# Patient Record
Sex: MALE | Race: WHITE | NOT HISPANIC OR LATINO | Employment: UNEMPLOYED | URBAN - METROPOLITAN AREA
[De-identification: names, ages, dates, MRNs, and addresses within clinical notes are randomized per-mention and may not be internally consistent; named-entity substitution may affect disease eponyms.]

---

## 2023-08-23 NOTE — PROVIDER TRANSFER
(Physician in Lead of Transfers)  Outside Transfer Acceptance Note / Regional Referral Center    Upon patient arrival to floor, please send SecureChat to [facility] or call extension [phone] (if no answer, do NOT leave a callback number after the beep, rather please send a SecureChat to [facility]), for Hospital Medicine admit team assignment and for additional admit orders for the patient.  Do not page the attending physician associated with the patient on arrival (this physician may not be on duty at the time of arrival).  Rather, always send a SecureChat to [facility] or call [phone] to reach the triage physician for orders and team assignment.    Referring facility:  [place], [place]  Referring provider: CHRISTINE ONOFRE  Accepting facility: [facility]  Accepting provider: MARCIN MARIE  Reason for transfer: Higher Level of Care   Transfer diagnosis: Decompensated Alcoholic Cirrhosis  Transfer specialty requested: Hepatology  Transfer specialty notified: Yes  Transfer level: NUMBER 1-5: 2  Bed type requested: stepdown  Isolation status: No active isolations   Admission class or status: IP- Inpatient      Narrative     38-year-old male with a history of alcoholic cirrhosis admitted to [facility] in [place] on August 14 with increasing abdominal girth, somnolence, and weakness.  He underwent paracentesis.  Admit diagnoses included alcoholic cirrhosis with ascites, hyponatremia, hypokalemia, thrombocytopenia, and AYAH.  During his stay he had intermittent fevers. Infectious Diseases evaluated him for fever and elevated Fungitell.  It was noted that blood and urine culture along with chest x-ray were negative.  They felt the Fungitell was nonspecific and the patient had no clear evidence of fungal infection.  HIV, RPR, and respiratory viral panels were noted to be negative.  MRI of the abdomen on August 20 had no cholelithiasis or biliary dilatation.   He remains on Rocephin currently.     Encephalopathy improved during his stay.  Current diagnoses included acute liver injury with decompensated cirrhosis, acute on chronic anemia, SIRS (no obvious source of infection) nonbleeding varices, and improving (but not resolved) encephalopathy.  He noted last alcohol intake was around July 7.     Hemoglobin remained low during his stay with levels ranging 6.6-8.0.  He had no overt evidence of bleeding. They noted fibrinogen and haptoglobin were low and LDH was elevated.  Ceruloplasmin is pending. EGD on August 21 had 1 column of large varices with no bleeding and no stigmata of recent bleeding in the lower 3rd of the esophagus.  No red Rigoberto sign present.  Banding was not performed.  Moderate portal hypertensive gastropathy in the entire examined stomach.  Examined duodenum was normal.    He remains hemodynamically stable in a [facility] bed.  He is awake and alert.  Will plan transfer to Hospital Medicine at [facility] for Hepatology evaluation.    August 22: White blood cells 8.8 (down from 14.4 on August 17), hemoglobin 6.9, hematocrit 20, platelets 40, sodium 134, potassium 3.3, chloride 104, CO2 18, BUN 9, creatinine 0.72, glucose 94, INR 3, total bilirubin 34.8, AST 45, ALT 22    August 20: RPR nonreactive    August 19: HIV nonreactive, COVID negative, influenza negative    August 18 urine culture had no growth    August 16: Acute hepatitis panel nonreactive, blood cultures had no growth at 5 days  -chest x-ray had no acute cardiopulmonary abnormality.    August 15: Abdominal ultrasound had cirrhosis without obvious focal mass.  No bile duct dilation.  Gallbladder is nondistended but contains sludge.  Gallbladder wall is thickened likely reactive to hepatic disease.  Moderate volume of ascites.  Splenomegaly.    August 14:  Peritoneal fluid culture had no growth after 3 days  -white blood cells 16.3, hemoglobin 10.4, hematocrit 28.6, platelets 66, sodium 124,  potassium 2.8, chloride 88, CO2 25, BUN 28, creatinine 1.48, glucose 96, INR 2.1    Objective     Vitals:  Temperature 99.1°, pulse 98, respirations 21, blood pressure 131/70, O2 sats 100%    Instructions    Admit to Hospital Medicine  Consult Hepatology      NADEGE Martínez MD  Hospital Medicine Staff  Cell: [phone]

## 2023-08-24 ENCOUNTER — HOSPITAL ENCOUNTER (INPATIENT)
Facility: HOSPITAL | Age: 38
LOS: 21 days | Discharge: HOME | DRG: 006 | End: 2023-09-14
Attending: INTERNAL MEDICINE | Admitting: INTERNAL MEDICINE
Payer: COMMERCIAL

## 2023-08-24 DIAGNOSIS — T38.0X5A STEROID-INDUCED HYPERGLYCEMIA: ICD-10-CM

## 2023-08-24 DIAGNOSIS — Z01.818 PRE-OP EVALUATION: ICD-10-CM

## 2023-08-24 DIAGNOSIS — B37.9 CANDIDIASIS: ICD-10-CM

## 2023-08-24 DIAGNOSIS — I49.9 ARRHYTHMIA: ICD-10-CM

## 2023-08-24 DIAGNOSIS — T38.0X5A ADRENAL CORTICAL STEROIDS CAUSING ADVERSE EFFECT IN THERAPEUTIC USE: ICD-10-CM

## 2023-08-24 DIAGNOSIS — D69.6 THROMBOCYTOPENIA, UNSPECIFIED: ICD-10-CM

## 2023-08-24 DIAGNOSIS — K72.90 DECOMPENSATED HEPATIC CIRRHOSIS: ICD-10-CM

## 2023-08-24 DIAGNOSIS — K74.60 DECOMPENSATED HEPATIC CIRRHOSIS: ICD-10-CM

## 2023-08-24 DIAGNOSIS — F10.21 ALCOHOL USE DISORDER, SEVERE, IN EARLY REMISSION: Chronic | ICD-10-CM

## 2023-08-24 DIAGNOSIS — E87.20 METABOLIC ACIDOSIS: ICD-10-CM

## 2023-08-24 DIAGNOSIS — E44.0 MODERATE MALNUTRITION: ICD-10-CM

## 2023-08-24 DIAGNOSIS — Z79.899 HIGH RISK MEDICATION USE: ICD-10-CM

## 2023-08-24 DIAGNOSIS — R73.9 STEROID-INDUCED HYPERGLYCEMIA: ICD-10-CM

## 2023-08-24 DIAGNOSIS — K70.31 ALCOHOLIC CIRRHOSIS OF LIVER WITH ASCITES: ICD-10-CM

## 2023-08-24 DIAGNOSIS — Z29.89 PROPHYLACTIC IMMUNOTHERAPY: ICD-10-CM

## 2023-08-24 DIAGNOSIS — Z76.82 LIVER TRANSPLANT CANDIDATE: ICD-10-CM

## 2023-08-24 DIAGNOSIS — D68.4 ACQUIRED COAGULATION FACTOR DEFICIENCY: ICD-10-CM

## 2023-08-24 DIAGNOSIS — R94.31 QT PROLONGATION: ICD-10-CM

## 2023-08-24 DIAGNOSIS — E87.1 HYPONATREMIA: ICD-10-CM

## 2023-08-24 DIAGNOSIS — Z79.60 LONG-TERM USE OF IMMUNOSUPPRESSANT MEDICATION: ICD-10-CM

## 2023-08-24 DIAGNOSIS — Z91.89 AT RISK FOR OPPORTUNISTIC INFECTIONS: ICD-10-CM

## 2023-08-24 DIAGNOSIS — Z01.810 PREOP CARDIOVASCULAR EXAM: ICD-10-CM

## 2023-08-24 DIAGNOSIS — Z99.11 ENCOUNTER FOR WEANING FROM VENTILATOR: ICD-10-CM

## 2023-08-24 DIAGNOSIS — Z94.4 LIVER TRANSPLANTED: Primary | ICD-10-CM

## 2023-08-24 DIAGNOSIS — Z01.818 ENCOUNTER FOR PRE-TRANSPLANT EVALUATION FOR LIVER TRANSPLANT: ICD-10-CM

## 2023-08-24 DIAGNOSIS — R00.1 SINUS BRADYCARDIA: ICD-10-CM

## 2023-08-24 DIAGNOSIS — Z76.82 PRE-LIVER TRANSPLANT, LISTED: ICD-10-CM

## 2023-08-24 LAB
ABO + RH BLD: NORMAL
ALBUMIN FLD-MCNC: 0.9 G/DL
ALBUMIN SERPL BCP-MCNC: 2.9 G/DL (ref 3.5–5.2)
ALP SERPL-CCNC: 136 U/L (ref 55–135)
ALT SERPL W/O P-5'-P-CCNC: 22 U/L (ref 10–44)
AMMONIA PLAS-SCNC: 62 UMOL/L (ref 10–50)
ANION GAP SERPL CALC-SCNC: 10 MMOL/L (ref 8–16)
APPEARANCE FLD: NORMAL
AST SERPL-CCNC: 51 U/L (ref 10–40)
BASOPHILS # BLD AUTO: 0.16 K/UL (ref 0–0.2)
BASOPHILS NFR BLD: 1.9 % (ref 0–1.9)
BILIRUB SERPL-MCNC: 39.3 MG/DL (ref 0.1–1)
BLD GP AB SCN CELLS X3 SERPL QL: NORMAL
BODY FLD TYPE: NORMAL
BODY FLUID SOURCE, LDH: NORMAL
BUN SERPL-MCNC: 14 MG/DL (ref 6–20)
CALCIUM SERPL-MCNC: 8.1 MG/DL (ref 8.7–10.5)
CHLORIDE SERPL-SCNC: 105 MMOL/L (ref 95–110)
CO2 SERPL-SCNC: 18 MMOL/L (ref 23–29)
COLOR FLD: YELLOW
CREAT SERPL-MCNC: 1 MG/DL (ref 0.5–1.4)
DIFFERENTIAL METHOD: ABNORMAL
EOSINOPHIL # BLD AUTO: 0.3 K/UL (ref 0–0.5)
EOSINOPHIL NFR BLD: 3.2 % (ref 0–8)
ERYTHROCYTE [DISTWIDTH] IN BLOOD BY AUTOMATED COUNT: 20 % (ref 11.5–14.5)
EST. GFR  (NO RACE VARIABLE): >60 ML/MIN/1.73 M^2
FIBRINOGEN PPP-MCNC: <70 MG/DL (ref 182–400)
GLUCOSE SERPL-MCNC: 116 MG/DL (ref 70–110)
GRAM STN SPEC: NORMAL
GRAM STN SPEC: NORMAL
HAPTOGLOB SERPL-MCNC: <10 MG/DL (ref 30–250)
HCT VFR BLD AUTO: 23.6 % (ref 40–54)
HGB BLD-MCNC: 8.1 G/DL (ref 14–18)
IMM GRANULOCYTES # BLD AUTO: 0.09 K/UL (ref 0–0.04)
IMM GRANULOCYTES NFR BLD AUTO: 1 % (ref 0–0.5)
INR PPP: 2.3 (ref 0.8–1.2)
LDH FLD L TO P-CCNC: 44 U/L
LDH SERPL L TO P-CCNC: 308 U/L (ref 110–260)
LYMPHOCYTES # BLD AUTO: 1 K/UL (ref 1–4.8)
LYMPHOCYTES NFR BLD: 12.1 % (ref 18–48)
LYMPHOCYTES NFR FLD MANUAL: 37 %
MCH RBC QN AUTO: 34.6 PG (ref 27–31)
MCHC RBC AUTO-ENTMCNC: 34.3 G/DL (ref 32–36)
MCV RBC AUTO: 101 FL (ref 82–98)
MESOTHL CELL NFR FLD MANUAL: 13 %
MONOCYTES # BLD AUTO: 1.2 K/UL (ref 0.3–1)
MONOCYTES NFR BLD: 14.4 % (ref 4–15)
MONOS+MACROS NFR FLD MANUAL: 38 %
NEUTROPHILS # BLD AUTO: 5.8 K/UL (ref 1.8–7.7)
NEUTROPHILS NFR BLD: 67.4 % (ref 38–73)
NEUTROPHILS NFR FLD MANUAL: 12 %
NRBC BLD-RTO: 0 /100 WBC
PLATELET # BLD AUTO: 51 K/UL (ref 150–450)
PMV BLD AUTO: 10.5 FL (ref 9.2–12.9)
POTASSIUM SERPL-SCNC: 3.3 MMOL/L (ref 3.5–5.1)
PROT FLD-MCNC: 1.1 G/DL
PROT SERPL-MCNC: 5 G/DL (ref 6–8.4)
PROTHROMBIN TIME: 23.8 SEC (ref 9–12.5)
RBC # BLD AUTO: 2.34 M/UL (ref 4.6–6.2)
SODIUM SERPL-SCNC: 133 MMOL/L (ref 136–145)
SPECIMEN OUTDATE: NORMAL
SPECIMEN SOURCE: NORMAL
SPECIMEN SOURCE: NORMAL
WBC # BLD AUTO: 8.63 K/UL (ref 3.9–12.7)
WBC # FLD: 37 /CU MM

## 2023-08-24 PROCEDURE — 80053 COMPREHEN METABOLIC PANEL: CPT | Performed by: HOSPITALIST

## 2023-08-24 PROCEDURE — 82042 OTHER SOURCE ALBUMIN QUAN EA: CPT | Performed by: HOSPITALIST

## 2023-08-24 PROCEDURE — 83010 ASSAY OF HAPTOGLOBIN QUANT: CPT | Performed by: HOSPITALIST

## 2023-08-24 PROCEDURE — 86850 RBC ANTIBODY SCREEN: CPT | Performed by: HOSPITALIST

## 2023-08-24 PROCEDURE — 84157 ASSAY OF PROTEIN OTHER: CPT | Performed by: HOSPITALIST

## 2023-08-24 PROCEDURE — 83615 LACTATE (LD) (LDH) ENZYME: CPT | Mod: 91 | Performed by: HOSPITALIST

## 2023-08-24 PROCEDURE — 87205 SMEAR GRAM STAIN: CPT | Performed by: HOSPITALIST

## 2023-08-24 PROCEDURE — 25000003 PHARM REV CODE 250: Performed by: HOSPITALIST

## 2023-08-24 PROCEDURE — 99223 PR INITIAL HOSPITAL CARE,LEVL III: ICD-10-PCS | Mod: ,,, | Performed by: HOSPITALIST

## 2023-08-24 PROCEDURE — 20600001 HC STEP DOWN PRIVATE ROOM

## 2023-08-24 PROCEDURE — 90792 PSYCH DIAG EVAL W/MED SRVCS: CPT | Mod: ,,, | Performed by: PSYCHIATRY & NEUROLOGY

## 2023-08-24 PROCEDURE — 89051 BODY FLUID CELL COUNT: CPT | Performed by: HOSPITALIST

## 2023-08-24 PROCEDURE — 63600175 PHARM REV CODE 636 W HCPCS: Performed by: HOSPITALIST

## 2023-08-24 PROCEDURE — 82140 ASSAY OF AMMONIA: CPT | Performed by: HOSPITALIST

## 2023-08-24 PROCEDURE — 85025 COMPLETE CBC W/AUTO DIFF WBC: CPT | Performed by: HOSPITALIST

## 2023-08-24 PROCEDURE — 85384 FIBRINOGEN ACTIVITY: CPT | Performed by: HOSPITALIST

## 2023-08-24 PROCEDURE — 63600175 PHARM REV CODE 636 W HCPCS: Mod: JZ,JG

## 2023-08-24 PROCEDURE — 90792 PR PSYCHIATRIC DIAGNOSTIC EVALUATION W/MEDICAL SERVICES: ICD-10-PCS | Mod: ,,, | Performed by: PSYCHIATRY & NEUROLOGY

## 2023-08-24 PROCEDURE — 99223 1ST HOSP IP/OBS HIGH 75: CPT | Mod: ,,, | Performed by: HOSPITALIST

## 2023-08-24 PROCEDURE — P9047 ALBUMIN (HUMAN), 25%, 50ML: HCPCS | Mod: JZ,JG

## 2023-08-24 PROCEDURE — 85610 PROTHROMBIN TIME: CPT | Performed by: HOSPITALIST

## 2023-08-24 PROCEDURE — 36415 COLL VENOUS BLD VENIPUNCTURE: CPT | Performed by: HOSPITALIST

## 2023-08-24 PROCEDURE — 87075 CULTR BACTERIA EXCEPT BLOOD: CPT | Performed by: HOSPITALIST

## 2023-08-24 PROCEDURE — 87070 CULTURE OTHR SPECIMN AEROBIC: CPT | Performed by: HOSPITALIST

## 2023-08-24 PROCEDURE — 83615 LACTATE (LD) (LDH) ENZYME: CPT | Performed by: HOSPITALIST

## 2023-08-24 PROCEDURE — 87040 BLOOD CULTURE FOR BACTERIA: CPT | Performed by: HOSPITALIST

## 2023-08-24 PROCEDURE — 80321 ALCOHOLS BIOMARKERS 1OR 2: CPT | Performed by: HOSPITALIST

## 2023-08-24 RX ORDER — SODIUM CHLORIDE 0.9 % (FLUSH) 0.9 %
10 SYRINGE (ML) INJECTION
Status: DISCONTINUED | OUTPATIENT
Start: 2023-08-24 | End: 2023-09-07

## 2023-08-24 RX ORDER — ACETAMINOPHEN 325 MG/1
650 TABLET ORAL EVERY 4 HOURS PRN
Status: DISCONTINUED | OUTPATIENT
Start: 2023-08-24 | End: 2023-09-07

## 2023-08-24 RX ORDER — ALUMINUM HYDROXIDE, MAGNESIUM HYDROXIDE, AND SIMETHICONE 2400; 240; 2400 MG/30ML; MG/30ML; MG/30ML
30 SUSPENSION ORAL EVERY 6 HOURS PRN
Status: DISCONTINUED | OUTPATIENT
Start: 2023-08-24 | End: 2023-09-07

## 2023-08-24 RX ORDER — POTASSIUM CHLORIDE 750 MG/1
10 TABLET, EXTENDED RELEASE ORAL 2 TIMES DAILY
Status: ON HOLD | COMMUNITY
Start: 2023-08-08 | End: 2023-09-13 | Stop reason: HOSPADM

## 2023-08-24 RX ORDER — ALBUMIN HUMAN 250 G/1000ML
SOLUTION INTRAVENOUS
Status: COMPLETED
Start: 2023-08-24 | End: 2023-08-24

## 2023-08-24 RX ORDER — SIMETHICONE 80 MG
1 TABLET,CHEWABLE ORAL 3 TIMES DAILY PRN
Status: DISCONTINUED | OUTPATIENT
Start: 2023-08-24 | End: 2023-09-07

## 2023-08-24 RX ORDER — LACTULOSE 10 G/15ML
30 SOLUTION ORAL 3 TIMES DAILY
Status: DISCONTINUED | OUTPATIENT
Start: 2023-08-24 | End: 2023-09-07

## 2023-08-24 RX ORDER — TALC
6 POWDER (GRAM) TOPICAL NIGHTLY PRN
Status: DISCONTINUED | OUTPATIENT
Start: 2023-08-24 | End: 2023-08-29

## 2023-08-24 RX ORDER — ONDANSETRON 8 MG/1
8 TABLET, ORALLY DISINTEGRATING ORAL EVERY 8 HOURS PRN
Status: DISCONTINUED | OUTPATIENT
Start: 2023-08-24 | End: 2023-09-07

## 2023-08-24 RX ORDER — OXYCODONE HYDROCHLORIDE 5 MG/1
5 TABLET ORAL EVERY 4 HOURS PRN
Status: DISCONTINUED | OUTPATIENT
Start: 2023-08-24 | End: 2023-09-07

## 2023-08-24 RX ORDER — ALBUTEROL SULFATE 90 UG/1
2 AEROSOL, METERED RESPIRATORY (INHALATION) EVERY 6 HOURS PRN
COMMUNITY

## 2023-08-24 RX ORDER — PANTOPRAZOLE SODIUM 40 MG/1
40 TABLET, DELAYED RELEASE ORAL DAILY
Status: DISCONTINUED | OUTPATIENT
Start: 2023-08-24 | End: 2023-09-07

## 2023-08-24 RX ORDER — ALBUMIN HUMAN 250 G/1000ML
SOLUTION INTRAVENOUS
Status: DISPENSED
Start: 2023-08-24 | End: 2023-08-25

## 2023-08-24 RX ADMIN — PANTOPRAZOLE SODIUM 40 MG: 40 TABLET, DELAYED RELEASE ORAL at 11:08

## 2023-08-24 RX ADMIN — LACTULOSE 30 G: 20 SOLUTION ORAL at 08:08

## 2023-08-24 RX ADMIN — PHYTONADIONE 10 MG: 10 INJECTION, EMULSION INTRAMUSCULAR; INTRAVENOUS; SUBCUTANEOUS at 11:08

## 2023-08-24 RX ADMIN — RIFAXIMIN 550 MG: 550 TABLET ORAL at 11:08

## 2023-08-24 RX ADMIN — RIFAXIMIN 550 MG: 550 TABLET ORAL at 08:08

## 2023-08-24 RX ADMIN — CEFTRIAXONE 2 G: 2 INJECTION, POWDER, FOR SOLUTION INTRAMUSCULAR; INTRAVENOUS at 11:08

## 2023-08-24 RX ADMIN — ALBUMIN (HUMAN): 12.5 SOLUTION INTRAVENOUS at 03:08

## 2023-08-24 RX ADMIN — POTASSIUM BICARBONATE 50 MEQ: 978 TABLET, EFFERVESCENT ORAL at 05:08

## 2023-08-24 NOTE — CONSULTS
[facility]  Hepatology  Consult Note    Patient Name: Sukhdeep Grimes  MRN: [id number]  Admission Date: 8/24/2023  Hospital Length of Stay: 0 days  Code Status: Full Code   Attending Provider:  Dr. Ellington  Consulting Provider: Tucker Cristobal MD  Primary Care Physician: No, Primary Doctor  Principal Problem:<principal problem not specified>    Inpatient consult to Hepatology  Consult performed by: Tucker Cristobal MD  Consult ordered by: Johanna Shearer MD        Subjective:     HPI: Sukhdeep Grimes is a 38 y.o. male with history of decompensated alcoholic cirrhosis, alcohol use disorder who was admitted to [facility] in [place] on August 14 with increasing abdominal girth, somnolence, and weakness. He had intermittent fevers but workup did not reveal a cause of infection. Paracentesis did not show SBP. MRI of the abdomen on August 20 had no cholelithiasis or biliary dilatation. Encephalopathy improved with lactulose & Xifaxin. S/p course of albumin, octreotide and midodrine (8/17-8/21) with improvement. EGD on 8/21 showed large varices (no stigmata) & PHG. Carvedilol was started at 3.125 Qday. He has now been transferred to [facility] for liver transplant eval.     After arrival, he was evaluated by addiction psychiatry. Paracentesis was performed & 5 L fluid removed.  Pertinent labs today hemoglobin 7.5, platelets 40, INR 2.4, sodium 134, creatinine 0.8 and total bilirubin 34.8.    The patient began drinking at age 22 years. Would have 5-6 shots of vodka with several cans of beer on his day off; alcohol use with persistent but lighter on his days on. He works in the service industry. Last drink 1 month ago (around 7/7/23). He continued drinking despite being told not to by physicians 2.5 years ago for elevated LFTs. This was when he underwent rehab, but relapsed. This was also around the time he underwent rehab. The patient has not had hospitalizations or medical  issues related to alcohol in the past.  No DUI or other on ends with the law.  Review of his notes from [place] indicate that the patient was motivated to do quit drinking.      Review of Systems   Constitutional:  Negative for chills and fever.   HENT:  Negative for dental problem and nosebleeds.    Eyes:  Negative for pain and visual disturbance.   Respiratory:  Positive for cough and shortness of breath. Negative for chest tightness.    Cardiovascular:  Positive for leg swelling. Negative for chest pain.   Gastrointestinal:  Negative for abdominal distention, abdominal pain, diarrhea and nausea.   Endocrine: Negative for cold intolerance and polydipsia.   Genitourinary:  Negative for dysuria and frequency.   Musculoskeletal:  Negative for arthralgias, gait problem and joint swelling.   Skin:  Negative for color change and wound.   Allergic/Immunologic: Negative for immunocompromised state.   Neurological:  Positive for dizziness. Negative for light-headedness and headaches.   Psychiatric/Behavioral:  Positive for confusion. The patient is nervous/anxious.         Objective:     Vitals:    08/24/23 1334   BP: (!) 150/86   Pulse: 92   Resp: 18   Temp:        Physical Exam  Constitutional:       Appearance: He is not ill-appearing.   HENT:      Head: Normocephalic and atraumatic.      Right Ear: External ear normal.      Left Ear: External ear normal.      Nose: Nose normal.      Mouth/Throat:      Mouth: Mucous membranes are moist.      Pharynx: No oropharyngeal exudate.   Eyes:      General: No scleral icterus.     Pupils: Pupils are equal, round, and reactive to light.   Cardiovascular:      Pulses: Normal pulses.      Heart sounds: No murmur heard.  Pulmonary:      Effort: Pulmonary effort is normal. No respiratory distress.      Breath sounds: No wheezing.   Abdominal:      General: There is distension.      Tenderness: There is no abdominal tenderness.   Musculoskeletal:      Right lower leg: No edema.       Left lower leg: No edema.   Skin:     Coloration: Skin is jaundiced.      Findings: No erythema.   Neurological:      General: No focal deficit present.      Mental Status: He is oriented to person, place, and time.   Psychiatric:         Mood and Affect: Mood normal.         Behavior: Behavior normal.     Significant Labs:  Recent Labs   Lab 08/24/23  1130   HGB 8.1*       Lab Results   Component Value Date    WBC 8.63 08/24/2023    HGB 8.1 (L) 08/24/2023    HCT 23.6 (L) 08/24/2023     (H) 08/24/2023    PLT 51 (L) 08/24/2023       Lab Results   Component Value Date     (L) 08/24/2023    K 3.3 (L) 08/24/2023     08/24/2023    CO2 18 (L) 08/24/2023    BUN 14 08/24/2023    CREATININE 1.0 08/24/2023    CALCIUM 8.1 (L) 08/24/2023    ANIONGAP 10 08/24/2023       Lab Results   Component Value Date    ALT 22 08/24/2023    AST 51 (H) 08/24/2023    ALKPHOS 136 (H) 08/24/2023    BILITOT 39.3 (H) 08/24/2023       Lab Results   Component Value Date    INR 2.3 (H) 08/24/2023       Significant Imaging:  Reviewed pertinent radiology findings.       Assessment/Plan:     Sukhdeep Grimes is a 38 y.o. male with history of decompensated alcoholic cirrhosis, alcohol use disorder who is transferred to [facility] from [place] for liver transplantation eval. Diagnosed with cirrhosis in July 2023 when he presented with increasing abdominal girth, somnolence, and weakness.  Most recent EGD on 8/21 showed large varices without bleeding stigmata and PHG. Last drink 1 month ago; patient reports he is committed to sobriety. He has relapsed in the past and continued drinking in spite of being told not for elevated LFTs. Modifiably high risk for tx per addiction psych. He has financial approval for liver transplant evaluation.     Problem List:  Decompensated EtOH Cirrhosis c/b ascites and HE  Alcohol use disorder, with prior relapse  HRS s/p treatment   Large esophageal  varices    ===============================    Pertinent workup:  EGD (8/21/23):  Large (> 5 mm) esophageal varices with no bleeding and no stigmata of recent bleeding. Portal hypertensive gastropathy. Normal examined duodenum.    MRI Abdomen 8/20/23:   1. No cholelithiasis. No biliary dilatation.  2. Hepatic cirrhosis with evidence of portal venous hypertension. Moderate volume ascites.    RUQ US 8/15:   Cirrhosis without obvious focal mass. No bile duct dilation.  The gallbladder is nondistended but contains sludge. The gallbladder wall is thickened which is likely reactive to hepatic disease. Moderate volume of ascites. Splenomegaly.    TTE 8/18/23:  Normal left ventricle cavity size. There is mild concentric left ventricular hypertrophy present. LV systolic function is normal (55-65%). Late presentation of bubbles in shunt study.  Cannot exclude PFO.       HEP A IgM: Non-Reactive 08/16/2023   HEP A Total: Reactive 08/22/2023   HEP B Core IgM/surface Ag/core total: Non-Reactive 08/16/2023   HEP B Surface Ab: Not immune 8/16/23  HEP C Ab: Non-Reactive 08/16/2023  ASMA: Negative 8/15/23  LEYDI: Negative 8/15/23  AMA Negative 8/22/23  Alpha 1 antitrypsin: 96 8/22/23  HIV 1/2: Negative 8/19/23  IgG: WNL 8/15/23    ================================  MELD 3.0: 31 at 8/24/2023 11:30 AM  MELD-Na: 31 at 8/24/2023 11:30 AM  Calculated from:  Serum Creatinine: 1.0 mg/dL at 8/24/2023  9:53 AM  Serum Sodium: 133 mmol/L at 8/24/2023  9:53 AM  Total Bilirubin: 39.3 mg/dL at 8/24/2023  9:53 AM  Serum Albumin: 2.9 g/dL at 8/24/2023  9:53 AM  INR(ratio): 2.3 at 8/24/2023 11:30 AM  Age at listing (hypothetical): 38 years  Sex: Male at 8/24/2023 11:30 AM       Recommendations:  - Meets criteria for SBP prophylaxis (Ascitic fluid protein < 1.5 and T bilirubin >3). Can start PO ciprofloxacin or PO Bactrim.     - Obtain repeat TTE since prior TTE showed concern for PFO.     -  Obtain CT triple phase.     - Transplant coordinator will order  Infectious Diseases labs.     - Continue PO lactulose 20 mg TID (titrate till 2-3 daily Bm achieved) & Xifaxin 550 mg BID.    - Continue folate and thiamine supplementation.     - Appreciate addiction psychiatry recs.     - Low Na, high protein diet.     - Avoid sedating drugs (opiates & benzodiazepines) if possible.    - Daily CBC, CMP & INR. Follow PEth, ceruloplasmin.       Thank you for involving us in the care of Sukhdeep Grimes. Please call with any additional questions, concerns or changes in the patient's clinical status. We will continue to follow.     Tucker Cristobal MD  Gastroenterology Fellow PGY V  [facility]

## 2023-08-24 NOTE — SEDATION DOCUMENTATION
Paracentesis complete. 5000mL of ascites fluid removed. Albumin replaced per algorithm, pt tolerated well with no distress noted.

## 2023-08-24 NOTE — PROCEDURES
Radiology Post-Procedure Note    Pre Op Diagnosis: Ascites  Post Op Diagnosis: Same    Procedure: Paracentesis    Procedure performed by: Guy Herrera MD; res Audie Oliveros MD    Written Informed Consent Obtained: Yes  Specimen Removed: YES, 5 L dark straw-colored ascitic fluid  Estimated Blood Loss: Minimal    Findings:   Successful paracentesis.  Albumin administered PRN per protocol.    Patient tolerated procedure well.    Audie Oliveros M.D.  Resident, Diagnostic and Interventional Radiology  Department of Radiology  [facility]

## 2023-08-24 NOTE — SUBJECTIVE & OBJECTIVE
No past medical history on file.    No past surgical history on file.    Review of patient's allergies indicates:  No Active Allergies    No current facility-administered medications on file prior to encounter.     Current Outpatient Medications on File Prior to Encounter   Medication Sig    albuterol (VENTOLIN HFA) 90 mcg/actuation inhaler Inhale 2 puffs into the lungs every 6 (six) hours as needed for Wheezing. Rescue    potassium chloride (KLOR-CON) 10 MEQ TbSR Take 10 mEq by mouth 2 (two) times daily.     Family History    None       Tobacco Use    Smoking status: Not on file    Smokeless tobacco: Not on file   Substance and Sexual Activity    Alcohol use: Not on file    Drug use: Not on file    Sexual activity: Not on file     Review of Systems   Constitutional:  Negative for chills and fever.   HENT:  Negative for dental problem and nosebleeds.    Eyes:  Negative for pain and visual disturbance.   Respiratory:  Positive for cough and shortness of breath. Negative for chest tightness.    Cardiovascular:  Positive for leg swelling. Negative for chest pain.   Gastrointestinal:  Negative for abdominal distention, abdominal pain, diarrhea and nausea.   Endocrine: Negative for cold intolerance and polydipsia.   Genitourinary:  Negative for dysuria and frequency.   Musculoskeletal:  Negative for arthralgias, gait problem and joint swelling.   Skin:  Negative for color change and wound.   Allergic/Immunologic: Negative for immunocompromised state.   Neurological:  Positive for dizziness. Negative for light-headedness and headaches.   Psychiatric/Behavioral:  Positive for confusion. The patient is nervous/anxious.      Objective:     Vital Signs (Most Recent):  Temp: 97.9 °F (36.6 °C) (08/24/23 1112)  Pulse: 89 (08/24/23 1502)  Resp: 18 (08/24/23 1502)  BP: 126/74 (08/24/23 1502)  SpO2: 99 % (08/24/23 1502) Vital Signs (24h Range):  Temp:  [97.9 °F (36.6 °C)-98.8 °F (37.1 °C)] 97.9 °F (36.6 °C)  Pulse:  [89-98]  89  Resp:  [18-19] 18  SpO2:  [98 %-100 %] 99 %  BP: (115-150)/(55-86) 126/74     Weight: 67.1 kg (148 lb 0.6 oz)  Body mass index is 21.24 kg/m².     Physical Exam  Constitutional:       Appearance: He is not ill-appearing.   HENT:      Head: Normocephalic and atraumatic.      Right Ear: External ear normal.      Left Ear: External ear normal.      Nose: Nose normal.      Mouth/Throat:      Mouth: Mucous membranes are moist.      Pharynx: No oropharyngeal exudate.   Eyes:      General: No scleral icterus.     Pupils: Pupils are equal, round, and reactive to light.   Cardiovascular:      Pulses: Normal pulses.      Heart sounds: No murmur heard.  Pulmonary:      Effort: Pulmonary effort is normal. No respiratory distress.      Breath sounds: No wheezing.   Abdominal:      General: There is distension.      Tenderness: There is no abdominal tenderness.   Musculoskeletal:      Right lower leg: No edema.      Left lower leg: No edema.   Skin:     Coloration: Skin is jaundiced.      Findings: No erythema.   Neurological:      General: No focal deficit present.      Mental Status: He is oriented to person, place, and time.   Psychiatric:         Mood and Affect: Mood normal.         Behavior: Behavior normal.              CRANIAL NERVES     CN III, IV, VI   Pupils are equal, round, and reactive to light.       Significant Labs: All pertinent labs within the past 24 hours have been reviewed.    Significant Imaging: I have reviewed all pertinent imaging results/findings within the past 24 hours.

## 2023-08-24 NOTE — HPI
38-year-old male with a history of alcoholic cirrhosis admitted to [facility] in [place] on August 14 with increasing abdominal girth, somnolence, and weakness.  He underwent paracentesis.  Admit diagnoses included alcoholic cirrhosis with ascites, hyponatremia, hypokalemia, thrombocytopenia, and AYAH.  During his stay he had intermittent fevers. Infectious Diseases evaluated him for fever and elevated Fungitell.  It was noted that blood and urine culture along with chest x-ray were negative.  They felt the Fungitell was nonspecific and the patient had no clear evidence of fungal infection.  HIV, RPR, and respiratory viral panels were noted to be negative.  MRI of the abdomen on August 20 had no cholelithiasis or biliary dilatation.  He remains on Rocephin currently.      Encephalopathy improved during his stay.  Current diagnoses included acute liver injury with decompensated cirrhosis, acute on chronic anemia, SIRS (no obvious source of infection) nonbleeding varices, and improving (but not resolved) encephalopathy.  He noted last alcohol intake was around July 7.      Hemoglobin remained low during his stay with levels ranging 6.6-8.0.  He had no overt evidence of bleeding. They noted fibrinogen and haptoglobin were low and LDH was elevated.  Ceruloplasmin is pending. EGD on August 21 had 1 column of large varices with no bleeding and no stigmata of recent bleeding in the lower 3rd of the esophagus.  No red Rigoberto sign present.  Banding was not performed.  Moderate portal hypertensive gastropathy in the entire examined stomach.  Examined duodenum was normal.     He remains hemodynamically stable in a [facility] bed.  He is awake and alert.  Will plan transfer to Hospital Medicine at [facility] for Hepatology evaluation.     August 22: White blood cells 8.8 (down from 14.4 on August 17), hemoglobin 6.9, hematocrit 20, platelets 40, sodium 134, potassium 3.3, chloride 104, CO2 18, BUN 9,  creatinine 0.72, glucose 94, INR 3, total bilirubin 34.8, AST 45, ALT 22     August 20: RPR nonreactive     August 19: HIV nonreactive, COVID negative, influenza negative     August 18 urine culture had no growth     August 16: Acute hepatitis panel nonreactive, blood cultures had no growth at 5 days  -chest x-ray had no acute cardiopulmonary abnormality.     August 15: Abdominal ultrasound had cirrhosis without obvious focal mass.  No bile duct dilation.  Gallbladder is nondistended but contains sludge.  Gallbladder wall is thickened likely reactive to hepatic disease.  Moderate volume of ascites.  Splenomegaly.     August 14:  Peritoneal fluid culture had no growth after 3 days  -white blood cells 16.3, hemoglobin 10.4, hematocrit 28.6, platelets 66, sodium 124, potassium 2.8, chloride 88, CO2 25, BUN 28, creatinine 1.48, glucose 96, INR 2.1

## 2023-08-24 NOTE — CONSULTS
INITIAL VISIT: PSYCHIATRY  Addiction Psychiatry Consultation Service  Pre-Transplant Evaluation      ASSESSMENT AND PLAN:     DIAGNOSES & PROBLEMS:  Alcohol Use Disorder     In Summary:  Mr. Grimes is a 38 year old male with a history of alcoholic cirrhosis complicated by ascites and non-bleeding varices, alcohol use disorder (last drink around 1 month ago), generalized anxiety disorder who presented to [facility] in [place] for decompensated liver cirrhosis. He was transferred here for liver transplant evaluation. On interview, patient states he was living in [place] with his brother, has been consuming alcohol since his early twenties, states he was always peer pressured into drinking and developed a habit. His alcohol use gradually increased in his twenties to around 5-9 beers w/ an additional 6 shots daily or every other day. Over the course of a decade he states he was hospitalized several times either due to trauma suffered while drinking or the side effects of drinking. He completed a rehab program roughly three years ago in [place] and was sober until he was peer pressured by family and friends to drink again. Around 3 months ago, patient noted decreased appetite, increased weakness, fatigue, yellowing skin, distended abdomen. He went to the hospital and was diagnosed with cirrhosis attributed to alcohol. Patient states he stopped drinking after that diagnosis and has good insight into his disease process and cause. He states he signed a contract with his brother to never consume alcohol again and is motivated to remain sober. His mood is optimistic after transfer to [facility] for transplant evaluation. Shows good motivation to undergo the necessary steps for transplantation.         Transplant Suitability:  From a psychiatric perspective, this patient presents as a modifiably high risk for transplant.    Plan:  - Recommend completion of rehab program and enrollment in  to decrease risk  "for transplant  - Recommend discontinuation benzodiazepines for treatment of anxiety      With reasonable medical certainty, based on history, chart review, available collateral information, and a present-state examination:  - PEC is not indicated  - recommend patient enroll in addiction rehab program after discharge and medical stabilization  - counseled on full abstinence from alcohol and substances of abuse (illicit and prescription)  - full engagement in 12 step (or equivalent) recovery program(s), including meeting attendance and acquisition/maintenance of sponsor  - relapse prevention and motivational interviewing provided  - provided resources for various addiction rehabilitation options as part of aftercare planning       PRESENTATION:     Sukhdeep Grimes presents with the following chief complaint: alcohol and/or drug addiction    Per Chart:  "38-year-old male with a history of alcoholic cirrhosis admitted to [facility] in [place] on August 14 with increasing abdominal girth, somnolence, and weakness.  He underwent paracentesis.  Admit diagnoses included alcoholic cirrhosis with ascites, hyponatremia, hypokalemia, thrombocytopenia, and AYAH.  During his stay he had intermittent fevers. Infectious Diseases evaluated him for fever and elevated Fungitell.  It was noted that blood and urine culture along with chest x-ray were negative.  They felt the Fungitell was nonspecific and the patient had no clear evidence of fungal infection.  HIV, RPR, and respiratory viral panels were noted to be negative.  MRI of the abdomen on August 20 had no cholelithiasis or biliary dilatation.  He remains on Rocephin currently.      Encephalopathy improved during his stay.  Current diagnoses included acute liver injury with decompensated cirrhosis, acute on chronic anemia, SIRS (no obvious source of infection) nonbleeding varices, and improving (but not resolved) encephalopathy.  He noted last alcohol intake " "was around July 7.      Hemoglobin remained low during his stay with levels ranging 6.6-8.0.  He had no overt evidence of bleeding. They noted fibrinogen and haptoglobin were low and LDH was elevated.  Ceruloplasmin is pending. EGD on August 21 had 1 column of large varices with no bleeding and no stigmata of recent bleeding in the lower 3rd of the esophagus.  No red Rigoberto sign present.  Banding was not performed.  Moderate portal hypertensive gastropathy in the entire examined stomach.  Examined duodenum was normal.     He remains hemodynamically stable in a [facility] bed.  He is awake and alert. Will plan transfer to Hospital Medicine at [facility] for Hepatology evaluation.     August 22: White blood cells 8.8 (down from 14.4 on August 17), hemoglobin 6.9, hematocrit 20, platelets 40, sodium 134, potassium 3.3, chloride 104, CO2 18, BUN 9, creatinine 0.72, glucose 94, INR 3, total bilirubin 34.8, AST 45, ALT 22     August 20: RPR nonreactive     August 19: HIV nonreactive, COVID negative, influenza negative     August 18 urine culture had no growth     August 16: Acute hepatitis panel nonreactive, blood cultures had no growth at 5 days  -chest x-ray had no acute cardiopulmonary abnormality.     August 15: Abdominal ultrasound had cirrhosis without obvious focal mass.  No bile duct dilation.  Gallbladder is nondistended but contains sludge.  Gallbladder wall is thickened likely reactive to hepatic disease.  Moderate volume of ascites.  Splenomegaly.     August 14:  Peritoneal fluid culture had no growth after 3 days  -white blood cells 16.3, hemoglobin 10.4, hematocrit 28.6, platelets 66, sodium 124, potassium 2.8, chloride 88, CO2 25, BUN 28, creatinine 1.48, glucose 96, INR 2.1"    Per Patient:  Mr. Grimes has a long standing history of alcoholism. He began drinking in the early 20s, has been drinking up to 9 beers in addition to 5-6 shots every day or every other day. His alcohol requirements have " "increased since his early 20s. He has completed rehab around 3 years ago and remained sober for a brief period of time before relapsing secondary to peer pressure from family and friends. He reported a diagnosis of cirrhosis around 3 months ago after he had difficulty walking and felt weak. He stopped drinking after this diagnosis and promised his siblings that he would remain sober. He denies any recreational drug use. He admits to using a vape pen but denies any tobacco use. He states he has a diagnosis of generalized anxiety disorder and has been using lorazepam to treat his anxiety. He last received lorazepam around 2 days ago while in the hospital. He states he "found half a pill at the bottom of his backpack" and took it. He states he does not have any more ativan on him. He states he is motivated to obtain a new liver and is willing to undertake the necessary steps in order to get this.       REVIEW OF SYSTEMS:  I[]I Patient denies any pertinent ROS, and none is known.  I[]I Patient unable or unwilling to provide any ROS.    [x] Y  [] N  sleep disturbance: ** Globally: nearly every day Positive for: initial insomnia  [] Y  [x] N  appetite/weight change: ** Globally: denied    [x] Y  [] N  fatigue/anergia: ** Globally: persistent Positive for: lapsing into sleep  [x] Y  [] N  impairment in focus/concentration: ** Globally: persistent Positive for: diminished ability to think or concentrate     [x] Y  [] N  depression: ** Globally: moderate Positive for: intermittent sadness Negative for: hopelessness  [x] Y  [] N  anxiety/worry: ** Globally: persistent Positive for: more anxiety than the average person Negative for: more anxiety than the average person  [] Y  [x] N  dysregulated mood/behavior: **   [] Y  [x] N  manic symptomatology: **   [] Y  [x] N  psychosis: **     A pertinent medical review of systems was performed with the following notable findings: Jaundice, " occasional anxiety, generalized weakness    CURRENT PSYCHOTROPIC REGIMEN:  I[x]I Y  I[]I N  I[]I U    Lorazepam      ADDICTION:     I[x]I Y  I[]I N  I[]I U  I[]I Current  I[]I Former  Nicotine Use: Vaping  I[x]I Y  I[]I N  I[]I U  I[]I Current  I[]I Former  Alcohol Use:   I[x]I Y  I[]I N  I[]I U  I[]I Current  I[]I Former  Alcohol Misuse/Abuse:   I[]I Y  I[x]I N  I[]I U  I[]I Current  I[]I Former  Illicit Drug Use/Misuse/Abuse:   I[]I Y  I[]I N  I[x]I U  I[]I Current  I[]I Former  Misuse/Abuse of Rx Medications:   I[]I Cannabis  I[]I Cocaine  I[]I Heroin  I[]I Meth  I[]I Opioids  I[]I Stimulants  I[x]I Benzos  I[]I Other:     I[]I N/A  I[]I U  Substance(s) of Choice: Alcohol   I[]I N/A  I[]I U  Substance(s) Used Currently/Recently: Alcohol and Benzodiazepine   I[]I N/A  I[]I U  Alcohol Consumption: daily or near daily 9 beers, with 5-6 shots   I[]I N/A  I[]I U  Last Drink: >2 mo ago   I[]I N/A  I[]I U  Last Drug Use: ativan, 2 days ago   I[]I N/A  I[]I U  Duration of Sobriety/Abstinence: 2 months     I[x]I Y  I[]I N  I[]I U  Hx of Detox:   I[x]I Y  I[]I N  I[]I U  Hx of Rehab: done around 3 years ago   I[]I Y  I[x]I N  I[]I U  Hx of IVDU:   I[]I Y  I[x]I N  I[]I U  Hx of Accidental Overdose:   I[]I Y  I[x]I N  I[]I U  Hx of DUI:   I[]I Y  I[x]I N  I[]I U  Hx of Complicated Withdrawal (i.e. Seizures and/or Delirium Tremens):   I[x]I Y  I[]I N  I[]I U  Hx of Known/Suspected Substance-Induced Psychiatric Disorder:   I[x]I Y  I[]I N  I[]I U  Hx of Medication Assisted Treatment:   I[x]I Y  I[]I N  I[]I U  Hx of Twelve Step Program (or Equivalent) Involvement:   I[]I Y  I[x]I N  I[]I U  Currently Exhibits Signs of Intoxication:   I[]I Y  I[x]I N  I[]I U  Currently Exhibits Signs of Withdrawal:   I[x]I Y  I[]I N  I[]I U  Currently Active in Recovery:   I[x]I Y  I[]I N  I[]I U  Social Support:   I[]I Y  I[x]I N  I[]I U  Spouse/Partner Consumption:     I[]I N/A  I[x]I Y  I[]I N  I[]I U  Acknowledges/Accepts Addiction:   I[]I  "N/A  I[x]I Y  I[]I N  I[]I U  Advised to Quit/Cut Back:   I[]I N/A  I[x]I Y  I[]I N  I[]I U  Alcohol/Drug Cessation ("Wants to Quit"): Interested in Quitting, Making Efforts to Cut Back or Quit  I[]I N/A  I[x]I Y  I[]I N  I[]I U  Motivation to Pursue Treatment: High, Agreeable  I[]I N/A  I[]I Y  I[]I N  I[]I U  Tobacco Cessation ("Wants to Quit"): interested in quitting    ADDITIONAL CONSIDERATIONS FOR TRANSPLANT:     I[]I N/A  I[x]I Y  I[]I N  I[]I U  I[]I A  Awareness of Biomedical Complications:   I[]I N/A  I[x]I Y  I[]I N  I[]I U  I[]I A  Understands Need for Lifetime Sobriety:   I[]I N/A  I[x]I Y  I[]I N  I[]I U  I[]I A  Able to Adhere to Treatment Plan:     Date First Informed of Impending Organ Failure: Around 3 months ago during initial hospitalization as reported by the patient  Continued to Drink/Use Despite Knowledge of Organ Damage: Discontinued alcohol on learning of diagnosis  When Was the Subject of Transplantation First Broached: On recent hospitalization (8/14)  Patient Made the Following Lifestyle Adjustments and How: Drinking cessation  Does the Patient Accept/Understand the Connection Between Substance Use and Subsequent Organ Failure: Yes, he understands the alcohol lead to his liver failure  Understands Need for Lifetime Medication: Yes  Understands Need for Lifetime Sobriety: Yes  View/Acceptance of Twelve Step (or Equivalent) Programs: Acceptance of rehab and further therapy    DSM-5-TR SUBSTANCE USE DISORDER CRITERIA:     -- Impaired Control:  I[x]I Y  I[]I N  I[]I U  I[]I A  I[]I D  Often take in larger amounts or over a longer period of time than was intended:   I[x]I Y  I[]I N  I[]I U  I[]I A  I[]I D  Persistent desire or unsuccessful efforts to cut down or control use:   I[x]I Y  I[]I N  I[]I U  I[]I A  I[]I D  Great deal of time spent in activities necessary to obtain substance, use, or recover from effects:   I[x]I Y  I[]I N  I[]I U  I[]I A  I[]I D  Craving/strong desire for substance " "or urge to use:   -- Social Impairment:  I[x]I Y  I[]I N  I[]I U  I[]I A  I[]I D  Use resulting in failure to fulfill major role obligations at home, work or school:   I[x]I Y  I[]I N  I[]I U  I[]I A  I[]I D  Social, occupational, recreational activities decreased because of use:   I[x]I Y  I[]I N  I[]I U  I[]I A  I[]I D  Continued use despite having persistent or recurrent social or interpersonal problems caused or exacerbated by the substance:   -- Risky Use:  I[]I Y  I[x]I N  I[]I U  I[]I A  I[]I D  Recurrent use in situations in which it is physically hazardous:   I[]I Y  I[x]I N  I[]I U  I[]I A  I[]I D  Use despite physical or psychological problems that are likely to have been caused or exacerbated by the substance:   -- Neuroadaptation:  I[x]I Y  I[]I N  I[]I U  I[]I A  I[]I D  Tolerance, as defined by either of the following: (1) a need for markedly increased amounts of substance to achieve intoxication or desired effect.  -OR- (2) a markedly diminished effect with continued use of the same amount of substance:   I[]I Y  I[x]I N  I[]I U  I[]I A  I[]I D  Withdrawal, as manifested by either of the following: (1) the characteristic withdrawal syndrome for substance.  -OR- (2) substance is taken to relieve or avoid withdrawal symptoms:   -- Mild (1-3), Moderate (4-5), Severe (?6)    I[]I N/A  I[]I Y  I[]I N  I[x]I U  I[]I A  I[]I D  Active Substance Use Disorder:       HISTORY:     I[]I Patient denies any history, and none is known.  I[]I Patient unable or unwilling to provide any history.    I[x]I Y  I[]I N  I[]I U  Psychiatric Diagnoses: Patient reports a prior visit with psychiatrist who treated him for "racing thoughts"  I[]I Y  I[x]I N  I[]I U  Current Psychiatric Provider (if Applicable):   I[]I Y  I[x]I N  I[]I U  Hx of Psychiatric Hospitalization:   I[x]I Y  I[]I N  I[]I U  Hx of Outpatient Psychiatric Treatment (psychiatry/psychotherapy):   I[x]I Y  I[]I N  I[]I U  Psychotropic Trials: Unclear which " "medication patient took. He self discontinued due to "headaches".  I[]I Y  I[x]I N  I[]I U  Prior Suicide Attempts:   I[]I Y  I[x]I N  I[]I U  Hx of Suicidal Ideation:   I[]I Y  I[x]I N  I[]I U  Hx of Homicidal Ideation:   I[]I Y  I[x]I N  I[]I U  Hx of Self-Injurious Behavior (Non-Suicidal):   I[]I Y  I[x]I N  I[]I U  Hx of Violence:   I[]I Y  I[x]I N  I[]I U  Documented Hx of Malingering:     FAMILY HISTORY:  I[x]I Y  I[]I N  I[]I U    Mother and sister had depression   Father had a h/o significant alcohol use - seizure or MI     I[]I Y  I[x]I N  I[]I U  Hx of Trauma/Neglect:   I[]I Y  I[x]I N  I[]I U  Hx of Physical Abuse:   I[]I Y  I[x]I N  I[]I U  Hx of Sexual Abuse:   I[]I Y  I[x]I N  I[]I U  Grew Up Locally?: [place]   I[x]I Y  I[]I N  I[]I U  Happy Childhood?:   I[]I Y  I[x]I N  I[]I U  Significant Developmental Delay/Disability?:   I[x]I Y  I[]I N  I[]I U  GED/High School Dipoloma?:   I[x]I Y  I[]I N  I[]I U  Post High School Education?:   I[x]I Y  I[]I N  I[]I U  Currently Employed?:   I[]I Y  I[x]I N  I[]I U  On or Applying for Disability?:   I[x]I Y  I[]I N  I[]I U  Functions Independently?:   I[]I Y  I[]I N  I[x]I U  Financially Stable?:   I[x]I Y  I[]I N  I[]I U  Domiciled?:   I[]I Y  I[x]I N  I[]I U  Lives Alone?:   I[x]I Y  I[]I N  I[]I U  Heterosexual/Cisgender?:   I[]I Y  I[x]I N  I[]I U  Currently in a Romantic Relationship?:   I[]I Y  I[x]I N  I[]I U  Ever ?:   I[]I Y  I[x]I N  I[]I U  Children/Dependents?:   I[]I Y  I[x]I N  I[]I U  Voodoo/Spiritual?:   I[]I Y  I[x]I N  I[]I U   History?:   I[]I Y  I[x]I N  I[]I U  Current Legal Issues:   I[]I Y  I[x]I N  I[]I U  Past Charges/Convictions:   I[]I Y  I[x]I N  I[]I U  Hx of Incarceration:   I[]I Y  I[x]I N  I[]I U  Engaged in Hobbies/Recreational Activities?:   I[]I Y  I[x]I N  I[]I U  Access to a Gun?: No  NOTE: patient counseled on gun safety, including safe storage.  NOTE: patient counseled on inherent risks associated with " gun ownership.    I[]I Y  I[x]I N  I[]I U  Hx of Seizure:   I[x]I Y  I[]I N  I[]I U  Hx of Significant Head Trauma (e.g., Loss of Consciousness, Concussion, Coma):    I[x]I Y  I[]I N  I[]I U  Medical History & Diagnoses:       The patient's past medical history has been reviewed and updated as appropriate within the electronic medical record system.    * No active hospital problems. *     Scheduled and PRN Medications: The electronic chart was reviewed and updated as appropriate.  See Medcard for details.    Current Facility-Administered Medications:     acetaminophen tablet 650 mg, 650 mg, Oral, Q4H PRN, Johanna Shearer MD    aluminum & magnesium hydroxide-simethicone 400-400-40 mg/5 mL suspension 30 mL, 30 mL, Oral, Q6H PRN, Johanna Shearer MD    cefTRIAXone (ROCEPHIN) 2 g in dextrose 5 % in water (D5W) 100 mL IVPB (MB+), 2 g, Intravenous, Q24H, Johanna Shearer MD    lactulose 20 gram/30 mL solution Soln 30 g, 30 g, Oral, TID, Johanna Shearer MD    melatonin tablet 6 mg, 6 mg, Oral, Nightly PRN, Johanna Shearer MD    ondansetron disintegrating tablet 8 mg, 8 mg, Oral, Q8H PRN, Johanna Shearer MD    oxyCODONE immediate release tablet 5 mg, 5 mg, Oral, Q4H PRN, Johanna Shearer MD    pantoprazole EC tablet 40 mg, 40 mg, Oral, Daily, Johanna Shearer MD    phytonadione vitamin k (AQUA-MEPHYTON) 10 mg in dextrose 5 % (D5W) 50 mL IVPB, 10 mg, Intravenous, Daily, Johanna Shearer MD    rifAXIMin tablet 550 mg, 550 mg, Oral, BID, Johanna Shearer MD    simethicone chewable tablet 80 mg, 1 tablet, Oral, TID PRN, Johanna Shearer MD    sodium chloride 0.9% flush 10 mL, 10 mL, Intravenous, PRN, Johanna Shearer E., MD    Allergies:  Patient has no known allergies.    PSYCHOSOCIAL FACTORS:  Stressors (Biopsychosocial, Cultural and Environmental): social  Functioning Relationships: good support system    STRENGTHS AND LIABILITIES:   Strength: Patient accepts guidance/feedback.  Strength: Patient is  "motivated for change.  Strength: Patient is accepting of treatment.  Strength: Patient is seeking help.  Strength: Patient embraces recovery.  Liability: Patient has poor health.    Additional Relevant History, As Applicable:       EXAMINATION:     BP (!) 115/55   Pulse 98   Temp 98.8 °F (37.1 °C) (Oral)   Resp 19   Ht 5' 10" (1.778 m)   Wt 67.1 kg (148 lb 0.6 oz)   SpO2 100%   BMI 21.24 kg/m²     MENTAL STATUS EXAMINATION:  General Appearance: **   jaundiced, NAD  Behavior: **  cooperative, under good behavioral control  Involuntary Movements and Motor Activity: **  no abnormal involuntary movements noted  Gait and Station: **  unable to assess - patient lying down or seated  Speech and Language: **  spontaneous  Mood: "happy"  Affect: **  reactive, mood congruent  Thought Process and Associations: **  linear, goal-directed, at times tangential   Thought Content and Perceptions: **  no suicidal or homicidal ideation, no evidence of psychosis  Sensorium: **  grossly intact, alert  Recent and Remote Memory: **  grossly intact  Attention and Concentration: **   largely intact, though some distractibility   Fund of Knowledge: **  grossly intact, used appropriate vocabulary, no significant deficits noted  Insight: **  intact, demonstrates awareness of illness  Judgment: **  intact, behavior is adequate/appropriate given the circumstances      RISK MANAGEMENT:     I[]I Y  I[x]I N  I[]I U  I[]I A  Suicidal Ideation/Behavior: ** no active ideation  I[]I Y  I[x]I N  I[]I U  I[]I A  Homicidal Ideation/Behavior: **  I[]I Y  I[x]I N  I[]I U  I[]I A  Violence: **  I[]I Y  I[x]I N  I[]I U  I[]I A  Self-Injurious Behavior: **    The patient is deemed to be a historian of unknown reliability and accuracy, with no evidence of delirium.    I[]I Y  I[x]I N  I[]I U  I[]I A  I[]I N/A  Minimization of Risk Parameters Suspected/Evident: **  I[]I Y  I[x]I N  I[]I U  I[]I A  I[]I N/A  Exaggeration of Risk Parameters " Suspected/Evident: **      [] Y  [x] N  Danger to Self:   [] Y  [x] N  Danger to Others:   [] Y  [x] N  Grave Disability:       In cases of emergency, daily coverage provided by Acute/ER Psych MD, NP, PA, or SW, with contact numbers located in [facility] On Call Schedule.    [facility]  Department of Psychiatry  [facility]        KEY:     I[]I Y = Yes / Present / Endorses  I[]I N = No / Absent / Denies  I[]I U = Unknown / Unable to Assess / Unwilling to Participate  I[]I A = Ambiguity Exists / Accuracy Uncertain  I[]I D = Denial or Minimization is Suspected/Evident  I[]I N/A = Non-Applicable    CHART REVIEW:     Available documentation has been reviewed, and pertinent elements of the chart have been incorporated into this evaluation where appropriate.    The patient's last Epic encounter in the psychiatry department was on: Visit date not found  The patient's first Epic encounter in the psychiatry department was on:      LA/MS  [place]  Site reviewed - No recent discrepancies or irregularities are noted.  No fill history    ADVICE AND COUNSELING:     [x] In cases of emergencies (e.g. SI/HI resulting in danger to self or others, functioning deteriorates to the level of grave disability), call 911 or [phone], or present to the emergency department for immediate assistance.  [x] Patient should not operate a motor vehicle or heavy machinery if effects of medications or underlying symptoms/condition impair the ability to safely do so.    Alcohol, Tobacco, and Drug Counseling, as well as resources, has been provided, as warranted.     Shared medical decision making and informed consent are the hallmark and bedrock of good clinical care, and as such have been employed and obtained, respectively, to the degree possible.      Risk Mitigation Strategies, Harm Reduction Techniques, and Safety Netting are important interventions that can reduce acute and chronic risk, and as such have been employed to  the degree possible.    Prescription Drug Management entails the review, recommendation, or consideration without recommendation of medications, and as such was employed during the encounter.    Additional Psychoeducation has been provided, as warranted.    Discussed, to the extent possible, diagnosis, risks and benefits of proposed treatment vs alternative treatments vs no treatment, potential side effects of these treatments and the inherent unpredictability of treatment. The patient expresses understanding of the above and displays the capacity to agree with this treatment given said understanding. Patient also agrees that, currently, the benefits outweigh the risks and consents to treatment at this time.     Written material has been provided to supplement, augment, and reinforce any discussions and interventions, via the AVS or other pre-printed handouts, as warranted.      DIAGNOSTIC TESTING:     The chart was reviewed for recent diagnostic procedures and investigations, and pertinent results are noted below.    Na *   *   K *   *   Ca *   *  Phos *   *   Mg *   *     Glu *   *   HgA1c <4.0  8/1/2023    BUN *   *   Cr *   *   GFR *   *   Specific Gravity *   *   Protein (Urine) *   *   Microalbumin *   *     T Prot *   *   Alb *   *   T Bili *   *   Alk Phos *   *   AST *   *   ALT *   *   GGT *   *   NH3 *   *   Amylase *   *   Lipase *   *    TSH *   *   Free T4 *   *  PTH *   *  Prolactin *   *   CPK *   *   Troponin I *   *   PT *   *   INR *   *    WBC *   *   RBC *   *   Hgb *   *   HCT *   *   MCV *   *  PLT *   *   ANC *   *    Cholesterol *   *   Triglycerides *   *   LDL *   *   HDL *   *     B12 *   *   Folate *   *   Thiamine *   *   Vit D *   *     HIV 1/2 Ag/Ab *   *   Hep B Surface *   *   Hep B Core *   *   Hep A *   *   Hep C *   *   RPR *   *     Lithium *   *   VPA *   *   Clozapine *   *     Alcohol *   *   Benzodiazepines *   *    Barbiturates *   *   Cannabis *   *   Cocaine *   *   Amphetamines *   *   PCP *   *   Opiates *   *   Methadone *   *   Buprenorphine *   *   Fentanyl *   *   Oxycodone *   *   Tramadol *   *     Ethanol *   *  PETH *   *   EtG *   *   EtS *   *   Buprenorphine *   *   Norbuprenorphine *   *     No results found for this or any previous visit.    No results found for this or any previous visit.    CONSULTATION:     A diagnostic psychiatric evaluation was performed and responsiveness to treatment was assessed.  The patient demonstrates adequate ability/capacity to respond to treatment.    Inpatient consult to Psychiatry  Consult performed by: Chente Barker DO  Consult ordered by: Johanna Shearer MD          - The consulting clinician was informed of the encounter documentation.

## 2023-08-24 NOTE — H&P
[facility]  History & Physical    Patient Name: Sukhdeep Grimes  MRN: [id number]  Patient Class: IP- Inpatient  Admission Date: 8/24/2023  Attending Physician: Johanna Shearer MD   Primary Care Provider: Martine Primary Doctor         Patient information was obtained from patient, past medical records and ER records.     Subjective:     Principal Problem:Alcoholic cirrhosis of liver with ascites    Chief Complaint:   Chief Complaint   Patient presents with    liver cirrhosis     Transfer for liver cirrhosis           HPI: 38-year-old male with a history of alcoholic cirrhosis admitted to [facility] in [place] on August 14 with increasing abdominal girth, somnolence, and weakness.  He underwent paracentesis.  Admit diagnoses included alcoholic cirrhosis with ascites, hyponatremia, hypokalemia, thrombocytopenia, and AYAH.  During his stay he had intermittent fevers. Infectious Diseases evaluated him for fever and elevated Fungitell.  It was noted that blood and urine culture along with chest x-ray were negative.  They felt the Fungitell was nonspecific and the patient had no clear evidence of fungal infection.  HIV, RPR, and respiratory viral panels were noted to be negative.  MRI of the abdomen on August 20 had no cholelithiasis or biliary dilatation.  He remains on Rocephin currently.      Encephalopathy improved during his stay.  Current diagnoses included acute liver injury with decompensated cirrhosis, acute on chronic anemia, SIRS (no obvious source of infection) nonbleeding varices, and improving (but not resolved) encephalopathy.  He noted last alcohol intake was around July 7.      Hemoglobin remained low during his stay with levels ranging 6.6-8.0.  He had no overt evidence of bleeding. They noted fibrinogen and haptoglobin were low and LDH was elevated.  Ceruloplasmin is pending. EGD on August 21 had 1 column of large varices with no bleeding and no  stigmata of recent bleeding in the lower 3rd of the esophagus.  No red Rigoberto sign present.  Banding was not performed.  Moderate portal hypertensive gastropathy in the entire examined stomach.  Examined duodenum was normal.     He remains hemodynamically stable in a [facility] bed.  He is awake and alert.  Will plan transfer to Hospital Medicine at [facility] for Hepatology evaluation.     August 22: White blood cells 8.8 (down from 14.4 on August 17), hemoglobin 6.9, hematocrit 20, platelets 40, sodium 134, potassium 3.3, chloride 104, CO2 18, BUN 9, creatinine 0.72, glucose 94, INR 3, total bilirubin 34.8, AST 45, ALT 22 August 20: RPR nonreactive     August 19: HIV nonreactive, COVID negative, influenza negative     August 18 urine culture had no growth     August 16: Acute hepatitis panel nonreactive, blood cultures had no growth at 5 days  -chest x-ray had no acute cardiopulmonary abnormality.     August 15: Abdominal ultrasound had cirrhosis without obvious focal mass.  No bile duct dilation.  Gallbladder is nondistended but contains sludge.  Gallbladder wall is thickened likely reactive to hepatic disease.  Moderate volume of ascites.  Splenomegaly.     August 14:  Peritoneal fluid culture had no growth after 3 days  -white blood cells 16.3, hemoglobin 10.4, hematocrit 28.6, platelets 66, sodium 124, potassium 2.8, chloride 88, CO2 25, BUN 28, creatinine 1.48, glucose 96, INR 2.1      No past medical history on file.    No past surgical history on file.    Review of patient's allergies indicates:  No Active Allergies    No current facility-administered medications on file prior to encounter.     Current Outpatient Medications on File Prior to Encounter   Medication Sig    albuterol (VENTOLIN HFA) 90 mcg/actuation inhaler Inhale 2 puffs into the lungs every 6 (six) hours as needed for Wheezing. Rescue    potassium chloride (KLOR-CON) 10 MEQ TbSR Take 10 mEq by mouth 2 (two) times daily.     Family  History    None       Tobacco Use    Smoking status: Not on file    Smokeless tobacco: Not on file   Substance and Sexual Activity    Alcohol use: Not on file    Drug use: Not on file    Sexual activity: Not on file     Review of Systems   Constitutional:  Negative for chills and fever.   HENT:  Negative for dental problem and nosebleeds.    Eyes:  Negative for pain and visual disturbance.   Respiratory:  Positive for cough and shortness of breath. Negative for chest tightness.    Cardiovascular:  Positive for leg swelling. Negative for chest pain.   Gastrointestinal:  Negative for abdominal distention, abdominal pain, diarrhea and nausea.   Endocrine: Negative for cold intolerance and polydipsia.   Genitourinary:  Negative for dysuria and frequency.   Musculoskeletal:  Negative for arthralgias, gait problem and joint swelling.   Skin:  Negative for color change and wound.   Allergic/Immunologic: Negative for immunocompromised state.   Neurological:  Positive for dizziness. Negative for light-headedness and headaches.   Psychiatric/Behavioral:  Positive for confusion. The patient is nervous/anxious.      Objective:     Vital Signs (Most Recent):  Temp: 97.9 °F (36.6 °C) (08/24/23 1112)  Pulse: 89 (08/24/23 1502)  Resp: 18 (08/24/23 1502)  BP: 126/74 (08/24/23 1502)  SpO2: 99 % (08/24/23 1502) Vital Signs (24h Range):  Temp:  [97.9 °F (36.6 °C)-98.8 °F (37.1 °C)] 97.9 °F (36.6 °C)  Pulse:  [89-98] 89  Resp:  [18-19] 18  SpO2:  [98 %-100 %] 99 %  BP: (115-150)/(55-86) 126/74     Weight: 67.1 kg (148 lb 0.6 oz)  Body mass index is 21.24 kg/m².     Physical Exam  Constitutional:       Appearance: He is not ill-appearing.   HENT:      Head: Normocephalic and atraumatic.      Right Ear: External ear normal.      Left Ear: External ear normal.      Nose: Nose normal.      Mouth/Throat:      Mouth: Mucous membranes are moist.      Pharynx: No oropharyngeal exudate.   Eyes:      General: No scleral icterus.     Pupils:  Pupils are equal, round, and reactive to light.   Cardiovascular:      Pulses: Normal pulses.      Heart sounds: No murmur heard.  Pulmonary:      Effort: Pulmonary effort is normal. No respiratory distress.      Breath sounds: No wheezing.   Abdominal:      General: There is distension.      Tenderness: There is no abdominal tenderness.   Musculoskeletal:      Right lower leg: No edema.      Left lower leg: No edema.   Skin:     Coloration: Skin is jaundiced.      Findings: No erythema.   Neurological:      General: No focal deficit present.      Mental Status: He is oriented to person, place, and time.   Psychiatric:         Mood and Affect: Mood normal.         Behavior: Behavior normal.              CRANIAL NERVES     CN III, IV, VI   Pupils are equal, round, and reactive to light.       Significant Labs: All pertinent labs within the past 24 hours have been reviewed.    Significant Imaging: I have reviewed all pertinent imaging results/findings within the past 24 hours.    Assessment/Plan:     * Alcoholic cirrhosis of liver with ascites  MELD 3.0: 31 at 8/24/2023 11:30 AM  MELD-Na: 31 at 8/24/2023 11:30 AM  Calculated from:  Serum Creatinine: 1.0 mg/dL at 8/24/2023  9:53 AM  Serum Sodium: 133 mmol/L at 8/24/2023  9:53 AM  Total Bilirubin: 39.3 mg/dL at 8/24/2023  9:53 AM  Serum Albumin: 2.9 g/dL at 8/24/2023  9:53 AM  INR(ratio): 2.3 at 8/24/2023 11:30 AM  Age at listing (hypothetical): 38 years  Sex: Male at 8/24/2023 11:30 AM      Lactulose/rifaximin  Vitamin K   Liver ultrasound doppler  Hepatology consult  PET  Psych consult  Paracentesis        Encounter for pre-transplant evaluation for liver transplant    See liver cirrhosis    Decompensated hepatic cirrhosis    See liver cirrhosis    Alcohol use disorder, severe, in early remission  Consult addiction psychiatry         VTE Risk Mitigation (From admission, onward)         Ordered     IP VTE LOW RISK PATIENT  Once         08/24/23 1002     Place AURORA hose   Until discontinued         08/24/23 1002     Place sequential compression device  Until discontinued         08/24/23 1002                           Johanna Shearer MD  Department of Hospital Medicine  [facility] - Transplant Stepdown

## 2023-08-24 NOTE — ASSESSMENT & PLAN NOTE
MELD 3.0: 31 at 8/24/2023 11:30 AM  MELD-Na: 31 at 8/24/2023 11:30 AM  Calculated from:  Serum Creatinine: 1.0 mg/dL at 8/24/2023  9:53 AM  Serum Sodium: 133 mmol/L at 8/24/2023  9:53 AM  Total Bilirubin: 39.3 mg/dL at 8/24/2023  9:53 AM  Serum Albumin: 2.9 g/dL at 8/24/2023  9:53 AM  INR(ratio): 2.3 at 8/24/2023 11:30 AM  Age at listing (hypothetical): 38 years  Sex: Male at 8/24/2023 11:30 AM      Lactulose/rifaximin  Vitamin K   Liver ultrasound doppler  Hepatology consult  PET  Psych consult  Paracentesis

## 2023-08-24 NOTE — NURSING
"Pt arrived to unit from [facility]. AAOx4 but does repeat questions, VSS, no c/o pain. Dr. Shearer notified & at bedside. Orders released. Pt oriented to room, call light/personal belongings w/in reach, non-slip socks in place, pt remains free from falls, pt independent & ambulatory, WCTM.  MELD 31  Bili 39  Cr. Fibrinogen <70  Ammonia 62  Lactulose TID  Rocephin Q24  Vit K Qd x 3  PETH in process  Addiction Psyc deemed pt as a modifiably high risk for tx  PT/OT consult  U/S complete  Para complete - 5L removed, dressing CDI upon arrival to unit  U/A + cx to be collected - pt keeps urinating in the toilet, "forgetting" to provide sample in cup  Pt reports no BM today  Notified Dr. Shearer that pt's mother would like to speak to her    "

## 2023-08-25 ENCOUNTER — DOCUMENTATION ONLY (OUTPATIENT)
Dept: TRANSPLANT | Facility: CLINIC | Age: 38
End: 2023-08-25
Payer: COMMERCIAL

## 2023-08-25 LAB
ABO + RH BLD: NORMAL
ALBUMIN SERPL BCP-MCNC: 2.9 G/DL (ref 3.5–5.2)
ALP SERPL-CCNC: 113 U/L (ref 55–135)
ALT SERPL W/O P-5'-P-CCNC: 19 U/L (ref 10–44)
AMPHET+METHAMPHET UR QL: NEGATIVE
ANION GAP SERPL CALC-SCNC: 8 MMOL/L (ref 8–16)
ASCENDING AORTA: 3.13 CM
AST SERPL-CCNC: 45 U/L (ref 10–40)
AV INDEX (PROSTH): 0.89
AV MEAN GRADIENT: 6 MMHG
AV PEAK GRADIENT: 10 MMHG
AV VALVE AREA BY VELOCITY RATIO: 3.04 CM²
AV VALVE AREA: 3.16 CM²
AV VELOCITY RATIO: 0.86
BARBITURATES UR QL SCN>200 NG/ML: NEGATIVE
BASOPHILS # BLD AUTO: 0.11 K/UL (ref 0–0.2)
BASOPHILS NFR BLD: 1.4 % (ref 0–1.9)
BENZODIAZ UR QL SCN>200 NG/ML: NEGATIVE
BILIRUB SERPL-MCNC: 34.8 MG/DL (ref 0.1–1)
BILIRUB UR QL STRIP: ABNORMAL
BLD GP AB SCN CELLS X3 SERPL QL: NORMAL
BSA FOR ECHO PROCEDURE: 1.82 M2
BUN SERPL-MCNC: 11 MG/DL (ref 6–20)
BZE UR QL SCN: NEGATIVE
CALCIUM SERPL-MCNC: 7.8 MG/DL (ref 8.7–10.5)
CANNABINOIDS UR QL SCN: NEGATIVE
CHLORIDE SERPL-SCNC: 108 MMOL/L (ref 95–110)
CLARITY UR REFRACT.AUTO: ABNORMAL
CO2 SERPL-SCNC: 18 MMOL/L (ref 23–29)
COLOR UR AUTO: ABNORMAL
COMPLEXED PSA SERPL-MCNC: 0.91 NG/ML (ref 0–4)
CREAT SERPL-MCNC: 0.8 MG/DL (ref 0.5–1.4)
CREAT UR-MCNC: 133 MG/DL (ref 23–375)
CV ECHO LV RWT: 0.26 CM
DIFFERENTIAL METHOD: ABNORMAL
DOP CALC AO PEAK VEL: 1.59 M/S
DOP CALC AO VTI: 28.81 CM
DOP CALC LVOT AREA: 3.5 CM2
DOP CALC LVOT DIAMETER: 2.12 CM
DOP CALC LVOT PEAK VEL: 1.37 M/S
DOP CALC LVOT STROKE VOLUME: 90.92 CM3
DOP CALCLVOT PEAK VEL VTI: 25.77 CM
E WAVE DECELERATION TIME: 136.81 MSEC
E/A RATIO: 0.97
E/E' RATIO: 6.67 M/S
ECHO LV POSTERIOR WALL: 0.67 CM (ref 0.6–1.1)
EOSINOPHIL # BLD AUTO: 0.3 K/UL (ref 0–0.5)
EOSINOPHIL NFR BLD: 3.7 % (ref 0–8)
ERYTHROCYTE [DISTWIDTH] IN BLOOD BY AUTOMATED COUNT: 19.8 % (ref 11.5–14.5)
EST. GFR  (NO RACE VARIABLE): >60 ML/MIN/1.73 M^2
ETHANOL UR-MCNC: <10 MG/DL
FRACTIONAL SHORTENING: 36 % (ref 28–44)
GLUCOSE SERPL-MCNC: 88 MG/DL (ref 70–110)
GLUCOSE UR QL STRIP: NEGATIVE
HAV IGG SER QL IA: NORMAL
HBV CORE AB SERPL QL IA: NORMAL
HBV SURFACE AB SER-ACNC: 5.62 MIU/ML
HBV SURFACE AB SER-ACNC: NORMAL M[IU]/ML
HBV SURFACE AG SERPL QL IA: NORMAL
HCT VFR BLD AUTO: 22.3 % (ref 40–54)
HCV AB SERPL QL IA: NORMAL
HGB BLD-MCNC: 7.5 G/DL (ref 14–18)
HGB UR QL STRIP: ABNORMAL
HIV 1+2 AB+HIV1 P24 AG SERPL QL IA: NORMAL
IMM GRANULOCYTES # BLD AUTO: 0.06 K/UL (ref 0–0.04)
IMM GRANULOCYTES NFR BLD AUTO: 0.8 % (ref 0–0.5)
INR PPP: 2.4 (ref 0.8–1.2)
INTERVENTRICULAR SEPTUM: 0.68 CM (ref 0.6–1.1)
KETONES UR QL STRIP: NEGATIVE
LA MAJOR: 5.41 CM
LA MINOR: 5.64 CM
LA WIDTH: 4.3 CM
LEFT ATRIUM SIZE: 4.31 CM
LEFT ATRIUM VOLUME INDEX MOD: 44.8 ML/M2
LEFT ATRIUM VOLUME INDEX: 47.3 ML/M2
LEFT ATRIUM VOLUME MOD: 82.47 CM3
LEFT ATRIUM VOLUME: 87 CM3
LEFT INTERNAL DIMENSION IN SYSTOLE: 3.31 CM (ref 2.1–4)
LEFT VENTRICLE DIASTOLIC VOLUME INDEX: 68.65 ML/M2
LEFT VENTRICLE DIASTOLIC VOLUME: 126.32 ML
LEFT VENTRICLE MASS INDEX: 63 G/M2
LEFT VENTRICLE SYSTOLIC VOLUME INDEX: 24.2 ML/M2
LEFT VENTRICLE SYSTOLIC VOLUME: 44.48 ML
LEFT VENTRICULAR INTERNAL DIMENSION IN DIASTOLE: 5.14 CM (ref 3.5–6)
LEFT VENTRICULAR MASS: 115.05 G
LEUKOCYTE ESTERASE UR QL STRIP: NEGATIVE
LV LATERAL E/E' RATIO: 5.83 M/S
LV SEPTAL E/E' RATIO: 7.78 M/S
LYMPHOCYTES # BLD AUTO: 1.2 K/UL (ref 1–4.8)
LYMPHOCYTES NFR BLD: 14.6 % (ref 18–48)
MCH RBC QN AUTO: 34.2 PG (ref 27–31)
MCHC RBC AUTO-ENTMCNC: 33.6 G/DL (ref 32–36)
MCV RBC AUTO: 102 FL (ref 82–98)
METHADONE UR QL SCN>300 NG/ML: NEGATIVE
MONOCYTES # BLD AUTO: 1.3 K/UL (ref 0.3–1)
MONOCYTES NFR BLD: 16.2 % (ref 4–15)
MV A" WAVE DURATION": 10.28 MSEC
MV PEAK A VEL: 0.72 M/S
MV PEAK E VEL: 0.7 M/S
MV STENOSIS PRESSURE HALF TIME: 39.67 MS
MV VALVE AREA P 1/2 METHOD: 5.55 CM2
NEUTROPHILS # BLD AUTO: 5 K/UL (ref 1.8–7.7)
NEUTROPHILS NFR BLD: 63.3 % (ref 38–73)
NITRITE UR QL STRIP: NEGATIVE
NRBC BLD-RTO: 0 /100 WBC
OPIATES UR QL SCN: NEGATIVE
PCP UR QL SCN>25 NG/ML: NEGATIVE
PH UR STRIP: 7 [PH] (ref 5–8)
PISA TR MAX VEL: 2.62 M/S
PLATELET # BLD AUTO: 40 K/UL (ref 150–450)
PMV BLD AUTO: 10.3 FL (ref 9.2–12.9)
POTASSIUM SERPL-SCNC: 3.5 MMOL/L (ref 3.5–5.1)
PROT SERPL-MCNC: 4.6 G/DL (ref 6–8.4)
PROT UR QL STRIP: ABNORMAL
PROTHROMBIN TIME: 24.7 SEC (ref 9–12.5)
PULM VEIN S/D RATIO: 0.88
PV PEAK D VEL: 0.56 M/S
PV PEAK S VEL: 0.49 M/S
RA MAJOR: 4.95 CM
RA PRESSURE ESTIMATED: 3 MMHG
RA WIDTH: 4.06 CM
RBC # BLD AUTO: 2.19 M/UL (ref 4.6–6.2)
RIGHT VENTRICULAR END-DIASTOLIC DIMENSION: 3.61 CM
RV TB RVSP: 6 MMHG
SINUS: 3.01 CM
SODIUM SERPL-SCNC: 134 MMOL/L (ref 136–145)
SP GR UR STRIP: 1.02 (ref 1–1.03)
SPECIMEN OUTDATE: NORMAL
STJ: 2.64 CM
TDI LATERAL: 0.12 M/S
TDI SEPTAL: 0.09 M/S
TDI: 0.11 M/S
TOXICOLOGY INFORMATION: NORMAL
TR MAX PG: 27 MMHG
TRICUSPID ANNULAR PLANE SYSTOLIC EXCURSION: 2.85 CM
TV REST PULMONARY ARTERY PRESSURE: 30 MMHG
URN SPEC COLLECT METH UR: ABNORMAL
WBC # BLD AUTO: 7.92 K/UL (ref 3.9–12.7)
Z-SCORE OF LEFT VENTRICULAR DIMENSION IN END DIASTOLE: 0.07
Z-SCORE OF LEFT VENTRICULAR DIMENSION IN END SYSTOLE: 0.39

## 2023-08-25 PROCEDURE — 85610 PROTHROMBIN TIME: CPT | Performed by: HOSPITALIST

## 2023-08-25 PROCEDURE — 97535 SELF CARE MNGMENT TRAINING: CPT

## 2023-08-25 PROCEDURE — 86704 HEP B CORE ANTIBODY TOTAL: CPT | Performed by: STUDENT IN AN ORGANIZED HEALTH CARE EDUCATION/TRAINING PROGRAM

## 2023-08-25 PROCEDURE — 97165 OT EVAL LOW COMPLEX 30 MIN: CPT

## 2023-08-25 PROCEDURE — 81003 URINALYSIS AUTO W/O SCOPE: CPT | Performed by: HOSPITALIST

## 2023-08-25 PROCEDURE — 25000003 PHARM REV CODE 250: Performed by: HOSPITALIST

## 2023-08-25 PROCEDURE — 63600175 PHARM REV CODE 636 W HCPCS: Performed by: HOSPITALIST

## 2023-08-25 PROCEDURE — 86592 SYPHILIS TEST NON-TREP QUAL: CPT | Performed by: STUDENT IN AN ORGANIZED HEALTH CARE EDUCATION/TRAINING PROGRAM

## 2023-08-25 PROCEDURE — 86480 TB TEST CELL IMMUN MEASURE: CPT | Performed by: STUDENT IN AN ORGANIZED HEALTH CARE EDUCATION/TRAINING PROGRAM

## 2023-08-25 PROCEDURE — 99223 1ST HOSP IP/OBS HIGH 75: CPT | Mod: ,,, | Performed by: STUDENT IN AN ORGANIZED HEALTH CARE EDUCATION/TRAINING PROGRAM

## 2023-08-25 PROCEDURE — 80321 ALCOHOLS BIOMARKERS 1OR 2: CPT | Performed by: STUDENT IN AN ORGANIZED HEALTH CARE EDUCATION/TRAINING PROGRAM

## 2023-08-25 PROCEDURE — 84153 ASSAY OF PSA TOTAL: CPT | Performed by: STUDENT IN AN ORGANIZED HEALTH CARE EDUCATION/TRAINING PROGRAM

## 2023-08-25 PROCEDURE — 20600001 HC STEP DOWN PRIVATE ROOM

## 2023-08-25 PROCEDURE — 99223 PR INITIAL HOSPITAL CARE,LEVL III: ICD-10-PCS | Mod: ,,, | Performed by: STUDENT IN AN ORGANIZED HEALTH CARE EDUCATION/TRAINING PROGRAM

## 2023-08-25 PROCEDURE — 25500020 PHARM REV CODE 255: Performed by: HOSPITALIST

## 2023-08-25 PROCEDURE — 80053 COMPREHEN METABOLIC PANEL: CPT | Performed by: HOSPITALIST

## 2023-08-25 PROCEDURE — 99233 PR SUBSEQUENT HOSPITAL CARE,LEVL III: ICD-10-PCS | Mod: ,,, | Performed by: HOSPITALIST

## 2023-08-25 PROCEDURE — 97530 THERAPEUTIC ACTIVITIES: CPT

## 2023-08-25 PROCEDURE — 99233 SBSQ HOSP IP/OBS HIGH 50: CPT | Mod: ,,, | Performed by: HOSPITALIST

## 2023-08-25 PROCEDURE — 86644 CMV ANTIBODY: CPT | Performed by: STUDENT IN AN ORGANIZED HEALTH CARE EDUCATION/TRAINING PROGRAM

## 2023-08-25 PROCEDURE — 86900 BLOOD TYPING SEROLOGIC ABO: CPT | Performed by: STUDENT IN AN ORGANIZED HEALTH CARE EDUCATION/TRAINING PROGRAM

## 2023-08-25 PROCEDURE — 80307 DRUG TEST PRSMV CHEM ANLYZR: CPT | Performed by: HOSPITALIST

## 2023-08-25 PROCEDURE — 87389 HIV-1 AG W/HIV-1&-2 AB AG IA: CPT | Performed by: STUDENT IN AN ORGANIZED HEALTH CARE EDUCATION/TRAINING PROGRAM

## 2023-08-25 PROCEDURE — 86682 HELMINTH ANTIBODY: CPT | Performed by: STUDENT IN AN ORGANIZED HEALTH CARE EDUCATION/TRAINING PROGRAM

## 2023-08-25 PROCEDURE — 85025 COMPLETE CBC W/AUTO DIFF WBC: CPT | Performed by: HOSPITALIST

## 2023-08-25 PROCEDURE — 86790 VIRUS ANTIBODY NOS: CPT | Performed by: STUDENT IN AN ORGANIZED HEALTH CARE EDUCATION/TRAINING PROGRAM

## 2023-08-25 PROCEDURE — 86706 HEP B SURFACE ANTIBODY: CPT | Mod: 91 | Performed by: STUDENT IN AN ORGANIZED HEALTH CARE EDUCATION/TRAINING PROGRAM

## 2023-08-25 PROCEDURE — 36415 COLL VENOUS BLD VENIPUNCTURE: CPT | Performed by: HOSPITALIST

## 2023-08-25 PROCEDURE — 86787 VARICELLA-ZOSTER ANTIBODY: CPT | Performed by: STUDENT IN AN ORGANIZED HEALTH CARE EDUCATION/TRAINING PROGRAM

## 2023-08-25 PROCEDURE — 87340 HEPATITIS B SURFACE AG IA: CPT | Performed by: STUDENT IN AN ORGANIZED HEALTH CARE EDUCATION/TRAINING PROGRAM

## 2023-08-25 PROCEDURE — 86803 HEPATITIS C AB TEST: CPT | Performed by: STUDENT IN AN ORGANIZED HEALTH CARE EDUCATION/TRAINING PROGRAM

## 2023-08-25 RX ORDER — FUROSEMIDE 40 MG/1
40 TABLET ORAL DAILY
Status: DISCONTINUED | OUTPATIENT
Start: 2023-08-25 | End: 2023-08-28

## 2023-08-25 RX ORDER — SPIRONOLACTONE 50 MG/1
100 TABLET, FILM COATED ORAL DAILY
Status: DISCONTINUED | OUTPATIENT
Start: 2023-08-25 | End: 2023-08-28

## 2023-08-25 RX ADMIN — SPIRONOLACTONE 100 MG: 50 TABLET ORAL at 10:08

## 2023-08-25 RX ADMIN — PANTOPRAZOLE SODIUM 40 MG: 40 TABLET, DELAYED RELEASE ORAL at 10:08

## 2023-08-25 RX ADMIN — LACTULOSE 30 G: 20 SOLUTION ORAL at 05:08

## 2023-08-25 RX ADMIN — LACTULOSE 30 G: 20 SOLUTION ORAL at 10:08

## 2023-08-25 RX ADMIN — LACTULOSE 30 G: 20 SOLUTION ORAL at 08:08

## 2023-08-25 RX ADMIN — RIFAXIMIN 550 MG: 550 TABLET ORAL at 08:08

## 2023-08-25 RX ADMIN — PHYTONADIONE 10 MG: 10 INJECTION, EMULSION INTRAMUSCULAR; INTRAVENOUS; SUBCUTANEOUS at 10:08

## 2023-08-25 RX ADMIN — FUROSEMIDE 40 MG: 40 TABLET ORAL at 10:08

## 2023-08-25 RX ADMIN — IOHEXOL 75 ML: 350 INJECTION, SOLUTION INTRAVENOUS at 04:08

## 2023-08-25 RX ADMIN — RIFAXIMIN 550 MG: 550 TABLET ORAL at 10:08

## 2023-08-25 NOTE — NURSING
Pt state it is ok for staff to discuss his care & plan with mother Maribel ([phone]) and brother Nitesh ([phone])

## 2023-08-25 NOTE — CONSULTS
Consult Note  Liver Transplant Surgery    Consult Requested By:   Reason for Consult: Liver transplant evaluation    SUBJECTIVE:     History of Present Illness: Patient is a 38 y.o. male with liver disease due to alcoholic liver disease.  Symptoms and complications of liver disease include ascites (recurrent paracentesis needed), edema, encephalopathy, esophageal or gastric varices, fatigue, jaundice, muscle wasting, portal hypertension, and thrombocytopenia.     Patient reports developing jaundice and fatigue ~3 months prior.  Sought medical care and found to have elevated MELD.  Work-up at that time demonstrated Ascites and esophageal varices.  Subsequently had episodes of hepatic encephlopathy and AYAH that has improved.  Has been abstinent from EtoH since 7/2023.  Patient developed worsening symptoms and presented to OSH 8/14/23.  Found to have high MELD and transferred to [facility] for inpatient transplant evaluation.      Prior Surgery:Denies  Medical Considerations:Hx of AYAH.    Frailty:Some muscle wasting, but ambulates without difficulty.  Strong .  Ascites:Moderate ascites  SBP history:Denies  PV:Patent on US  Arterial:Axial imaging pending  Biliary/Pancreas:Denies.      Scheduled Meds:   furosemide  40 mg Oral Daily    And    spironolactone  100 mg Oral Daily    lactulose  30 g Oral TID    pantoprazole  40 mg Oral Daily    phytonadione vitamin k (AQUA-MEPHYTON) 10 mg in dextrose 5 % (D5W) 50 mL IVPB  10 mg Intravenous Daily    rifAXImin  550 mg Oral BID     Continuous Infusions:  PRN Meds:acetaminophen, aluminum & magnesium hydroxide-simethicone, melatonin, ondansetron, oxyCODONE, simethicone, sodium chloride 0.9%    Review of patient's allergies indicates:  No Active Allergies    No past medical history on file.  No past surgical history on file.  No family history on file.        Review of Systems:      OBJECTIVE:     Vital Signs (Most Recent)  Temp: 97.6 °F (36.4 °C) (08/25/23 1146)  Pulse: 101 (08/25/23  "1146)  Resp: 20 (08/25/23 1146)  BP: 126/68 (08/25/23 1146)  SpO2: 100 % (08/25/23 1146)    Vital Signs Range (Last 24H):  Temp:  [97.6 °F (36.4 °C)-99.8 °F (37.7 °C)]   Pulse:  []   Resp:  [16-20]   BP: ()/(53-68)   SpO2:  [97 %-100 %]     Physical Exam:  Physical Exam  Constitutional:       General: He is not in acute distress.     Appearance: Normal appearance. He is ill-appearing. He is not toxic-appearing.   HENT:      Head: Normocephalic.      Right Ear: External ear normal.      Left Ear: External ear normal.   Eyes:      General: Scleral icterus present.      Extraocular Movements: Extraocular movements intact.      Pupils: Pupils are equal, round, and reactive to light.   Cardiovascular:      Rate and Rhythm: Normal rate.      Pulses: Normal pulses.   Pulmonary:      Effort: Pulmonary effort is normal.   Abdominal:      Comments: Soft, distended, non-tender, no abdominal scars.   Musculoskeletal:      Right lower leg: Edema present.      Left lower leg: Edema present.   Skin:     General: Skin is warm.      Capillary Refill: Capillary refill takes less than 2 seconds.      Coloration: Skin is jaundiced.   Neurological:      General: No focal deficit present.      Mental Status: He is alert and oriented to person, place, and time.      Comments: Lethargic with responses to questions           Laboratory:  CBC:   Recent Labs   Lab 08/28/23 0444   WBC 11.81   RBC 2.39*   HGB 8.3*   HCT 24.5*   PLT 63*     CMP:   Recent Labs   Lab 08/28/23 0444   GLU 94   CALCIUM 8.3*   ALBUMIN 2.8*   PROT 5.1*   *   K 4.4   CO2 20*   CL 99   BUN 19   CREATININE 1.4   ALKPHOS 128   ALT 25   AST 56*   BILITOT 37.0*     Coagulation:   Recent Labs   Lab 08/28/23 0444   INR 2.5*     Cardiac Markers: No results for input(s): "CKMB", "TROPONINT", "MYOGLOBIN" in the last 168 hours.  ABGs: No results for input(s): "PH", "PCO2", "HCO3", "POCSATURATED", "BE" in the last 168 hours.      ASSESSMENT/PLAN:     Patient " Active Problem List   Diagnosis    Alcohol use disorder, severe, in early remission    Alcoholic cirrhosis of liver with ascites    Decompensated hepatic cirrhosis    Encounter for pre-transplant evaluation for liver transplant       Transplant Candidacy: Patient is a 38 y.o. year old male with alcoholic liver disease being evaluated for possible OLT.  In my opinion, he is a suitable liver transplant candidate.  He will be presented to selection committee after all tests and evaluations are complete.    UNOS Patient Status  Functional Status: 50% - Requires considerable assistance and frequent medical care  Physical Capacity: No Limitations    Counseling: I discussed with the patient the benefits of liver transplantation.  We discussed the evaluation and listing procedures.  We discussed the MELD system and the associated waiting times.  We discussed national and center specific survival results.  We discussed the option of being multiply listed in different OPOs.   We discussed the risks, benefits and potential complications related to surgery including the risks related to anesthesia, bleeding, infection, primary non-function of the allograft, the risk of reoperation as well as the risk of death.  We discussed the typical post-operative course, length of hospitalization, the long-term use of immunosuppressive therapy as well as the need for long-term routine follow-up.    Coronavirus disease (COVID-19) caused by severe acute respiratory virus coronavirus 2 (SARS-C0V 2) is associated with increased mortality in solid organ transplant recipients (SOT) compared to non-transplant patients. Vaccine responses to vaccination are depressed against SARS-CoV2 compared to normal individuals but improve with third vaccination doses. Vaccination prior to SOT provides both the best opportunity for transplant candidates to develop protective immunity and to reduce the risk of serious COVID19 infections post transplantation.  Organ transplant candidates at [facility] will be required to receive SARS-CoV-2 vaccination prior to being listed with a an active status, whenever possible. Exceptions will be made for disability related reasons or for sincerely held Muslim beliefs.     PHS: I discussed the use of organs from donors with PHS risk criteria, including the testing protocols utilized, as well as data from the literature regarding the likelihood of transmission of hepatitis or HIV.  The patient that he is willing to consider such grafts.  DCD: I discussed the use of organs recovered by donation after cardiac death (DCD), including slightly decreased graft survival and greater risk of arterial and biliary complications. The potential advantage to the recipient is possibly receiving a transplant sooner by accepting such an organ. The patient that he is willing to consider such grafts.  HBcAb: I discussed the use of organs from donors with HBcAb in conjunction with long term use of HBV antiviral drugs, such as lamivudine. The small but measurable risk of hepatitis B seroconversion was discussed as well as the potentially life long need to continue antiviral drugs. The patient that he is willing to consider such grafts.  HCV Non-viremic recipient: I discussed the use of HCV-positive organs in naive recipients, including the risk of viral transmission to the patients or others, potential insurance barriers for antiviral medication coverage, risk for fibrosing cholestatic hepatitis, death or graft loss. The potential advantage to the recipient is the possibility of receiving a transplant sooner with decreased mortality risk by accepting such an organ. The patient that he is willing to consider such grafts.  LDLT: I discussed the nature of living donor liver transplant, including donor risks and more frequent recipient complications. The patient that he is willing to consider such grafts.  Covid: I  discussed that this donor has tested positive for the covid virus, but with very low levels of virus and no evidence of covid disease. Although the risk of transmission is unknown, we believe this donor is not infectious and use of the abdominal organs is safe.  To date, these organs have been used with no evidence of transmission. The patient that he is willing to consider such grafts.    Discussed with attending Dr. Murry

## 2023-08-25 NOTE — PT/OT/SLP EVAL
"Occupational Therapy   Evaluation    Name: Sukhdeep Grimes  MRN: [id number]  Admitting Diagnosis: Alcoholic cirrhosis of liver with ascites  Recent Surgery: * No surgery found *      Recommendations:     Discharge Recommendations: home  Discharge Equipment Recommendations:  none  Barriers to discharge:  Other (Comment) (patient awaiting transplant)    Assessment:     Sukhdeep Grimes is a 38 y.o. male with a medical diagnosis of Alcoholic cirrhosis of liver with ascites. Patient presents with decreased functional abilities in relation to PLOF and will benefit from skilled therapy services to return to PLOF. Patient with good prognosis for accelerated therapy outcome to reach set goals and discharge to next level of care.  Patient to receive therapy to combat weakness and worsening of functional abilities to care for self. Pt will work on adaptations to ADLs, strength and endurance training, and overall mobility training to directly aid safety at hospital and when going through daily life. Performance deficits affecting function: weakness, impaired endurance, impaired self care skills, impaired functional mobility, gait instability, impaired balance, decreased safety awareness, impaired cardiopulmonary response to activity.      Rehab Prognosis: Good; patient would benefit from acute skilled OT services to address these deficits and reach maximum level of function.       Plan:     Patient to be seen 2 x/week to address the above listed problems via self-care/home management, therapeutic activities, therapeutic exercises, neuromuscular re-education  Plan of Care Expires:    Plan of Care Reviewed with: patient    Subjective     Chief Complaint: "I didn't get very much rest last night"  Patient/Family Comments/goals: Receive transplant and return home better     Occupational Profile:  Living Environment: Patient lives with brother in [facility] with 4-5 NII front and no NII back of house. Patient states he uses back of house when " bringing in groceries but mainly uses front of house to enter. House is equipped with a tub/shower combo with detachable shower wand.   Previous level of function: Patient was independent with all ADLs and IADLs, no use of AD or AE around house.   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  Patient found supine with peripheral IV upon OT entry to room.    General Precautions: Standard, fall  Orthopedic Precautions:    Braces:    Respiratory Status: Room air    Occupational Performance:    Bed Mobility:    Patient completed Rolling/Turning to Left with  stand by assistance  Patient completed Rolling/Turning to Right with stand by assistance  Patient completed Scooting/Bridging with stand by assistance  Patient completed Supine to Sit with stand by assistance    Functional Mobility/Transfers:  Patient completed Sit <> Stand Transfer with stand by assistance  with  no assistive device   Patient completed Bed <> Chair Transfer using Step Transfer technique with stand by assistance with no assistive device  Functional Mobility: Patient completing FM with no AD at this time. Patient is a little unsteady and impulsive when completing movements. Completed functional mobility within room to sink and then to bed ~15 feet to simulate household.     Activities of Daily Living:  Feeding:  independence    Grooming: stand by assistance ; SBA for added safety due to impulsiveness    Cognitive/Visual Perceptual:  Cognitive/Psychosocial Skills:     -       Oriented to: Person, Place, Time, and Situation   -       Safety awareness/insight to  disability: impaired   Visual/Perceptual:      -Intact      Physical Exam:  Balance:    -       Balance was good; would recommend remaining close to patient due to impulsiveness; no LOB noted on this day however  Upper Extremity Range of Motion:     -       Right Upper Extremity: WNL  -       Left Upper Extremity: WNL  Upper Extremity Strength: -       Right Upper Extremity: WNL  -       Left Upper Extremity: WNL   Strength:    -       Right Upper Extremity: WNL  -       Left Upper Extremity: WNL    AMPAC 6 Click ADL:  AMPAC Total Score: 20    Treatment & Education:  Patient completed treatment and evaluation as stated above. Patient educated on OT scope, POC, MMTs, and early d/c planning. Patient performed all activities well and is a strong candidate for eventual transition home after transplant provided re-evaluation yields similar results after surgery.Patient educated on importance of continuance of movement, to alert hospital staff when something is needed and only transfer with hospital staff.       Patient left up in chair with all lines intact, call button in reach, and nursing notified    GOALS:   Multidisciplinary Problems       Occupational Therapy Goals          Problem: Occupational Therapy    Goal Priority Disciplines Outcome Interventions   Occupational Therapy Goal     OT, PT/OT     Description: Goals to be met by: 9/8/2023     Patient will increase functional independence with ADLs by performing:    LE Dressing with [place].  Toileting from toilet with [place] for hygiene and clothing management.   Supine to sit with [place].  Step transfer with [place]  Toilet transfer to toilet with [place].                         History:     No past medical history on file.    No past surgical history on file.    Time Tracking:     OT Date of Treatment: 08/25/23  OT Start Time: 0938  OT Stop Time: 1009  OT Total Time (min): 31 min    Billable Minutes:Evaluation 8  Self Care/Home  Management 10  Therapeutic Activity 13    8/25/2023

## 2023-08-25 NOTE — PLAN OF CARE
SW attempted to gather info for initial d/c assessment. PT/OT was meeting with Pt. SW will return at a later time.

## 2023-08-25 NOTE — PLAN OF CARE
[facility] - Transplant Stepdown  Initial Discharge Assessment       Primary Care Provider: No, Primary Doctor    Admission Diagnosis: Decompensated hepatic cirrhosis [K72.90, K74.60]  Decompensated hepatic cirrhosis [K72.90, K74.60]    Admission Date: 8/24/2023  Expected Discharge Date: 8/28/2023    Transition of Care Barriers: Transportation    Payor: [facility] BLUE SHIELD / Plan: BCBS ALL OUT OF STATE / Product Type: PPO /     No emergency contact information on file.    Discharge Plan A: Home with family  Discharge Plan B: Home Health    No Pharmacies Listed    Initial Assessment (most recent)       Adult Discharge Assessment - 08/25/23 1211          Discharge Assessment    Assessment Type Discharge Planning Assessment     Confirmed/corrected address, phone number and insurance Yes     Confirmed Demographics Correct on Facesheet     Source of Information patient     When was your last doctors appointment? 08/01/23     Communicated GEORGE with patient/caregiver Date not available/Unable to determine     Reason For Admission Liver Transplate Eval     People in Home parent(s)   Mother Gerhard López    Facility Arrived From: [facility]     Do you expect to return to your current living situation? Yes     Do you have help at home or someone to help you manage your care at home? Yes     Who are your caregiver(s) and their phone number(s)? Mother - Maribel     Prior to hospitilization cognitive status: Alert/Oriented     Current cognitive status: Alert/Oriented     Home Layout Able to live on 1st floor     Equipment Currently Used at Home none     Readmission within 30 days? Yes   [facility]    Patient currently being followed by outpatient case management? No     Do you currently have service(s) that help you manage your care at home? No     Do you take prescription medications? Yes     Do you have prescription coverage? Yes     Do you have any problems affording any of your  prescribed medications? No     Is the patient taking medications as prescribed? yes     Who is going to help you get home at discharge? Unknown     How do you get to doctors appointments? car, drives self;family or friend will provide     Are you on dialysis? No     Do you take coumadin? No     DME Needed Upon Discharge  none     Discharge Plan discussed with: Patient     Transition of Care Barriers Transportation     Discharge Plan A Home with family     Discharge Plan B Home Health        Physical Activity    On average, how many days per week do you engage in moderate to strenuous exercise (like a brisk walk)? 7 days     On average, how many minutes do you engage in exercise at this level? 40 min        Financial Resource Strain    How hard is it for you to pay for the very basics like food, housing, medical care, and heating? Not hard at all        Housing Stability    In the last 12 months, was there a time when you were not able to pay the mortgage or rent on time? No     In the last 12 months, was there a time when you did not have a steady place to sleep or slept in a shelter (including now)? No        Transportation Needs    In the past 12 months, has lack of transportation kept you from medical appointments or from getting medications? No     In the past 12 months, has lack of transportation kept you from meetings, work, or from getting things needed for daily living? No        Food Insecurity    Within the past 12 months, you worried that your food would run out before you got the money to buy more. Never true     Within the past 12 months, the food you bought just didn't last and you didn't have money to get more. Never true        Stress    Do you feel stress - tense, restless, nervous, or anxious, or unable to sleep at night because your mind is troubled all the time - these days? Not at all        Social Connections    In a typical week, how many times do you talk on the phone with family, friends, or  neighbors? More than three times a week     How often do you get together with friends or relatives? Once a week     How often do you attend Rastafari or Episcopalian services? 1 to 4 times per year     Do you belong to any clubs or organizations such as Rastafari groups, unions, fraternal or athletic groups, or school groups? No     How often do you attend meetings of the clubs or organizations you belong to? Never     Are you , , , , never , or living with a partner? Never         Alcohol Use    Q1: How often do you have a drink containing alcohol? Patient refused     Q2: How many drinks containing alcohol do you have on a typical day when you are drinking? Patient refused     Q3: How often do you have six or more drinks on one occasion? Patient refused                   Pt was transferred from [place] to . Pt lives in a single [place] home with his mother. Pt does not use dialysis or coumadin. Pt does not have personal transport back to [place].

## 2023-08-25 NOTE — NURSING
Patient identified by 2 identifiers.   Allergies reviewed.  20 g IV in place to SAMINA .  Bubble study explained to patient, patient verbalized understanding.  Bubble performed, with & without Valsalva.  Pt tolerated procedure well.

## 2023-08-25 NOTE — NURSING
PRE EDUCATION TEACHING NOTE    Sukhdeep Grimes was seen in the hospital today.   Handbook on pre-liver transplant information (see outline below) was given to the patient and time was allowed for questions.  Patient [place] with no family present.  Patient's mother, Maribel,  was on the telephone and able to participate in this session.  Informed consent signed by patient and written information given on selection criteria.   Signed consent confirmed in media.        LIVER TRANSPLANT WORK-UP EDUCATION  I. NATIONAL REGISTRY LISTING  A. Information for listing  B. [facility]  C. Per UNOS, can be listed at more than one center  II. SURGERY  A. Length  B. Complications: bleeding, infection  C. Central lines, drains, Rangel catheter, incision, endotracheal tube, NG tube  D. Transfusions, cell saver  III. SHORT TERM RECOVERY  A. ICU, PICU, Hospital stay  IV. LONG TERM RECOVERY  A. Labs at home  B. Clinic visits  C. Complications: infection, rejection, readmissions  D. Normal immunity and immunosuppression  E. Incidence of re-admit in 1st year  V. REJECTION  A. Incidence  B. Treatment: Solumedrol, Prograf, Thymoglobulin (actions and side effects)  VI. IMMUNOSUPPRESSIVES  A. Prednisone  B. Imuran/Cellcept  C. Cyclosporin/Prograf  D. Rapamune  E. Need for lifetime compliance  F. Actions and side effects  G. Costs  VII. RECURRENCE OF VIRAL HEPATITIS  VIII. Patient notified that HIV Testing is required for transplant evaluation and   repeated at scheduled intervals before and after transplant. Explained that   education will be provided, results will be discussed, and the appropriate   consults will be scheduled if needed.  
Yes

## 2023-08-25 NOTE — PROGRESS NOTES
[facility] - Transplant [facility] Medicine  Progress Note    Patient Name: Sukhdeep Grimes  MRN: [id number]  Patient Class: IP- Inpatient   Admission Date: 8/24/2023  Length of Stay: 1 days  Attending Physician: Johanna Shearer MD  Primary Care Provider: Martine, Primary Doctor        Subjective:     Principal Problem:Alcoholic cirrhosis of liver with ascites        HPI:  38-year-old male with a history of alcoholic cirrhosis admitted to [facility] in [place] on August 14 with increasing abdominal girth, somnolence, and weakness.  He underwent paracentesis.  Admit diagnoses included alcoholic cirrhosis with ascites, hyponatremia, hypokalemia, thrombocytopenia, and AYAH.  During his stay he had intermittent fevers. Infectious Diseases evaluated him for fever and elevated Fungitell.  It was noted that blood and urine culture along with chest x-ray were negative.  They felt the Fungitell was nonspecific and the patient had no clear evidence of fungal infection.  HIV, RPR, and respiratory viral panels were noted to be negative.  MRI of the abdomen on August 20 had no cholelithiasis or biliary dilatation.  He remains on Rocephin currently.      Encephalopathy improved during his stay.  Current diagnoses included acute liver injury with decompensated cirrhosis, acute on chronic anemia, SIRS (no obvious source of infection) nonbleeding varices, and improving (but not resolved) encephalopathy.  He noted last alcohol intake was around July 7.      Hemoglobin remained low during his stay with levels ranging 6.6-8.0.  He had no overt evidence of bleeding. They noted fibrinogen and haptoglobin were low and LDH was elevated.  Ceruloplasmin is pending. EGD on August 21 had 1 column of large varices with no bleeding and no stigmata of recent bleeding in the lower 3rd of the esophagus.  No red Rigoberto sign present.  Banding was not performed.  Moderate portal hypertensive gastropathy in the entire examined  stomach.  Examined duodenum was normal.     He remains hemodynamically stable in a med-surg bed.  He is awake and alert.  Will plan transfer to Hospital Medicine at [facility] for Hepatology evaluation.     August 22: White blood cells 8.8 (down from 14.4 on August 17), hemoglobin 6.9, hematocrit 20, platelets 40, sodium 134, potassium 3.3, chloride 104, CO2 18, BUN 9, creatinine 0.72, glucose 94, INR 3, total bilirubin 34.8, AST 45, ALT 22 August 20: RPR nonreactive     August 19: HIV nonreactive, COVID negative, influenza negative     August 18 urine culture had no growth     August 16: Acute hepatitis panel nonreactive, blood cultures had no growth at 5 days  -chest x-ray had no acute cardiopulmonary abnormality.     August 15: Abdominal ultrasound had cirrhosis without obvious focal mass.  No bile duct dilation.  Gallbladder is nondistended but contains sludge.  Gallbladder wall is thickened likely reactive to hepatic disease.  Moderate volume of ascites.  Splenomegaly.     August 14:  Peritoneal fluid culture had no growth after 3 days  -white blood cells 16.3, hemoglobin 10.4, hematocrit 28.6, platelets 66, sodium 124, potassium 2.8, chloride 88, CO2 25, BUN 28, creatinine 1.48, glucose 96, INR 2.1      Overview/Hospital Course:  No notes on file    Interval History:   8/25: approval for inpatient transplant evaluation      Review of Systems   Constitutional:  Negative for chills and fever.   HENT:  Negative for dental problem and nosebleeds.    Eyes:  Negative for pain and visual disturbance.   Respiratory:  Positive for cough and shortness of breath. Negative for chest tightness.    Cardiovascular:  Positive for leg swelling. Negative for chest pain.   Gastrointestinal:  Negative for abdominal distention, abdominal pain, diarrhea and nausea.   Endocrine: Negative for cold intolerance and polydipsia.   Genitourinary:  Negative for dysuria and frequency.   Musculoskeletal:  Negative for arthralgias,  gait problem and joint swelling.   Skin:  Negative for color change and wound.   Allergic/Immunologic: Negative for immunocompromised state.   Neurological:  Positive for dizziness. Negative for light-headedness and headaches.   Psychiatric/Behavioral:  Positive for confusion. The patient is nervous/anxious.      Objective:     Vital Signs (Most Recent):  Temp: 98.5 °F (36.9 °C) (08/25/23 0916)  Pulse: 93 (08/25/23 0916)  Resp: 16 (08/25/23 0916)  BP: (!) 105/53 (77) (08/25/23 0916)  SpO2: 98 % (08/25/23 0916) Vital Signs (24h Range):  Temp:  [97.8 °F (36.6 °C)-99.8 °F (37.7 °C)] 98.5 °F (36.9 °C)  Pulse:  [] 93  Resp:  [16-19] 16  SpO2:  [97 %-100 %] 98 %  BP: ()/(53-86) 105/53     Weight: 67.1 kg (148 lb 0.6 oz)  Body mass index is 21.24 kg/m².    Intake/Output Summary (Last 24 hours) at 8/25/2023 0943  Last data filed at 8/24/2023 1450  Gross per 24 hour   Intake --   Output 5000 ml   Net -5000 ml         Physical Exam  Constitutional:       Appearance: He is not ill-appearing.   HENT:      Head: Normocephalic and atraumatic.      Right Ear: External ear normal.      Left Ear: External ear normal.      Nose: Nose normal.      Mouth/Throat:      Mouth: Mucous membranes are moist.      Pharynx: No oropharyngeal exudate.   Eyes:      General: No scleral icterus.     Pupils: Pupils are equal, round, and reactive to light.   Cardiovascular:      Pulses: Normal pulses.      Heart sounds: No murmur heard.  Pulmonary:      Effort: Pulmonary effort is normal. No respiratory distress.      Breath sounds: No wheezing.   Abdominal:      General: There is distension.      Tenderness: There is no abdominal tenderness.   Musculoskeletal:      Right lower leg: No edema.      Left lower leg: No edema.   Skin:     Coloration: Skin is jaundiced.      Findings: No erythema.   Neurological:      General: No focal deficit present.      Mental Status: He is oriented to person, place, and time.   Psychiatric:         Mood  and Affect: Mood normal.         Behavior: Behavior normal.             Significant Labs: All pertinent labs within the past 24 hours have been reviewed.    Significant Imaging: I have reviewed all pertinent imaging results/findings within the past 24 hours.      Assessment/Plan:      * Alcoholic cirrhosis of liver with ascites  MELD 3.0: 31 at 8/24/2023 11:30 AM  MELD-Na: 31 at 8/24/2023 11:30 AM  Calculated from:  Serum Creatinine: 1.0 mg/dL at 8/24/2023  9:53 AM  Serum Sodium: 133 mmol/L at 8/24/2023  9:53 AM  Total Bilirubin: 39.3 mg/dL at 8/24/2023  9:53 AM  Serum Albumin: 2.9 g/dL at 8/24/2023  9:53 AM  INR(ratio): 2.3 at 8/24/2023 11:30 AM  Age at listing (hypothetical): 38 years  Sex: Male at 8/24/2023 11:30 AM      Lactulose/rifaximin  Vitamin K   Liver ultrasound doppler  Hepatology consult  [facility]  Psych consult  Paracentesis        Encounter for pre-transplant evaluation for liver transplant    See liver cirrhosis    Decompensated hepatic cirrhosis    See liver cirrhosis    Alcohol use disorder, severe, in early remission  Consult addiction psychiatry         VTE Risk Mitigation (From admission, onward)         Ordered     IP VTE LOW RISK PATIENT  Once         08/24/23 1002     Place AURORA hose  Until discontinued         08/24/23 1002     Place sequential compression device  Until discontinued         08/24/23 1002                Discharge Planning   GEORGE: 8/28/2023     Code Status: Full Code   Is the patient medically ready for discharge?:     Reason for patient still in hospital (select all that apply): Patient trending condition                     Johanna Shearer MD  Department of Hospital Medicine   [facility] - Transplant Stepdown

## 2023-08-25 NOTE — PLAN OF CARE
"- Pt intermittently displays confusion. When asked if he could state where he is, pt reports "[place]". When asked where he was transferred from, pt reports "[place]". Reorientation provided. Pt able to state name, , year.   - VSS, RA  - Ambulatory  - Encouraged pt to utilize urinal for accurate I&Os  - RLQ para site with dressing, C/D/I  - No N/V/D or pain reported   - Bed in lowest locked position, call light and personal items in reach, nonskid socks on, verbalized understanding to call for assistance   "

## 2023-08-25 NOTE — SUBJECTIVE & OBJECTIVE
Interval History:   8/25: approval for inpatient transplant evaluation      Review of Systems   Constitutional:  Negative for chills and fever.   HENT:  Negative for dental problem and nosebleeds.    Eyes:  Negative for pain and visual disturbance.   Respiratory:  Positive for cough and shortness of breath. Negative for chest tightness.    Cardiovascular:  Positive for leg swelling. Negative for chest pain.   Gastrointestinal:  Negative for abdominal distention, abdominal pain, diarrhea and nausea.   Endocrine: Negative for cold intolerance and polydipsia.   Genitourinary:  Negative for dysuria and frequency.   Musculoskeletal:  Negative for arthralgias, gait problem and joint swelling.   Skin:  Negative for color change and wound.   Allergic/Immunologic: Negative for immunocompromised state.   Neurological:  Positive for dizziness. Negative for light-headedness and headaches.   Psychiatric/Behavioral:  Positive for confusion. The patient is nervous/anxious.      Objective:     Vital Signs (Most Recent):  Temp: 98.5 °F (36.9 °C) (08/25/23 0916)  Pulse: 93 (08/25/23 0916)  Resp: 16 (08/25/23 0916)  BP: (!) 105/53 (77) (08/25/23 0916)  SpO2: 98 % (08/25/23 0916) Vital Signs (24h Range):  Temp:  [97.8 °F (36.6 °C)-99.8 °F (37.7 °C)] 98.5 °F (36.9 °C)  Pulse:  [] 93  Resp:  [16-19] 16  SpO2:  [97 %-100 %] 98 %  BP: ()/(53-86) 105/53     Weight: 67.1 kg (148 lb 0.6 oz)  Body mass index is 21.24 kg/m².    Intake/Output Summary (Last 24 hours) at 8/25/2023 0943  Last data filed at 8/24/2023 1450  Gross per 24 hour   Intake --   Output 5000 ml   Net -5000 ml         Physical Exam  Constitutional:       Appearance: He is not ill-appearing.   HENT:      Head: Normocephalic and atraumatic.      Right Ear: External ear normal.      Left Ear: External ear normal.      Nose: Nose normal.      Mouth/Throat:      Mouth: Mucous membranes are moist.      Pharynx: No oropharyngeal exudate.   Eyes:      General: No scleral  icterus.     Pupils: Pupils are equal, round, and reactive to light.   Cardiovascular:      Pulses: Normal pulses.      Heart sounds: No murmur heard.  Pulmonary:      Effort: Pulmonary effort is normal. No respiratory distress.      Breath sounds: No wheezing.   Abdominal:      General: There is distension.      Tenderness: There is no abdominal tenderness.   Musculoskeletal:      Right lower leg: No edema.      Left lower leg: No edema.   Skin:     Coloration: Skin is jaundiced.      Findings: No erythema.   Neurological:      General: No focal deficit present.      Mental Status: He is oriented to person, place, and time.   Psychiatric:         Mood and Affect: Mood normal.         Behavior: Behavior normal.             Significant Labs: All pertinent labs within the past 24 hours have been reviewed.    Significant Imaging: I have reviewed all pertinent imaging results/findings within the past 24 hours.

## 2023-08-25 NOTE — PLAN OF CARE
Goals to be met by: 9/8/2023     Patient will increase functional independence with ADLs by performing:    LE Dressing with [place].  Toileting from toilet with [place] for hygiene and clothing management.   Supine to sit with [place].  Step transfer with [place]  Toilet transfer to toilet with [place].

## 2023-08-25 NOTE — PLAN OF CARE
- Patient and mother both updated on POC including work up for liver transplant, expectations/tests  - Educated patient on Low Na diet with 1.5 L FR  - Echo & triple phase CT abdomen complete  - Pt reports no pain or discomfort.  Ascites, jaundice and asterixis noted    Problem: Adult Inpatient Plan of Care  Goal: Plan of Care Review  Outcome: Ongoing, Progressing  Goal: Patient-Specific Goal (Individualized)  Outcome: Ongoing, Progressing  Goal: Absence of Hospital-Acquired Illness or Injury  Outcome: Ongoing, Progressing  Goal: Optimal Comfort and Wellbeing  Outcome: Ongoing, Progressing  Goal: Readiness for Transition of Care  Outcome: Ongoing, Progressing     Problem: Fall Injury Risk  Goal: Absence of Fall and Fall-Related Injury  Outcome: Ongoing, Progressing

## 2023-08-26 PROBLEM — E44.0 MODERATE MALNUTRITION: Status: ACTIVE | Noted: 2023-08-26

## 2023-08-26 LAB
ALBUMIN SERPL BCP-MCNC: 2.7 G/DL (ref 3.5–5.2)
ALP SERPL-CCNC: 110 U/L (ref 55–135)
ALT SERPL W/O P-5'-P-CCNC: 21 U/L (ref 10–44)
ANION GAP SERPL CALC-SCNC: 10 MMOL/L (ref 8–16)
AST SERPL-CCNC: 47 U/L (ref 10–40)
BASOPHILS # BLD AUTO: 0.09 K/UL (ref 0–0.2)
BASOPHILS NFR BLD: 1.1 % (ref 0–1.9)
BILIRUB SERPL-MCNC: 34.8 MG/DL (ref 0.1–1)
BUN SERPL-MCNC: 13 MG/DL (ref 6–20)
CALCIUM SERPL-MCNC: 7.8 MG/DL (ref 8.7–10.5)
CHLORIDE SERPL-SCNC: 105 MMOL/L (ref 95–110)
CLINICAL BIOCHEMIST REVIEW: NORMAL
CO2 SERPL-SCNC: 18 MMOL/L (ref 23–29)
CREAT SERPL-MCNC: 0.9 MG/DL (ref 0.5–1.4)
DIFFERENTIAL METHOD: ABNORMAL
EOSINOPHIL # BLD AUTO: 0.3 K/UL (ref 0–0.5)
EOSINOPHIL NFR BLD: 4.1 % (ref 0–8)
ERYTHROCYTE [DISTWIDTH] IN BLOOD BY AUTOMATED COUNT: 20 % (ref 11.5–14.5)
EST. GFR  (NO RACE VARIABLE): >60 ML/MIN/1.73 M^2
GLUCOSE SERPL-MCNC: 86 MG/DL (ref 70–110)
HCT VFR BLD AUTO: 21.5 % (ref 40–54)
HGB BLD-MCNC: 7.5 G/DL (ref 14–18)
IMM GRANULOCYTES # BLD AUTO: 0.08 K/UL (ref 0–0.04)
IMM GRANULOCYTES NFR BLD AUTO: 1 % (ref 0–0.5)
INR PPP: 2.5 (ref 0.8–1.2)
LYMPHOCYTES # BLD AUTO: 1.1 K/UL (ref 1–4.8)
LYMPHOCYTES NFR BLD: 14.5 % (ref 18–48)
MCH RBC QN AUTO: 35.5 PG (ref 27–31)
MCHC RBC AUTO-ENTMCNC: 34.9 G/DL (ref 32–36)
MCV RBC AUTO: 102 FL (ref 82–98)
MONOCYTES # BLD AUTO: 1.4 K/UL (ref 0.3–1)
MONOCYTES NFR BLD: 17.7 % (ref 4–15)
NEUTROPHILS # BLD AUTO: 4.8 K/UL (ref 1.8–7.7)
NEUTROPHILS NFR BLD: 61.6 % (ref 38–73)
NRBC BLD-RTO: 0 /100 WBC
PLATELET # BLD AUTO: 42 K/UL (ref 150–450)
PLPETH BLD-MCNC: <10 NG/ML
PMV BLD AUTO: 10.7 FL (ref 9.2–12.9)
POPETH BLD-MCNC: <10 NG/ML
POTASSIUM SERPL-SCNC: 3.2 MMOL/L (ref 3.5–5.1)
PROT SERPL-MCNC: 4.6 G/DL (ref 6–8.4)
PROTHROMBIN TIME: 25.1 SEC (ref 9–12.5)
RBC # BLD AUTO: 2.11 M/UL (ref 4.6–6.2)
RPR SER QL: NORMAL
SODIUM SERPL-SCNC: 133 MMOL/L (ref 136–145)
WBC # BLD AUTO: 7.85 K/UL (ref 3.9–12.7)

## 2023-08-26 PROCEDURE — 85025 COMPLETE CBC W/AUTO DIFF WBC: CPT | Performed by: HOSPITALIST

## 2023-08-26 PROCEDURE — 94761 N-INVAS EAR/PLS OXIMETRY MLT: CPT

## 2023-08-26 PROCEDURE — 99233 PR SUBSEQUENT HOSPITAL CARE,LEVL III: ICD-10-PCS | Mod: ,,, | Performed by: HOSPITALIST

## 2023-08-26 PROCEDURE — 80053 COMPREHEN METABOLIC PANEL: CPT | Performed by: HOSPITALIST

## 2023-08-26 PROCEDURE — 85610 PROTHROMBIN TIME: CPT | Performed by: HOSPITALIST

## 2023-08-26 PROCEDURE — 25000003 PHARM REV CODE 250: Performed by: HOSPITALIST

## 2023-08-26 PROCEDURE — 63600175 PHARM REV CODE 636 W HCPCS: Performed by: HOSPITALIST

## 2023-08-26 PROCEDURE — 99233 SBSQ HOSP IP/OBS HIGH 50: CPT | Mod: ,,, | Performed by: HOSPITALIST

## 2023-08-26 PROCEDURE — 36415 COLL VENOUS BLD VENIPUNCTURE: CPT | Performed by: HOSPITALIST

## 2023-08-26 PROCEDURE — 20600001 HC STEP DOWN PRIVATE ROOM

## 2023-08-26 RX ADMIN — RIFAXIMIN 550 MG: 550 TABLET ORAL at 10:08

## 2023-08-26 RX ADMIN — PANTOPRAZOLE SODIUM 40 MG: 40 TABLET, DELAYED RELEASE ORAL at 10:08

## 2023-08-26 RX ADMIN — PHYTONADIONE 10 MG: 10 INJECTION, EMULSION INTRAMUSCULAR; INTRAVENOUS; SUBCUTANEOUS at 10:08

## 2023-08-26 RX ADMIN — LACTULOSE 30 G: 20 SOLUTION ORAL at 08:08

## 2023-08-26 RX ADMIN — LACTULOSE 30 G: 20 SOLUTION ORAL at 10:08

## 2023-08-26 RX ADMIN — ONDANSETRON 8 MG: 8 TABLET, ORALLY DISINTEGRATING ORAL at 08:08

## 2023-08-26 RX ADMIN — FUROSEMIDE 40 MG: 40 TABLET ORAL at 10:08

## 2023-08-26 RX ADMIN — SPIRONOLACTONE 100 MG: 50 TABLET ORAL at 10:08

## 2023-08-26 RX ADMIN — ONDANSETRON 8 MG: 8 TABLET, ORALLY DISINTEGRATING ORAL at 12:08

## 2023-08-26 RX ADMIN — LACTULOSE 30 G: 20 SOLUTION ORAL at 04:08

## 2023-08-26 RX ADMIN — RIFAXIMIN 550 MG: 550 TABLET ORAL at 08:08

## 2023-08-26 NOTE — PLAN OF CARE
Recommendations     1.) Recommend continuing with Low na diet as tolerated, fluid per MD.      2.) Recommend continuing Boost Plus daily to help optimize PRO/Kcal intake.      3.) RD to monitor wt, PO intake, skin, labs.        Goals: to meet % of EEN/EPN by next RD f/u  Nutrition Goal Status: new  Communication of RD Recs:  (POC)

## 2023-08-26 NOTE — TREATMENT PLAN
Hepatology Treatment Plan    Sukhdeep Grimes is a 38 y.o. male admitted to hospital 8/24/2023 (Hospital Day: 3) due to Alcoholic cirrhosis of liver with ascites.     Interval History  VSS, NAEON.     CT triple phase showed a 7 mm left liver lobe nodule. Doppler eval of lvier WNL.     Repeat TTE confirmed HPS.     Objective  Temp:  [98.3 °F (36.8 °C)-98.5 °F (36.9 °C)] 98.5 °F (36.9 °C) (08/26 1149)  Pulse:  [] 101 (08/26 1149)  BP: (105-120)/(56-66) 115/66 (08/26 1149)  Resp:  [12-18] 12 (08/26 1149)  SpO2:  [97 %-100 %] 97 % (08/26 1149)      Laboratory    Lab Results   Component Value Date    WBC 7.85 08/26/2023    HGB 7.5 (L) 08/26/2023    HCT 21.5 (L) 08/26/2023     (H) 08/26/2023    PLT 42 (L) 08/26/2023       Lab Results   Component Value Date     (L) 08/26/2023    K 3.2 (L) 08/26/2023     08/26/2023    CO2 18 (L) 08/26/2023    BUN 13 08/26/2023    CREATININE 0.9 08/26/2023    CALCIUM 7.8 (L) 08/26/2023       Lab Results   Component Value Date    ALBUMIN 2.7 (L) 08/26/2023    ALT 21 08/26/2023    AST 47 (H) 08/26/2023    ALKPHOS 110 08/26/2023    BILITOT 34.8 (H) 08/26/2023       Lab Results   Component Value Date    INR 2.5 (H) 08/26/2023    INR 2.4 (H) 08/25/2023    INR 2.3 (H) 08/24/2023       MELD 3.0: 32 at 8/26/2023  6:12 AM  MELD-Na: 31 at 8/26/2023  6:12 AM  Calculated from:  Serum Creatinine: 0.9 mg/dL (Using min of 1 mg/dL) at 8/26/2023  6:12 AM  Serum Sodium: 133 mmol/L at 8/26/2023  6:12 AM  Total Bilirubin: 34.8 mg/dL at 8/26/2023  6:12 AM  Serum Albumin: 2.7 g/dL at 8/26/2023  6:12 AM  INR(ratio): 2.5 at 8/26/2023  6:12 AM  Age at listing (hypothetical): 38 years  Sex: Male at 8/26/2023  6:12 AM      Assessment  Sukhdeep Grimes is a 38 y.o. male with history of decompensated alcoholic cirrhosis, and alcohol use disorder (relapsed after rehab 3 years ago) who is transferred to [facility] from [place] for liver transplantation eval. Diagnosed with  cirrhosis in July 2023 when he presented with increasing abdominal girth, somnolence, and weakness.  Most recent EGD on 8/21 showed large varices without bleeding stigmata and PHG. Last drink 1 month ago; patient reports he is committed to sobriety. He has relapsed in the past and continued drinking in spite of being told not for elevated LFTs. Modifiably high risk for tx per addiction psych. He has financial approval for liver transplant evaluation. Repeat TTE confirmed HPS. CT triple phase showed a 7 mm left liver lobe nodule.     Problem List:  Decompensated EtOH Cirrhosis c/b ascites and HE  Hepatopulmonary syndrome  Alcohol use disorder, with prior relapse  HRS s/p treatment   Large esophageal varices     ===============================     Pertinent workup:  EGD (8/21/23):  Large (> 5 mm) esophageal varices with no bleeding and no stigmata of recent bleeding. Portal hypertensive gastropathy. Normal examined duodenum.    CT triple phase (8/25/23):  Cirrhotic appearance of liver.  Focal exophytic nodule along the left hepatic lobe measuring 7 mm.  Lesion remains indeterminate. Splenomegaly, ascites, umbilical vein recanalization and gastroesophageal varices together suggested portal venous hypertension.     MRI Abdomen 8/20/23:   1. No cholelithiasis. No biliary dilatation.  2. Hepatic cirrhosis with evidence of portal venous hypertension. Moderate volume ascites.     RUQ US 8/15:   Cirrhosis without obvious focal mass. No bile duct dilation.  The gallbladder is nondistended but contains sludge. The gallbladder wall is thickened which is likely reactive to hepatic disease. Moderate volume of ascites. Splenomegaly.     TTE 8/25/23:  The left ventricle is normal in size. Normal wall thickness. Normal wall motion. There is normal systolic function. There is normal diastolic function. Study is negative for intra cardiac shunt. Bubbles on the left side on the 4th beat emanating from pulmonary veins consistent with  transpulmonary shunting.       HEP A IgM: Non-Reactive 08/16/2023   HEP A Total: Reactive 08/22/2023   HEP B Core IgM/surface Ag/core total: Non-Reactive 08/16/2023   HEP B Surface Ab: Not immune 8/16/23  HEP C Ab: Non-Reactive 08/16/2023  ASMA: Negative 8/15/23  LEYDI: Negative 8/15/23  AMA Negative 8/22/23  Alpha 1 antitrypsin: 96 8/22/23  HIV 1/2: Negative 8/19/23  IgG: WNL 8/15/23      Plan  - Meets criteria for SBP prophylaxis (Ascitic fluid protein < 1.5 and T bilirubin >3). Can start PO ciprofloxacin.     - Continue PO lactulose 20 mg TID (titrate till 2-3 daily Bm achieved) & Xifaxin 550 mg BID.     - Continue folate and thiamine supplementation.      - Appreciate addiction psychiatry recs.      - Low Na, high protein diet.      - Avoid sedating drugs (opiates & benzodiazepines) if possible.    - please obtain daily CBC, CMP, INR. Follow PEth.    - Plan of care was discussed with primary team      Thank you for involving us in the care of Sukhdeep Grimes. Please call with any additional concerns or questions.      Tucker Cristobal MD, PGY-V  Gastroenterology Fellow  [facility]

## 2023-08-26 NOTE — ASSESSMENT & PLAN NOTE
Malnutrition Type:  Context: acute illness or injury  Level: moderate    Related to (etiology):   Alcoholic cirrhosis of liver with ascites     Signs and Symptoms (as evidenced by):   Mild to moderate fat/muscle wasting     Malnutrition Characteristic Summary:  Subcutaneous Fat (Malnutrition): moderate depletion  Muscle Mass (Malnutrition): moderate depletion  Fluid Accumulation (Malnutrition):  (ascites)      Interventions/Recommendations (treatment strategy):  1.) Recommend continuing with Low na diet as tolerated, fluid per MD. 2.) Recommend continuing Boost Plus daily to help optimize PRO/Kcal intake. 3.) RD to monitor wt, PO intake, skin, labs.    Nutrition Diagnosis Status:   New

## 2023-08-26 NOTE — CONSULTS
[facility] - Transplant Stepdown  Adult Nutrition  Consult Note    SUMMARY     Recommendations    1.) Recommend continuing with Low na diet as tolerated, fluid per MD.     2.) Recommend continuing Boost Plus daily to help optimize PRO/Kcal intake.     3.) RD to monitor wt, PO intake, skin, labs.      Goals: to meet % of EEN/EPN by next RD f/u  Nutrition Goal Status: new  Communication of RD Recs:  (POC)    Assessment and Plan    Endocrine  Moderate malnutrition  Malnutrition Type:  Context: acute illness or injury  Level: moderate    Related to (etiology):   Alcoholic cirrhosis of liver with ascites     Signs and Symptoms (as evidenced by):   Mild to moderate fat/muscle wasting     Malnutrition Characteristic Summary:  Subcutaneous Fat (Malnutrition): moderate depletion  Muscle Mass (Malnutrition): moderate depletion  Fluid Accumulation (Malnutrition):  (ascites)      Interventions/Recommendations (treatment strategy):  1.) Recommend continuing with Low na diet as tolerated, fluid per MD. 2.) Recommend continuing Boost Plus daily to help optimize PRO/Kcal intake. 3.) RD to monitor wt, PO intake, skin, labs.    Nutrition Diagnosis Status:   New       Malnutrition Assessment  Malnutrition Context: acute illness or injury  Malnutrition Level: moderate  Skin (Micronutrient): yellow       Subcutaneous Fat (Malnutrition): moderate depletion  Muscle Mass (Malnutrition): moderate depletion  Fluid Accumulation (Malnutrition):  (ascites)   Orbital Region (Subcutaneous Fat Loss): moderate depletion  Upper Arm Region (Subcutaneous Fat Loss): mild depletion  Thoracic and Lumbar Region: moderate depletion   [place] Region (Muscle Loss): moderate depletion  Clavicle Bone Region (Muscle Loss): moderate depletion  Clavicle and Acromion Bone Region (Muscle Loss): moderate depletion  Dorsal Hand (Muscle Loss): mild depletion     Reason for Assessment    Reason For Assessment: consult  Diagnosis: liver disease (Alcoholic  "cirrhosis of liver with ascites)  Relevant Medical History: EtOH abuse, Decompensated hepatic cirrhosis  Interdisciplinary Rounds: did not attend  General Information Comments: RD consulted for liver tx work-up. Pt is waiting, on liver tx list. Pt endorses nausea, but denies v/d/c. RD spoke with RN about giving pt Zofran. Pt stated that they have a good appetite and consumed 100% of their dinner last night and 75% of their breakfast. Pt stated they have not tried Boost yet. Pt was not sure what their UBW is or if they have lost wt ("I am sure I had to have lost wt"). NFPE performed on - pt meets criteria for moderate malnutrition.  Nutrition Discharge Planningm Na education provided on . Discussed foods to limit or avoid and benefits of diet adherence. Post-transplant expectations/guidelines also introduced. Appropriate education material with RD contact information provided. No other needs identified.    Nutrition Risk Screen    Nutrition Risk Screen: no indicators present    Anthropometrics    Temp: 98.5 °F (36.9 °C)  Height Method: Stated  Height: 5' 10" (177.8 cm)  Height (inches): 70 in  Weight Method: Standard Scale  Weight: 67.1 kg (148 lb)  Weight (lb): 148 lb  Ideal Body Weight (IBW), Male: 166 lb  % Ideal Body Weight, Male (lb): 89.16 %  BMI (Calculated): 21.2  BMI Grade: 18.5-24.9 - normal    Lab/Procedures/Meds    Pertinent Labs Reviewed: reviewed  Pertinent Labs Comments: Na: 133, K: 3.2, Ca: 7.8, PRO: 4.6, alb: 2.7, tbili: 34.8, AST: 47, ammonia: 62  Pertinent Medications Reviewed: reviewed  Pertinent Medications Comments: lasix, spironolactone, lactulose, pantoprazole, VitK, abx    Estimated/Assessed Needs    Weight Used For Calorie Calculations: 67.1 kg (147 lb 14.9 oz)  Energy Calorie Requirements (kcal): 2013 kcal  Energy Need Method: Kcal/kg (30kcal/kg)  Protein Requirements: 81g (1.2g/kg)  Weight Used For Protein Calculations: 67.1 kg (147 lb 14.9 oz)  Fluid Requirements (mL): " 1ml/1kcal or per MD  Estimated Fluid Requirement Method: RDA Method  RDA Method (mL): 2013    Nutrition Prescription Ordered    Current Diet Order: Low Na diet  Nutrition Order Comments: 1.5L FR    Evaluation of Received Nutrient/Fluid Intake    I/O: -4.1L since admit  Energy Calories Required: meeting needs  Protein Required: meeting needs  Fluid Required:  (as per MD)  Comments: LBM 8/25  Tolerance: tolerating  % Intake of Estimated Energy Needs: 75 - 100 %  % Meal Intake: 75 - 100 %    Nutrition Risk    Level of Risk/Frequency of Follow-up:  (RD to f/u x1/week)     Monitor and Evaluation    Food and Nutrient Intake: energy intake, food and beverage intake  Food and Nutrient Adminstration: diet order  Knowledge/Beliefs/Attitudes: food and nutrition knowledge/skill, beliefs and attitudes  Physical Activity and Function: nutrition-related ADLs and IADLs  Anthropometric Measurements: weight, weight change, body mass index  Biochemical Data, Medical Tests and Procedures: electrolyte and renal panel, gastrointestinal profile, glucose/endocrine profile, inflammatory profile, lipid profile  Nutrition-Focused Physical Findings: overall appearance, skin     Nutrition Follow-Up    RD Follow-up?: Yes

## 2023-08-26 NOTE — PLAN OF CARE
- Patient updated on POC including work up for liver transplant, expectations/tests  - Educated patient on Low Na diet with 1.5 L FR  - Pt reports no pain or discomfort.  Ascites, jaundice and asterixis noted  - 3rd of 3 doses of Vit K IV infusion complete  - Continue to reinforce fall safety/prevention  - Patient declined shower & bed bath    Problem: Adult Inpatient Plan of Care  Goal: Plan of Care Review  Outcome: Ongoing, Progressing  Goal: Patient-Specific Goal (Individualized)  Outcome: Ongoing, Progressing  Goal: Absence of Hospital-Acquired Illness or Injury  Outcome: Ongoing, Progressing  Goal: Optimal Comfort and Wellbeing  Outcome: Ongoing, Progressing  Goal: Readiness for Transition of Care  Outcome: Ongoing, Progressing     Problem: Fall Injury Risk  Goal: Absence of Fall and Fall-Related Injury  Outcome: Ongoing, Progressing

## 2023-08-26 NOTE — PROGRESS NOTES
[facility] - Transplant [facility] Medicine  Progress Note    Patient Name: Sukhdeep Grimes  MRN: [id number]  Patient Class: IP- Inpatient   Admission Date: 8/24/2023  Length of Stay: 2 days  Attending Physician: Johanna Shearer MD  Primary Care Provider: Martine, Primary Doctor        Subjective:     Principal Problem:Alcoholic cirrhosis of liver with ascites        HPI:  38-year-old male with a history of alcoholic cirrhosis admitted to [facility] in [place] on August 14 with increasing abdominal girth, somnolence, and weakness.  He underwent paracentesis.  Admit diagnoses included alcoholic cirrhosis with ascites, hyponatremia, hypokalemia, thrombocytopenia, and AYAH.  During his stay he had intermittent fevers. Infectious Diseases evaluated him for fever and elevated Fungitell.  It was noted that blood and urine culture along with chest x-ray were negative.  They felt the Fungitell was nonspecific and the patient had no clear evidence of fungal infection.  HIV, RPR, and respiratory viral panels were noted to be negative.  MRI of the abdomen on August 20 had no cholelithiasis or biliary dilatation.  He remains on Rocephin currently.      Encephalopathy improved during his stay.  Current diagnoses included acute liver injury with decompensated cirrhosis, acute on chronic anemia, SIRS (no obvious source of infection) nonbleeding varices, and improving (but not resolved) encephalopathy.  He noted last alcohol intake was around July 7.      Hemoglobin remained low during his stay with levels ranging 6.6-8.0.  He had no overt evidence of bleeding. They noted fibrinogen and haptoglobin were low and LDH was elevated.  Ceruloplasmin is pending. EGD on August 21 had 1 column of large varices with no bleeding and no stigmata of recent bleeding in the lower 3rd of the esophagus.  No red Rigoberto sign present.  Banding was not performed.  Moderate portal hypertensive gastropathy in the entire examined  stomach.  Examined duodenum was normal.     He remains hemodynamically stable in a med-surg bed.  He is awake and alert.  Will plan transfer to Hospital Medicine at [facility] for Hepatology evaluation.     August 22: White blood cells 8.8 (down from 14.4 on August 17), hemoglobin 6.9, hematocrit 20, platelets 40, sodium 134, potassium 3.3, chloride 104, CO2 18, BUN 9, creatinine 0.72, glucose 94, INR 3, total bilirubin 34.8, AST 45, ALT 22 August 20: RPR nonreactive     August 19: HIV nonreactive, COVID negative, influenza negative     August 18 urine culture had no growth     August 16: Acute hepatitis panel nonreactive, blood cultures had no growth at 5 days  -chest x-ray had no acute cardiopulmonary abnormality.     August 15: Abdominal ultrasound had cirrhosis without obvious focal mass.  No bile duct dilation.  Gallbladder is nondistended but contains sludge.  Gallbladder wall is thickened likely reactive to hepatic disease.  Moderate volume of ascites.  Splenomegaly.     August 14:  Peritoneal fluid culture had no growth after 3 days  -white blood cells 16.3, hemoglobin 10.4, hematocrit 28.6, platelets 66, sodium 124, potassium 2.8, chloride 88, CO2 25, BUN 28, creatinine 1.48, glucose 96, INR 2.1      Overview/Hospital Course:  No notes on file    Interval History:   8/26: no acute issues reported from overnight    Review of Systems   Constitutional:  Negative for chills and fever.   HENT:  Negative for dental problem and nosebleeds.    Eyes:  Negative for pain and visual disturbance.   Respiratory:  Positive for cough and shortness of breath. Negative for chest tightness.    Cardiovascular:  Positive for leg swelling. Negative for chest pain.   Gastrointestinal:  Negative for abdominal distention, abdominal pain, diarrhea and nausea.   Endocrine: Negative for cold intolerance and polydipsia.   Genitourinary:  Negative for dysuria and frequency.   Musculoskeletal:  Negative for arthralgias, gait  problem and joint swelling.   Skin:  Negative for color change and wound.   Allergic/Immunologic: Negative for immunocompromised state.   Neurological:  Positive for dizziness. Negative for light-headedness and headaches.   Psychiatric/Behavioral:  Positive for confusion. The patient is nervous/anxious.      Objective:     Vital Signs (Most Recent):  Temp: 98.5 °F (36.9 °C) (08/26/23 1149)  Pulse: 101 (08/26/23 1149)  Resp: 12 (08/26/23 1149)  BP: 115/66 (08/26/23 1149)  SpO2: 97 % (08/26/23 1149) Vital Signs (24h Range):  Temp:  [98.3 °F (36.8 °C)-98.5 °F (36.9 °C)] 98.5 °F (36.9 °C)  Pulse:  [] 101  Resp:  [12-18] 12  SpO2:  [97 %-100 %] 97 %  BP: (105-120)/(56-66) 115/66     Weight: 67.1 kg (148 lb)  Body mass index is 21.24 kg/m².    Intake/Output Summary (Last 24 hours) at 8/26/2023 1401  Last data filed at 8/26/2023 1245  Gross per 24 hour   Intake 970 ml   Output 200 ml   Net 770 ml         Physical Exam  Constitutional:       Appearance: He is not ill-appearing.   HENT:      Head: Normocephalic and atraumatic.      Right Ear: External ear normal.      Left Ear: External ear normal.      Nose: Nose normal.      Mouth/Throat:      Mouth: Mucous membranes are moist.      Pharynx: No oropharyngeal exudate.   Eyes:      General: No scleral icterus.     Pupils: Pupils are equal, round, and reactive to light.   Cardiovascular:      Pulses: Normal pulses.      Heart sounds: No murmur heard.  Pulmonary:      Effort: Pulmonary effort is normal. No respiratory distress.      Breath sounds: No wheezing.   Abdominal:      General: There is distension.      Tenderness: There is no abdominal tenderness.   Musculoskeletal:      Right lower leg: No edema.      Left lower leg: No edema.   Skin:     Coloration: Skin is jaundiced.      Findings: No erythema.   Neurological:      General: No focal deficit present.      Mental Status: He is oriented to person, place, and time.   Psychiatric:         Mood and Affect: Mood  normal.         Behavior: Behavior normal.             Significant Labs: All pertinent labs within the past 24 hours have been reviewed.    Significant Imaging: I have reviewed all pertinent imaging results/findings within the past 24 hours.      Assessment/Plan:      * Alcoholic cirrhosis of liver with ascites  MELD 3.0: 31 at 8/24/2023 11:30 AM  MELD-Na: 31 at 8/24/2023 11:30 AM  Calculated from:  Serum Creatinine: 1.0 mg/dL at 8/24/2023  9:53 AM  Serum Sodium: 133 mmol/L at 8/24/2023  9:53 AM  Total Bilirubin: 39.3 mg/dL at 8/24/2023  9:53 AM  Serum Albumin: 2.9 g/dL at 8/24/2023  9:53 AM  INR(ratio): 2.3 at 8/24/2023 11:30 AM  Age at listing (hypothetical): 38 years  Sex: Male at 8/24/2023 11:30 AM      Lactulose/rifaximin  Vitamin K   Liver ultrasound doppler  Hepatology consult  PET  Psych consult  Paracentesis        Encounter for pre-transplant evaluation for liver transplant    See liver cirrhosis    Decompensated hepatic cirrhosis    See liver cirrhosis    Alcohol use disorder, severe, in early remission  Consult addiction psychiatry         VTE Risk Mitigation (From admission, onward)         Ordered     IP VTE LOW RISK PATIENT  Once         08/24/23 1002     Place AURORA hose  Until discontinued         08/24/23 1002     Place sequential compression device  Until discontinued         08/24/23 1002                Discharge Planning   GEORGE: 8/28/2023     Code Status: Full Code   Is the patient medically ready for discharge?:     Reason for patient still in hospital (select all that apply): Patient trending condition  Discharge Plan A: Home with family                  Johanna Shearer MD  Department of Hospital Medicine   [facility] - Transplant Stepdown

## 2023-08-26 NOTE — SUBJECTIVE & OBJECTIVE
Interval History:   8/26: no acute issues reported from overnight    Review of Systems   Constitutional:  Negative for chills and fever.   HENT:  Negative for dental problem and nosebleeds.    Eyes:  Negative for pain and visual disturbance.   Respiratory:  Positive for cough and shortness of breath. Negative for chest tightness.    Cardiovascular:  Positive for leg swelling. Negative for chest pain.   Gastrointestinal:  Negative for abdominal distention, abdominal pain, diarrhea and nausea.   Endocrine: Negative for cold intolerance and polydipsia.   Genitourinary:  Negative for dysuria and frequency.   Musculoskeletal:  Negative for arthralgias, gait problem and joint swelling.   Skin:  Negative for color change and wound.   Allergic/Immunologic: Negative for immunocompromised state.   Neurological:  Positive for dizziness. Negative for light-headedness and headaches.   Psychiatric/Behavioral:  Positive for confusion. The patient is nervous/anxious.      Objective:     Vital Signs (Most Recent):  Temp: 98.5 °F (36.9 °C) (08/26/23 1149)  Pulse: 101 (08/26/23 1149)  Resp: 12 (08/26/23 1149)  BP: 115/66 (08/26/23 1149)  SpO2: 97 % (08/26/23 1149) Vital Signs (24h Range):  Temp:  [98.3 °F (36.8 °C)-98.5 °F (36.9 °C)] 98.5 °F (36.9 °C)  Pulse:  [] 101  Resp:  [12-18] 12  SpO2:  [97 %-100 %] 97 %  BP: (105-120)/(56-66) 115/66     Weight: 67.1 kg (148 lb)  Body mass index is 21.24 kg/m².    Intake/Output Summary (Last 24 hours) at 8/26/2023 1401  Last data filed at 8/26/2023 1245  Gross per 24 hour   Intake 970 ml   Output 200 ml   Net 770 ml         Physical Exam  Constitutional:       Appearance: He is not ill-appearing.   HENT:      Head: Normocephalic and atraumatic.      Right Ear: External ear normal.      Left Ear: External ear normal.      Nose: Nose normal.      Mouth/Throat:      Mouth: Mucous membranes are moist.      Pharynx: No oropharyngeal exudate.   Eyes:      General: No scleral icterus.      Pupils: Pupils are equal, round, and reactive to light.   Cardiovascular:      Pulses: Normal pulses.      Heart sounds: No murmur heard.  Pulmonary:      Effort: Pulmonary effort is normal. No respiratory distress.      Breath sounds: No wheezing.   Abdominal:      General: There is distension.      Tenderness: There is no abdominal tenderness.   Musculoskeletal:      Right lower leg: No edema.      Left lower leg: No edema.   Skin:     Coloration: Skin is jaundiced.      Findings: No erythema.   Neurological:      General: No focal deficit present.      Mental Status: He is oriented to person, place, and time.   Psychiatric:         Mood and Affect: Mood normal.         Behavior: Behavior normal.             Significant Labs: All pertinent labs within the past 24 hours have been reviewed.    Significant Imaging: I have reviewed all pertinent imaging results/findings within the past 24 hours.

## 2023-08-26 NOTE — PLAN OF CARE
Pt aaox4 vswnl and no c/o pain. Pt ambulates independently. Jaundice skin noted. Started Aldactone and Lasix . D/c vanc and zosyn.

## 2023-08-27 LAB
ALBUMIN SERPL BCP-MCNC: 3 G/DL (ref 3.5–5.2)
ALP SERPL-CCNC: 139 U/L (ref 55–135)
ALT SERPL W/O P-5'-P-CCNC: 26 U/L (ref 10–44)
ANION GAP SERPL CALC-SCNC: 9 MMOL/L (ref 8–16)
AST SERPL-CCNC: 58 U/L (ref 10–40)
BASOPHILS # BLD AUTO: 0.14 K/UL (ref 0–0.2)
BASOPHILS NFR BLD: 1.4 % (ref 0–1.9)
BILIRUB SERPL-MCNC: 39.5 MG/DL (ref 0.1–1)
BUN SERPL-MCNC: 14 MG/DL (ref 6–20)
CALCIUM SERPL-MCNC: 8 MG/DL (ref 8.7–10.5)
CHLORIDE SERPL-SCNC: 100 MMOL/L (ref 95–110)
CO2 SERPL-SCNC: 21 MMOL/L (ref 23–29)
CREAT SERPL-MCNC: 1.1 MG/DL (ref 0.5–1.4)
DIFFERENTIAL METHOD: ABNORMAL
EOSINOPHIL # BLD AUTO: 0.3 K/UL (ref 0–0.5)
EOSINOPHIL NFR BLD: 3.5 % (ref 0–8)
ERYTHROCYTE [DISTWIDTH] IN BLOOD BY AUTOMATED COUNT: 19.9 % (ref 11.5–14.5)
EST. GFR  (NO RACE VARIABLE): >60 ML/MIN/1.73 M^2
GLUCOSE SERPL-MCNC: 86 MG/DL (ref 70–110)
HCT VFR BLD AUTO: 25.6 % (ref 40–54)
HGB BLD-MCNC: 8.8 G/DL (ref 14–18)
IMM GRANULOCYTES # BLD AUTO: 0.11 K/UL (ref 0–0.04)
IMM GRANULOCYTES NFR BLD AUTO: 1.1 % (ref 0–0.5)
INR PPP: 2.5 (ref 0.8–1.2)
LYMPHOCYTES # BLD AUTO: 1.3 K/UL (ref 1–4.8)
LYMPHOCYTES NFR BLD: 13.4 % (ref 18–48)
MCH RBC QN AUTO: 35.2 PG (ref 27–31)
MCHC RBC AUTO-ENTMCNC: 34.4 G/DL (ref 32–36)
MCV RBC AUTO: 102 FL (ref 82–98)
MONOCYTES # BLD AUTO: 1.6 K/UL (ref 0.3–1)
MONOCYTES NFR BLD: 16 % (ref 4–15)
NEUTROPHILS # BLD AUTO: 6.4 K/UL (ref 1.8–7.7)
NEUTROPHILS NFR BLD: 64.6 % (ref 38–73)
NRBC BLD-RTO: 0 /100 WBC
PLATELET # BLD AUTO: 63 K/UL (ref 150–450)
PMV BLD AUTO: 10.9 FL (ref 9.2–12.9)
POTASSIUM SERPL-SCNC: 3 MMOL/L (ref 3.5–5.1)
PROT SERPL-MCNC: 5.3 G/DL (ref 6–8.4)
PROTHROMBIN TIME: 25.1 SEC (ref 9–12.5)
RBC # BLD AUTO: 2.5 M/UL (ref 4.6–6.2)
SODIUM SERPL-SCNC: 130 MMOL/L (ref 136–145)
WBC # BLD AUTO: 9.85 K/UL (ref 3.9–12.7)

## 2023-08-27 PROCEDURE — 63600175 PHARM REV CODE 636 W HCPCS: Performed by: INTERNAL MEDICINE

## 2023-08-27 PROCEDURE — 90471 IMMUNIZATION ADMIN: CPT | Performed by: INTERNAL MEDICINE

## 2023-08-27 PROCEDURE — 63600175 PHARM REV CODE 636 W HCPCS: Performed by: HOSPITALIST

## 2023-08-27 PROCEDURE — 85025 COMPLETE CBC W/AUTO DIFF WBC: CPT | Performed by: HOSPITALIST

## 2023-08-27 PROCEDURE — 90472 IMMUNIZATION ADMIN EACH ADD: CPT | Performed by: INTERNAL MEDICINE

## 2023-08-27 PROCEDURE — 90632 HEPA VACCINE ADULT IM: CPT | Performed by: INTERNAL MEDICINE

## 2023-08-27 PROCEDURE — 91312 PHARM REV CODE 636 W HCPCS: CPT | Mod: CV19 | Performed by: INTERNAL MEDICINE

## 2023-08-27 PROCEDURE — 90739 HEPB VACC 2/4 DOSE ADULT IM: CPT | Performed by: INTERNAL MEDICINE

## 2023-08-27 PROCEDURE — 85610 PROTHROMBIN TIME: CPT | Performed by: HOSPITALIST

## 2023-08-27 PROCEDURE — 0124A: CPT | Performed by: INTERNAL MEDICINE

## 2023-08-27 PROCEDURE — 90715 TDAP VACCINE 7 YRS/> IM: CPT | Performed by: INTERNAL MEDICINE

## 2023-08-27 PROCEDURE — 99233 PR SUBSEQUENT HOSPITAL CARE,LEVL III: ICD-10-PCS | Mod: ,,, | Performed by: HOSPITALIST

## 2023-08-27 PROCEDURE — 36415 COLL VENOUS BLD VENIPUNCTURE: CPT | Performed by: HOSPITALIST

## 2023-08-27 PROCEDURE — 80053 COMPREHEN METABOLIC PANEL: CPT | Performed by: HOSPITALIST

## 2023-08-27 PROCEDURE — 25000003 PHARM REV CODE 250: Performed by: HOSPITALIST

## 2023-08-27 PROCEDURE — 99223 1ST HOSP IP/OBS HIGH 75: CPT | Mod: ,,, | Performed by: INTERNAL MEDICINE

## 2023-08-27 PROCEDURE — 20600001 HC STEP DOWN PRIVATE ROOM

## 2023-08-27 PROCEDURE — 99233 SBSQ HOSP IP/OBS HIGH 50: CPT | Mod: ,,, | Performed by: HOSPITALIST

## 2023-08-27 PROCEDURE — 97163 PT EVAL HIGH COMPLEX 45 MIN: CPT

## 2023-08-27 PROCEDURE — 99223 PR INITIAL HOSPITAL CARE,LEVL III: ICD-10-PCS | Mod: ,,, | Performed by: INTERNAL MEDICINE

## 2023-08-27 RX ORDER — MUPIROCIN 20 MG/G
OINTMENT TOPICAL 2 TIMES DAILY
Status: DISPENSED | OUTPATIENT
Start: 2023-08-27 | End: 2023-09-01

## 2023-08-27 RX ORDER — FLUCONAZOLE 2 MG/ML
400 INJECTION, SOLUTION INTRAVENOUS
Status: DISCONTINUED | OUTPATIENT
Start: 2023-08-27 | End: 2023-08-28

## 2023-08-27 RX ADMIN — FLUCONAZOLE 400 MG: 2 INJECTION, SOLUTION INTRAVENOUS at 01:08

## 2023-08-27 RX ADMIN — RIFAXIMIN 550 MG: 550 TABLET ORAL at 09:08

## 2023-08-27 RX ADMIN — LACTULOSE 30 G: 20 SOLUTION ORAL at 08:08

## 2023-08-27 RX ADMIN — BNT162B2 ORIGINAL AND OMICRON BA.4/BA.5 0.3 ML: .1125; .1125 INJECTION, SUSPENSION INTRAMUSCULAR at 03:08

## 2023-08-27 RX ADMIN — HEPATITIS B VACCINE (RECOMBINANT) ADJUVANTED 0.5 ML: 20 INJECTION, SOLUTION INTRAMUSCULAR at 03:08

## 2023-08-27 RX ADMIN — POTASSIUM BICARBONATE 50 MEQ: 978 TABLET, EFFERVESCENT ORAL at 10:08

## 2023-08-27 RX ADMIN — FUROSEMIDE 40 MG: 40 TABLET ORAL at 09:08

## 2023-08-27 RX ADMIN — MUPIROCIN: 20 OINTMENT TOPICAL at 08:08

## 2023-08-27 RX ADMIN — LACTULOSE 30 G: 20 SOLUTION ORAL at 02:08

## 2023-08-27 RX ADMIN — MUPIROCIN: 20 OINTMENT TOPICAL at 10:08

## 2023-08-27 RX ADMIN — HEPATITIS A VACCINE 1440 UNITS: 1440 INJECTION, SUSPENSION INTRAMUSCULAR at 05:08

## 2023-08-27 RX ADMIN — TETANUS TOXOID, REDUCED DIPHTHERIA TOXOID AND ACELLULAR PERTUSSIS VACCINE, ADSORBED 0.5 ML: 5; 2.5; 8; 8; 2.5 SUSPENSION INTRAMUSCULAR at 05:08

## 2023-08-27 RX ADMIN — SPIRONOLACTONE 100 MG: 50 TABLET ORAL at 09:08

## 2023-08-27 RX ADMIN — PANTOPRAZOLE SODIUM 40 MG: 40 TABLET, DELAYED RELEASE ORAL at 09:08

## 2023-08-27 RX ADMIN — RIFAXIMIN 550 MG: 550 TABLET ORAL at 08:08

## 2023-08-27 RX ADMIN — POTASSIUM BICARBONATE 50 MEQ: 978 TABLET, EFFERVESCENT ORAL at 01:08

## 2023-08-27 RX ADMIN — LACTULOSE 30 G: 20 SOLUTION ORAL at 09:08

## 2023-08-27 NOTE — PROGRESS NOTES
[facility] - Transplant [facility] Medicine  Progress Note    Patient Name: Sukhdeep Grimes  MRN: [id number]  Patient Class: IP- Inpatient   Admission Date: 8/24/2023  Length of Stay: 3 days  Attending Physician: Johanna Shearer MD  Primary Care Provider: Martine, Primary Doctor        Subjective:     Principal Problem:Alcoholic cirrhosis of liver with ascites        HPI:  38-year-old male with a history of alcoholic cirrhosis admitted to [facility] in [place] on August 14 with increasing abdominal girth, somnolence, and weakness.  He underwent paracentesis.  Admit diagnoses included alcoholic cirrhosis with ascites, hyponatremia, hypokalemia, thrombocytopenia, and AYAH.  During his stay he had intermittent fevers. Infectious Diseases evaluated him for fever and elevated Fungitell.  It was noted that blood and urine culture along with chest x-ray were negative.  They felt the Fungitell was nonspecific and the patient had no clear evidence of fungal infection.  HIV, RPR, and respiratory viral panels were noted to be negative.  MRI of the abdomen on August 20 had no cholelithiasis or biliary dilatation.  He remains on Rocephin currently.      Encephalopathy improved during his stay.  Current diagnoses included acute liver injury with decompensated cirrhosis, acute on chronic anemia, SIRS (no obvious source of infection) nonbleeding varices, and improving (but not resolved) encephalopathy.  He noted last alcohol intake was around July 7.      Hemoglobin remained low during his stay with levels ranging 6.6-8.0.  He had no overt evidence of bleeding. They noted fibrinogen and haptoglobin were low and LDH was elevated.  Ceruloplasmin is pending. EGD on August 21 had 1 column of large varices with no bleeding and no stigmata of recent bleeding in the lower 3rd of the esophagus.  No red Rigoberto sign present.  Banding was not performed.  Moderate portal hypertensive gastropathy in the entire examined  stomach.  Examined duodenum was normal.     He remains hemodynamically stable in a med-surg bed.  He is awake and alert.  Will plan transfer to Hospital Medicine at [facility] [address] [facility] for Hepatology evaluation.     August 22: White blood cells 8.8 (down from 14.4 on August 17), hemoglobin 6.9, hematocrit 20, platelets 40, sodium 134, potassium 3.3, chloride 104, CO2 18, BUN 9, creatinine 0.72, glucose 94, INR 3, total bilirubin 34.8, AST 45, ALT 22 August 20: RPR nonreactive     August 19: HIV nonreactive, COVID negative, influenza negative     August 18 urine culture had no growth     August 16: Acute hepatitis panel nonreactive, blood cultures had no growth at 5 days  -chest x-ray had no acute cardiopulmonary abnormality.     August 15: Abdominal ultrasound had cirrhosis without obvious focal mass.  No bile duct dilation.  Gallbladder is nondistended but contains sludge.  Gallbladder wall is thickened likely reactive to hepatic disease.  Moderate volume of ascites.  Splenomegaly.     August 14:  Peritoneal fluid culture had no growth after 3 days  -white blood cells 16.3, hemoglobin 10.4, hematocrit 28.6, platelets 66, sodium 124, potassium 2.8, chloride 88, CO2 25, BUN 28, creatinine 1.48, glucose 96, INR 2.1      Overview/Hospital Course:  No notes on file    Interval History:   8/27: prelim discussion to be presented to committee on wednesday    Review of Systems   Constitutional:  Negative for chills and fever.   HENT:  Negative for dental problem and nosebleeds.    Eyes:  Negative for pain and visual disturbance.   Respiratory:  Positive for cough and shortness of breath. Negative for chest tightness.    Cardiovascular:  Positive for leg swelling. Negative for chest pain.   Gastrointestinal:  Negative for abdominal distention, abdominal pain, diarrhea and nausea.   Endocrine: Negative for cold intolerance and polydipsia.   Genitourinary:  Negative for dysuria and frequency.   Musculoskeletal:  Negative for  arthralgias, gait problem and joint swelling.   Skin:  Negative for color change and wound.   Allergic/Immunologic: Negative for immunocompromised state.   Neurological:  Positive for dizziness. Negative for light-headedness and headaches.   Psychiatric/Behavioral:  Positive for confusion. The patient is nervous/anxious.      Objective:     Vital Signs (Most Recent):  Temp: 98.5 °F (36.9 °C) (08/27/23 0731)  Pulse: 92 (08/27/23 0731)  Resp: 16 (08/27/23 0731)  BP: 114/70 (08/27/23 0731)  SpO2: 100 % (08/27/23 0731) Vital Signs (24h Range):  Temp:  [98.3 °F (36.8 °C)-98.7 °F (37.1 °C)] 98.5 °F (36.9 °C)  Pulse:  [] 92  Resp:  [12-18] 16  SpO2:  [97 %-100 %] 100 %  BP: ()/(53-70) 114/70     Weight: 62.5 kg (137 lb 14.4 oz)  Body mass index is 19.79 kg/m².    Intake/Output Summary (Last 24 hours) at 8/27/2023 0939  Last data filed at 8/27/2023 0526  Gross per 24 hour   Intake 1100 ml   Output --   Net 1100 ml         Physical Exam  Constitutional:       Appearance: He is not ill-appearing.   HENT:      Head: Normocephalic and atraumatic.      Right Ear: External ear normal.      Left Ear: External ear normal.      Nose: Nose normal.      Mouth/Throat:      Mouth: Mucous membranes are moist.      Pharynx: No oropharyngeal exudate.   Eyes:      General: No scleral icterus.     Pupils: Pupils are equal, round, and reactive to light.   Cardiovascular:      Pulses: Normal pulses.      Heart sounds: No murmur heard.  Pulmonary:      Effort: Pulmonary effort is normal. No respiratory distress.      Breath sounds: No wheezing.   Abdominal:      General: There is distension.      Tenderness: There is no abdominal tenderness.   Musculoskeletal:      Right lower leg: No edema.      Left lower leg: No edema.   Skin:     Coloration: Skin is jaundiced.      Findings: No erythema.   Neurological:      General: No focal deficit present.      Mental Status: He is oriented to person, place, and time.   Psychiatric:          Mood and Affect: Mood normal.         Behavior: Behavior normal.             Significant Labs: All pertinent labs within the past 24 hours have been reviewed.    Significant Imaging: I have reviewed all pertinent imaging results/findings within the past 24 hours.      Assessment/Plan:      * Alcoholic cirrhosis of liver with ascites  MELD 3.0: 31 at 8/24/2023 11:30 AM  MELD-Na: 31 at 8/24/2023 11:30 AM  Calculated from:  Serum Creatinine: 1.0 mg/dL at 8/24/2023  9:53 AM  Serum Sodium: 133 mmol/L at 8/24/2023  9:53 AM  Total Bilirubin: 39.3 mg/dL at 8/24/2023  9:53 AM  Serum Albumin: 2.9 g/dL at 8/24/2023  9:53 AM  INR(ratio): 2.3 at 8/24/2023 11:30 AM  Age at listing (hypothetical): 38 years  Sex: Male at 8/24/2023 11:30 AM      Lactulose/rifaximin  Vitamin K   Liver ultrasound doppler  Hepatology consult  [facility]  Psych consult  Paracentesis        Encounter for pre-transplant evaluation for liver transplant    See liver cirrhosis    Decompensated hepatic cirrhosis    See liver cirrhosis    Alcohol use disorder, severe, in early remission  Consult addiction psychiatry         VTE Risk Mitigation (From admission, onward)         Ordered     IP VTE LOW RISK PATIENT  Once         08/24/23 1002     Place AURORA hose  Until discontinued         08/24/23 1002     Place sequential compression device  Until discontinued         08/24/23 1002                Discharge Planning   GEORGE: 8/28/2023     Code Status: Full Code   Is the patient medically ready for discharge?:     Reason for patient still in hospital (select all that apply): Patient trending condition  Discharge Plan A: Home with family                  Johanna Shearer MD  Department of Hospital Medicine   [facility] - Transplant Stepdown

## 2023-08-27 NOTE — PLAN OF CARE
Patient AAO x4. VSS. Afebrile. On room air. Delayed responses. Continuing low sodium diet and 1.5 L FR. Bed locked/ in lowest setting. Call bell in reach

## 2023-08-27 NOTE — PT/OT/SLP EVAL
"Physical Therapy Evaluation    Patient Name:  Sukhdeep Grimes   MRN:  [id number]    Recommendations:     Discharge Recommendations: home   Discharge Equipment Recommendations: none   Barriers to discharge: None    Assessment:     Sukhdeep Grimes is a 38 y.o. male admitted with a medical diagnosis of Alcoholic cirrhosis of liver with ascites.  He presents with the following impairments/functional limitations: gait instability, impaired balance (risk of deconditioning). Patient reported independence with baseline mobility. Currently he is SBA for bed mobility and ambulation due to above mentioned deficits. Recommending discharge home with no additional therapies.      Rehab Prognosis: Good; patient would benefit from acute skilled PT services to address these deficits and reach maximum level of function.    Recent Surgery: * No surgery found *      Plan:     During this hospitalization, patient to be seen 1 x/week to address the identified rehab impairments via gait training, therapeutic activities, therapeutic exercises, neuromuscular re-education and progress toward the following goals:    Plan of Care Expires:  09/27/23    Subjective     Chief Complaint: Patient just woke up  Patient/Family Comments/goals: "yeah, I don't want to stay in bed too much" re: conversation about mobility   Pain/Comfort:  Pain Rating 1: 0/10  Pain Rating Post-Intervention 1: 0/10    Patients cultural, spiritual, Uatsdin conflicts given the current situation: no    Living Environment:  Per patient: patient lives in 1st story [facility] with 0STE with brother.  Per OT note: patient lives in [facility] with 4-5 NII with brother.   Prior to admission, patients level of function was indp.  Equipment used at home: none.  DME owned (not currently used): none.  Upon discharge, patient will have assistance from brother (not 24/7).    Objective:     Communicated with RN prior to session.  Patient found supine with  (no active lines)  upon PT entry to " room.    General Precautions: Standard, fall  Orthopedic Precautions:N/A   Braces: N/A  Respiratory Status: Room air    Exams:  Cognitive Exam:  Patient is oriented to Person, Place, Time, and Situation  Sensation:    -       Intact  RLE ROM: WNL  RLE Strength: WNL  LLE ROM: WNL  LLE Strength: WNL    Functional Mobility:  Bed Mobility:     Supine to Sit: supervision  Sit to Supine: supervision  Transfers:     Sit to Stand:  stand by assistance with no AD  Gait: patient ambulated 50' in room with SBA no AD. Mild gait instability noted due to LE tremors.       AM-PAC 6 CLICK MOBILITY  Total Score:23       Treatment & Education:  Education: patient educated on POC, need for therapy, safety with mobility, discharge recommendations and equipment recommendations. Patient verbalized understanding of all topics.      Patient left supine with all lines intact, call button in reach, and RN notified.    GOALS:   Multidisciplinary Problems       Physical Therapy Goals          Problem: Physical Therapy    Goal Priority Disciplines Outcome Goal Variances Interventions   Physical Therapy Goal     PT, PT/OT Ongoing, Progressing     Description: Goals to be met by: 23     Patient will increase functional independence with mobility by performin. Sit to stand transfer with Lapel  2. Gait  x 150 feet with Lapel using No Assistive Device.   3. Ascend/descend 4 stair with no Handrails Lapel using No Assistive Device.   4. Stand for 2 minutes with Lapel using No Assistive Device  5. Lower extremity exercise program x15 reps per handout, with independence                         History:     Past Medical History:   Diagnosis Date    Alcoholic cirrhosis of liver with ascites     Esophageal varices without bleeding     Hepatic encephalopathy     Hypokalemia     Hyponatremia     Thrombocytopenia     Transfusion history 2022       Past Surgical History:   Procedure Laterality Date    EGD - EXTERNAL  "RESULT  08/21/2023    "1 column of large varices with no bleeding and no stigmata of recent bleeding in the lower 3rd of the esophagus.  No red Rigoberto sign present.  Banding was not performed.  Moderate portal hypertensive gastropathy in the entire examined stomach.  Examined duodenum was normal." EGD done in LTAC, located within [facility]    PARACENTESIS  08/24/2023       Time Tracking:     PT Received On: 08/27/23  PT Start Time: 0637     PT Stop Time: 0650  PT Total Time (min): 13 min     Billable Minutes: Evaluation 13 minutes      08/27/2023  "

## 2023-08-27 NOTE — ASSESSMENT & PLAN NOTE
Assessment and Plan    38 y.o. male with a history of alcoholic cirrhosis admitted to OSH in [place] 8/14/23 with decompensated cirrhosis with intermittent fevers.  Evaluated by infectious diseases team there found to have elevated BDG other cultures, RIP, HIV, RPR, imaging negative he is here for OLT evaluation    1. Risks of Infection: Available serologies were reviewed. No unusual risks of infection or significant barriers to transplantation were identified from the infectious disease standpoint given the information available at this time.    - Acute infectious issues: given elevated BDG would give fluconzole 400 mg daily x 2 weeks (or until transplant).  No indication for ceftriaxone at this time   - Pending serologies: Strongyloides IgG and VZV IgG   - Please call if any pending serologic testing is positive.    2. Immunizations:  Based on the patient's immunization history and serologies, the following immunizations are recommended:  - Hepatitis A    Patient does not have immunity to hepatitis A    Vaccination ordered today: Yes   - Hepatitis B    Patient does notdoes not have immunity to hepatitis B    Vaccination ordered today: Yes   - COVID    Current CDC vaccination recommendations were discussed with the patient   - Annual high dose influenza     Vaccination ordered today:    - Prevnar 20    Vaccination ordered today: Yes   - Tdap    Vaccination ordered today: Yes   - Shingrix    Vaccination ordered today: No. Reason for not ordering: nt on inpatient formulary    Recommended Pre-Transplant Immunization Schedule   Vaccine  0m 1m 2m 6m   Pneumococcal conjugate vaccine (Prevnar 20) X      Tetanus-diphtheria-pertussis (Tdap)* X      Hepatitis A Vaccine (Havrix)** X   X   Hepatitis B Vaccine (Heplisav)** X X     Influenza (annual) X      Zoster Recombinant Vaccine (Shingrix) X  X           *Administer booster every 10 years.       **Administer if no immunity demonstrated on serologies                Patient will receive vaccines while inpatient, and will be scheduled for all subsequent doses to be given in ID clinic.    3. Counseling:   I discussed with the patient the risk for increased susceptibility to infections following transplantation including increased risk for infection right after transplant and if rejection should occur.  The patient has been counseled on the importance of vaccinations to decrease risk of infection and severe illness. Specific guidance has been provided to the patient regarding the patient's occupation, hobbies and activities to avoid future infectious complications.     4. Transplant Candidacy: Based on available information, there are no identified significant barriers to transplantation from an infectious disease standpoint.  Final determination of transplant candidacy will be made once evaluation is complete and reviewed by the Selection Committee.      Follow up with infectious disease as needed.         The total time for evaluation and management services performed on 08/27/2023 was greater thanminutes.

## 2023-08-27 NOTE — SUBJECTIVE & OBJECTIVE
Interval History:   8/27: prelim discussion to be presented to committee on wednesday    Review of Systems   Constitutional:  Negative for chills and fever.   HENT:  Negative for dental problem and nosebleeds.    Eyes:  Negative for pain and visual disturbance.   Respiratory:  Positive for cough and shortness of breath. Negative for chest tightness.    Cardiovascular:  Positive for leg swelling. Negative for chest pain.   Gastrointestinal:  Negative for abdominal distention, abdominal pain, diarrhea and nausea.   Endocrine: Negative for cold intolerance and polydipsia.   Genitourinary:  Negative for dysuria and frequency.   Musculoskeletal:  Negative for arthralgias, gait problem and joint swelling.   Skin:  Negative for color change and wound.   Allergic/Immunologic: Negative for immunocompromised state.   Neurological:  Positive for dizziness. Negative for light-headedness and headaches.   Psychiatric/Behavioral:  Positive for confusion. The patient is nervous/anxious.      Objective:     Vital Signs (Most Recent):  Temp: 98.5 °F (36.9 °C) (08/27/23 0731)  Pulse: 92 (08/27/23 0731)  Resp: 16 (08/27/23 0731)  BP: 114/70 (08/27/23 0731)  SpO2: 100 % (08/27/23 0731) Vital Signs (24h Range):  Temp:  [98.3 °F (36.8 °C)-98.7 °F (37.1 °C)] 98.5 °F (36.9 °C)  Pulse:  [] 92  Resp:  [12-18] 16  SpO2:  [97 %-100 %] 100 %  BP: ()/(53-70) 114/70     Weight: 62.5 kg (137 lb 14.4 oz)  Body mass index is 19.79 kg/m².    Intake/Output Summary (Last 24 hours) at 8/27/2023 0924  Last data filed at 8/27/2023 0526  Gross per 24 hour   Intake 1100 ml   Output --   Net 1100 ml         Physical Exam  Constitutional:       Appearance: He is not ill-appearing.   HENT:      Head: Normocephalic and atraumatic.      Right Ear: External ear normal.      Left Ear: External ear normal.      Nose: Nose normal.      Mouth/Throat:      Mouth: Mucous membranes are moist.      Pharynx: No oropharyngeal exudate.   Eyes:      General: No  scleral icterus.     Pupils: Pupils are equal, round, and reactive to light.   Cardiovascular:      Pulses: Normal pulses.      Heart sounds: No murmur heard.  Pulmonary:      Effort: Pulmonary effort is normal. No respiratory distress.      Breath sounds: No wheezing.   Abdominal:      General: There is distension.      Tenderness: There is no abdominal tenderness.   Musculoskeletal:      Right lower leg: No edema.      Left lower leg: No edema.   Skin:     Coloration: Skin is jaundiced.      Findings: No erythema.   Neurological:      General: No focal deficit present.      Mental Status: He is oriented to person, place, and time.   Psychiatric:         Mood and Affect: Mood normal.         Behavior: Behavior normal.             Significant Labs: All pertinent labs within the past 24 hours have been reviewed.    Significant Imaging: I have reviewed all pertinent imaging results/findings within the past 24 hours.

## 2023-08-27 NOTE — CONSULTS
[facility] - Transplant Stepdown  Infectious Disease  Consult Note    Patient Name: Sukhdeep Grimes  MRN: [id number]  Admission Date: 8/24/2023  Hospital Length of Stay: 3 days  Attending Physician: Johanna Shearer MD  Primary Care Provider: No, Primary Doctor     Isolation Status: No active isolations    Patient information was obtained from patient, past medical records and ER records.      Inpatient consult to Infectious Diseases  Consult performed by: Andrey Solitario MD  Consult ordered by: Johanna Shearer MD  Reason for consult: OLT eval        Assessment/Plan:     Other  Encounter for pre-transplant evaluation for liver transplant  Assessment and Plan    38 y.o. male with a history of alcoholic cirrhosis admitted to OSH in [place] 8/14/23 with decompensated cirrhosis with intermittent fevers.  Evaluated by infectious diseases team there found to have elevated BDG other cultures, RIP, HIV, RPR, imaging negative he is here for OLT evaluation    1. Risks of Infection: Available serologies were reviewed. No unusual risks of infection or significant barriers to transplantation were identified from the infectious disease standpoint given the information available at this time.    - Acute infectious issues: given elevated BDG would give fluconzole 400 mg daily x 2 weeks (or until transplant).  No indication for ceftriaxone at this time   - Pending serologies: Strongyloides IgG and VZV IgG   - Please call if any pending serologic testing is positive.    2. Immunizations:  Based on the patient's immunization history and serologies, the following immunizations are recommended:  - Hepatitis A    Patient does not have immunity to hepatitis A    Vaccination ordered today: Yes   - Hepatitis B    Patient does notdoes not have immunity to hepatitis B    Vaccination ordered today: Yes   - COVID    Current CDC vaccination recommendations were discussed with the patient   - Annual high dose influenza     Vaccination  ordered today:    - Prevnar 20    Vaccination ordered today: Yes   - Tdap    Vaccination ordered today: Yes   - Shingrix    Vaccination ordered today: No. Reason for not ordering: nt on inpatient formulary    Recommended Pre-Transplant Immunization Schedule   Vaccine  0m 1m 2m 6m   Pneumococcal conjugate vaccine (Prevnar 20) X      Tetanus-diphtheria-pertussis (Tdap)* X      Hepatitis A Vaccine (Havrix)** X   X   Hepatitis B Vaccine (Heplisav)** X X     Influenza (annual) X      Zoster Recombinant Vaccine (Shingrix) X  X           *Administer booster every 10 years.       **Administer if no immunity demonstrated on serologies               Patient will receive vaccines while inpatient, and will be scheduled for all subsequent doses to be given in ID clinic.    3. Counseling:   I discussed with the patient the risk for increased susceptibility to infections following transplantation including increased risk for infection right after transplant and if rejection should occur.  The patient has been counseled on the importance of vaccinations to decrease risk of infection and severe illness. Specific guidance has been provided to the patient regarding the patient's occupation, hobbies and activities to avoid future infectious complications.     4. Transplant Candidacy: Based on available information, there are no identified significant barriers to transplantation from an infectious disease standpoint.  Final determination of transplant candidacy will be made once evaluation is complete and reviewed by the Selection Committee.      Follow up with infectious disease as needed.         The total time for evaluation and management services performed on 08/27/2023 was greater thanminutes.         Thank you for your consult. I will sign off. Please contact us if you have any additional questions.    Andrey Solitario MD  Infectious Disease  [facility] - Transplant Stepdown    Subjective:     Principal Problem: Alcoholic cirrhosis  of liver with ascites    HPI:   PRE-TRANSPLANT INFECTIOUS DISEASE CONSULT    Reason for Visit:  Pre-transplant evaluation  Referring Provider: Provider Not[facility]ystem     History of Present Illness:    38 y.o. male with a history of alcoholic cirrhosis admitted to OSH in [place] 8/14/23 with decompensated cirrhosis with intermittent fevers.  Evaluated by infectious diseases team there found to have elevated BDG other cultures, RIP, HIV, RPR, imaging negative he is here for OLT evaluation    Infectious History:   Recent hospital admissions: No   Recent infections: No   Recent or current antibiotic use: No   History of recurrent infections *(sinus / pneumonia / UTI / SBP)*: No   History of diabetic foot wound, bone/joint infection: No   Recent dental infections, issues or procedures: No   History of chicken pox: No   History of shingles: No   History of STI: No   History of COVID infection: No    History of Immunosuppression:   Prior chemotherapy / immunosuppression: No   Prior transplant: No   History of splenectomy: No    Tuberculosis:   Prior screening for latent TB: Yes   Prior diagnosis of latent TB: No   Risk factors for TB *known exposure, incarceration, homelessness*: No    Geographical exposures:   Currently lives in [place] with mom   Lived in the following states: [place], [place], [place], [place]   Lived in or travelled to [facility] [place]: No   International travel: No   Travel-associated illness: No    Social/Environmental:   Occupation:  service industry   Pets: Yes 2 dogs   Livestock: No   Fishing / hunting: No   Hobbies: golf being outdoors   Water: [place]   Consumption of raw/undercooked meat or seafood?  No   Tobacco: No   Alcohol: No   Recreational drug use:  No   Sexual partners: no sex in past few years, female partners      Past Histories:   Past Medical History:   Diagnosis Date    Alcoholic cirrhosis of liver with ascites     Esophageal varices without bleeding      "Hepatic encephalopathy     Hypokalemia     Hyponatremia     Thrombocytopenia     Transfusion history 08/2022     Past Surgical History:   Procedure Laterality Date    EGD - EXTERNAL RESULT  08/21/2023    "1 column of large varices with no bleeding and no stigmata of recent bleeding in the lower 3rd of the esophagus.  No red Rigoberto sign present.  Banding was not performed.  Moderate portal hypertensive gastropathy in the entire examined stomach.  Examined duodenum was normal." EGD done in [facility])    PARACENTESIS  08/24/2023     No family history on file.     Review of patient's allergies indicates:  No Active Allergies      Immunization History:  Received all childhood vaccines: Yes  All household members receive annual flu vaccine: unknown  All household members are up to date on COVID vaccine: Yes      Labs:    CBC:   Lab Results   Component Value Date    WBC 9.85 08/27/2023    HGB 8.8 (L) 08/27/2023    HCT 25.6 (L) 08/27/2023     (H) 08/27/2023    PLT 63 (L) 08/27/2023    GRAN 6.4 08/27/2023    GRAN 64.6 08/27/2023    LYMPH 1.3 08/27/2023    LYMPH 13.4 (L) 08/27/2023    MONO 1.6 (H) 08/27/2023    MONO 16.0 (H) 08/27/2023    EOSINOPHIL 3.5 08/27/2023       Syphilis screening:   Lab Results   Component Value Date    RPR Non-reactive 08/25/2023        TB screening: No results found for: "TBGOLDPLUS", "TSPOTSCREN"    HIV screening:   Lab Results   Component Value Date    [id number] Non-reactive 08/25/2023       Strongyloides IgG: No results found for: "STRONGANTIGG"    Hepatitis Serologies:   Lab Results   Component Value Date    HEPAIGG Non-reactive 08/25/2023    HEPBCAB Non-reactive 08/25/2023    HEPBSAB 5.62 08/25/2023    HEPBSAB Non-reactive 08/25/2023    HEPCAB Non-reactive 08/25/2023        Varicella IgG: No results found for: "VARICELLAINT"        There is no immunization history on file for this patient.        Past Medical History:   Diagnosis Date    Alcoholic cirrhosis of liver " "with ascites     Esophageal varices without bleeding     Hepatic encephalopathy     Hypokalemia     Hyponatremia     Thrombocytopenia     Transfusion history 08/2022       Past Surgical History:   Procedure Laterality Date    EGD - EXTERNAL RESULT  08/21/2023    "1 column of large varices with no bleeding and no stigmata of recent bleeding in the lower 3rd of the esophagus.  No red Rigoberto sign present.  Banding was not performed.  Moderate portal hypertensive gastropathy in the entire examined stomach.  Examined duodenum was normal." [facility] done in [facility])    PARACENTESIS  08/24/2023       Review of patient's allergies indicates:  No Active Allergies    Medications:  Medications Prior to Admission   Medication Sig    albuterol (VENTOLIN HFA) 90 mcg/actuation inhaler Inhale 2 puffs into the lungs every 6 (six) hours as needed for Wheezing. Rescue    potassium chloride (KLOR-CON) 10 MEQ TbSR Take 10 mEq by mouth 2 (two) times daily.     Antibiotics (From admission, onward)      Start     Stop Route Frequency Ordered    08/27/23 1045  mupirocin 2 % ointment         09/01/23 0859 Nasl 2 times daily 08/27/23 0943    08/24/23 1100  rifAXIMin tablet 550 mg         -- Oral 2 times daily 08/24/23 1002          Antifungals (From admission, onward)      None          Antivirals (From admission, onward)      None               There is no immunization history on file for this patient.    Family History    None       Social History     Socioeconomic History    Marital status: Single     Social Determinants of Health     Financial Resource Strain: Low Risk  (8/25/2023)    Overall Financial Resource Strain (CARDIA)     Difficulty of Paying Living Expenses: Not hard at all   Food Insecurity: No Food Insecurity (8/25/2023)    Hunger Vital Sign     Worried About Running Out of Food in the Last Year: Never true     Ran Out of Food in the Last Year: Never true   Transportation Needs: No Transportation Needs " (8/25/2023)    PRAPARE - Transportation     Lack of Transportation (Medical): No     Lack of Transportation (Non-Medical): No   Physical Activity: Sufficiently Active (8/25/2023)    Exercise Vital Sign     Days of Exercise per Week: 7 days     Minutes of Exercise per Session: 40 min   Stress: No Stress Concern Present (8/25/2023)    [place] [employer] - Occupational Stress Questionnaire     Feeling of Stress : Not at all   Social Connections: Moderately Isolated (8/25/2023)    Social Connection and Isolation Panel [NHANES]     Frequency of Communication with Friends and Family: More than three times a week     Frequency of Social Gatherings with Friends and Family: Once a week     Attends Restoration Services: 1 to 4 times per year     Active Member of Clubs or Organizations: No     Attends Club or Organization Meetings: Never     Marital Status: Never    Housing Stability: Unknown (8/25/2023)    Housing Stability Vital Sign     Unable to Pay for Housing in the Last Year: No     Unstable Housing in the Last Year: No     Review of Systems   Constitutional:  Positive for activity change, appetite change and fatigue. Negative for chills and fever.   HENT:  Negative for congestion, dental problem, mouth sores and sinus pressure.    Eyes:  Negative for pain, redness and visual disturbance.   Respiratory:  Negative for cough, shortness of breath and wheezing.    Cardiovascular:  Positive for leg swelling. Negative for chest pain.   Gastrointestinal:  Positive for abdominal distention. Negative for abdominal pain, diarrhea, nausea and vomiting.   Endocrine: Negative for polyuria.   Genitourinary:  Negative for decreased urine volume, dysuria and frequency.   Musculoskeletal:  Negative for joint swelling.   Skin:  Negative for color change.   Allergic/Immunologic: Negative for food allergies.   Neurological:  Positive for weakness. Negative for dizziness and headaches.    Hematological:  Negative for adenopathy.   Psychiatric/Behavioral:  Positive for confusion. Negative for agitation. The patient is not nervous/anxious and is not hyperactive.      Objective:     Vital Signs (Most Recent):  Temp: 98.5 °F (36.9 °C) (08/27/23 0731)  Pulse: 92 (08/27/23 0731)  Resp: 16 (08/27/23 0731)  BP: 114/70 (08/27/23 0731)  SpO2: 100 % (08/27/23 0731) Vital Signs (24h Range):  Temp:  [98.3 °F (36.8 °C)-98.7 °F (37.1 °C)] 98.5 °F (36.9 °C)  Pulse:  [] 92  Resp:  [12-18] 16  SpO2:  [97 %-100 %] 100 %  BP: ()/(53-70) 114/70     Weight: 62.5 kg (137 lb 14.4 oz)  Body mass index is 19.79 kg/m².    Estimated Creatinine Clearance: 81.4 mL/min (based on SCr of 1.1 mg/dL).     Physical Exam  Constitutional:       Appearance: He is well-developed. He is ill-appearing. He is not toxic-appearing.   HENT:      Head: Normocephalic and atraumatic.   Eyes:      General: Scleral icterus present.      Conjunctiva/sclera: Conjunctivae normal.   Cardiovascular:      Rate and Rhythm: Normal rate and regular rhythm.      Heart sounds: Normal heart sounds. No murmur heard.  Pulmonary:      Effort: Pulmonary effort is normal. No respiratory distress.      Breath sounds: Normal breath sounds. No wheezing.   Abdominal:      General: Bowel sounds are normal. There is distension.      Palpations: Abdomen is soft.      Tenderness: There is no abdominal tenderness.   Musculoskeletal:         General: No tenderness. Normal range of motion.      Cervical back: Neck supple.   Lymphadenopathy:      Cervical: No cervical adenopathy.   Skin:     General: Skin is warm and dry.      Coloration: Skin is jaundiced.      Findings: No rash.   Neurological:      Mental Status: He is alert and oriented to person, place, and time.      Coordination: Coordination normal.   Psychiatric:         Behavior: Behavior normal.          Significant Labs: CBC:   Recent Labs   Lab 08/26/23  0612 08/27/23  0659   WBC 7.85 9.85   HGB 7.5*  8.8*   HCT 21.5* 25.6*   PLT 42* 63*     CMP:   Recent Labs   Lab 08/26/23  0612 08/27/23  0659   * 130*   K 3.2* 3.0*    100   CO2 18* 21*   GLU 86 86   BUN 13 14   CREATININE 0.9 1.1   CALCIUM 7.8* 8.0*   PROT 4.6* 5.3*   ALBUMIN 2.7* 3.0*   BILITOT 34.8* 39.5*   ALKPHOS 110 139*   AST 47* 58*   ALT 21 26   ANIONGAP 10 9       Significant Imaging: I have reviewed all pertinent imaging results/findings within the past 24 hours.

## 2023-08-27 NOTE — HPI
PRE-TRANSPLANT INFECTIOUS DISEASE CONSULT    Reason for Visit:  Pre-transplant evaluation  Referring Provider: Provider Notinsystem     History of Present Illness:    38 y.o. male with a history of alcoholic cirrhosis admitted to OSH in [place] 8/14/23 with decompensated cirrhosis with intermittent fevers.  Evaluated by infectious diseases team there found to have elevated BDG other cultures, RIP, HIV, RPR, imaging negative he is here for OLT evaluation    Infectious History:  Recent hospital admissions: No  Recent infections: No  Recent or current antibiotic use: No  History of recurrent infections *(sinus / pneumonia / UTI / SBP)*: No  History of diabetic foot wound, bone/joint infection: No  Recent dental infections, issues or procedures: No  History of chicken pox: No  History of shingles: No  History of STI: No  History of COVID infection: No    History of Immunosuppression:  Prior chemotherapy / immunosuppression: No  Prior transplant: No  History of splenectomy: No    Tuberculosis:  Prior screening for latent TB: Yes  Prior diagnosis of latent TB: No  Risk factors for TB *known exposure, incarceration, homelessness*: No    Geographical exposures:  Currently lives in [place] with mom  Lived in the following states: [place], [place], [place], [place]  Lived in or travelled to [facility] [place]: No  International travel: No  Travel-associated illness: No    Social/Environmental:  Occupation:  service industry  Pets: Yes 2 dogs  Livestock: No  Fishing / hunting: No  Hobbies: golf being outdoors  Water: [place]  Consumption of raw/undercooked meat or seafood?  No  Tobacco: No  Alcohol: No  Recreational drug use:  No  Sexual partners: no sex in past few years, female partners      Past Histories:   Past Medical History:   Diagnosis Date    Alcoholic cirrhosis of liver with ascites     Esophageal varices without bleeding     Hepatic encephalopathy     Hypokalemia     Hyponatremia     Thrombocytopenia     Transfusion history  "08/2022     Past Surgical History:   Procedure Laterality Date    EGD - EXTERNAL RESULT  08/21/2023    "1 column of large varices with no bleeding and no stigmata of recent bleeding in the lower 3rd of the esophagus.  No red Rigoberto sign present.  Banding was not performed.  Moderate portal hypertensive gastropathy in the entire examined stomach.  Examined duodenum was normal." EGD done in [facility])    PARACENTESIS  08/24/2023     No family history on file.     Review of patient's allergies indicates:  No Active Allergies      Immunization History:  Received all childhood vaccines: Yes  All household members receive annual flu vaccine: unknown  All household members are up to date on COVID vaccine: Yes      Labs:    CBC:   Lab Results   Component Value Date    WBC 9.85 08/27/2023    HGB 8.8 (L) 08/27/2023    HCT 25.6 (L) 08/27/2023     (H) 08/27/2023    PLT 63 (L) 08/27/2023    GRAN 6.4 08/27/2023    GRAN 64.6 08/27/2023    LYMPH 1.3 08/27/2023    LYMPH 13.4 (L) 08/27/2023    MONO 1.6 (H) 08/27/2023    MONO 16.0 (H) 08/27/2023    EOSINOPHIL 3.5 08/27/2023       Syphilis screening:   Lab Results   Component Value Date    RPR Non-reactive 08/25/2023        TB screening: No results found for: "TBGOLDPLUS", "TSPOTSCREN"    HIV screening:   Lab Results   Component Value Date    [id number] Non-reactive 08/25/2023       Strongyloides IgG: No results found for: "STRONGANTIGG"    Hepatitis Serologies:   Lab Results   Component Value Date    HEPAIGG Non-reactive 08/25/2023    HEPBCAB Non-reactive 08/25/2023    HEPBSAB 5.62 08/25/2023    HEPBSAB Non-reactive 08/25/2023    HEPCAB Non-reactive 08/25/2023        Varicella IgG: No results found for: "VARICELLAINT"        There is no immunization history on file for this patient.    "

## 2023-08-27 NOTE — SUBJECTIVE & OBJECTIVE
"Past Medical History:   Diagnosis Date    Alcoholic cirrhosis of liver with ascites     Esophageal varices without bleeding     Hepatic encephalopathy     Hypokalemia     Hyponatremia     Thrombocytopenia     Transfusion history 08/2022       Past Surgical History:   Procedure Laterality Date    EGD - EXTERNAL RESULT  08/21/2023    "1 column of large varices with no bleeding and no stigmata of recent bleeding in the lower 3rd of the esophagus.  No red Rigoberto sign present.  Banding was not performed.  Moderate portal hypertensive gastropathy in the entire examined stomach.  Examined duodenum was normal." EGD done in [facility])    PARACENTESIS  08/24/2023       Review of patient's allergies indicates:  No Active Allergies    Medications:  Medications Prior to Admission   Medication Sig    albuterol (VENTOLIN HFA) 90 mcg/actuation inhaler Inhale 2 puffs into the lungs every 6 (six) hours as needed for Wheezing. Rescue    potassium chloride (KLOR-CON) 10 MEQ TbSR Take 10 mEq by mouth 2 (two) times daily.     Antibiotics (From admission, onward)      Start     Stop Route Frequency Ordered    08/27/23 1045  mupirocin 2 % ointment         09/01/23 0859 Nasl 2 times daily 08/27/23 0943    08/24/23 1100  rifAXIMin tablet 550 mg         -- Oral 2 times daily 08/24/23 1002          Antifungals (From admission, onward)      None          Antivirals (From admission, onward)      None               There is no immunization history on file for this patient.    Family History    None       Social History     Socioeconomic History    Marital status: Single     Social Determinants of Health     Financial Resource Strain: Low Risk  (8/25/2023)    Overall Financial Resource Strain (CARDIA)     Difficulty of Paying Living Expenses: Not hard at all   Food Insecurity: No Food Insecurity (8/25/2023)    Hunger Vital Sign     Worried About Running Out of Food in the Last Year: Never true     Ran Out of Food in the Last Year: Never " true   Transportation Needs: No Transportation Needs (8/25/2023)    PRAPARE - Transportation     Lack of Transportation (Medical): No     Lack of Transportation (Non-Medical): No   Physical Activity: Sufficiently Active (8/25/2023)    Exercise Vital Sign     Days of Exercise per Week: 7 days     Minutes of Exercise per Session: 40 min   Stress: No Stress Concern Present (8/25/2023)    [employer] [place] [employer] - Occupational Stress Questionnaire     Feeling of Stress : Not at all   Social Connections: Moderately Isolated (8/25/2023)    Social Connection and Isolation Panel [NHANES]     Frequency of Communication with Friends and Family: More than three times a week     Frequency of Social Gatherings with Friends and Family: Once a week     Attends Jewish Services: 1 to 4 times per year     Active Member of Clubs or Organizations: No     Attends Club or Organization Meetings: Never     Marital Status: Never    Housing Stability: Unknown (8/25/2023)    Housing Stability Vital Sign     Unable to Pay for Housing in the Last Year: No     Unstable Housing in the Last Year: No     Review of Systems   Constitutional:  Positive for activity change, appetite change and fatigue. Negative for chills and fever.   HENT:  Negative for congestion, dental problem, mouth sores and sinus pressure.    Eyes:  Negative for pain, redness and visual disturbance.   Respiratory:  Negative for cough, shortness of breath and wheezing.    Cardiovascular:  Positive for leg swelling. Negative for chest pain.   Gastrointestinal:  Positive for abdominal distention. Negative for abdominal pain, diarrhea, nausea and vomiting.   Endocrine: Negative for polyuria.   Genitourinary:  Negative for decreased urine volume, dysuria and frequency.   Musculoskeletal:  Negative for joint swelling.   Skin:  Negative for color change.   Allergic/Immunologic: Negative for food allergies.   Neurological:  Positive for weakness. Negative for  dizziness and headaches.   Hematological:  Negative for adenopathy.   Psychiatric/Behavioral:  Positive for confusion. Negative for agitation. The patient is not nervous/anxious and is not hyperactive.      Objective:     Vital Signs (Most Recent):  Temp: 98.5 °F (36.9 °C) (08/27/23 0731)  Pulse: 92 (08/27/23 0731)  Resp: 16 (08/27/23 0731)  BP: 114/70 (08/27/23 0731)  SpO2: 100 % (08/27/23 0731) Vital Signs (24h Range):  Temp:  [98.3 °F (36.8 °C)-98.7 °F (37.1 °C)] 98.5 °F (36.9 °C)  Pulse:  [] 92  Resp:  [12-18] 16  SpO2:  [97 %-100 %] 100 %  BP: ()/(53-70) 114/70     Weight: 62.5 kg (137 lb 14.4 oz)  Body mass index is 19.79 kg/m².    Estimated Creatinine Clearance: 81.4 mL/min (based on SCr of 1.1 mg/dL).     Physical Exam  Constitutional:       Appearance: He is well-developed. He is ill-appearing. He is not toxic-appearing.   HENT:      Head: Normocephalic and atraumatic.   Eyes:      General: Scleral icterus present.      Conjunctiva/sclera: Conjunctivae normal.   Cardiovascular:      Rate and Rhythm: Normal rate and regular rhythm.      Heart sounds: Normal heart sounds. No murmur heard.  Pulmonary:      Effort: Pulmonary effort is normal. No respiratory distress.      Breath sounds: Normal breath sounds. No wheezing.   Abdominal:      General: Bowel sounds are normal. There is distension.      Palpations: Abdomen is soft.      Tenderness: There is no abdominal tenderness.   Musculoskeletal:         General: No tenderness. Normal range of motion.      Cervical back: Neck supple.   Lymphadenopathy:      Cervical: No cervical adenopathy.   Skin:     General: Skin is warm and dry.      Coloration: Skin is jaundiced.      Findings: No rash.   Neurological:      Mental Status: He is alert and oriented to person, place, and time.      Coordination: Coordination normal.   Psychiatric:         Behavior: Behavior normal.          Significant Labs: CBC:   Recent Labs   Lab 08/26/23  0612 08/27/23  0659    WBC 7.85 9.85   HGB 7.5* 8.8*   HCT 21.5* 25.6*   PLT 42* 63*     CMP:   Recent Labs   Lab 08/26/23  0612 08/27/23  0659   * 130*   K 3.2* 3.0*    100   CO2 18* 21*   GLU 86 86   BUN 13 14   CREATININE 0.9 1.1   CALCIUM 7.8* 8.0*   PROT 4.6* 5.3*   ALBUMIN 2.7* 3.0*   BILITOT 34.8* 39.5*   ALKPHOS 110 139*   AST 47* 58*   ALT 21 26   ANIONGAP 10 9       Significant Imaging: I have reviewed all pertinent imaging results/findings within the past 24 hours.

## 2023-08-27 NOTE — PLAN OF CARE
Problem: Physical Therapy  Goal: Physical Therapy Goal  Description: Goals to be met by: 23     Patient will increase functional independence with mobility by performin. Sit to stand transfer with Moore  2. Gait  x 150 feet with Moore using No Assistive Device.   3. Ascend/descend 4 stair with no Handrails Moore using No Assistive Device.   4. Stand for 2 minutes with Moore using No Assistive Device  5. Lower extremity exercise program x15 reps per handout, with independence    Outcome: Ongoing, Progressing     PT eval completed and goals established. Pt will begin PT POC, progressing as tolerated.

## 2023-08-27 NOTE — PROGRESS NOTES
1500: Took over patient assignment from STEPH Moffett, FLORENCE at this time.    AAOx4. VSS. Patient up ad lizzeth independently to chair. Patient took shower this AM. K 3.0 - PO replacements given x2 this shift. Diflucan given IVPB Q24hrs. Hep B and covid vaccines given per TARIK Foy RN w/ student nurse. Hep A and Tdap vaccines given per ANTHONY Grace RN w/ student nurse. Non-skid socks on. Bed in low position. Call light within reach.

## 2023-08-28 LAB
ALBUMIN SERPL BCP-MCNC: 2.8 G/DL (ref 3.5–5.2)
ALP SERPL-CCNC: 128 U/L (ref 55–135)
ALT SERPL W/O P-5'-P-CCNC: 25 U/L (ref 10–44)
ANION GAP SERPL CALC-SCNC: 11 MMOL/L (ref 8–16)
ANISOCYTOSIS BLD QL SMEAR: SLIGHT
AST SERPL-CCNC: 56 U/L (ref 10–40)
BACTERIA SPEC AEROBE CULT: NO GROWTH
BASOPHILS # BLD AUTO: 0.12 K/UL (ref 0–0.2)
BASOPHILS NFR BLD: 1 % (ref 0–1.9)
BILIRUB SERPL-MCNC: 37 MG/DL (ref 0.1–1)
BUN SERPL-MCNC: 19 MG/DL (ref 6–20)
BURR CELLS BLD QL SMEAR: ABNORMAL
CALCIUM SERPL-MCNC: 8.3 MG/DL (ref 8.7–10.5)
CHLORIDE SERPL-SCNC: 99 MMOL/L (ref 95–110)
CO2 SERPL-SCNC: 20 MMOL/L (ref 23–29)
CREAT SERPL-MCNC: 1.4 MG/DL (ref 0.5–1.4)
DIFFERENTIAL METHOD: ABNORMAL
EOSINOPHIL # BLD AUTO: 0.4 K/UL (ref 0–0.5)
EOSINOPHIL NFR BLD: 3 % (ref 0–8)
ERYTHROCYTE [DISTWIDTH] IN BLOOD BY AUTOMATED COUNT: 19.7 % (ref 11.5–14.5)
EST. GFR  (NO RACE VARIABLE): >60 ML/MIN/1.73 M^2
GLUCOSE SERPL-MCNC: 94 MG/DL (ref 70–110)
HCT VFR BLD AUTO: 24.5 % (ref 40–54)
HGB BLD-MCNC: 8.3 G/DL (ref 14–18)
HYPOCHROMIA BLD QL SMEAR: ABNORMAL
IMM GRANULOCYTES # BLD AUTO: 0.11 K/UL (ref 0–0.04)
IMM GRANULOCYTES NFR BLD AUTO: 0.9 % (ref 0–0.5)
INR PPP: 2.5 (ref 0.8–1.2)
LYMPHOCYTES # BLD AUTO: 1 K/UL (ref 1–4.8)
LYMPHOCYTES NFR BLD: 8.6 % (ref 18–48)
MCH RBC QN AUTO: 34.7 PG (ref 27–31)
MCHC RBC AUTO-ENTMCNC: 33.9 G/DL (ref 32–36)
MCV RBC AUTO: 103 FL (ref 82–98)
MONOCYTES # BLD AUTO: 1.7 K/UL (ref 0.3–1)
MONOCYTES NFR BLD: 14.3 % (ref 4–15)
NEUTROPHILS # BLD AUTO: 8.5 K/UL (ref 1.8–7.7)
NEUTROPHILS NFR BLD: 72.2 % (ref 38–73)
NRBC BLD-RTO: 0 /100 WBC
OVALOCYTES BLD QL SMEAR: ABNORMAL
PLATELET # BLD AUTO: 63 K/UL (ref 150–450)
PMV BLD AUTO: 11.1 FL (ref 9.2–12.9)
POIKILOCYTOSIS BLD QL SMEAR: SLIGHT
POLYCHROMASIA BLD QL SMEAR: ABNORMAL
POTASSIUM SERPL-SCNC: 4.4 MMOL/L (ref 3.5–5.1)
PROT SERPL-MCNC: 5.1 G/DL (ref 6–8.4)
PROTHROMBIN TIME: 25.1 SEC (ref 9–12.5)
RBC # BLD AUTO: 2.39 M/UL (ref 4.6–6.2)
SCHISTOCYTES BLD QL SMEAR: ABNORMAL
SODIUM SERPL-SCNC: 130 MMOL/L (ref 136–145)
STRONGYLOIDES ANTIBODY IGG: NEGATIVE
VARICELLA INTERPRETATION: POSITIVE
VARICELLA ZOSTER IGG: 638.3 AU/ML
WBC # BLD AUTO: 11.81 K/UL (ref 3.9–12.7)

## 2023-08-28 PROCEDURE — 99233 PR SUBSEQUENT HOSPITAL CARE,LEVL III: ICD-10-PCS | Mod: ,,, | Performed by: HOSPITALIST

## 2023-08-28 PROCEDURE — 25500020 PHARM REV CODE 255: Performed by: HOSPITALIST

## 2023-08-28 PROCEDURE — 85610 PROTHROMBIN TIME: CPT | Performed by: HOSPITALIST

## 2023-08-28 PROCEDURE — 99233 SBSQ HOSP IP/OBS HIGH 50: CPT | Mod: ,,, | Performed by: HOSPITALIST

## 2023-08-28 PROCEDURE — A9585 GADOBUTROL INJECTION: HCPCS | Performed by: HOSPITALIST

## 2023-08-28 PROCEDURE — 80053 COMPREHEN METABOLIC PANEL: CPT | Performed by: HOSPITALIST

## 2023-08-28 PROCEDURE — 36415 COLL VENOUS BLD VENIPUNCTURE: CPT | Performed by: HOSPITALIST

## 2023-08-28 PROCEDURE — P9047 ALBUMIN (HUMAN), 25%, 50ML: HCPCS | Mod: JZ,JG | Performed by: HOSPITALIST

## 2023-08-28 PROCEDURE — 94761 N-INVAS EAR/PLS OXIMETRY MLT: CPT

## 2023-08-28 PROCEDURE — 85025 COMPLETE CBC W/AUTO DIFF WBC: CPT | Performed by: HOSPITALIST

## 2023-08-28 PROCEDURE — 93005 ELECTROCARDIOGRAM TRACING: CPT

## 2023-08-28 PROCEDURE — 93010 EKG 12-LEAD: ICD-10-PCS | Mod: ,,, | Performed by: INTERNAL MEDICINE

## 2023-08-28 PROCEDURE — 93010 ELECTROCARDIOGRAM REPORT: CPT | Mod: ,,, | Performed by: INTERNAL MEDICINE

## 2023-08-28 PROCEDURE — 63600175 PHARM REV CODE 636 W HCPCS: Mod: JZ,JG | Performed by: HOSPITALIST

## 2023-08-28 PROCEDURE — 25000003 PHARM REV CODE 250: Performed by: HOSPITALIST

## 2023-08-28 PROCEDURE — 20600001 HC STEP DOWN PRIVATE ROOM

## 2023-08-28 RX ORDER — GADOBUTROL 604.72 MG/ML
10 INJECTION INTRAVENOUS
Status: COMPLETED | OUTPATIENT
Start: 2023-08-28 | End: 2023-08-28

## 2023-08-28 RX ORDER — FLUCONAZOLE 200 MG/1
400 TABLET ORAL DAILY
Status: DISCONTINUED | OUTPATIENT
Start: 2023-08-28 | End: 2023-09-05

## 2023-08-28 RX ORDER — ALBUMIN HUMAN 250 G/1000ML
25 SOLUTION INTRAVENOUS 3 TIMES DAILY
Status: DISCONTINUED | OUTPATIENT
Start: 2023-08-28 | End: 2023-08-29

## 2023-08-28 RX ADMIN — Medication 6 MG: at 11:08

## 2023-08-28 RX ADMIN — PANTOPRAZOLE SODIUM 40 MG: 40 TABLET, DELAYED RELEASE ORAL at 09:08

## 2023-08-28 RX ADMIN — RIFAXIMIN 550 MG: 550 TABLET ORAL at 08:08

## 2023-08-28 RX ADMIN — RIFAXIMIN 550 MG: 550 TABLET ORAL at 09:08

## 2023-08-28 RX ADMIN — ALBUMIN (HUMAN) 25 G: 12.5 SOLUTION INTRAVENOUS at 08:08

## 2023-08-28 RX ADMIN — ALBUMIN (HUMAN) 25 G: 12.5 SOLUTION INTRAVENOUS at 02:08

## 2023-08-28 RX ADMIN — ONDANSETRON 8 MG: 8 TABLET, ORALLY DISINTEGRATING ORAL at 07:08

## 2023-08-28 RX ADMIN — MUPIROCIN: 20 OINTMENT TOPICAL at 09:08

## 2023-08-28 RX ADMIN — LACTULOSE 30 G: 20 SOLUTION ORAL at 09:08

## 2023-08-28 RX ADMIN — ALBUMIN (HUMAN) 25 G: 12.5 SOLUTION INTRAVENOUS at 10:08

## 2023-08-28 RX ADMIN — FLUCONAZOLE 400 MG: 200 TABLET ORAL at 09:08

## 2023-08-28 RX ADMIN — GADOBUTROL 10 ML: 604.72 INJECTION INTRAVENOUS at 12:08

## 2023-08-28 RX ADMIN — LACTULOSE 30 G: 20 SOLUTION ORAL at 08:08

## 2023-08-28 RX ADMIN — LACTULOSE 30 G: 20 SOLUTION ORAL at 02:08

## 2023-08-28 RX ADMIN — MUPIROCIN: 20 OINTMENT TOPICAL at 08:08

## 2023-08-28 NOTE — TREATMENT PLAN
Hepatology Treatment Plan    Sukhdeep Grimes is a 38 y.o. male admitted to hospital 8/24/2023 (Hospital Day: 5) due to Alcoholic cirrhosis of liver with ascites.     Interval History  VSS, NISREEN.     Will obtain MRI for small indeterminate liver lesion      Objective  Temp:  [98.3 °F (36.8 °C)-99.2 °F (37.3 °C)] 98.3 °F (36.8 °C) (08/28 1108)  Pulse:  [] 100 (08/28 1108)  BP: ()/(51-59) 107/59 (08/28 1108)  Resp:  [15-20] 18 (08/28 1108)  SpO2:  [98 %-99 %] 99 % (08/28 1108)      Laboratory    Lab Results   Component Value Date    WBC 11.81 08/28/2023    HGB 8.3 (L) 08/28/2023    HCT 24.5 (L) 08/28/2023     (H) 08/28/2023    PLT 63 (L) 08/28/2023       Lab Results   Component Value Date     (L) 08/28/2023    K 4.4 08/28/2023    CL 99 08/28/2023    CO2 20 (L) 08/28/2023    BUN 19 08/28/2023    CREATININE 1.4 08/28/2023    CALCIUM 8.3 (L) 08/28/2023       Lab Results   Component Value Date    ALBUMIN 2.8 (L) 08/28/2023    ALT 25 08/28/2023    AST 56 (H) 08/28/2023    ALKPHOS 128 08/28/2023    BILITOT 37.0 (H) 08/28/2023       Lab Results   Component Value Date    INR 2.5 (H) 08/28/2023    INR 2.5 (H) 08/27/2023    INR 2.5 (H) 08/26/2023       MELD 3.0: 35 at 8/28/2023  4:44 AM  MELD-Na: 35 at 8/28/2023  4:44 AM  Calculated from:  Serum Creatinine: 1.4 mg/dL at 8/28/2023  4:44 AM  Serum Sodium: 130 mmol/L at 8/28/2023  4:44 AM  Total Bilirubin: 37.0 mg/dL at 8/28/2023  4:44 AM  Serum Albumin: 2.8 g/dL at 8/28/2023  4:44 AM  INR(ratio): 2.5 at 8/28/2023  4:44 AM  Age at listing (hypothetical): 38 years  Sex: Male at 8/28/2023  4:44 AM      Assessment  Sukhdeep Grimes is a 38 y.o. male with history of decompensated alcoholic cirrhosis, and alcohol use disorder (relapsed after rehab 3 years ago) who is transferred to [facility] from [place] for liver transplantation eval. Diagnosed with cirrhosis in July 2023 when he presented with increasing abdominal girth, somnolence, and  weakness.  Most recent EGD on 8/21 showed large varices without bleeding stigmata and PHG. Last drink 1 month ago; patient reports he is committed to sobriety. He has relapsed in the past and continued drinking in spite of being told not for elevated LFTs. Modifiably high risk for tx per addiction psych. He has financial approval for liver transplant evaluation. Repeat TTE confirmed HPS. CT triple phase showed a 7 mm left liver lobe nodule.     Problem List:  Decompensated EtOH Cirrhosis c/b ascites and HE  Hepatopulmonary syndrome  Alcohol use disorder, with prior relapse  HRS s/p treatment   Large esophageal varices     ===============================     Pertinent workup:  EGD (8/21/23):  Large (> 5 mm) esophageal varices with no bleeding and no stigmata of recent bleeding. Portal hypertensive gastropathy. Normal examined duodenum.    CT triple phase (8/25/23):  Cirrhotic appearance of liver.  Focal exophytic nodule along the left hepatic lobe measuring 7 mm.  Lesion remains indeterminate. Splenomegaly, ascites, umbilical vein recanalization and gastroesophageal varices together suggested portal venous hypertension.     MRI Abdomen 8/20/23:   1. No cholelithiasis. No biliary dilatation.  2. Hepatic cirrhosis with evidence of portal venous hypertension. Moderate volume ascites.     RUQ US 8/15:   Cirrhosis without obvious focal mass. No bile duct dilation.  The gallbladder is nondistended but contains sludge. The gallbladder wall is thickened which is likely reactive to hepatic disease. Moderate volume of ascites. Splenomegaly.     TTE 8/25/23:  The left ventricle is normal in size. Normal wall thickness. Normal wall motion. There is normal systolic function. There is normal diastolic function. Study is negative for intra cardiac shunt. Bubbles on the left side on the 4th beat emanating from pulmonary veins consistent with transpulmonary shunting.       HEP A IgM: Non-Reactive 08/16/2023   HEP A Total: Reactive  08/22/2023   HEP B Core IgM/surface Ag/core total: Non-Reactive 08/16/2023   HEP B Surface Ab: Not immune 8/16/23  HEP C Ab: Non-Reactive 08/16/2023  ASMA: Negative 8/15/23  LEYDI: Negative 8/15/23  AMA Negative 8/22/23  Alpha 1 antitrypsin: 96 8/22/23  HIV 1/2: Negative 8/19/23  IgG: WNL 8/15/23      Plan    - Obtain MRI abdomen with and without contrast to evaluate liver lesion    - AFP ordered    - Meets criteria for SBP prophylaxis (Ascitic fluid protein < 1.5 and T bilirubin >3). Can start PO ciprofloxacin.     - Continue PO lactulose 20 mg TID (titrate till 2-3 daily Bm achieved) & Xifaxin 550 mg BID.     - Continue folate and thiamine supplementation.      - Appreciate addiction psychiatry recs.      - Low Na, high protein diet.      - Avoid sedating drugs (opiates & benzodiazepines) if possible.    - please obtain daily CBC, CMP, INR. Follow PEth.    - Plan of care was discussed with primary team      Thank you for involving us in the care of Sukhdeep Grimes. Please call with any additional concerns or questions.      Mahad Aguilar  Gastroenterology and Hepatology Fellow, PGY-V

## 2023-08-28 NOTE — PLAN OF CARE
Pt admitted to OSH 8/14 with liver failure, transferred to [facility] on 8/24 for liver transplant evaluation. Hepatology following, plan for pt to be presented to committee on Wednesday 8/30. MELD 35 today. MRI of abdomen today to further assess lesion on liver seen on previous CT. Pt is AAOx4, VSS on bedside monitor, afebrile, on RA. Albumin q8hrs. Aldactone/lasix dc'd today. Brother at bedside today. Pt is up with standby-assist, remains free from falls/injuries so far today. Nonskid socks on, call bell within reach, bed locked/lowest position. Pt verbalized understanding to call for needs/assistance.
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
## 2023-08-28 NOTE — PROGRESS NOTES
Pt escorted off the unit via stretcher with transport personnel for abdomen MRI. No distress noted.

## 2023-08-28 NOTE — SUBJECTIVE & OBJECTIVE
Interval History:   8/28: no issues per patient. Brother at bedside.     Review of Systems   Constitutional:  Negative for chills and fever.   HENT:  Negative for dental problem and nosebleeds.    Eyes:  Negative for pain and visual disturbance.   Respiratory:  Positive for cough and shortness of breath. Negative for chest tightness.    Cardiovascular:  Positive for leg swelling. Negative for chest pain.   Gastrointestinal:  Negative for abdominal distention, abdominal pain, diarrhea and nausea.   Endocrine: Negative for cold intolerance and polydipsia.   Genitourinary:  Negative for dysuria and frequency.   Musculoskeletal:  Negative for arthralgias, gait problem and joint swelling.   Skin:  Negative for color change and wound.   Allergic/Immunologic: Negative for immunocompromised state.   Neurological:  Positive for dizziness. Negative for light-headedness and headaches.   Psychiatric/Behavioral:  Positive for confusion. The patient is nervous/anxious.      Objective:     Vital Signs (Most Recent):  Temp: 98.3 °F (36.8 °C) (08/28/23 1108)  Pulse: 100 (08/28/23 1108)  Resp: 18 (08/28/23 1108)  BP: (!) 107/59 (08/28/23 1108)  SpO2: 99 % (08/28/23 1108) Vital Signs (24h Range):  Temp:  [98.3 °F (36.8 °C)-99.2 °F (37.3 °C)] 98.3 °F (36.8 °C)  Pulse:  [] 100  Resp:  [15-20] 18  SpO2:  [98 %-99 %] 99 %  BP: ()/(51-59) 107/59     Weight: 63.3 kg (139 lb 8.8 oz)  Body mass index is 20.02 kg/m².    Intake/Output Summary (Last 24 hours) at 8/28/2023 1251  Last data filed at 8/28/2023 0425  Gross per 24 hour   Intake 390 ml   Output --   Net 390 ml         Physical Exam  Constitutional:       Appearance: He is not ill-appearing.   HENT:      Head: Normocephalic and atraumatic.      Right Ear: External ear normal.      Left Ear: External ear normal.      Nose: Nose normal.      Mouth/Throat:      Mouth: Mucous membranes are moist.      Pharynx: No oropharyngeal exudate.   Eyes:      General: No scleral icterus.      Pupils: Pupils are equal, round, and reactive to light.   Cardiovascular:      Pulses: Normal pulses.      Heart sounds: No murmur heard.  Pulmonary:      Effort: Pulmonary effort is normal. No respiratory distress.      Breath sounds: No wheezing.   Abdominal:      General: There is distension.      Tenderness: There is no abdominal tenderness.   Musculoskeletal:      Right lower leg: No edema.      Left lower leg: No edema.   Skin:     Coloration: Skin is jaundiced.      Findings: No erythema.   Neurological:      General: No focal deficit present.      Mental Status: He is oriented to person, place, and time.   Psychiatric:         Mood and Affect: Mood normal.         Behavior: Behavior normal.             Significant Labs: All pertinent labs within the past 24 hours have been reviewed.    Significant Imaging: I have reviewed all pertinent imaging results/findings within the past 24 hours.

## 2023-08-28 NOTE — PROGRESS NOTES
[facility] - Transplant [facility] Medicine  Progress Note    Patient Name: Sukhdeep Grimes  MRN: [id number]  Patient Class: IP- Inpatient   Admission Date: 8/24/2023  Length of Stay: 4 days  Attending Physician: Johanna Shearer MD  Primary Care Provider: Martine, Primary Doctor        Subjective:     Principal Problem:Alcoholic cirrhosis of liver with ascites        HPI:  38-year-old male with a history of alcoholic cirrhosis admitted to [facility] in [place] on August 14 with increasing abdominal girth, somnolence, and weakness.  He underwent paracentesis.  Admit diagnoses included alcoholic cirrhosis with ascites, hyponatremia, hypokalemia, thrombocytopenia, and AYAH.  During his stay he had intermittent fevers. Infectious Diseases evaluated him for fever and elevated Fungitell.  It was noted that blood and urine culture along with chest x-ray were negative.  They felt the Fungitell was nonspecific and the patient had no clear evidence of fungal infection.  HIV, RPR, and respiratory viral panels were noted to be negative.  MRI of the abdomen on August 20 had no cholelithiasis or biliary dilatation.  He remains on Rocephin currently.      Encephalopathy improved during his stay.  Current diagnoses included acute liver injury with decompensated cirrhosis, acute on chronic anemia, SIRS (no obvious source of infection) nonbleeding varices, and improving (but not resolved) encephalopathy.  He noted last alcohol intake was around July 7.      Hemoglobin remained low during his stay with levels ranging 6.6-8.0.  He had no overt evidence of bleeding. They noted fibrinogen and haptoglobin were low and LDH was elevated.  Ceruloplasmin is pending. EGD on August 21 had 1 column of large varices with no bleeding and no stigmata of recent bleeding in the lower 3rd of the esophagus.  No red Rigoberto sign present.  Banding was not performed.  Moderate portal hypertensive gastropathy in the entire examined  stomach.  Examined duodenum was normal.     He remains hemodynamically stable in a med-surg bed.  He is awake and alert.  Will plan transfer to Hospital Medicine at [facility] for Hepatology evaluation.     August 22: White blood cells 8.8 (down from 14.4 on August 17), hemoglobin 6.9, hematocrit 20, platelets 40, sodium 134, potassium 3.3, chloride 104, CO2 18, BUN 9, creatinine 0.72, glucose 94, INR 3, total bilirubin 34.8, AST 45, ALT 22 August 20: RPR nonreactive     August 19: HIV nonreactive, COVID negative, influenza negative     August 18 urine culture had no growth     August 16: Acute hepatitis panel nonreactive, blood cultures had no growth at 5 days  -chest x-ray had no acute cardiopulmonary abnormality.     August 15: Abdominal ultrasound had cirrhosis without obvious focal mass.  No bile duct dilation.  Gallbladder is nondistended but contains sludge.  Gallbladder wall is thickened likely reactive to hepatic disease.  Moderate volume of ascites.  Splenomegaly.     August 14:  Peritoneal fluid culture had no growth after 3 days  -white blood cells 16.3, hemoglobin 10.4, hematocrit 28.6, platelets 66, sodium 124, potassium 2.8, chloride 88, CO2 25, BUN 28, creatinine 1.48, glucose 96, INR 2.1      Overview/Hospital Course:  No notes on file    Interval History:   8/28: no issues per patient. Brother at bedside.     Review of Systems   Constitutional:  Negative for chills and fever.   HENT:  Negative for dental problem and nosebleeds.    Eyes:  Negative for pain and visual disturbance.   Respiratory:  Positive for cough and shortness of breath. Negative for chest tightness.    Cardiovascular:  Positive for leg swelling. Negative for chest pain.   Gastrointestinal:  Negative for abdominal distention, abdominal pain, diarrhea and nausea.   Endocrine: Negative for cold intolerance and polydipsia.   Genitourinary:  Negative for dysuria and frequency.   Musculoskeletal:  Negative for arthralgias,  gait problem and joint swelling.   Skin:  Negative for color change and wound.   Allergic/Immunologic: Negative for immunocompromised state.   Neurological:  Positive for dizziness. Negative for light-headedness and headaches.   Psychiatric/Behavioral:  Positive for confusion. The patient is nervous/anxious.      Objective:     Vital Signs (Most Recent):  Temp: 98.3 °F (36.8 °C) (08/28/23 1108)  Pulse: 100 (08/28/23 1108)  Resp: 18 (08/28/23 1108)  BP: (!) 107/59 (08/28/23 1108)  SpO2: 99 % (08/28/23 1108) Vital Signs (24h Range):  Temp:  [98.3 °F (36.8 °C)-99.2 °F (37.3 °C)] 98.3 °F (36.8 °C)  Pulse:  [] 100  Resp:  [15-20] 18  SpO2:  [98 %-99 %] 99 %  BP: ()/(51-59) 107/59     Weight: 63.3 kg (139 lb 8.8 oz)  Body mass index is 20.02 kg/m².    Intake/Output Summary (Last 24 hours) at 8/28/2023 1251  Last data filed at 8/28/2023 0425  Gross per 24 hour   Intake 390 ml   Output --   Net 390 ml         Physical Exam  Constitutional:       Appearance: He is not ill-appearing.   HENT:      Head: Normocephalic and atraumatic.      Right Ear: External ear normal.      Left Ear: External ear normal.      Nose: Nose normal.      Mouth/Throat:      Mouth: Mucous membranes are moist.      Pharynx: No oropharyngeal exudate.   Eyes:      General: No scleral icterus.     Pupils: Pupils are equal, round, and reactive to light.   Cardiovascular:      Pulses: Normal pulses.      Heart sounds: No murmur heard.  Pulmonary:      Effort: Pulmonary effort is normal. No respiratory distress.      Breath sounds: No wheezing.   Abdominal:      General: There is distension.      Tenderness: There is no abdominal tenderness.   Musculoskeletal:      Right lower leg: No edema.      Left lower leg: No edema.   Skin:     Coloration: Skin is jaundiced.      Findings: No erythema.   Neurological:      General: No focal deficit present.      Mental Status: He is oriented to person, place, and time.   Psychiatric:         Mood and  Affect: Mood normal.         Behavior: Behavior normal.             Significant Labs: All pertinent labs within the past 24 hours have been reviewed.    Significant Imaging: I have reviewed all pertinent imaging results/findings within the past 24 hours.      Assessment/Plan:      * Alcoholic cirrhosis of liver with ascites  MELD 3.0: 31 at 8/24/2023 11:30 AM  MELD-Na: 31 at 8/24/2023 11:30 AM  Calculated from:  Serum Creatinine: 1.0 mg/dL at 8/24/2023  9:53 AM  Serum Sodium: 133 mmol/L at 8/24/2023  9:53 AM  Total Bilirubin: 39.3 mg/dL at 8/24/2023  9:53 AM  Serum Albumin: 2.9 g/dL at 8/24/2023  9:53 AM  INR(ratio): 2.3 at 8/24/2023 11:30 AM  Age at listing (hypothetical): 38 years  Sex: Male at 8/24/2023 11:30 AM      Lactulose/rifaximin  Vitamin K   Liver ultrasound doppler  Hepatology consult  PET  Psych consult  Paracentesis        Encounter for pre-transplant evaluation for liver transplant    See liver cirrhosis    Decompensated hepatic cirrhosis    See liver cirrhosis    Alcohol use disorder, severe, in early remission  Consult addiction psychiatry         VTE Risk Mitigation (From admission, onward)         Ordered     IP VTE LOW RISK PATIENT  Once         08/24/23 1002     Place AURORA hose  Until discontinued         08/24/23 1002     Place sequential compression device  Until discontinued         08/24/23 1002                Discharge Planning   GEORGE: 8/29/2023     Code Status: Full Code   Is the patient medically ready for discharge?:     Reason for patient still in hospital (select all that apply): Patient trending condition  Discharge Plan A: Home, Home with family, Home Health   Discharge Delays: None known at this time              Johanna Shearer MD  Department of Hospital Medicine   [facility] - Transplant Stepdown

## 2023-08-28 NOTE — PHYSICIAN QUERY
PT Name: Sukhdeep Grimes  MR #: [id number]  DOCUMENTATION CLARIFICATION      CDS Xiomara Cast RN, BSN        Contact Information:    [email or web address]            This form is a permanent document in the medical record.      Query Date: August 28, 2023    By submitting this query, we are merely seeking further clarification of documentation. Please utilize your independent clinical judgment when addressing the question(s) below.    The Medical Record contains the following:   Indicators  Supporting Clinical Findings Location in Medical Record   X   PT        INR        PTT  08/25/23 06:34 08/26/23 06:12 08/27/23 06:59 08/28/23 04:44   Protime 24.7 (H) 25.1 (H) 25.1 (H) 25.1 (H)   INR 2.4 (H) 2.5 (H) 2.5 (H) 2.5 (H)    Labs   X   Platelets  08/24/23 11:30 08/25/23 06:34 08/26/23 06:12 08/27/23 06:59 08/28/23 04:44   Platelets 51 (L) 40 (L) 42 (L) 63 (L) 63 (L)    Labs     Coagulopathy or Coagulation Defect documented     X   Acute/Chronic Illness Alcoholic cirrhosis of liver with ascites  Encounter for pre-transplant evaluation for liver transplant  Decompensated hepatic cirrhosis  Alcohol use disorder, severe, in early remission 8/27 H PN    X   Treatment phytonadione vitamin k (AQUA-MEPHYTON) 10 mg in dextrose 5 % (D5W) 50 mL IVPB     Ordered Dose: 10 mg Route: Intravenous Frequency: Daily @ 50 mL/hr over 60 Minutes    Scheduled Start Date/Time: 08/24/23 1100 End Date/Time: 08/26/23 1115 after 3 doses      MAR    Other       Provider, please specify diagnosis or diagnoses associated with above clinical findings.    [  x ] Coagulation Factor Deficiency due to Liver Disease     [   ] Coagulation Defect due to (please specify):_________     [   ] Coagulation deficiency unspecified     [   ] Abnormal INR/Coagulation Profile not associated with a coagulation defect (lab finding only)     [   ] Other hematological diagnosis (please specify):_______     [  ] Clinically Undetermined           Please document in your  progress notes daily for the duration of treatment until resolved, and include in your discharge summary.    Form No. [id number]

## 2023-08-28 NOTE — PLAN OF CARE
[facility] - Transplant Stepdown  Discharge Reassessment    Primary Care Provider: No, Primary Doctor    Expected Discharge Date: 8/29/2023    Reassessment (most recent)       Discharge Reassessment - 08/28/23 1011          Discharge Reassessment    Assessment Type Discharge Planning Reassessment     Did the patient's condition or plan change since previous assessment? No     Discharge Plan discussed with: Patient;Sibling     Communicated GEORGE with patient/caregiver Date not available/Unable to determine     Discharge Plan A Home;Home with family;Home Health     Discharge Plan B Home;Home with family     DME Needed Upon Discharge  none     Transition of Care Barriers None     Why the patient remains in the hospital Requires continued medical care        Post-Acute Status    Discharge Delays None known at this time                        No

## 2023-08-28 NOTE — PROGRESS NOTES
PRE EDUCATION TEACHING NOTE    Met with patient's brother , Nitesh, to review below information.  Patient was also present for this session.  Consent signed at last meeting .   LIVER TRANSPLANT WORK-UP EDUCATION  I. NATIONAL REGISTRY LISTING  A. Information for listing  B. Regions  C. Per UNOS, can be listed at more than one center  II. SURGERY  A. Length  B. Complications: bleeding, infection  C. Central lines, drains, Rangel catheter, incision, endotracheal tube, NG tube  D. Transfusions, cell saver  III. SHORT TERM RECOVERY  A. ICU, PICU, Hospital stay  IV. LONG TERM RECOVERY  A. Labs at home  B. Clinic visits  C. Complications: infection, rejection, readmissions  D. Normal immunity and immunosuppression  E. Incidence of re-admit in 1st year  V. REJECTION  A. Incidence  B. Treatment: Solumedrol, Prograf, Thymoglobulin (actions and side effects)  VI. IMMUNOSUPPRESSIVES  A. Prednisone  B. Imuran/Cellcept  C. Cyclosporin/Prograf  D. Rapamune  E. Need for lifetime compliance  F. Actions and side effects  G. Costs  VII. RECURRENCE OF VIRAL HEPATITIS

## 2023-08-28 NOTE — PLAN OF CARE
Patient AAO x4. VSS. Afebrile. On room air. He is independent and ambulatory. Delayed responses. Continuing low sodium diet and 1.5 L FR. Family at bedside. Bed locked/ in lowest setting. Call bell in reach

## 2023-08-28 NOTE — PHYSICIAN QUERY
"PT Name: Sukhdeep Grimes  MR #: [id number]    DOCUMENTATION CLARIFICATION     CDS Xiomara Cast, RN, BSN        Contact Information:    [email or web address]             This form is a permanent document in the medical record.     Query Date: August 28, 2023    By submitting this query, we are merely seeking further clarification of documentation.. Please utilize your independent clinical judgment when addressing the question(s) below.    The medical record contains the following:   Indicators  Supporting Clinical Findings Location in Medical Record   X   Registered Dietician Diagnosis Moderate malnutrition  Malnutrition Type:  Context: acute illness or injury  Level: moderate    NFPE performed on 8/26- pt meets criteria for moderate malnutrition. 8/26 RD consult     Energy Intake     X   Weight Loss Pt was not sure what their UBW is or if they have lost wt ("I am sure I had to have lost wt"). Pt stated that they have a good appetite and consumed 100% of their dinner last night and 75% of their breakfast 8/26 RD consult   X   Fat Loss Subcutaneous Fat (Malnutrition): moderate depletion  Orbital Region (Subcutaneous Fat Loss): moderate depletion  Upper Arm Region (Subcutaneous Fat Loss): mild depletion  Thoracic and Lumbar Region: moderate depletion  8/26 RD consult    X   Muscle Loss Muscle Mass (Malnutrition): moderate depletion  Brooks Region (Muscle Loss): moderate depletion  Clavicle Bone Region (Muscle Loss): moderate depletion  Clavicle and Acromion Bone Region (Muscle Loss): moderate depletion  Dorsal Hand (Muscle Loss): mild depletion    8/26 RD consult    X   Edema/Fluid Accumulation Fluid Accumulation (Malnutrition):  (ascites) 8/26 RD consult     Reduced  Strength (by dynamometer)     X   Weight, BMI, Usual Body Weight Weight Method: Standard Scale  Weight: 67.1 kg (148 lb)  BMI (Calculated): 21.2 8/26 RD gueconsult     Delayed Wound Healing     X   Acute or Chronic Illness Alcoholic cirrhosis of liver " with ascites  Encounter for pre-transplant evaluation for liver transplant  Decompensated hepatic cirrhosis  Alcohol use disorder, severe, in early remission   8/26  PN     Social or Environmental Circumstances     X   Treatment Recommendations     1.) Recommend continuing with Low na diet as tolerated, fluid per MD.      2.) Recommend continuing Boost Plus daily to help optimize PRO/Kcal intake.      3.) RD to monitor wt, PO intake, skin, labs. 8/26 RD consult     Other       Academy of Nutrition and Dietetics (Academy) and the American Society for Parenteral and Enteral Nutrition (A.S.P.E.N.) Clinical Characteristics to support Malnutrition      Criteria for mild malnutrition is defined as 1 characteristic outlined above within the established moderate or severe parameters.  A minimum of 2 out of the 6 characteristics noted above are recommended for a diagnosis of moderate or severe malnutrition.  Chronic illness/injury is a disease/condition lasting 3 months or longer.    The noted clinical guidelines are only system guidelines and do not replace the providers clinical judgment.    Provider, please specify diagnosis or diagnoses associated with above clinical findings.    [ x ] Moderate Malnutrition - a minimum of 2 of the 6 moderate malnutrition characteristics noted above      [  ] Malnutrition, Unspecified degree     [  ] Other Nutritional Diagnosis (please specify): _______     [  ] Malnutrition ruled out       Please document in your progress notes daily for the duration of treatment until resolved and  include in your discharge summary.      References:    Pratik, IVAN., & Ernesto R. (2022, April). Assessment and management of anorexia and cachexia in palliative care. Retrieved May 23, 2022, from https://www.[employer]/contents/assessment-and-management-of-anorexia-and-cachexia-in-palliative-care?mhmbiEpu=1016&source=see_link     MARYAM Forman, PhD, RD, DIANN, ELSIE Mccall, PhD, RN, ANTIONE Fung MD, PhD, RANDY,  ANTHONY Cross, MS, RD, [facility], NADEGE Hammond, MS, RD, The Academy Malnutrition Work Group, The A.S.P.E.N. Board of Directors. (2012). Consensus Statement: Academy of Nutrition and Dietetics and American Society for Parenteral and Enteral Nutrition: Characteristics Recommended for the Identification and Documentation of Adult Malnutrition (Undernutrition). Journal of Parenteral and Enteral Nutrition, 36(3), 275-283. doi:10.1177/9489835788193525     Form No. [id number]

## 2023-08-29 LAB
AFP SERPL-MCNC: <2 NG/ML (ref 0–8.4)
ALBUMIN SERPL BCP-MCNC: 3.1 G/DL (ref 3.5–5.2)
ALP SERPL-CCNC: 116 U/L (ref 55–135)
ALT SERPL W/O P-5'-P-CCNC: 20 U/L (ref 10–44)
ANION GAP SERPL CALC-SCNC: 9 MMOL/L (ref 8–16)
AST SERPL-CCNC: 45 U/L (ref 10–40)
BACTERIA BLD CULT: NORMAL
BASOPHILS # BLD AUTO: 0.11 K/UL (ref 0–0.2)
BASOPHILS NFR BLD: 1.3 % (ref 0–1.9)
BILIRUB SERPL-MCNC: 29.2 MG/DL (ref 0.1–1)
BUN SERPL-MCNC: 17 MG/DL (ref 6–20)
BURR CELLS BLD QL SMEAR: ABNORMAL
CALCIUM SERPL-MCNC: 8.3 MG/DL (ref 8.7–10.5)
CHLORIDE SERPL-SCNC: 102 MMOL/L (ref 95–110)
CMV IGG SERPL QL IA: NORMAL
CO2 SERPL-SCNC: 19 MMOL/L (ref 23–29)
CREAT SERPL-MCNC: 1.1 MG/DL (ref 0.5–1.4)
DIFFERENTIAL METHOD: ABNORMAL
EOSINOPHIL # BLD AUTO: 0.3 K/UL (ref 0–0.5)
EOSINOPHIL NFR BLD: 3.4 % (ref 0–8)
ERYTHROCYTE [DISTWIDTH] IN BLOOD BY AUTOMATED COUNT: 19.1 % (ref 11.5–14.5)
EST. GFR  (NO RACE VARIABLE): >60 ML/MIN/1.73 M^2
GLUCOSE SERPL-MCNC: 94 MG/DL (ref 70–110)
HCT VFR BLD AUTO: 21.5 % (ref 40–54)
HGB BLD-MCNC: 7.2 G/DL (ref 14–18)
IMM GRANULOCYTES # BLD AUTO: 0.07 K/UL (ref 0–0.04)
IMM GRANULOCYTES NFR BLD AUTO: 0.8 % (ref 0–0.5)
INR PPP: 2.5 (ref 0.8–1.2)
LYMPHOCYTES # BLD AUTO: 1.4 K/UL (ref 1–4.8)
LYMPHOCYTES NFR BLD: 16.2 % (ref 18–48)
MCH RBC QN AUTO: 34.8 PG (ref 27–31)
MCHC RBC AUTO-ENTMCNC: 33.5 G/DL (ref 32–36)
MCV RBC AUTO: 104 FL (ref 82–98)
MONOCYTES # BLD AUTO: 1.5 K/UL (ref 0.3–1)
MONOCYTES NFR BLD: 17.8 % (ref 4–15)
NEUTROPHILS # BLD AUTO: 5.1 K/UL (ref 1.8–7.7)
NEUTROPHILS NFR BLD: 60.5 % (ref 38–73)
NRBC BLD-RTO: 0 /100 WBC
PLATELET # BLD AUTO: 53 K/UL (ref 150–450)
PLATELET BLD QL SMEAR: ABNORMAL
PMV BLD AUTO: 10.7 FL (ref 9.2–12.9)
POIKILOCYTOSIS BLD QL SMEAR: SLIGHT
POTASSIUM SERPL-SCNC: 3.6 MMOL/L (ref 3.5–5.1)
PROT SERPL-MCNC: 4.9 G/DL (ref 6–8.4)
PROTHROMBIN TIME: 25.8 SEC (ref 9–12.5)
RBC # BLD AUTO: 2.07 M/UL (ref 4.6–6.2)
SCHISTOCYTES BLD QL SMEAR: ABNORMAL
SODIUM SERPL-SCNC: 130 MMOL/L (ref 136–145)
WBC # BLD AUTO: 8.41 K/UL (ref 3.9–12.7)

## 2023-08-29 PROCEDURE — 97535 SELF CARE MNGMENT TRAINING: CPT

## 2023-08-29 PROCEDURE — 82105 ALPHA-FETOPROTEIN SERUM: CPT | Performed by: STUDENT IN AN ORGANIZED HEALTH CARE EDUCATION/TRAINING PROGRAM

## 2023-08-29 PROCEDURE — 99233 PR SUBSEQUENT HOSPITAL CARE,LEVL III: ICD-10-PCS | Mod: ,,, | Performed by: HOSPITALIST

## 2023-08-29 PROCEDURE — 20600001 HC STEP DOWN PRIVATE ROOM

## 2023-08-29 PROCEDURE — 85610 PROTHROMBIN TIME: CPT | Performed by: HOSPITALIST

## 2023-08-29 PROCEDURE — 99233 SBSQ HOSP IP/OBS HIGH 50: CPT | Mod: ,,, | Performed by: HOSPITALIST

## 2023-08-29 PROCEDURE — 25000003 PHARM REV CODE 250: Performed by: HOSPITALIST

## 2023-08-29 PROCEDURE — 36415 COLL VENOUS BLD VENIPUNCTURE: CPT | Performed by: HOSPITALIST

## 2023-08-29 PROCEDURE — 85025 COMPLETE CBC W/AUTO DIFF WBC: CPT | Performed by: HOSPITALIST

## 2023-08-29 PROCEDURE — 80053 COMPREHEN METABOLIC PANEL: CPT | Performed by: HOSPITALIST

## 2023-08-29 PROCEDURE — 94761 N-INVAS EAR/PLS OXIMETRY MLT: CPT

## 2023-08-29 RX ORDER — TALC
6 POWDER (GRAM) TOPICAL NIGHTLY
Status: DISCONTINUED | OUTPATIENT
Start: 2023-08-29 | End: 2023-09-07

## 2023-08-29 RX ADMIN — RIFAXIMIN 550 MG: 550 TABLET ORAL at 08:08

## 2023-08-29 RX ADMIN — Medication 6 MG: at 08:08

## 2023-08-29 RX ADMIN — LACTULOSE 30 G: 20 SOLUTION ORAL at 03:08

## 2023-08-29 RX ADMIN — MUPIROCIN: 20 OINTMENT TOPICAL at 09:08

## 2023-08-29 RX ADMIN — RIFAXIMIN 550 MG: 550 TABLET ORAL at 09:08

## 2023-08-29 RX ADMIN — FLUCONAZOLE 400 MG: 200 TABLET ORAL at 09:08

## 2023-08-29 RX ADMIN — LACTULOSE 30 G: 20 SOLUTION ORAL at 08:08

## 2023-08-29 RX ADMIN — PANTOPRAZOLE SODIUM 40 MG: 40 TABLET, DELAYED RELEASE ORAL at 09:08

## 2023-08-29 RX ADMIN — MUPIROCIN: 20 OINTMENT TOPICAL at 07:08

## 2023-08-29 RX ADMIN — LACTULOSE 30 G: 20 SOLUTION ORAL at 09:08

## 2023-08-29 NOTE — PLAN OF CARE
Pt has brother at bedside attentive to pt.  He has call bell in reach, non slips socks on, and bedrails up x2.  Pt encouraged to wash hands.  He has prn meds for pain.  Pt is on albumin and rifaximin for pre liver diagnosis.  He is on diflucan for SBP prophylaxis.

## 2023-08-29 NOTE — PT/OT/SLP PROGRESS
Occupational Therapy   Treatment    Name: Sukhdeep Grimes  MRN: [id number]  Admitting Diagnosis:  Alcoholic cirrhosis of liver with ascites       Recommendations:     Discharge Recommendations: home  Discharge Equipment Recommendations:   (TBD pending progress)  Barriers to discharge:   (awaiting transplant)    Assessment:     Sukhdeep Grimes is a 38 y.o. male with a medical diagnosis of Alcoholic cirrhosis of liver with ascites.  He presents alert and agreeable to session. Pt able to mobilize to/from bathroom for self-care and toileting. Pt requires cueing for safety and to slow down pace 2* fall risk. Performance deficits affecting function are decreased safety awareness, gait instability, impaired balance, impaired self care skills, impaired functional mobility.     Rehab Prognosis:  Good; patient would benefit from acute skilled OT services to address these deficits and reach maximum level of function.       Plan:     Patient to be seen 2 x/week to address the above listed problems via self-care/home management, therapeutic activities, therapeutic exercises  Plan of Care Expires:    Plan of Care Reviewed with: patient    Subjective     Chief Complaint: denies  Patient/Family Comments/goals: to get better  Pain/Comfort:  Pain Rating 1: 0/10  Pain Rating Post-Intervention 1: 0/10    Objective:     Communicated with: RN prior to session.  Patient found supine with   upon OT entry to room.    General Precautions: Standard, fall    Orthopedic Precautions:   Braces:    Respiratory Status: Room air     Occupational Performance:     Bed Mobility:    Mod (I) supine to sit    Functional Mobility/Transfers:  Patient completed Sit <> Stand Transfer with supervision  with  no assistive device   Patient completed Toilet Transfer Step Transfer technique and STS with supervision with  no AD  Functional Mobility: CGA>>SBA around room to simulate HH distances    Activities of Daily Living:  Feeding:  independence    Grooming:  independence    Upper Body Dressing: set-up A    Lower Body Dressing: contact guard assistance    Toileting: stand by assistance        AMPAC 6 Click ADL: 20    Treatment & Education:  Pt ed on OT POC  Pt ed on safety and slowing down pace during ADL and mobility    Patient left up in chair with all lines intact, call button in reach, RN notified    GOALS:   Multidisciplinary Problems       Occupational Therapy Goals          Problem: Occupational Therapy    Goal Priority Disciplines Outcome Interventions   Occupational Therapy Goal     OT, PT/OT     Description: Goals to be met by: 9/8/2023     Patient will increase functional independence with ADLs by performing:    LE Dressing with [place].  Toileting from toilet with [place] for hygiene and clothing management.   Supine to sit with [place].  Step transfer with [place]  Toilet transfer to toilet with [place].                         Time Tracking:     OT Date of Treatment: 08/29/23  OT Start Time: 1400  OT Stop Time: 1413  OT Total Time (min): 13 min    Billable Minutes:Self Care/Home Management 13               8/29/2023

## 2023-08-29 NOTE — PHYSICIAN QUERY
PT Name: Sukhdeep Grimes  MR #: [id number]     DOCUMENTATION CLARIFICATION     CDS: Brandy Capley, RN  Email: [email or web address]     This form is a permanent document in the medical record.    Query Date: August 29, 2023    By submitting this query, we are merely seeking further clarification of documentation to reflect the severity of illness of your patient. Please utilize your independent clinical judgment when addressing the question(s) below.    The Medical Record reflects the following:     Indicators   Supporting Clinical Findings Location in Medical Record   X Lab Value(s)   Potassium: 3.3 (L)     Labs 8/24   X Treatment/Medication potassium bicarbonate disintegrating tablet 50 mEq  Dose: 50 mEq  Freq: Once Route: Oral  Start: 08/24/23 1745 End: 08/24/23 1724   MAR 8/24    Other       Provider, please specify the diagnosis or diagnoses that correspond(s) to the above indicators. Rivera all that apply:    [ x  ] Hypokalemia     [   ] Other electrolyte disturbance (please specify): __________         Please document in your progress notes daily for the duration of treatment until resolved, and include in your discharge summary.

## 2023-08-29 NOTE — SUBJECTIVE & OBJECTIVE
Interval History:   8/29: AFP undetectable, MRI with small nodule    Review of Systems   Constitutional:  Negative for chills and fever.   HENT:  Negative for dental problem and nosebleeds.    Eyes:  Negative for pain and visual disturbance.   Respiratory:  Positive for cough and shortness of breath. Negative for chest tightness.    Cardiovascular:  Positive for leg swelling. Negative for chest pain.   Gastrointestinal:  Negative for abdominal distention, abdominal pain, diarrhea and nausea.   Endocrine: Negative for cold intolerance and polydipsia.   Genitourinary:  Negative for dysuria and frequency.   Musculoskeletal:  Negative for arthralgias, gait problem and joint swelling.   Skin:  Negative for color change and wound.   Allergic/Immunologic: Negative for immunocompromised state.   Neurological:  Positive for dizziness. Negative for light-headedness and headaches.   Psychiatric/Behavioral:  Positive for confusion. The patient is nervous/anxious.      Objective:     Vital Signs (Most Recent):  Temp: 98.9 °F (37.2 °C) (08/29/23 0451)  Pulse: 96 (08/29/23 0451)  Resp: 18 (08/29/23 0451)  BP: (!) 100/53 (08/29/23 0451)  SpO2: 98 % (08/29/23 0451) Vital Signs (24h Range):  Temp:  [98.2 °F (36.8 °C)-98.9 °F (37.2 °C)] 98.9 °F (37.2 °C)  Pulse:  [] 96  Resp:  [18] 18  SpO2:  [98 %-100 %] 98 %  BP: (100-119)/(53-63) 100/53     Weight: 64.1 kg (141 lb 5 oz)  Body mass index is 20.28 kg/m².    Intake/Output Summary (Last 24 hours) at 8/29/2023 0856  Last data filed at 8/29/2023 0607  Gross per 24 hour   Intake 840 ml   Output 150 ml   Net 690 ml         Physical Exam  Constitutional:       Appearance: He is not ill-appearing.   HENT:      Head: Normocephalic and atraumatic.      Right Ear: External ear normal.      Left Ear: External ear normal.      Nose: Nose normal.      Mouth/Throat:      Mouth: Mucous membranes are moist.      Pharynx: No oropharyngeal exudate.   Eyes:      General: No scleral icterus.      Pupils: Pupils are equal, round, and reactive to light.   Cardiovascular:      Pulses: Normal pulses.      Heart sounds: No murmur heard.  Pulmonary:      Effort: Pulmonary effort is normal. No respiratory distress.      Breath sounds: No wheezing.   Abdominal:      General: There is distension.      Tenderness: There is no abdominal tenderness.   Musculoskeletal:      Right lower leg: No edema.      Left lower leg: No edema.   Skin:     Coloration: Skin is jaundiced.      Findings: No erythema.   Neurological:      General: No focal deficit present.      Mental Status: He is oriented to person, place, and time.   Psychiatric:         Mood and Affect: Mood normal.         Behavior: Behavior normal.             Significant Labs: All pertinent labs within the past 24 hours have been reviewed.    Significant Imaging: I have reviewed all pertinent imaging results/findings within the past 24 hours.

## 2023-08-29 NOTE — PROGRESS NOTES
[facility] - Transplant [facility] Medicine  Progress Note    Patient Name: Sukhdeep Grimes  MRN: [id number]  Patient Class: IP- Inpatient   Admission Date: 8/24/2023  Length of Stay: 5 days  Attending Physician: Johanna Shearer MD  Primary Care Provider: Martine, Primary Doctor        Subjective:     Principal Problem:Alcoholic cirrhosis of liver with ascites        HPI:  38-year-old male with a history of alcoholic cirrhosis admitted to [facility] in [place] on August 14 with increasing abdominal girth, somnolence, and weakness.  He underwent paracentesis.  Admit diagnoses included alcoholic cirrhosis with ascites, hyponatremia, hypokalemia, thrombocytopenia, and AYAH.  During his stay he had intermittent fevers. Infectious Diseases evaluated him for fever and elevated Fungitell.  It was noted that blood and urine culture along with chest x-ray were negative.  They felt the Fungitell was nonspecific and the patient had no clear evidence of fungal infection.  HIV, RPR, and respiratory viral panels were noted to be negative.  MRI of the abdomen on August 20 had no cholelithiasis or biliary dilatation.  He remains on Rocephin currently.      Encephalopathy improved during his stay.  Current diagnoses included acute liver injury with decompensated cirrhosis, acute on chronic anemia, SIRS (no obvious source of infection) nonbleeding varices, and improving (but not resolved) encephalopathy.  He noted last alcohol intake was around July 7.      Hemoglobin remained low during his stay with levels ranging 6.6-8.0.  He had no overt evidence of bleeding. They noted fibrinogen and haptoglobin were low and LDH was elevated.  Ceruloplasmin is pending. EGD on August 21 had 1 column of large varices with no bleeding and no stigmata of recent bleeding in the lower 3rd of the esophagus.  No red Rigoberto sign present.  Banding was not performed.  Moderate portal hypertensive gastropathy in the entire examined  stomach.  Examined duodenum was normal.     He remains hemodynamically stable in a med-surg bed.  He is awake and alert.  Will plan transfer to Hospital Medicine at [facility] for Hepatology evaluation.     August 22: White blood cells 8.8 (down from 14.4 on August 17), hemoglobin 6.9, hematocrit 20, platelets 40, sodium 134, potassium 3.3, chloride 104, CO2 18, BUN 9, creatinine 0.72, glucose 94, INR 3, total bilirubin 34.8, AST 45, ALT 22 August 20: RPR nonreactive     August 19: HIV nonreactive, COVID negative, influenza negative     August 18 urine culture had no growth     August 16: Acute hepatitis panel nonreactive, blood cultures had no growth at 5 days  -chest x-ray had no acute cardiopulmonary abnormality.     August 15: Abdominal ultrasound had cirrhosis without obvious focal mass.  No bile duct dilation.  Gallbladder is nondistended but contains sludge.  Gallbladder wall is thickened likely reactive to hepatic disease.  Moderate volume of ascites.  Splenomegaly.     August 14:  Peritoneal fluid culture had no growth after 3 days  -white blood cells 16.3, hemoglobin 10.4, hematocrit 28.6, platelets 66, sodium 124, potassium 2.8, chloride 88, CO2 25, BUN 28, creatinine 1.48, glucose 96, INR 2.1      Overview/Hospital Course:  No notes on file    Interval History:   8/29: AFP undetectable, MRI with small nodule    Review of Systems   Constitutional:  Negative for chills and fever.   HENT:  Negative for dental problem and nosebleeds.    Eyes:  Negative for pain and visual disturbance.   Respiratory:  Positive for cough and shortness of breath. Negative for chest tightness.    Cardiovascular:  Positive for leg swelling. Negative for chest pain.   Gastrointestinal:  Negative for abdominal distention, abdominal pain, diarrhea and nausea.   Endocrine: Negative for cold intolerance and polydipsia.   Genitourinary:  Negative for dysuria and frequency.   Musculoskeletal:  Negative for arthralgias, gait  problem and joint swelling.   Skin:  Negative for color change and wound.   Allergic/Immunologic: Negative for immunocompromised state.   Neurological:  Positive for dizziness. Negative for light-headedness and headaches.   Psychiatric/Behavioral:  Positive for confusion. The patient is nervous/anxious.      Objective:     Vital Signs (Most Recent):  Temp: 98.9 °F (37.2 °C) (08/29/23 0451)  Pulse: 96 (08/29/23 0451)  Resp: 18 (08/29/23 0451)  BP: (!) 100/53 (08/29/23 0451)  SpO2: 98 % (08/29/23 0451) Vital Signs (24h Range):  Temp:  [98.2 °F (36.8 °C)-98.9 °F (37.2 °C)] 98.9 °F (37.2 °C)  Pulse:  [] 96  Resp:  [18] 18  SpO2:  [98 %-100 %] 98 %  BP: (100-119)/(53-63) 100/53     Weight: 64.1 kg (141 lb 5 oz)  Body mass index is 20.28 kg/m².    Intake/Output Summary (Last 24 hours) at 8/29/2023 0856  Last data filed at 8/29/2023 0607  Gross per 24 hour   Intake 840 ml   Output 150 ml   Net 690 ml         Physical Exam  Constitutional:       Appearance: He is not ill-appearing.   HENT:      Head: Normocephalic and atraumatic.      Right Ear: External ear normal.      Left Ear: External ear normal.      Nose: Nose normal.      Mouth/Throat:      Mouth: Mucous membranes are moist.      Pharynx: No oropharyngeal exudate.   Eyes:      General: No scleral icterus.     Pupils: Pupils are equal, round, and reactive to light.   Cardiovascular:      Pulses: Normal pulses.      Heart sounds: No murmur heard.  Pulmonary:      Effort: Pulmonary effort is normal. No respiratory distress.      Breath sounds: No wheezing.   Abdominal:      General: There is distension.      Tenderness: There is no abdominal tenderness.   Musculoskeletal:      Right lower leg: No edema.      Left lower leg: No edema.   Skin:     Coloration: Skin is jaundiced.      Findings: No erythema.   Neurological:      General: No focal deficit present.      Mental Status: He is oriented to person, place, and time.   Psychiatric:         Mood and Affect:  Mood normal.         Behavior: Behavior normal.             Significant Labs: All pertinent labs within the past 24 hours have been reviewed.    Significant Imaging: I have reviewed all pertinent imaging results/findings within the past 24 hours.      Assessment/Plan:      * Alcoholic cirrhosis of liver with ascites  MELD 3.0: 31 at 8/24/2023 11:30 AM  MELD-Na: 31 at 8/24/2023 11:30 AM  Calculated from:  Serum Creatinine: 1.0 mg/dL at 8/24/2023  9:53 AM  Serum Sodium: 133 mmol/L at 8/24/2023  9:53 AM  Total Bilirubin: 39.3 mg/dL at 8/24/2023  9:53 AM  Serum Albumin: 2.9 g/dL at 8/24/2023  9:53 AM  INR(ratio): 2.3 at 8/24/2023 11:30 AM  Age at listing (hypothetical): 38 years  Sex: Male at 8/24/2023 11:30 AM      Lactulose/rifaximin  Vitamin K   Liver ultrasound doppler  Hepatology consult  PET  Psych consult  Paracentesis        Encounter for pre-transplant evaluation for liver transplant    See liver cirrhosis    Decompensated hepatic cirrhosis    See liver cirrhosis    Alcohol use disorder, severe, in early remission  Consult addiction psychiatry         VTE Risk Mitigation (From admission, onward)         Ordered     IP VTE LOW RISK PATIENT  Once         08/24/23 1002     Place AURORA hose  Until discontinued         08/24/23 1002     Place sequential compression device  Until discontinued         08/24/23 1002                Discharge Planning   GEORGE: 9/1/2023     Code Status: Full Code   Is the patient medically ready for discharge?:     Reason for patient still in hospital (select all that apply): Patient trending condition  Discharge Plan A: Home, Home with family, Home Health   Discharge Delays: None known at this time              Johanna Shearer MD  Department of Hospital Medicine   [facility] - Transplant Stepdown

## 2023-08-29 NOTE — PLAN OF CARE
Pt AAOx4. Independent. 24hr urine collection started at 1515 8/29 - to be done 1515 8/30. VS stable. Pt case to be present to the board tomorrow for possible LT.

## 2023-08-30 ENCOUNTER — DOCUMENTATION ONLY (OUTPATIENT)
Dept: TRANSPLANT | Facility: CLINIC | Age: 38
End: 2023-08-30
Payer: COMMERCIAL

## 2023-08-30 ENCOUNTER — COMMITTEE REVIEW (OUTPATIENT)
Dept: TRANSPLANT | Facility: CLINIC | Age: 38
End: 2023-08-30

## 2023-08-30 LAB
ALBUMIN SERPL BCP-MCNC: 3.1 G/DL (ref 3.5–5.2)
ALP SERPL-CCNC: 115 U/L (ref 55–135)
ALT SERPL W/O P-5'-P-CCNC: 22 U/L (ref 10–44)
ANION GAP SERPL CALC-SCNC: 11 MMOL/L (ref 8–16)
AST SERPL-CCNC: 59 U/L (ref 10–40)
BASOPHILS # BLD AUTO: 0.16 K/UL (ref 0–0.2)
BASOPHILS NFR BLD: 1.4 % (ref 0–1.9)
BILIRUB SERPL-MCNC: 33.4 MG/DL (ref 0.1–1)
BUN SERPL-MCNC: 13 MG/DL (ref 6–20)
CALCIUM SERPL-MCNC: 8.5 MG/DL (ref 8.7–10.5)
CHLORIDE SERPL-SCNC: 102 MMOL/L (ref 95–110)
CLINICAL BIOCHEMIST REVIEW: NORMAL
CO2 SERPL-SCNC: 16 MMOL/L (ref 23–29)
CREAT SERPL-MCNC: 1.2 MG/DL (ref 0.5–1.4)
DIFFERENTIAL METHOD: ABNORMAL
EOSINOPHIL # BLD AUTO: 0.4 K/UL (ref 0–0.5)
EOSINOPHIL NFR BLD: 3.3 % (ref 0–8)
ERYTHROCYTE [DISTWIDTH] IN BLOOD BY AUTOMATED COUNT: 18.1 % (ref 11.5–14.5)
EST. GFR  (NO RACE VARIABLE): >60 ML/MIN/1.73 M^2
GLUCOSE SERPL-MCNC: 90 MG/DL (ref 70–110)
HCT VFR BLD AUTO: 23.3 % (ref 40–54)
HGB BLD-MCNC: 7.9 G/DL (ref 14–18)
IMM GRANULOCYTES # BLD AUTO: 0.16 K/UL (ref 0–0.04)
IMM GRANULOCYTES NFR BLD AUTO: 1.4 % (ref 0–0.5)
INR PPP: 2.4 (ref 0.8–1.2)
LYMPHOCYTES # BLD AUTO: 1.8 K/UL (ref 1–4.8)
LYMPHOCYTES NFR BLD: 15.5 % (ref 18–48)
MCH RBC QN AUTO: 35.1 PG (ref 27–31)
MCHC RBC AUTO-ENTMCNC: 33.9 G/DL (ref 32–36)
MCV RBC AUTO: 104 FL (ref 82–98)
MONOCYTES # BLD AUTO: 1.8 K/UL (ref 0.3–1)
MONOCYTES NFR BLD: 15.3 % (ref 4–15)
NEUTROPHILS # BLD AUTO: 7.3 K/UL (ref 1.8–7.7)
NEUTROPHILS NFR BLD: 63.1 % (ref 38–73)
NRBC BLD-RTO: 0 /100 WBC
PLATELET # BLD AUTO: 72 K/UL (ref 150–450)
PLPETH BLD-MCNC: <10 NG/ML
PMV BLD AUTO: 10.4 FL (ref 9.2–12.9)
POPETH BLD-MCNC: <10 NG/ML
POTASSIUM SERPL-SCNC: 4 MMOL/L (ref 3.5–5.1)
PROT SERPL-MCNC: 5.1 G/DL (ref 6–8.4)
PROTHROMBIN TIME: 24.3 SEC (ref 9–12.5)
RBC # BLD AUTO: 2.25 M/UL (ref 4.6–6.2)
SODIUM SERPL-SCNC: 129 MMOL/L (ref 136–145)
WBC # BLD AUTO: 11.51 K/UL (ref 3.9–12.7)

## 2023-08-30 PROCEDURE — 99233 SBSQ HOSP IP/OBS HIGH 50: CPT | Mod: ,,, | Performed by: HOSPITALIST

## 2023-08-30 PROCEDURE — 25000003 PHARM REV CODE 250: Performed by: HOSPITALIST

## 2023-08-30 PROCEDURE — 99233 PR SUBSEQUENT HOSPITAL CARE,LEVL III: ICD-10-PCS | Mod: ,,, | Performed by: INTERNAL MEDICINE

## 2023-08-30 PROCEDURE — 20600001 HC STEP DOWN PRIVATE ROOM

## 2023-08-30 PROCEDURE — 99233 PR SUBSEQUENT HOSPITAL CARE,LEVL III: ICD-10-PCS | Mod: ,,, | Performed by: HOSPITALIST

## 2023-08-30 PROCEDURE — 85025 COMPLETE CBC W/AUTO DIFF WBC: CPT | Performed by: HOSPITALIST

## 2023-08-30 PROCEDURE — 82525 ASSAY OF COPPER: CPT | Performed by: HOSPITALIST

## 2023-08-30 PROCEDURE — 36415 COLL VENOUS BLD VENIPUNCTURE: CPT | Performed by: HOSPITALIST

## 2023-08-30 PROCEDURE — 99233 SBSQ HOSP IP/OBS HIGH 50: CPT | Mod: ,,, | Performed by: INTERNAL MEDICINE

## 2023-08-30 PROCEDURE — 85610 PROTHROMBIN TIME: CPT | Performed by: HOSPITALIST

## 2023-08-30 PROCEDURE — 80053 COMPREHEN METABOLIC PANEL: CPT | Performed by: HOSPITALIST

## 2023-08-30 RX ADMIN — ONDANSETRON 8 MG: 8 TABLET, ORALLY DISINTEGRATING ORAL at 10:08

## 2023-08-30 RX ADMIN — FLUCONAZOLE 400 MG: 200 TABLET ORAL at 09:08

## 2023-08-30 RX ADMIN — PANTOPRAZOLE SODIUM 40 MG: 40 TABLET, DELAYED RELEASE ORAL at 09:08

## 2023-08-30 RX ADMIN — RIFAXIMIN 550 MG: 550 TABLET ORAL at 08:08

## 2023-08-30 RX ADMIN — RIFAXIMIN 550 MG: 550 TABLET ORAL at 09:08

## 2023-08-30 RX ADMIN — LACTULOSE 30 G: 20 SOLUTION ORAL at 03:08

## 2023-08-30 RX ADMIN — Medication 6 MG: at 08:08

## 2023-08-30 RX ADMIN — MUPIROCIN: 20 OINTMENT TOPICAL at 08:08

## 2023-08-30 RX ADMIN — LACTULOSE 30 G: 20 SOLUTION ORAL at 08:08

## 2023-08-30 RX ADMIN — LACTULOSE 30 G: 20 SOLUTION ORAL at 09:08

## 2023-08-30 RX ADMIN — MUPIROCIN: 20 OINTMENT TOPICAL at 09:08

## 2023-08-30 NOTE — SUBJECTIVE & OBJECTIVE
Interval History:   8/30: to be presented at transplant committee today     Review of Systems   Constitutional:  Negative for chills and fever.   HENT:  Negative for dental problem and nosebleeds.    Eyes:  Negative for pain and visual disturbance.   Respiratory:  Positive for cough and shortness of breath. Negative for chest tightness.    Cardiovascular:  Positive for leg swelling. Negative for chest pain.   Gastrointestinal:  Negative for abdominal distention, abdominal pain, diarrhea and nausea.   Endocrine: Negative for cold intolerance and polydipsia.   Genitourinary:  Negative for dysuria and frequency.   Musculoskeletal:  Negative for arthralgias, gait problem and joint swelling.   Skin:  Negative for color change and wound.   Allergic/Immunologic: Negative for immunocompromised state.   Neurological:  Positive for dizziness. Negative for light-headedness and headaches.   Psychiatric/Behavioral:  Positive for confusion. The patient is nervous/anxious.      Objective:     Vital Signs (Most Recent):  Temp: 98.4 °F (36.9 °C) (08/30/23 1123)  Pulse: (!) 118 (08/30/23 1123)  Resp: 14 (08/30/23 1123)  BP: 133/74 (08/30/23 1123)  SpO2: 100 % (08/30/23 1123) Vital Signs (24h Range):  Temp:  [97.5 °F (36.4 °C)-100.1 °F (37.8 °C)] 98.4 °F (36.9 °C)  Pulse:  [] 118  Resp:  [14-18] 14  SpO2:  [97 %-100 %] 100 %  BP: (111-133)/(56-74) 133/74     Weight: 64.1 kg (141 lb 5 oz)  Body mass index is 20.28 kg/m².    Intake/Output Summary (Last 24 hours) at 8/30/2023 1147  Last data filed at 8/29/2023 1540  Gross per 24 hour   Intake --   Output 75 ml   Net -75 ml         Physical Exam  Constitutional:       Appearance: He is not ill-appearing.   HENT:      Head: Normocephalic and atraumatic.      Right Ear: External ear normal.      Left Ear: External ear normal.      Nose: Nose normal.      Mouth/Throat:      Mouth: Mucous membranes are moist.      Pharynx: No oropharyngeal exudate.   Eyes:      General: No scleral  icterus.     Pupils: Pupils are equal, round, and reactive to light.   Cardiovascular:      Pulses: Normal pulses.      Heart sounds: No murmur heard.  Pulmonary:      Effort: Pulmonary effort is normal. No respiratory distress.      Breath sounds: No wheezing.   Abdominal:      General: There is distension.      Tenderness: There is no abdominal tenderness.   Musculoskeletal:      Right lower leg: No edema.      Left lower leg: No edema.   Skin:     Coloration: Skin is jaundiced.      Findings: No erythema.   Neurological:      General: No focal deficit present.      Mental Status: He is oriented to person, place, and time.   Psychiatric:         Mood and Affect: Mood normal.         Behavior: Behavior normal.             Significant Labs: All pertinent labs within the past 24 hours have been reviewed.    Significant Imaging: I have reviewed all pertinent imaging results/findings within the past 24 hours.

## 2023-08-30 NOTE — PLAN OF CARE
Pt AAOx4. Independent. 24hr urine collection completed and sent off. VS stable. Pt's liver case was approved. Ambulatory in the halls.

## 2023-08-30 NOTE — PROGRESS NOTES
[facility] - Transplant [facility] Medicine  Progress Note    Patient Name: Sukhdeep Grimes  MRN: [id number]  Patient Class: IP- Inpatient   Admission Date: 8/24/2023  Length of Stay: 6 days  Attending Physician: Johanna Shearer MD  Primary Care Provider: Martine, Primary Doctor        Subjective:     Principal Problem:Alcoholic cirrhosis of liver with ascites        HPI:  38-year-old male with a history of alcoholic cirrhosis admitted to [facility] in [place] on August 14 with increasing abdominal girth, somnolence, and weakness.  He underwent paracentesis.  Admit diagnoses included alcoholic cirrhosis with ascites, hyponatremia, hypokalemia, thrombocytopenia, and AYAH.  During his stay he had intermittent fevers. Infectious Diseases evaluated him for fever and elevated Fungitell.  It was noted that blood and urine culture along with chest x-ray were negative.  They felt the Fungitell was nonspecific and the patient had no clear evidence of fungal infection.  HIV, RPR, and respiratory viral panels were noted to be negative.  MRI of the abdomen on August 20 had no cholelithiasis or biliary dilatation.  He remains on Rocephin currently.      Encephalopathy improved during his stay.  Current diagnoses included acute liver injury with decompensated cirrhosis, acute on chronic anemia, SIRS (no obvious source of infection) nonbleeding varices, and improving (but not resolved) encephalopathy.  He noted last alcohol intake was around July 7.      Hemoglobin remained low during his stay with levels ranging 6.6-8.0.  He had no overt evidence of bleeding. They noted fibrinogen and haptoglobin were low and LDH was elevated.  Ceruloplasmin is pending. EGD on August 21 had 1 column of large varices with no bleeding and no stigmata of recent bleeding in the lower 3rd of the esophagus.  No red Rigoberto sign present.  Banding was not performed.  Moderate portal hypertensive gastropathy in the entire examined  stomach.  Examined duodenum was normal.     He remains hemodynamically stable in a med-surg bed.  He is awake and alert.  Will plan transfer to Hospital Medicine at [facility] for Hepatology evaluation.     August 22: White blood cells 8.8 (down from 14.4 on August 17), hemoglobin 6.9, hematocrit 20, platelets 40, sodium 134, potassium 3.3, chloride 104, CO2 18, BUN 9, creatinine 0.72, glucose 94, INR 3, total bilirubin 34.8, AST 45, ALT 22 August 20: RPR nonreactive     August 19: HIV nonreactive, COVID negative, influenza negative     August 18 urine culture had no growth     August 16: Acute hepatitis panel nonreactive, blood cultures had no growth at 5 days  -chest x-ray had no acute cardiopulmonary abnormality.     August 15: Abdominal ultrasound had cirrhosis without obvious focal mass.  No bile duct dilation.  Gallbladder is nondistended but contains sludge.  Gallbladder wall is thickened likely reactive to hepatic disease.  Moderate volume of ascites.  Splenomegaly.     August 14:  Peritoneal fluid culture had no growth after 3 days  -white blood cells 16.3, hemoglobin 10.4, hematocrit 28.6, platelets 66, sodium 124, potassium 2.8, chloride 88, CO2 25, BUN 28, creatinine 1.48, glucose 96, INR 2.1      Overview/Hospital Course:  No notes on file    Interval History:   8/30: to be presented at transplant committee today     Review of Systems   Constitutional:  Negative for chills and fever.   HENT:  Negative for dental problem and nosebleeds.    Eyes:  Negative for pain and visual disturbance.   Respiratory:  Positive for cough and shortness of breath. Negative for chest tightness.    Cardiovascular:  Positive for leg swelling. Negative for chest pain.   Gastrointestinal:  Negative for abdominal distention, abdominal pain, diarrhea and nausea.   Endocrine: Negative for cold intolerance and polydipsia.   Genitourinary:  Negative for dysuria and frequency.   Musculoskeletal:  Negative for arthralgias,  gait problem and joint swelling.   Skin:  Negative for color change and wound.   Allergic/Immunologic: Negative for immunocompromised state.   Neurological:  Positive for dizziness. Negative for light-headedness and headaches.   Psychiatric/Behavioral:  Positive for confusion. The patient is nervous/anxious.      Objective:     Vital Signs (Most Recent):  Temp: 98.4 °F (36.9 °C) (08/30/23 1123)  Pulse: (!) 118 (08/30/23 1123)  Resp: 14 (08/30/23 1123)  BP: 133/74 (08/30/23 1123)  SpO2: 100 % (08/30/23 1123) Vital Signs (24h Range):  Temp:  [97.5 °F (36.4 °C)-100.1 °F (37.8 °C)] 98.4 °F (36.9 °C)  Pulse:  [] 118  Resp:  [14-18] 14  SpO2:  [97 %-100 %] 100 %  BP: (111-133)/(56-74) 133/74     Weight: 64.1 kg (141 lb 5 oz)  Body mass index is 20.28 kg/m².    Intake/Output Summary (Last 24 hours) at 8/30/2023 1147  Last data filed at 8/29/2023 1540  Gross per 24 hour   Intake --   Output 75 ml   Net -75 ml         Physical Exam  Constitutional:       Appearance: He is not ill-appearing.   HENT:      Head: Normocephalic and atraumatic.      Right Ear: External ear normal.      Left Ear: External ear normal.      Nose: Nose normal.      Mouth/Throat:      Mouth: Mucous membranes are moist.      Pharynx: No oropharyngeal exudate.   Eyes:      General: No scleral icterus.     Pupils: Pupils are equal, round, and reactive to light.   Cardiovascular:      Pulses: Normal pulses.      Heart sounds: No murmur heard.  Pulmonary:      Effort: Pulmonary effort is normal. No respiratory distress.      Breath sounds: No wheezing.   Abdominal:      General: There is distension.      Tenderness: There is no abdominal tenderness.   Musculoskeletal:      Right lower leg: No edema.      Left lower leg: No edema.   Skin:     Coloration: Skin is jaundiced.      Findings: No erythema.   Neurological:      General: No focal deficit present.      Mental Status: He is oriented to person, place, and time.   Psychiatric:         Mood and  Affect: Mood normal.         Behavior: Behavior normal.             Significant Labs: All pertinent labs within the past 24 hours have been reviewed.    Significant Imaging: I have reviewed all pertinent imaging results/findings within the past 24 hours.      Assessment/Plan:      * Alcoholic cirrhosis of liver with ascites  MELD 3.0: 31 at 8/24/2023 11:30 AM  MELD-Na: 31 at 8/24/2023 11:30 AM  Calculated from:  Serum Creatinine: 1.0 mg/dL at 8/24/2023  9:53 AM  Serum Sodium: 133 mmol/L at 8/24/2023  9:53 AM  Total Bilirubin: 39.3 mg/dL at 8/24/2023  9:53 AM  Serum Albumin: 2.9 g/dL at 8/24/2023  9:53 AM  INR(ratio): 2.3 at 8/24/2023 11:30 AM  Age at listing (hypothetical): 38 years  Sex: Male at 8/24/2023 11:30 AM      Lactulose/rifaximin  Vitamin K   Liver ultrasound doppler  Hepatology consult  PET  Psych consult  Paracentesis        Encounter for pre-transplant evaluation for liver transplant    See liver cirrhosis    Decompensated hepatic cirrhosis    See liver cirrhosis    Alcohol use disorder, severe, in early remission  Consult addiction psychiatry         VTE Risk Mitigation (From admission, onward)         Ordered     IP VTE LOW RISK PATIENT  Once         08/24/23 1002     Place AURORA hose  Until discontinued         08/24/23 1002     Place sequential compression device  Until discontinued         08/24/23 1002                Discharge Planning   GEORGE: 9/1/2023     Code Status: Full Code   Is the patient medically ready for discharge?:     Reason for patient still in hospital (select all that apply): Patient trending condition  Discharge Plan A: Home, Home with family, Home Health   Discharge Delays: None known at this time              Johanna Shearer MD  Department of Hospital Medicine   [facility] - Transplant Stepdown

## 2023-08-30 NOTE — PROGRESS NOTES
Transplant Social Work Note:    SW met with pt in room to discuss pt participating in AA. Pt reports he attended a virtual AA meeting on 8/28/2023 and gave SW AA log with meeting information. SW assisted pt with downloading and navigating AA lizzette on pt's phone. Pt is highly motivated to engage in AA meetings and maintain sobriety.     Pt now has working Smart phone. Pt reports his phone number is [phone] and email address is [email or web address].     Pt is in good spirits and excited to learn of committee decision.    No other needs at this time.    Debbie Lee LMSW

## 2023-08-30 NOTE — PROGRESS NOTES
Hepatology Progress Note    Sukhdeep Grimes is a 38 y.o. male admitted to hospital 8/24/2023 (Hospital Day: 7) due to Alcoholic cirrhosis of liver with ascites.     Interval History  Discussed in transplant committee today, approved for listing pending insurance approval      Objective  Temp:  [97.5 °F (36.4 °C)-100.1 °F (37.8 °C)] 98.2 °F (36.8 °C) (08/30 1602)  Pulse:  [] 106 (08/30 1602)  BP: (112-133)/(56-79) 124/79 (08/30 1602)  Resp:  [12-18] 12 (08/30 1602)  SpO2:  [97 %-100 %] 100 % (08/30 1602)    Physical Exam:  Constitutional:  not in acute distress and well developed  HENT: Head: Normal, normocephalic.  Eyes: scleral icterus  Respiratory: normal chest expansion & respiratory effort and no accessory muscle use  GI: soft, non-tender, without masses or organomegaly  Skin: jaundiced  Neurological: alert, oriented x3.     Laboratory    Lab Results   Component Value Date    WBC 11.51 08/30/2023    HGB 7.9 (L) 08/30/2023    HCT 23.3 (L) 08/30/2023     (H) 08/30/2023    PLT 72 (L) 08/30/2023       Lab Results   Component Value Date     (L) 08/30/2023    K 4.0 08/30/2023     08/30/2023    CO2 16 (L) 08/30/2023    BUN 13 08/30/2023    CREATININE 1.2 08/30/2023    CALCIUM 8.5 (L) 08/30/2023       Lab Results   Component Value Date    ALBUMIN 3.1 (L) 08/30/2023    ALT 22 08/30/2023    AST 59 (H) 08/30/2023    ALKPHOS 115 08/30/2023    BILITOT 33.4 (H) 08/30/2023       Lab Results   Component Value Date    INR 2.4 (H) 08/30/2023    INR 2.5 (H) 08/29/2023    INR 2.5 (H) 08/28/2023       MELD 3.0: 32 at 8/30/2023  5:44 AM  MELD-Na: 33 at 8/30/2023  5:44 AM  Calculated from:  Serum Creatinine: 1.2 mg/dL at 8/30/2023  5:44 AM  Serum Sodium: 129 mmol/L at 8/30/2023  5:44 AM  Total Bilirubin: 33.4 mg/dL at 8/30/2023  5:44 AM  Serum Albumin: 3.1 g/dL at 8/30/2023  5:44 AM  INR(ratio): 2.4 at 8/30/2023  5:44 AM  Age at listing (hypothetical): 38 years  Sex: Male at 8/30/2023  5:44  AM      Assessment  Sukhdeep Grimes is a 38 y.o. male with history of decompensated alcoholic cirrhosis, and alcohol use disorder (relapsed after rehab 3 years ago) who is transferred to [facility] from [place] for liver transplantation eval. Diagnosed with cirrhosis in July 2023 when he presented with increasing abdominal girth, somnolence, and weakness.  Most recent EGD on 8/21 showed large varices without bleeding stigmata and PHG. Last drink 1 month ago; patient reports he is committed to sobriety. He has relapsed in the past and continued drinking in spite of being told not for elevated LFTs. Modifiably high risk for tx per addiction psych. He has financial approval for liver transplant evaluation. Repeat TTE confirmed HPS. CT triple phase showed a 7 mm left liver lobe nodule.     Problem List:  Decompensated EtOH Cirrhosis c/b ascites and HE  Hepatopulmonary syndrome  Alcohol use disorder, with prior relapse  HRS s/p treatment   Large esophageal varices     ===============================     Pertinent workup:  EGD (8/21/23):  Large (> 5 mm) esophageal varices with no bleeding and no stigmata of recent bleeding. Portal hypertensive gastropathy. Normal examined duodenum.    CT triple phase (8/25/23):  Cirrhotic appearance of liver.  Focal exophytic nodule along the left hepatic lobe measuring 7 mm.  Lesion remains indeterminate. Splenomegaly, ascites, umbilical vein recanalization and gastroesophageal varices together suggested portal venous hypertension.     MRI Abdomen 8/20/23:   1. No cholelithiasis. No biliary dilatation.  2. Hepatic cirrhosis with evidence of portal venous hypertension. Moderate volume ascites.     RUQ US 8/15:   Cirrhosis without obvious focal mass. No bile duct dilation.  The gallbladder is nondistended but contains sludge. The gallbladder wall is thickened which is likely reactive to hepatic disease. Moderate volume of ascites. Splenomegaly.     TTE 8/25/23:  The left  ventricle is normal in size. Normal wall thickness. Normal wall motion. There is normal systolic function. There is normal diastolic function. Study is negative for intra cardiac shunt. Bubbles on the left side on the 4th beat emanating from pulmonary veins consistent with transpulmonary shunting.       HEP A IgM: Non-Reactive 08/16/2023   HEP A Total: Reactive 08/22/2023   HEP B Core IgM/surface Ag/core total: Non-Reactive 08/16/2023   HEP B Surface Ab: Not immune 8/16/23  HEP C Ab: Non-Reactive 08/16/2023  ASMA: Negative 8/15/23  LEYDI: Negative 8/15/23  AMA Negative 8/22/23  Alpha 1 antitrypsin: 96 8/22/23  HIV 1/2: Negative 8/19/23  IgG: WNL 8/15/23      Plan  - Discussed in committee and approved for transplant, listing pending insurance approval  - Noted low ceruloplasmin. Likely low in setting of cirrhosis but 24h urine copper pending to evaluate for Charli disease  - Continue PO lactulose 20 mg TID (titrate till 2-3 daily Bm achieved) & Xifaxin 550 mg BID.  - Continue folate and thiamine supplementation.  - Appreciate addiction psychiatry recs.   - Low Na, high protein diet.   - Avoid sedating drugs (opiates & benzodiazepines) if possible  - Plan to discuss in selection committee tomorrow 8/30  - please obtain daily CBC, CMP, INR. Follow PEth.  - Plan of care was discussed with primary team      Thank you for involving us in the care of Sukhdeep Grimes. Please call with any additional concerns or questions.      Mahad Aguilar  Gastroenterology and Hepatology Fellow, PGY-V

## 2023-08-30 NOTE — PLAN OF CARE
Patient AAO x4. VSS. Afebrile. On room air. He is independent and ambulatory.24 hour urine to be completed today at 1515. Bed locked/ in lowest setting. Call bell in reach

## 2023-08-30 NOTE — PROGRESS NOTES
PHARM.D. PRE-TRANSPLANT NOTE:    This patient's medication therapy was evaluated as part of his pre-transplant evaluation.      The following general pharmacologic concerns were noted: None    The following concerns for post-operative pain management were noted: Patient currently on oxycodone.    The following pharmacologic concerns related to HCV therapy were noted: None      This patient's medication profile was reviewed for considerations for DAA Hepatitis C therapy:    [X]  No current inducers of CYP 3A4 or PGP  [X]  No amiodarone on this patient's EMR profile in the last 24 months  [X]  No past or current atrial fibrillation on this patient's EMR profile       No current facility-administered medications for this visit.     No current outpatient medications on file.     Facility-Administered Medications Ordered in Other Visits   Medication Dose Route Frequency Provider Last Rate Last Admin    acetaminophen tablet 650 mg  650 mg Oral Q4H PRN Johanna Shearer MD        aluminum & magnesium hydroxide-simethicone 400-400-40 mg/5 mL suspension 30 mL  30 mL Oral Q6H PRN Johanna Shearer MD        fluconazole tablet 400 mg  400 mg Oral Daily Johanna Shearer MD   400 mg at 08/30/23 0905    lactulose 20 gram/30 mL solution Soln 30 g  30 g Oral TID Johanna Shearer MD   30 g at 08/30/23 0905    melatonin tablet 6 mg  6 mg Oral Nightly Johanna Shearer MD   6 mg at 08/29/23 2000    mupirocin 2 % ointment   Nasal BID Johanna Shearer MD   Given at 08/30/23 0905    ondansetron disintegrating tablet 8 mg  8 mg Oral Q8H PRN Johanna Shearer MD   8 mg at 08/30/23 1053    oxyCODONE immediate release tablet 5 mg  5 mg Oral Q4H PRN Johanna Shearer MD        pantoprazole EC tablet 40 mg  40 mg Oral Daily Johanna Shearer MD   40 mg at 08/30/23 0905    rifAXIMin tablet 550 mg  550 mg Oral BID Johanna Shearer MD   550 mg at 08/30/23 0905    simethicone chewable tablet 80 mg  1 tablet Oral TID PRN Manfred  Johanna GONZALEZ MD        sodium chloride 0.9% flush 10 mL  10 mL Intravenous PRN Johanna Shearer MD               I am available for consultation and can be contacted, as needed by the other members of the Transplant team.

## 2023-08-30 NOTE — PROGRESS NOTES
PHARM.D. PRE-TRANSPLANT NOTE:    This patient's medication therapy was evaluated as part of his pre-transplant evaluation.      The following general pharmacologic concerns were noted: none, plan to continue fluconazole 400 mg until 9/10; Will need second HBV vaccine in 1 month, or 3 months post-transplant    The following concerns for post-operative pain management were noted: none    The following pharmacologic concerns related to HCV therapy were noted: none      This patient's medication profile was reviewed for considerations for DAA Hepatitis C therapy:    [x]  No current inducers of CYP 3A4 or PGP  [x]  No amiodarone on this patient's EMR profile in the last 24 months  [x]  No past or current atrial fibrillation on this patient's EMR profile       Current Facility-Administered Medications   Medication Dose Route Frequency Provider Last Rate Last Admin    acetaminophen tablet 650 mg  650 mg Oral Q4H PRN Johanna Shearer MD        aluminum & magnesium hydroxide-simethicone 400-400-40 mg/5 mL suspension 30 mL  30 mL Oral Q6H PRN Johanna Shearer MD        fluconazole tablet 400 mg  400 mg Oral Daily Joahnna Shearer MD   400 mg at 08/30/23 0905    lactulose 20 gram/30 mL solution Soln 30 g  30 g Oral TID Johanna Shearer MD   30 g at 08/30/23 0905    melatonin tablet 6 mg  6 mg Oral Nightly Johanna Shearer MD   6 mg at 08/29/23 2000    mupirocin 2 % ointment   Nasal BID Johanna Shearer MD   Given at 08/30/23 0905    ondansetron disintegrating tablet 8 mg  8 mg Oral Q8H PRN Johanna Shearer MD   8 mg at 08/30/23 1053    oxyCODONE immediate release tablet 5 mg  5 mg Oral Q4H PRN Johanna Shearer MD        pantoprazole EC tablet 40 mg  40 mg Oral Daily Johanna Shearer MD   40 mg at 08/30/23 0905    rifAXIMin tablet 550 mg  550 mg Oral BID Johanna Shearer MD   550 mg at 08/30/23 0905    simethicone chewable tablet 80 mg  1 tablet Oral TID PRN Johanna Shearer MD        sodium chloride 0.9%  flush 10 mL  10 mL Intravenous PRN Johanna Shearer MD               I am available for consultation and can be contacted, as needed by the other members of the Transplant team.

## 2023-08-31 LAB
ALBUMIN SERPL BCP-MCNC: 3.1 G/DL (ref 3.5–5.2)
ALP SERPL-CCNC: 126 U/L (ref 55–135)
ALT SERPL W/O P-5'-P-CCNC: 26 U/L (ref 10–44)
ANION GAP SERPL CALC-SCNC: 11 MMOL/L (ref 8–16)
AST SERPL-CCNC: 66 U/L (ref 10–40)
BACTERIA SPEC ANAEROBE CULT: NORMAL
BASOPHILS # BLD AUTO: 0.12 K/UL (ref 0–0.2)
BASOPHILS NFR BLD: 1.3 % (ref 0–1.9)
BILIRUB SERPL-MCNC: 32.3 MG/DL (ref 0.1–1)
BUN SERPL-MCNC: 13 MG/DL (ref 6–20)
CALCIUM SERPL-MCNC: 8.6 MG/DL (ref 8.7–10.5)
CHLORIDE SERPL-SCNC: 103 MMOL/L (ref 95–110)
CO2 SERPL-SCNC: 16 MMOL/L (ref 23–29)
CREAT SERPL-MCNC: 1 MG/DL (ref 0.5–1.4)
DIFFERENTIAL METHOD: ABNORMAL
EOSINOPHIL # BLD AUTO: 0.3 K/UL (ref 0–0.5)
EOSINOPHIL NFR BLD: 3.3 % (ref 0–8)
ERYTHROCYTE [DISTWIDTH] IN BLOOD BY AUTOMATED COUNT: 18.1 % (ref 11.5–14.5)
EST. GFR  (NO RACE VARIABLE): >60 ML/MIN/1.73 M^2
GLUCOSE SERPL-MCNC: 82 MG/DL (ref 70–110)
HCT VFR BLD AUTO: 22.4 % (ref 40–54)
HGB BLD-MCNC: 7.6 G/DL (ref 14–18)
IMM GRANULOCYTES # BLD AUTO: 0.2 K/UL (ref 0–0.04)
IMM GRANULOCYTES NFR BLD AUTO: 2.2 % (ref 0–0.5)
INR PPP: 2.3 (ref 0.8–1.2)
LYMPHOCYTES # BLD AUTO: 1.6 K/UL (ref 1–4.8)
LYMPHOCYTES NFR BLD: 16.8 % (ref 18–48)
MCH RBC QN AUTO: 34.9 PG (ref 27–31)
MCHC RBC AUTO-ENTMCNC: 33.9 G/DL (ref 32–36)
MCV RBC AUTO: 103 FL (ref 82–98)
MONOCYTES # BLD AUTO: 1.6 K/UL (ref 0.3–1)
MONOCYTES NFR BLD: 17.6 % (ref 4–15)
NEUTROPHILS # BLD AUTO: 5.4 K/UL (ref 1.8–7.7)
NEUTROPHILS NFR BLD: 58.8 % (ref 38–73)
NRBC BLD-RTO: 0 /100 WBC
PLATELET # BLD AUTO: 67 K/UL (ref 150–450)
PMV BLD AUTO: 10.5 FL (ref 9.2–12.9)
POTASSIUM SERPL-SCNC: 3.8 MMOL/L (ref 3.5–5.1)
PROT SERPL-MCNC: 5.2 G/DL (ref 6–8.4)
PROTHROMBIN TIME: 23.4 SEC (ref 9–12.5)
RBC # BLD AUTO: 2.18 M/UL (ref 4.6–6.2)
SODIUM SERPL-SCNC: 130 MMOL/L (ref 136–145)
WBC # BLD AUTO: 9.2 K/UL (ref 3.9–12.7)

## 2023-08-31 PROCEDURE — 25000003 PHARM REV CODE 250: Performed by: HOSPITALIST

## 2023-08-31 PROCEDURE — 85610 PROTHROMBIN TIME: CPT | Performed by: HOSPITALIST

## 2023-08-31 PROCEDURE — 36415 COLL VENOUS BLD VENIPUNCTURE: CPT | Performed by: HOSPITALIST

## 2023-08-31 PROCEDURE — 99231 SBSQ HOSP IP/OBS SF/LOW 25: CPT | Mod: ,,, | Performed by: STUDENT IN AN ORGANIZED HEALTH CARE EDUCATION/TRAINING PROGRAM

## 2023-08-31 PROCEDURE — 99231 PR SUBSEQUENT HOSPITAL CARE,LEVL I: ICD-10-PCS | Mod: ,,, | Performed by: STUDENT IN AN ORGANIZED HEALTH CARE EDUCATION/TRAINING PROGRAM

## 2023-08-31 PROCEDURE — 80053 COMPREHEN METABOLIC PANEL: CPT | Performed by: HOSPITALIST

## 2023-08-31 PROCEDURE — 20600001 HC STEP DOWN PRIVATE ROOM

## 2023-08-31 PROCEDURE — 85025 COMPLETE CBC W/AUTO DIFF WBC: CPT | Performed by: HOSPITALIST

## 2023-08-31 PROCEDURE — 94761 N-INVAS EAR/PLS OXIMETRY MLT: CPT

## 2023-08-31 RX ADMIN — MUPIROCIN: 20 OINTMENT TOPICAL at 09:08

## 2023-08-31 RX ADMIN — RIFAXIMIN 550 MG: 550 TABLET ORAL at 08:08

## 2023-08-31 RX ADMIN — LACTULOSE 30 G: 20 SOLUTION ORAL at 08:08

## 2023-08-31 RX ADMIN — Medication 6 MG: at 09:08

## 2023-08-31 RX ADMIN — RIFAXIMIN 550 MG: 550 TABLET ORAL at 09:08

## 2023-08-31 RX ADMIN — PANTOPRAZOLE SODIUM 40 MG: 40 TABLET, DELAYED RELEASE ORAL at 08:08

## 2023-08-31 RX ADMIN — LACTULOSE 30 G: 20 SOLUTION ORAL at 09:08

## 2023-08-31 RX ADMIN — FLUCONAZOLE 400 MG: 200 TABLET ORAL at 08:08

## 2023-08-31 NOTE — PLAN OF CARE
Recommendations    1.) Recommend continuing with Low na diet as tolerated, fluid per MD.      2.) Continue Boost Plus TID     3.) RD to monitor wt, PO intake, skin, labs.     Goals: to meet % of EEN/EPN by next RD f/u  Nutrition Goal Status: goal met  Communication of RD Recs:  (POC)

## 2023-08-31 NOTE — PROGRESS NOTES
Phone call to patient to make him aware of status of his case.  Currently awaiting approval for patient's insurance for listing.  Encouraged that this coordinator would be in touch with financial frequently. Patient verbalized understanding.

## 2023-08-31 NOTE — PLAN OF CARE
Patient AAO x4. VSS. Afebrile. On room air. He is independent and ambulatory.24 hour urine negative for copper.  Transplant listing approved pending insurance acceptance. Bed locked/ in lowest setting. Call bell in reach

## 2023-08-31 NOTE — PLAN OF CARE
AAOX4  VVS  MEDS GIVEN AS ORDERED   NO ACUTE DISTRESS NOTED OR VOICED   SAFETY IN PLACE WITH VERBAL UNDERSTANDING NOTED TO CALL PRN

## 2023-08-31 NOTE — TREATMENT PLAN
Hepatology Treatment Plan    Sukhdeep Grimes is a 38 y.o. male admitted to hospital 8/24/2023 (Hospital Day: 8) due to Alcoholic cirrhosis of liver with ascites.     Interval History  Approved for transplant, awaiting financial approval before listing      Objective  Temp:  [98.2 °F (36.8 °C)-99.1 °F (37.3 °C)] 98.2 °F (36.8 °C) (08/31 0944)  Pulse:  [] 86 (08/31 0944)  BP: (104-133)/(52-79) 104/52 (08/31 0944)  Resp:  [12-18] 18 (08/31 0944)  SpO2:  [98 %-100 %] 98 % (08/31 0944)        Laboratory    Lab Results   Component Value Date    WBC 9.20 08/31/2023    HGB 7.6 (L) 08/31/2023    HCT 22.4 (L) 08/31/2023     (H) 08/31/2023    PLT 67 (L) 08/31/2023       Lab Results   Component Value Date     (L) 08/31/2023    K 3.8 08/31/2023     08/31/2023    CO2 16 (L) 08/31/2023    BUN 13 08/31/2023    CREATININE 1.0 08/31/2023    CALCIUM 8.6 (L) 08/31/2023       Lab Results   Component Value Date    ALBUMIN 3.1 (L) 08/31/2023    ALT 26 08/31/2023    AST 66 (H) 08/31/2023    ALKPHOS 126 08/31/2023    BILITOT 32.3 (H) 08/31/2023       Lab Results   Component Value Date    INR 2.3 (H) 08/31/2023    INR 2.4 (H) 08/30/2023    INR 2.5 (H) 08/29/2023       MELD 3.0: 30 at 8/31/2023  5:48 AM  MELD-Na: 32 at 8/31/2023  5:48 AM  Calculated from:  Serum Creatinine: 1.0 mg/dL at 8/31/2023  5:48 AM  Serum Sodium: 130 mmol/L at 8/31/2023  5:48 AM  Total Bilirubin: 32.3 mg/dL at 8/31/2023  5:48 AM  Serum Albumin: 3.1 g/dL at 8/31/2023  5:48 AM  INR(ratio): 2.3 at 8/31/2023  5:48 AM  Age at listing (hypothetical): 38 years  Sex: Male at 8/31/2023  5:48 AM      Assessment  Sukhdeep Grimes is a 38 y.o. male with history of decompensated alcoholic cirrhosis, and alcohol use disorder (relapsed after rehab 3 years ago) who is transferred to [facility] from [place] for liver transplantation eval. Diagnosed with cirrhosis in July 2023 when he presented with increasing abdominal girth, somnolence, and  weakness.  Most recent EGD on 8/21 showed large varices without bleeding stigmata and PHG. Last drink 1 month ago; patient reports he is committed to sobriety. He has relapsed in the past and continued drinking in spite of being told not for elevated LFTs. Modifiably high risk for tx per addiction psych. He has financial approval for liver transplant evaluation. Repeat TTE confirmed HPS. CT triple phase showed a 7 mm left liver lobe nodule.     Problem List:  Decompensated EtOH Cirrhosis c/b ascites and HE  Hepatopulmonary syndrome  Alcohol use disorder, with prior relapse  HRS s/p treatment   Large esophageal varices     ===============================     Pertinent workup:  EGD (8/21/23):  Large (> 5 mm) esophageal varices with no bleeding and no stigmata of recent bleeding. Portal hypertensive gastropathy. Normal examined duodenum.    CT triple phase (8/25/23):  Cirrhotic appearance of liver.  Focal exophytic nodule along the left hepatic lobe measuring 7 mm.  Lesion remains indeterminate. Splenomegaly, ascites, umbilical vein recanalization and gastroesophageal varices together suggested portal venous hypertension.     MRI Abdomen 8/20/23:   1. No cholelithiasis. No biliary dilatation.  2. Hepatic cirrhosis with evidence of portal venous hypertension. Moderate volume ascites.     RUQ US 8/15:   Cirrhosis without obvious focal mass. No bile duct dilation.  The gallbladder is nondistended but contains sludge. The gallbladder wall is thickened which is likely reactive to hepatic disease. Moderate volume of ascites. Splenomegaly.     TTE 8/25/23:  The left ventricle is normal in size. Normal wall thickness. Normal wall motion. There is normal systolic function. There is normal diastolic function. Study is negative for intra cardiac shunt. Bubbles on the left side on the 4th beat emanating from pulmonary veins consistent with transpulmonary shunting.       HEP A IgM: Non-Reactive 08/16/2023   HEP A Total: Reactive  08/22/2023   HEP B Core IgM/surface Ag/core total: Non-Reactive 08/16/2023   HEP B Surface Ab: Not immune 8/16/23  HEP C Ab: Non-Reactive 08/16/2023  ASMA: Negative 8/15/23  LEYDI: Negative 8/15/23  AMA Negative 8/22/23  Alpha 1 antitrypsin: 96 8/22/23  HIV 1/2: Negative 8/19/23  IgG: WNL 8/15/23      Plan  - Discussed in committee and approved for transplant, listing pending insurance approval  - Noted low ceruloplasmin. Likely low in setting of cirrhosis but 24h urine copper pending to evaluate for Charli disease  - Continue PO lactulose 20 mg TID (titrate till 2-3 daily Bm achieved) & Xifaxin 550 mg BID.  - Continue folate and thiamine supplementation.  - Appreciate addiction psychiatry recs.   - Low Na, high protein diet.   - Avoid sedating drugs (opiates & benzodiazepines) if possible  - please obtain daily CBC, CMP, INR. Follow PEth.  - Plan of care was discussed with primary team      Thank you for involving us in the care of Sukhdeep Grimes. Please call with any additional concerns or questions.      Mahad Aguilar  Gastroenterology and Hepatology Fellow, PGY-V

## 2023-08-31 NOTE — PROGRESS NOTES
[facility]  Progress Note    Patient Name: Sukhdeep Grimes  MRN: [id number]  Patient Class: IP- Inpatient   Admission Date: 8/24/2023  Length of Stay: 7 days  Attending Physician: Judy Narvaez MD  Primary Care Provider: Martine, Primary Doctor        Subjective:     Principal Problem:Alcoholic cirrhosis of liver with ascites        HPI:  38-year-old male with a history of alcoholic cirrhosis admitted to [facility] in [place] on August 14 with increasing abdominal girth, somnolence, and weakness.  He underwent paracentesis.  Admit diagnoses included alcoholic cirrhosis with ascites, hyponatremia, hypokalemia, thrombocytopenia, and AYAH.  During his stay he had intermittent fevers. Infectious Diseases evaluated him for fever and elevated Fungitell.  It was noted that blood and urine culture along with chest x-ray were negative.  They felt the Fungitell was nonspecific and the patient had no clear evidence of fungal infection.  HIV, RPR, and respiratory viral panels were noted to be negative.  MRI of the abdomen on August 20 had no cholelithiasis or biliary dilatation.  He remains on Rocephin currently.      Encephalopathy improved during his stay.  Current diagnoses included acute liver injury with decompensated cirrhosis, acute on chronic anemia, SIRS (no obvious source of infection) nonbleeding varices, and improving (but not resolved) encephalopathy.  He noted last alcohol intake was around July 7.      Hemoglobin remained low during his stay with levels ranging 6.6-8.0.  He had no overt evidence of bleeding. They noted fibrinogen and haptoglobin were low and LDH was elevated.  Ceruloplasmin is pending. EGD on August 21 had 1 column of large varices with no bleeding and no stigmata of recent bleeding in the lower 3rd of the esophagus.  No red Rigoberto sign present.  Banding was not performed.  Moderate portal hypertensive gastropathy in the entire examined stomach.   Examined duodenum was normal.     He remains hemodynamically stable in a [facility]surg [facility].  He is awake and alert.  Will plan transfer to Hospital Medicine at [facility] for Hepatology evaluation.     August 22: White blood cells 8.8 (down from 14.4 on August 17), hemoglobin 6.9, hematocrit 20, platelets 40, sodium 134, potassium 3.3, chloride 104, CO2 18, BUN 9, creatinine 0.72, glucose 94, INR 3, total bilirubin 34.8, AST 45, ALT 22 August 20: RPR nonreactive     August 19: HIV nonreactive, COVID negative, influenza negative     August 18 urine culture had no growth     August 16: Acute hepatitis panel nonreactive, blood cultures had no growth at 5 days  -chest x-ray had no acute cardiopulmonary abnormality.     August 15: Abdominal ultrasound had cirrhosis without obvious focal mass.  No bile duct dilation.  Gallbladder is nondistended but contains sludge.  Gallbladder wall is thickened likely reactive to hepatic disease.  Moderate volume of ascites.  Splenomegaly.     August 14:  Peritoneal fluid culture had no growth after 3 days  -white blood cells 16.3, hemoglobin 10.4, hematocrit 28.6, platelets 66, sodium 124, potassium 2.8, chloride 88, CO2 25, BUN 28, creatinine 1.48, glucose 96, INR 2.1      Overview/Hospital Course:  No notes on file    Interval History:   8/31. Approved for liver transplant. Awaiting insurance approval.     Review of Systems   Constitutional:  Negative for chills and fever.   HENT:  Negative for dental problem and nosebleeds.    Eyes:  Negative for pain and visual disturbance.   Respiratory:  Positive for cough and shortness of breath. Negative for chest tightness.    Cardiovascular:  Positive for leg swelling. Negative for chest pain.   Gastrointestinal:  Negative for abdominal distention, abdominal pain, diarrhea and nausea.   Endocrine: Negative for cold intolerance and polydipsia.   Genitourinary:  Negative for dysuria and frequency.   Musculoskeletal:  Negative for  arthralgias, gait problem and joint swelling.   Skin:  Negative for color change and wound.   Allergic/Immunologic: Negative for immunocompromised state.   Neurological:  Positive for dizziness. Negative for light-headedness and headaches.   Psychiatric/Behavioral:  Positive for confusion. The patient is nervous/anxious.      Objective:     Vital Signs (Most Recent):  Temp: 97.8 °F (36.6 °C) (08/31/23 1224)  Pulse: 95 (08/31/23 1224)  Resp: 18 (08/31/23 1224)  BP: 127/73 (08/31/23 1224)  SpO2: 100 % (08/31/23 1224) Vital Signs (24h Range):  Temp:  [97.8 °F (36.6 °C)-99.1 °F (37.3 °C)] 97.8 °F (36.6 °C)  Pulse:  [] 95  Resp:  [12-18] 18  SpO2:  [98 %-100 %] 100 %  BP: (104-132)/(52-79) 127/73     Weight: 64.1 kg (141 lb 5 oz)  Body mass index is 20.28 kg/m².    Intake/Output Summary (Last 24 hours) at 8/31/2023 1411  Last data filed at 8/30/2023 1619  Gross per 24 hour   Intake --   Output 700 ml   Net -700 ml           Physical Exam  Constitutional:       Appearance: He is not ill-appearing.   HENT:      Head: Normocephalic and atraumatic.      Right Ear: External ear normal.      Left Ear: External ear normal.      Nose: Nose normal.      Mouth/Throat:      Mouth: Mucous membranes are moist.      Pharynx: No oropharyngeal exudate.   Eyes:      General: No scleral icterus.     Pupils: Pupils are equal, round, and reactive to light.   Cardiovascular:      Pulses: Normal pulses.      Heart sounds: No murmur heard.  Pulmonary:      Effort: Pulmonary effort is normal. No respiratory distress.      Breath sounds: No wheezing.   Abdominal:      General: There is distension.      Tenderness: There is no abdominal tenderness.   Musculoskeletal:      Right lower leg: No edema.      Left lower leg: No edema.   Skin:     Coloration: Skin is jaundiced.      Findings: No erythema.   Neurological:      General: No focal deficit present.      Mental Status: He is oriented to person, place, and time.   Psychiatric:          Mood and Affect: Mood normal.         Behavior: Behavior normal.             Significant Labs: All pertinent labs within the past 24 hours have been reviewed.    Significant Imaging: I have reviewed all pertinent imaging results/findings within the past 24 hours.      Assessment/Plan:      * Alcoholic cirrhosis of liver with ascites  MELD 3.0: 30 at 8/31/2023  5:48 AM  MELD-Na: 32 at 8/31/2023  5:48 AM  Calculated from:  Serum Creatinine: 1.0 mg/dL at 8/31/2023  5:48 AM  Serum Sodium: 130 mmol/L at 8/31/2023  5:48 AM  Total Bilirubin: 32.3 mg/dL at 8/31/2023  5:48 AM  Serum Albumin: 3.1 g/dL at 8/31/2023  5:48 AM  INR(ratio): 2.3 at 8/31/2023  5:48 AM  Age at listing (hypothetical): 38 years  Sex: Male at 8/31/2023  5:48 AM      Lactulose/rifaximin  Vitamin K   Liver ultrasound doppler  Hepatology consult  [facility]  Psych consult  Paracentesis        Encounter for pre-transplant evaluation for liver transplant  Approved for liver transplant. Awaiting insurance approval  Hepatology following     Decompensated hepatic cirrhosis    See liver cirrhosis    Alcohol use disorder, severe, in early remission  Consult addiction psychiatry         VTE Risk Mitigation (From admission, onward)         Ordered     IP VTE LOW RISK PATIENT  Once         08/24/23 1002     Place AURORA hose  Until discontinued         08/24/23 1002     Place sequential compression device  Until discontinued         08/24/23 1002                Discharge Planning   GEORGE: 9/1/2023     Code Status: Full Code   Is the patient medically ready for discharge?:     Reason for patient still in hospital (select all that apply): Patient trending condition  Discharge Plan A: Home, Home with family, Home Health   Discharge Delays: None known at this time              Judy Narvaez MD  Department of Hospital Medicine   [facility] - Transplant Stepdown

## 2023-08-31 NOTE — ASSESSMENT & PLAN NOTE
MELD 3.0: 30 at 8/31/2023  5:48 AM  MELD-Na: 32 at 8/31/2023  5:48 AM  Calculated from:  Serum Creatinine: 1.0 mg/dL at 8/31/2023  5:48 AM  Serum Sodium: 130 mmol/L at 8/31/2023  5:48 AM  Total Bilirubin: 32.3 mg/dL at 8/31/2023  5:48 AM  Serum Albumin: 3.1 g/dL at 8/31/2023  5:48 AM  INR(ratio): 2.3 at 8/31/2023  5:48 AM  Age at listing (hypothetical): 38 years  Sex: Male at 8/31/2023  5:48 AM      Lactulose/rifaximin  Vitamin K   Liver ultrasound doppler  Hepatology consult  PET  Psych consult  Paracentesis

## 2023-08-31 NOTE — PROGRESS NOTES
[facility] - Transplant Stepdown  Adult Nutrition  Progress Note    SUMMARY       Recommendations    1.) Recommend continuing with Low na diet as tolerated, fluid per MD.      2.) Continue Boost Plus TID     3.) RD to monitor wt, PO intake, skin, labs.     Goals: to meet % of EEN/EPN by next RD f/u  Nutrition Goal Status: goal met  Communication of RD Recs:  (POC)    Assessment and Plan    Endocrine  Moderate malnutrition  Malnutrition Type:  Context: acute illness or injury  Level: moderate    Related to (etiology):   Alcoholic cirrhosis of liver with ascites     Signs and Symptoms (as evidenced by):   Mild to moderate fat/muscle wasting     Malnutrition Characteristic Summary:  Subcutaneous Fat (Malnutrition): moderate depletion  Muscle Mass (Malnutrition): moderate depletion  Fluid Accumulation (Malnutrition):  (ascites)      Interventions/Recommendations (treatment strategy):  1.) Recommend continuing with Low na diet as tolerated, fluid per MD. 2.) Recommend continuing Boost Plus daily to help optimize PRO/Kcal intake. 3.) RD to monitor wt, PO intake, skin, labs.    Nutrition Diagnosis Status:   Continue    Malnutrition Assessment  Malnutrition Context: acute illness or injury  Malnutrition Level: moderate  Skin (Micronutrient): yellow       Subcutaneous Fat (Malnutrition): moderate depletion  Muscle Mass (Malnutrition): moderate depletion  Fluid Accumulation (Malnutrition):  (ascites)   Orbital Region (Subcutaneous Fat Loss): moderate depletion  Upper Arm Region (Subcutaneous Fat Loss): mild depletion  Thoracic and Lumbar Region: moderate depletion   [place] Region (Muscle Loss): moderate depletion  Clavicle Bone Region (Muscle Loss): moderate depletion  Clavicle and Acromion Bone Region (Muscle Loss): moderate depletion  Dorsal Hand (Muscle Loss): mild depletion     Reason for Assessment    Reason For Assessment: RD follow-up  Diagnosis: liver disease (Alcoholic cirrhosis of liver with ascites)  Relevant  "Medical History: EtOH abuse, Decompensated hepatic cirrhosis  Interdisciplinary Rounds: did not attend    General Information Comments:   RD f/u. Pt with liver cirrhosis, pending insurance clearance for listing. Pt reports good appetite. Tolerating 100% of meals. Drinking Boost. Denies N/V. Reports 3-4 BM per day. NFPE performed on - pt meets criteria for moderate malnutrition. Reiterated the importance of adherence to low Na diet and expectation post tx. Pt with no additional questions at this time.     Nutrition Discharge Planningm Na education provided on . Discussed foods to limit or avoid and benefits of diet adherence. Post-transplant expectations/guidelines also introduced. Appropriate education material with RD contact information provided. No other needs identified.    Nutrition Risk Screen    Nutrition Risk Screen: no indicators present    Nutrition/Diet History    Spiritual, Cultural Beliefs, Hoahaoism Practices, Values that Affect Care: no    Anthropometrics    Temp: 97.8 °F (36.6 °C)  Height Method: Stated  Height: 5' 10" (177.8 cm)  Height (inches): 70 in  Weight Method: Bed Scale  Weight: 64.1 kg (141 lb 5 oz)  Weight (lb): 141.32 lb  Ideal Body Weight (IBW), Male: 166 lb  % Ideal Body Weight, Male (lb): 89.16 %  BMI (Calculated): 20.3  BMI Grade: 18.5-24.9 - normal     Lab/Procedures/Meds    Pertinent Labs Reviewed: reviewed  Pertinent Labs Comments: Na 130, Tprotein 5.2, albumin 3.1, Tbili 32.3, AST 66  Pertinent Medications Reviewed: reviewed  Pertinent Medications Comments: melatonin, lactulose, pantoprazole    Estimated/Assessed Needs    Weight Used For Calorie Calculations: 67.1 kg (147 lb 14.9 oz)  Energy Calorie Requirements (kcal): 2013 kcal  Energy Need Method: Kcal/kg (30kcal/kg)  Protein Requirements: 81g (1.2g/kg)  Weight Used For Protein Calculations: 67.1 kg (147 lb 14.9 oz)  Fluid Requirements (mL): 1ml/1kcal or per MD  Estimated Fluid Requirement Method: RDA Method  RDA " Method (mL): 2013     Nutrition Prescription Ordered    Current Diet Order: Low Na diet  Nutrition Order Comments: 1.5L FR    Evaluation of Received Nutrient/Fluid Intake    I/O: - 3 L since admit  Energy Calories Required: meeting needs  Protein Required: meeting needs  Fluid Required:  (as per MD)  Comments: LBM 8/31  Tolerance: tolerating  % Intake of Estimated Energy Needs: 75 - 100 %  % Meal Intake: 75 - 100 %    Nutrition Risk    Level of Risk/Frequency of Follow-up:  (1x/week)     Monitor and Evaluation    Food and Nutrient Intake: energy intake, food and beverage intake  Food and Nutrient Adminstration: diet order  Knowledge/Beliefs/Attitudes: food and nutrition knowledge/skill, beliefs and attitudes  Physical Activity and Function: nutrition-related ADLs and IADLs  Anthropometric Measurements: weight, weight change, body mass index  Biochemical Data, Medical Tests and Procedures: electrolyte and renal panel, gastrointestinal profile, glucose/endocrine profile, inflammatory profile, lipid profile  Nutrition-Focused Physical Findings: overall appearance, skin     Nutrition Follow-Up    RD Follow-up?: Yes

## 2023-08-31 NOTE — SUBJECTIVE & OBJECTIVE
Interval History:   8/31. Approved for liver transplant. Awaiting insurance approval.     Review of Systems   Constitutional:  Negative for chills and fever.   HENT:  Negative for dental problem and nosebleeds.    Eyes:  Negative for pain and visual disturbance.   Respiratory:  Positive for cough and shortness of breath. Negative for chest tightness.    Cardiovascular:  Positive for leg swelling. Negative for chest pain.   Gastrointestinal:  Negative for abdominal distention, abdominal pain, diarrhea and nausea.   Endocrine: Negative for cold intolerance and polydipsia.   Genitourinary:  Negative for dysuria and frequency.   Musculoskeletal:  Negative for arthralgias, gait problem and joint swelling.   Skin:  Negative for color change and wound.   Allergic/Immunologic: Negative for immunocompromised state.   Neurological:  Positive for dizziness. Negative for light-headedness and headaches.   Psychiatric/Behavioral:  Positive for confusion. The patient is nervous/anxious.      Objective:     Vital Signs (Most Recent):  Temp: 97.8 °F (36.6 °C) (08/31/23 1224)  Pulse: 95 (08/31/23 1224)  Resp: 18 (08/31/23 1224)  BP: 127/73 (08/31/23 1224)  SpO2: 100 % (08/31/23 1224) Vital Signs (24h Range):  Temp:  [97.8 °F (36.6 °C)-99.1 °F (37.3 °C)] 97.8 °F (36.6 °C)  Pulse:  [] 95  Resp:  [12-18] 18  SpO2:  [98 %-100 %] 100 %  BP: (104-132)/(52-79) 127/73     Weight: 64.1 kg (141 lb 5 oz)  Body mass index is 20.28 kg/m².    Intake/Output Summary (Last 24 hours) at 8/31/2023 1411  Last data filed at 8/30/2023 1619  Gross per 24 hour   Intake --   Output 700 ml   Net -700 ml           Physical Exam  Constitutional:       Appearance: He is not ill-appearing.   HENT:      Head: Normocephalic and atraumatic.      Right Ear: External ear normal.      Left Ear: External ear normal.      Nose: Nose normal.      Mouth/Throat:      Mouth: Mucous membranes are moist.      Pharynx: No oropharyngeal exudate.   Eyes:      General: No  scleral icterus.     Pupils: Pupils are equal, round, and reactive to light.   Cardiovascular:      Pulses: Normal pulses.      Heart sounds: No murmur heard.  Pulmonary:      Effort: Pulmonary effort is normal. No respiratory distress.      Breath sounds: No wheezing.   Abdominal:      General: There is distension.      Tenderness: There is no abdominal tenderness.   Musculoskeletal:      Right lower leg: No edema.      Left lower leg: No edema.   Skin:     Coloration: Skin is jaundiced.      Findings: No erythema.   Neurological:      General: No focal deficit present.      Mental Status: He is oriented to person, place, and time.   Psychiatric:         Mood and Affect: Mood normal.         Behavior: Behavior normal.             Significant Labs: All pertinent labs within the past 24 hours have been reviewed.    Significant Imaging: I have reviewed all pertinent imaging results/findings within the past 24 hours.

## 2023-09-01 ENCOUNTER — TELEPHONE (OUTPATIENT)
Dept: TRANSPLANT | Facility: CLINIC | Age: 38
End: 2023-09-01
Payer: COMMERCIAL

## 2023-09-01 LAB
ALBUMIN SERPL BCP-MCNC: 2.9 G/DL (ref 3.5–5.2)
ALBUMIN SERPL BCP-MCNC: 3.1 G/DL (ref 3.5–5.2)
ALP SERPL-CCNC: 127 U/L (ref 55–135)
ALP SERPL-CCNC: 139 U/L (ref 55–135)
ALT SERPL W/O P-5'-P-CCNC: 27 U/L (ref 10–44)
ALT SERPL W/O P-5'-P-CCNC: 30 U/L (ref 10–44)
ANION GAP SERPL CALC-SCNC: 10 MMOL/L (ref 8–16)
ANION GAP SERPL CALC-SCNC: 13 MMOL/L (ref 8–16)
ANISOCYTOSIS BLD QL SMEAR: SLIGHT
AST SERPL-CCNC: 65 U/L (ref 10–40)
AST SERPL-CCNC: 75 U/L (ref 10–40)
BASOPHILS # BLD AUTO: 0.05 K/UL (ref 0–0.2)
BASOPHILS # BLD AUTO: 0.13 K/UL (ref 0–0.2)
BASOPHILS NFR BLD: 0.5 % (ref 0–1.9)
BASOPHILS NFR BLD: 1.2 % (ref 0–1.9)
BILIRUB SERPL-MCNC: 32.2 MG/DL (ref 0.1–1)
BILIRUB SERPL-MCNC: 36.4 MG/DL (ref 0.1–1)
BLOOD BANK HEPATITIS FREEZE AND HOLD: NORMAL
BUN SERPL-MCNC: 12 MG/DL (ref 6–20)
BUN SERPL-MCNC: 13 MG/DL (ref 6–20)
BURR CELLS BLD QL SMEAR: ABNORMAL
CALCIUM SERPL-MCNC: 8.3 MG/DL (ref 8.7–10.5)
CALCIUM SERPL-MCNC: 8.8 MG/DL (ref 8.7–10.5)
CHLORIDE SERPL-SCNC: 102 MMOL/L (ref 95–110)
CHLORIDE SERPL-SCNC: 105 MMOL/L (ref 95–110)
CO2 SERPL-SCNC: 14 MMOL/L (ref 23–29)
CO2 SERPL-SCNC: 16 MMOL/L (ref 23–29)
CREAT SERPL-MCNC: 0.9 MG/DL (ref 0.5–1.4)
CREAT SERPL-MCNC: 1 MG/DL (ref 0.5–1.4)
DIFFERENTIAL METHOD: ABNORMAL
DIFFERENTIAL METHOD: ABNORMAL
EOSINOPHIL # BLD AUTO: 0.2 K/UL (ref 0–0.5)
EOSINOPHIL # BLD AUTO: 0.3 K/UL (ref 0–0.5)
EOSINOPHIL NFR BLD: 2.3 % (ref 0–8)
EOSINOPHIL NFR BLD: 3.2 % (ref 0–8)
ERYTHROCYTE [DISTWIDTH] IN BLOOD BY AUTOMATED COUNT: 17.7 % (ref 11.5–14.5)
ERYTHROCYTE [DISTWIDTH] IN BLOOD BY AUTOMATED COUNT: 18.2 % (ref 11.5–14.5)
EST. GFR  (NO RACE VARIABLE): >60 ML/MIN/1.73 M^2
EST. GFR  (NO RACE VARIABLE): >60 ML/MIN/1.73 M^2
GLUCOSE SERPL-MCNC: 104 MG/DL (ref 70–110)
GLUCOSE SERPL-MCNC: 92 MG/DL (ref 70–110)
HBV CORE AB SERPL QL IA: NORMAL
HBV SURFACE AB SER-ACNC: 17.46 MIU/ML
HBV SURFACE AB SER-ACNC: REACTIVE M[IU]/ML
HBV SURFACE AG SERPL QL IA: NORMAL
HCT VFR BLD AUTO: 22.5 % (ref 40–54)
HCT VFR BLD AUTO: 23.3 % (ref 40–54)
HCV AB SERPL QL IA: NORMAL
HGB BLD-MCNC: 7.3 G/DL (ref 14–18)
HGB BLD-MCNC: 7.6 G/DL (ref 14–18)
HIV 1+2 AB+HIV1 P24 AG SERPL QL IA: NORMAL
HYPOCHROMIA BLD QL SMEAR: ABNORMAL
IMM GRANULOCYTES # BLD AUTO: 0.15 K/UL (ref 0–0.04)
IMM GRANULOCYTES # BLD AUTO: 0.22 K/UL (ref 0–0.04)
IMM GRANULOCYTES NFR BLD AUTO: 1.6 % (ref 0–0.5)
IMM GRANULOCYTES NFR BLD AUTO: 2.1 % (ref 0–0.5)
INR PPP: 2.3 (ref 0.8–1.2)
INR PPP: 2.3 (ref 0.8–1.2)
LYMPHOCYTES # BLD AUTO: 1.2 K/UL (ref 1–4.8)
LYMPHOCYTES # BLD AUTO: 1.4 K/UL (ref 1–4.8)
LYMPHOCYTES NFR BLD: 10.8 % (ref 18–48)
LYMPHOCYTES NFR BLD: 14.7 % (ref 18–48)
MCH RBC QN AUTO: 33.8 PG (ref 27–31)
MCH RBC QN AUTO: 33.8 PG (ref 27–31)
MCHC RBC AUTO-ENTMCNC: 32.4 G/DL (ref 32–36)
MCHC RBC AUTO-ENTMCNC: 32.6 G/DL (ref 32–36)
MCV RBC AUTO: 104 FL (ref 82–98)
MCV RBC AUTO: 104 FL (ref 82–98)
MONOCYTES # BLD AUTO: 1.5 K/UL (ref 0.3–1)
MONOCYTES # BLD AUTO: 1.6 K/UL (ref 0.3–1)
MONOCYTES NFR BLD: 14.4 % (ref 4–15)
MONOCYTES NFR BLD: 17.1 % (ref 4–15)
NEUTROPHILS # BLD AUTO: 6 K/UL (ref 1.8–7.7)
NEUTROPHILS # BLD AUTO: 7.4 K/UL (ref 1.8–7.7)
NEUTROPHILS NFR BLD: 62.9 % (ref 38–73)
NEUTROPHILS NFR BLD: 69.2 % (ref 38–73)
NRBC BLD-RTO: 0 /100 WBC
NRBC BLD-RTO: 0 /100 WBC
OVALOCYTES BLD QL SMEAR: ABNORMAL
PLATELET # BLD AUTO: 60 K/UL (ref 150–450)
PLATELET # BLD AUTO: 77 K/UL (ref 150–450)
PLATELET BLD QL SMEAR: ABNORMAL
PMV BLD AUTO: 10 FL (ref 9.2–12.9)
PMV BLD AUTO: 9.9 FL (ref 9.2–12.9)
POIKILOCYTOSIS BLD QL SMEAR: SLIGHT
POLYCHROMASIA BLD QL SMEAR: ABNORMAL
POTASSIUM SERPL-SCNC: 4.1 MMOL/L (ref 3.5–5.1)
POTASSIUM SERPL-SCNC: 4.2 MMOL/L (ref 3.5–5.1)
PROT SERPL-MCNC: 5 G/DL (ref 6–8.4)
PROT SERPL-MCNC: 5.6 G/DL (ref 6–8.4)
PROTHROMBIN TIME: 23 SEC (ref 9–12.5)
PROTHROMBIN TIME: 23.5 SEC (ref 9–12.5)
RBC # BLD AUTO: 2.16 M/UL (ref 4.6–6.2)
RBC # BLD AUTO: 2.25 M/UL (ref 4.6–6.2)
SARS-COV-2 RDRP RESP QL NAA+PROBE: NEGATIVE
SODIUM SERPL-SCNC: 129 MMOL/L (ref 136–145)
SODIUM SERPL-SCNC: 131 MMOL/L (ref 136–145)
WBC # BLD AUTO: 10.66 K/UL (ref 3.9–12.7)
WBC # BLD AUTO: 9.47 K/UL (ref 3.9–12.7)

## 2023-09-01 PROCEDURE — 36415 COLL VENOUS BLD VENIPUNCTURE: CPT | Performed by: PHYSICIAN ASSISTANT

## 2023-09-01 PROCEDURE — U0002 COVID-19 LAB TEST NON-CDC: HCPCS | Performed by: PHYSICIAN ASSISTANT

## 2023-09-01 PROCEDURE — 86870 RBC ANTIBODY IDENTIFICATION: CPT | Performed by: PHYSICIAN ASSISTANT

## 2023-09-01 PROCEDURE — 36415 COLL VENOUS BLD VENIPUNCTURE: CPT | Performed by: HOSPITALIST

## 2023-09-01 PROCEDURE — 99232 SBSQ HOSP IP/OBS MODERATE 35: CPT | Mod: ,,, | Performed by: STUDENT IN AN ORGANIZED HEALTH CARE EDUCATION/TRAINING PROGRAM

## 2023-09-01 PROCEDURE — 93010 EKG 12-LEAD: ICD-10-PCS | Mod: ,,, | Performed by: INTERNAL MEDICINE

## 2023-09-01 PROCEDURE — 86905 BLOOD TYPING RBC ANTIGENS: CPT | Performed by: PHYSICIAN ASSISTANT

## 2023-09-01 PROCEDURE — 85610 PROTHROMBIN TIME: CPT | Mod: 91 | Performed by: PHYSICIAN ASSISTANT

## 2023-09-01 PROCEDURE — 86902 BLOOD TYPE ANTIGEN DONOR EA: CPT | Performed by: PHYSICIAN ASSISTANT

## 2023-09-01 PROCEDURE — 25000003 PHARM REV CODE 250: Performed by: STUDENT IN AN ORGANIZED HEALTH CARE EDUCATION/TRAINING PROGRAM

## 2023-09-01 PROCEDURE — 85610 PROTHROMBIN TIME: CPT | Performed by: HOSPITALIST

## 2023-09-01 PROCEDURE — 25000003 PHARM REV CODE 250: Performed by: HOSPITALIST

## 2023-09-01 PROCEDURE — 85025 COMPLETE CBC W/AUTO DIFF WBC: CPT | Performed by: HOSPITALIST

## 2023-09-01 PROCEDURE — 93005 ELECTROCARDIOGRAM TRACING: CPT

## 2023-09-01 PROCEDURE — 86900 BLOOD TYPING SEROLOGIC ABO: CPT | Performed by: PHYSICIAN ASSISTANT

## 2023-09-01 PROCEDURE — 99000 SPECIMEN HANDLING OFFICE-LAB: CPT | Performed by: PHYSICIAN ASSISTANT

## 2023-09-01 PROCEDURE — 86704 HEP B CORE ANTIBODY TOTAL: CPT | Performed by: PHYSICIAN ASSISTANT

## 2023-09-01 PROCEDURE — 87522 HEPATITIS C REVRS TRNSCRPJ: CPT | Performed by: PHYSICIAN ASSISTANT

## 2023-09-01 PROCEDURE — 97116 GAIT TRAINING THERAPY: CPT

## 2023-09-01 PROCEDURE — 85025 COMPLETE CBC W/AUTO DIFF WBC: CPT | Mod: 91 | Performed by: PHYSICIAN ASSISTANT

## 2023-09-01 PROCEDURE — 87389 HIV-1 AG W/HIV-1&-2 AB AG IA: CPT | Performed by: PHYSICIAN ASSISTANT

## 2023-09-01 PROCEDURE — 80053 COMPREHEN METABOLIC PANEL: CPT | Mod: 91 | Performed by: PHYSICIAN ASSISTANT

## 2023-09-01 PROCEDURE — 20600001 HC STEP DOWN PRIVATE ROOM

## 2023-09-01 PROCEDURE — 86803 HEPATITIS C AB TEST: CPT | Performed by: PHYSICIAN ASSISTANT

## 2023-09-01 PROCEDURE — 86922 COMPATIBILITY TEST ANTIGLOB: CPT | Performed by: PHYSICIAN ASSISTANT

## 2023-09-01 PROCEDURE — 97110 THERAPEUTIC EXERCISES: CPT

## 2023-09-01 PROCEDURE — 86706 HEP B SURFACE ANTIBODY: CPT | Mod: 91 | Performed by: PHYSICIAN ASSISTANT

## 2023-09-01 PROCEDURE — 80053 COMPREHEN METABOLIC PANEL: CPT | Performed by: HOSPITALIST

## 2023-09-01 PROCEDURE — 93010 ELECTROCARDIOGRAM REPORT: CPT | Mod: ,,, | Performed by: INTERNAL MEDICINE

## 2023-09-01 PROCEDURE — 99232 PR SUBSEQUENT HOSPITAL CARE,LEVL II: ICD-10-PCS | Mod: ,,, | Performed by: STUDENT IN AN ORGANIZED HEALTH CARE EDUCATION/TRAINING PROGRAM

## 2023-09-01 PROCEDURE — 87340 HEPATITIS B SURFACE AG IA: CPT | Performed by: PHYSICIAN ASSISTANT

## 2023-09-01 RX ORDER — HYDROCODONE BITARTRATE AND ACETAMINOPHEN 500; 5 MG/1; MG/1
TABLET ORAL
Status: DISCONTINUED | OUTPATIENT
Start: 2023-09-01 | End: 2023-09-01

## 2023-09-01 RX ORDER — HYDROCODONE BITARTRATE AND ACETAMINOPHEN 500; 5 MG/1; MG/1
TABLET ORAL
Status: DISCONTINUED | OUTPATIENT
Start: 2023-09-01 | End: 2023-09-02

## 2023-09-01 RX ORDER — METHYLPREDNISOLONE SODIUM SUCCINATE 500 MG/8ML
500 INJECTION INTRAMUSCULAR; INTRAVENOUS
Status: DISCONTINUED | OUTPATIENT
Start: 2023-09-01 | End: 2023-09-04

## 2023-09-01 RX ORDER — HYDROXYZINE PAMOATE 25 MG/1
25 CAPSULE ORAL EVERY 8 HOURS PRN
Status: DISCONTINUED | OUTPATIENT
Start: 2023-09-01 | End: 2023-09-07

## 2023-09-01 RX ORDER — MUPIROCIN 20 MG/G
OINTMENT TOPICAL
Status: DISCONTINUED | OUTPATIENT
Start: 2023-09-01 | End: 2023-09-06

## 2023-09-01 RX ADMIN — Medication 6 MG: at 09:09

## 2023-09-01 RX ADMIN — RIFAXIMIN 550 MG: 550 TABLET ORAL at 09:09

## 2023-09-01 RX ADMIN — LACTULOSE 30 G: 20 SOLUTION ORAL at 09:09

## 2023-09-01 RX ADMIN — PANTOPRAZOLE SODIUM 40 MG: 40 TABLET, DELAYED RELEASE ORAL at 09:09

## 2023-09-01 RX ADMIN — HYDROXYZINE PAMOATE 25 MG: 25 CAPSULE ORAL at 04:09

## 2023-09-01 RX ADMIN — ONDANSETRON 8 MG: 8 TABLET, ORALLY DISINTEGRATING ORAL at 04:09

## 2023-09-01 RX ADMIN — FLUCONAZOLE 400 MG: 200 TABLET ORAL at 09:09

## 2023-09-01 NOTE — TELEPHONE ENCOUNTER
"  LIVER WAIT LISTING NOTE    **NOTE:   IF ANY EXTERNAL LABS ARE USED FOR LISTING THE VALUES AND DATES MUST BE ENTERED IN EPIC TO GENERATE THIS NOTE**    Date of Financial clearance to list: 2023    [facility]:        Organ: Liver    Last Name: Sydney  First Name: Sukhdeep     : 1985      Birth sex:     male         MRN#: [id number]                                   State of Permanent Residence:  [address] [place] [id number]    Ethnicity: Not  or /a   Race:      White    CLINICAL INFORMATION       ABO  ABO Blood Group:   AB NEG     ABO Confirmation: (THESE DATES MUST BE PRIOR TO THE LIST DATE AND SUPPORTED BY SEPARATE LAB REPORTS)    Internal Results    Lab Results   Component Value Date    GROUPTRH AB NEG 2023    GROUPTRH AB NEG 2023     No results found for: "ABO"    External Results    ABO Date 1:  ABO Result 1:  ABO Date 2:  ABO Result 2:     Are either of these ABO results based on External Labs? No  (If Yes, STOP and go to source document in Media Tab for verification).    VITALS  Height:    Ht Readings from Last 1 Encounters:   23 5' 10" (1.778 m)     Weight:    Wt Readings from Last 1 Encounters:   23 64.6 kg (142 lb 6.7 oz)       LIVER ORGAN INFORMATION  Candidate Medical Urgency Status: MELD/PELD    Number of Previous Transplants: 0  Sex for the purposes of adult MELD calculation: Male    MELD/PELD Data Collection:  Had dialysis twice, or 24 hours of CVVHD, within 1 week prior to the serum creatinine test: No  Encephalopathy: 1 - 2 Date: 2023  Ascites: slight Date: 2023          MELD Score:  MELD 3.0: 30 at 2023  6:03 AM  MELD-Na: 32 at 2023  6:03 AM  Calculated from:  Serum Creatinine: 0.9 mg/dL (Using min of 1 mg/dL) at 2023  6:03 AM  Serum Sodium: 129 mmol/L at 2023  6:03 AM  Total Bilirubin: 32.2 mg/dL at 2023  6:03 AM  Serum Albumin: 2.9 g/dL at 2023  6:03 AM  INR(ratio): 2.3 at 2023  6:03 AM  Age at " "listing (hypothetical): 38 years  Sex: Male at 9/1/2023  6:03 AM    Lab Results   Component Value Date    ALBUMIN 2.9 (L) 09/01/2023     No results found for: "EXTINR", "EXTCREATININ", "EXTSODIUM", "EXTBILITOTAL", "EXTALBUMIN"    Additional Organs: none  Kidney: No    If Kidney is "Yes" above, check Diagnosis and enter the medical eligibility below for a simultaneous liver/kidney:    Diagnosis: Chronic kidney disease (CKD) with measured or calculated GFR less than or equal to 60 ml/min for greater than 90 consecutive days. At least one of the following must qualify for CKD:  Date Begun Dialysis CrCl (ml/min)  Must be < or = 30 eGFR (ml/min) Must be < or = 30    Yes/no:                    Diagnosis: Sustained acute kidney injury (must be confirmed at least once every 7 days). Please select at least one of the following criteria:  Date of test or treatment Dialysis received CrCl (ml/min) Must be < or = 30 eGFR (ml/min) Must be < or = 25 Number of days since previous test or treatment (must be less than or equal to 7 days)    Yes/No:                  Diagnosis: Metabolic disease, Check all diagnosis that apply:     Hyperoxaluria    Atypical hemolytic uremic syndrome (HUS) from mutations in factor H or factor I    Familial non-neuropathic systemic amyloidosis    Methylmalonic aciduria     Transplant nephrologist confirming candidate's most recent diagnosis for SLK: N/A               ## Please submit a separate Kidney Listing note for combined Liver/Kidney patients. ##    Will Recipient Accept?   Accept HBcAB Positive Organ:  Yes  Accept HBV GOGO Positive Organ:  No  Accept HCV Antibody Positive Organ: Yes   Accept HCV GOGO Positive Organ:  Yes                        Local: No                           Import: No  Accept DCD Organ:    Yes  Minimum acceptable donor age:  5 years  Maximum acceptable donor age:  99 years  Minimum acceptable donor weight:  40 lbs    Maximum acceptable donor weight:  440 lb  Maximum soren the " organ or  Recovery team will travel:   5000 miles    Blood Type x2 was verified by myself and (type in Name of second coordinator).Jose Lima RN  Blood type determination and reporting was completed according to the programs protocols and OPTN requirements.

## 2023-09-01 NOTE — PROGRESS NOTES
Admit Note     Met with patient to assess needs. Patient is a 38 y.o. single male, admitted for Decompensated hepatic cirrhosis [K72.90, K74.60]  Decompensated hepatic cirrhosis [K72.90, K74.60]     Patient admitted from outside hospital on 8/24/2023 .  At this time, patient presents as alert and oriented x 4, pleasant, good eye contact, well groomed, average intelligence, calm, communicative, cooperative, and asking and answering questions appropriately.  Pt required frequent re-direction and was noticeably anxious. At this time, patients caregiver not present. SW called and spoke with pt's brother, Nitesh, and pt's mother, Maribel, by phone immediately after meeting with pt to provide update and education.     Household/Family Systems     Patient resides with patient's mother, at [address] [place] [id number].  Support system includes pt's mother, Maribel (lives in [place]), pt's older brother, Nitesh, (lives in [place], [place]). Pt is a triplet and is close to brotherYung (lives in [place]).  Patient does not have dependents that are need of being cared for.     Patients primary caregiver is Maribel Grimes, carla mother, phone number [phone].  Confirmed patients contact information is [phone] (home);   No relevant phone numbers on file.   .    During admission, patient's caregiver plans to stay at home.  Confirmed patient and patients caregivers do have access to reliable transportation. Pt's brother, Nitesh, reports he will arrange for mother or brother to rent a car while staying locally.     Cognitive Status/Learning     Patient reports reading ability as college and states patient does have difficulty with focusing and remaining on task.  Patient reports patient learns best by one-on-one instruction with practice.   Needed: No.   Highest education level: some college    Vocation/Disability   .  Working for Income: No  If no, reason not working: Disability  Patient previously worked  as  before hospitalization. SW sent SSD referral in to [facility] .     Adherence     Patient reports a high level of adherence to patients health care regimen.  Adherence counseling and education provided. Patient verbalizes understanding.    Substance Use    Patient reports the following substance usage.    Tobacco: none, patient denies any use.  Alcohol: none at this time. Pt reports last drank approx 2-3 months ago and plans to remain sober. Pt has attended 1 virtual AA meeting since being hospitalized and plans to attend another tonight. Pt acknowledges past heavy drinking (8 beers daily with 2 shots of liquor) and has completed rehab program 2x/ in past. Pt's family has found rehab program for pt to join in [place] once pt returns home. Pt has good insight into Etoh consequences and appears committed to maintaining sobriety.   Illicit Drugs/Non-prescribed Medications: none, patient denies any use.  Patient states clear understanding of the potential impact of substance use.  Substance abstinence/cessation counseling, education and resources provided and reviewed.     Services Utilizing/ADLS    Infusion Service: Prior to admission, patient utilizing? no  Home Health: Prior to admission, patient utilizing? no  DME: Prior to admission, no  Pulmonary/Cardiac Rehab: Prior to admission, no  Dialysis:  Prior to admission, no  Transplant Specialty Pharmacy:  Prior to admission, no; patient provides permission to release necessary information to specialty pharmacy for medications post-tranplant. Resources provided and patient is choosing [facility] pharmacy at this time.    Prior to admission, patient reports patient was independent with ADLS and was driving.  Patient reports patient is not able to care for self at this time due to compromised medical condition (as documented in medical record) and physical weakness..  Patient indicates a willingness to care for self once  medically cleared to do so.    Insurance/Medications    Insured by   Payer/Plan Subscr  Sex Relation Sub. Ins. ID Effective Group Num   1. BLUE CROSS BL* JUDAH RDZ 1985 Male Self [id number]                                   [address] [address]      Primary Insurance (for UNOS reporting): Private Insurance  Secondary Insurance (for UNOS reporting): None    Patient reports patient is able to obtain and afford medications at this time and at time of discharge.    Living Will/Healthcare Power of     Patient states patient does not have a LW and/or HCPA.   provided education regarding LW and HCPA and the completion of forms.    Coping/Mental Health    Patient is coping adequately with the aid of  family members and watching tv . Patient indicates mental health difficulties. Pt reports having anxiety and appeared highly anxious. Pt reports taking Lorazapam to manage anxiety outpatient. SW sent message to pt's nurse coordinator, Maya Blood RN, who reports pt will begin receiving Vistaril. SW and pt discussed at length ways pt can keep occupied while staying in the hospital- beginning a tv series, playing puzzles on phone, talking to family members. SW encouraged pt's brother and mother to bring pt games and activities to keep him occupied.     Discharge Planning    At time of discharge, patient plans to return to [address] apartments under the care of mother and brothers.  Patients mother and brother will transport patient.  Per rounds today, expected discharge date has not been medically determined at this time. Patient and patients caregiver  verbalize understanding and are involved in treatment planning and discharge process.    Additional Concerns    Patient's caretaker denies additional needs and/or concerns at this time. Patient is being followed for needs, education, resources, information, emotional support, supportive counseling, and for supportive and skilled  discharge plan of care.  providing ongoing psychosocial support, education, resources and d/c planning as needed.  SW remains available.  remains available. Patient's caregiver verbalizes understanding and agreement with information reviewed,  availability and how to access available resources as needed. Patient denies additional needs and/or concerns at this time. Patient verbalizes understanding and agreement with information reviewed, social work availability, and how to access available resources as needed.

## 2023-09-01 NOTE — TREATMENT PLAN
Hepatology Progress Note    Sukhdeep Grimes is a 38 y.o. male admitted to hospital 8/24/2023 (Hospital Day: 9) due to Alcoholic cirrhosis of liver with ascites.     Interval History  Received financial approval for transplant. Will be listed today      Objective  Temp:  [97.8 °F (36.6 °C)-98.6 °F (37 °C)] 98.6 °F (37 °C) (09/01 0800)  Pulse:  [] 95 (09/01 0800)  BP: (118-137)/(60-73) 118/60 (09/01 0800)  Resp:  [18] 18 (09/01 0800)  SpO2:  [97 %-100 %] 99 % (09/01 0800)    Physical Exam:  Constitutional:  not in acute distress and well developed  HENT: Head: Normal, normocephalic.  Eyes: scleral icterus  Respiratory: normal chest expansion & respiratory effort and no accessory muscle use  GI: soft, non-tender, without masses or organomegaly  Skin: jaundiced  Neurological: alert, oriented x3    Laboratory    Lab Results   Component Value Date    WBC 9.47 09/01/2023    HGB 7.3 (L) 09/01/2023    HCT 22.5 (L) 09/01/2023     (H) 09/01/2023    PLT 60 (L) 09/01/2023       Lab Results   Component Value Date     (L) 09/01/2023    K 4.2 09/01/2023     09/01/2023    CO2 14 (L) 09/01/2023    BUN 12 09/01/2023    CREATININE 0.9 09/01/2023    CALCIUM 8.3 (L) 09/01/2023       Lab Results   Component Value Date    ALBUMIN 2.9 (L) 09/01/2023    ALT 27 09/01/2023    AST 65 (H) 09/01/2023    ALKPHOS 127 09/01/2023    BILITOT 32.2 (H) 09/01/2023       Lab Results   Component Value Date    INR 2.3 (H) 09/01/2023    INR 2.3 (H) 08/31/2023    INR 2.4 (H) 08/30/2023       MELD 3.0: 30 at 9/1/2023  6:03 AM  MELD-Na: 32 at 9/1/2023  6:03 AM  Calculated from:  Serum Creatinine: 0.9 mg/dL (Using min of 1 mg/dL) at 9/1/2023  6:03 AM  Serum Sodium: 129 mmol/L at 9/1/2023  6:03 AM  Total Bilirubin: 32.2 mg/dL at 9/1/2023  6:03 AM  Serum Albumin: 2.9 g/dL at 9/1/2023  6:03 AM  INR(ratio): 2.3 at 9/1/2023  6:03 AM  Age at listing (hypothetical): 38 years  Sex: Male at 9/1/2023  6:03 AM      Assessment  Sukhdeep Grimes is a  38 y.o. male with history of decompensated alcoholic cirrhosis, and alcohol use disorder (relapsed after rehab 3 years ago) who is transferred to [facility] from [place] for liver transplantation [place]. Diagnosed with cirrhosis in July 2023 when he presented with increasing abdominal girth, somnolence, and weakness.  Most recent EGD on 8/21 showed large varices without bleeding stigmata and PHG. Last drink 1 month ago; patient reports he is committed to sobriety. He has relapsed in the past and continued drinking in spite of being told not for elevated LFTs. Modifiably high risk for tx per addiction psych. He has financial approval for liver transplant evaluation. Repeat TTE confirmed HPS. CT triple phase showed a 7 mm left liver lobe nodule.    Approved and listed on 9/1.     Problem List:  Decompensated EtOH Cirrhosis c/b ascites and HE  Hepatopulmonary syndrome  Alcohol use disorder, with prior relapse  HRS s/p treatment   Large esophageal varices     ===============================     Pertinent workup:  EGD (8/21/23):  Large (> 5 mm) esophageal varices with no bleeding and no stigmata of recent bleeding. Portal hypertensive gastropathy. Normal examined duodenum.    CT triple phase (8/25/23):  Cirrhotic appearance of liver.  Focal exophytic nodule along the left hepatic lobe measuring 7 mm.  Lesion remains indeterminate. Splenomegaly, ascites, umbilical vein recanalization and gastroesophageal varices together suggested portal venous hypertension.     MRI Abdomen 8/20/23:   1. No cholelithiasis. No biliary dilatation.  2. Hepatic cirrhosis with evidence of portal venous hypertension. Moderate volume ascites.     RUQ US 8/15:   Cirrhosis without obvious focal mass. No bile duct dilation.  The gallbladder is nondistended but contains sludge. The gallbladder wall is thickened which is likely reactive to hepatic disease. Moderate volume of ascites. Splenomegaly.     TTE 8/25/23:  The left ventricle  is normal in size. Normal wall thickness. Normal wall motion. There is normal systolic function. There is normal diastolic function. Study is negative for intra cardiac shunt. Bubbles on the left side on the 4th beat emanating from pulmonary veins consistent with transpulmonary shunting.       HEP A IgM: Non-Reactive 08/16/2023   HEP A Total: Reactive 08/22/2023   HEP B Core IgM/surface Ag/core total: Non-Reactive 08/16/2023   HEP B Surface Ab: Not immune 8/16/23  HEP C Ab: Non-Reactive 08/16/2023  ASMA: Negative 8/15/23  LEYDI: Negative 8/15/23  AMA Negative 8/22/23  Alpha 1 antitrypsin: 96 8/22/23  HIV 1/2: Negative 8/19/23  IgG: WNL 8/15/23      Plan  - Will list for transplant today 9/1/23  - Will transfer to liver transplant service in AM  - Noted low ceruloplasmin. Likely low in setting of cirrhosis but 24h urine copper pending to evaluate for Charli disease  - Continue PO lactulose 20 mg TID (titrate till 2-3 daily Bm achieved) & Xifaxin 550 mg BID.  - Continue folate and thiamine supplementation.  - Low Na, high protein diet.   - Avoid sedating drugs (opiates & benzodiazepines) if possible  - please obtain daily CBC, CMP, INR  - Plan of care was discussed with primary team      Thank you for involving us in the care of Sukhdeep Grimes. Please call with any additional concerns or questions.      Mahad Aguilar  Gastroenterology and Hepatology Fellow, PGY-V

## 2023-09-01 NOTE — ASSESSMENT & PLAN NOTE
MELD 3.0: 30 at 2023  6:03 AM  MELD-Na: 32 at 2023  6:03 AM  Calculated from:  Serum Creatinine: 0.9 mg/dL (Using min of 1 mg/dL) at 2023  6:03 AM  Serum Sodium: 129 mmol/L at 2023  6:03 AM  Total Bilirubin: 32.2 mg/dL at 2023  6:03 AM  Serum Albumin: 2.9 g/dL at 2023  6:03 AM  INR(ratio): 2.3 at 2023  6:03 AM  Age at listin years  Sex: Male at 2023  6:03 AM      Lactulose/rifaximin  Vitamin K   Liver ultrasound doppler  Hepatology consult  PET  Psych consult  Paracentesis

## 2023-09-01 NOTE — ASSESSMENT & PLAN NOTE
Approved for liver transplant.Hepatology following   Listed for transplant today 9/1/23  Will transfer to liver transplant service 9/2

## 2023-09-01 NOTE — TELEPHONE ENCOUNTER
"TXP LIVER COORDINATOR ORGAN OFFER NOTE   UNOS# [id number]  Notified by Brady Burk, , that Sukhdeep Grimes is eligible for liver as a primary recipient offer.  Spoke with patient and identified no acute medical issues with telephone assessment. Protocol script read to patient regarding N/A, standard donor offer.  Patient was informed that if he turned down the offer A transplant doctor will call you after we hang up."  Patient was also informed that if he turned down this donor, he would not be punished or removed from the transplant list, that he  would remain on the list at his current status/MELD score. However, by waiting on the list longer rather than receiving this transplant, he will face a greater risk of death than any risk from the slight possibility of transmitted infection.  Patient was asked if they have had a positive COVID-19 test or if they have any signs or symptoms. Informed patient that they will be tested for COVID-19 upon arrival to the hospital, unless have a previous positive result. If tested and result is positive, the transplant will not be able to occur, they will be inactivated on the wait list for 21 days per protocol and required to quarantine.   Patient verbalized understanding, all questions answered, patient accepts organ offer.   Patient notified of plan (no times set for donor yet - probably 9/2/23) and states understanding. Called pt's mother, Maribel, to let her know about case.    "

## 2023-09-01 NOTE — PT/OT/SLP PROGRESS
Physical Therapy Treatment    Patient Name:  Sukhdeep Grimes   MRN:  [id number]    Recommendations:     Discharge Recommendations: home  Discharge Equipment Recommendations: none  Barriers to discharge: None    Assessment:     Sukhdeep Grimes is a 38 y.o. male admitted with a medical diagnosis of Alcoholic cirrhosis of liver with ascites.  He presents with the following impairments/functional limitations: weakness, impaired self care skills, impaired functional mobility, decreased safety awareness, decreased lower extremity function, gait instability.    Rehab Prognosis: Good; patient would benefit from acute skilled PT services to address these deficits and reach maximum level of function.    Recent Surgery: * No surgery found *      Plan:     During this hospitalization, patient to be seen 1 x/week to address the identified rehab impairments via gait training, therapeutic activities, therapeutic exercises, neuromuscular re-education and progress toward the following goals:    Plan of Care Expires:  09/27/23    Subjective     Chief Complaint: none verbalized  Patient/Family Comments/goals: return to PLOF  Pain/Comfort:  Pain Rating 1: 0/10  Pain Rating Post-Intervention 1: 0/10      Objective:     Communicated with RN prior to session.  Patient found supine with peripheral IV upon PT entry to room.     General Precautions: Standard, fall  Orthopedic Precautions: N/A  Braces: N/A  Respiratory Status: Room air     Functional Mobility:  Bed Mobility:     Scooting: independence  Supine to Sit: independence  Transfers:     Sit to Stand:  supervision with no AD  Gait: ~300 ft, Supervision with no AD; unsteadiness present, no major LOBs  Balance:   Static Sitting: independent  Dynamic Sitting: independent  Static Standing: Supervision  Dynamic Standing: Supervision      AM-PAC 6 CLICK MOBILITY  Turning over in bed (including adjusting bedclothes, sheets and blankets)?: 4  Sitting down on and standing up from a chair with  arms (e.g., wheelchair, bedside commode, etc.): 3  Moving from lying on back to sitting on the side of the bed?: 4  Moving to and from a bed to a chair (including a wheelchair)?: 3  Need to walk in hospital room?: 3  Climbing 3-5 steps with a railing?: 3  Basic Mobility Total Score: 20       Treatment & Education:  Patient also educated and instructed on therapeutic exercises. Therapeutic exercises included the following: Standing hip abduction, Standing hip extension, Standing marches (Single leg), Standing Bilateral Heel raises. Pt performed 1x10 on BLEs with verbal cuing and demonstration as needed.  Patient educated on role of therapy, goals of session, and benefits of mobilizing.   Discussed PT plan of care during hospitalization.   Patient educated on calling for assistance.   Patient educated on how their diagnosis impacts their mobility within PT scope of practice.   All questions answered within PT scope of practice.    Patient left ambulatory in room with all lines intact and call button in reach.    GOALS:   Multidisciplinary Problems       Physical Therapy Goals          Problem: Physical Therapy    Goal Priority Disciplines Outcome Goal Variances Interventions   Physical Therapy Goal     PT, PT/OT Ongoing, Progressing     Description: Goals to be met by: 23     Patient will increase functional independence with mobility by performin. Sit to stand transfer with [place]  2. Gait  x 150 feet with [place] using No Assistive Device.   3. Ascend/descend 4 stair with no Handrails [place] using No Assistive Device.   4. Stand for 2 minutes with [place] using No Assistive Device  5. Lower extremity exercise program x15 reps per handout, with independence                         Time Tracking:     PT Received On: 23  PT Start Time: 1707     PT Stop Time: 1733  PT Total Time (min): 26 min     Billable Minutes: Gait Training 13 and Therapeutic Exercise 13    Treatment Type:  Treatment  PT/PTA: PT     Number of PTA visits since last PT visit: 0     09/01/2023

## 2023-09-01 NOTE — PROGRESS NOTES
[facility] Medicine  Progress Note    Patient Name: Sukhdeep Grimes  MRN: [id number]  Patient Class: IP- Inpatient   Admission Date: 8/24/2023  Length of Stay: 8 days  Attending Physician: Judy Narvaez MD  Primary Care Provider: Martine, Primary Doctor        Subjective:     Principal Problem:Alcoholic cirrhosis of liver with ascites        HPI:  38-year-old male with a history of alcoholic cirrhosis admitted to [facility] in [place] on August 14 with increasing abdominal girth, somnolence, and weakness.  He underwent paracentesis.  Admit diagnoses included alcoholic cirrhosis with ascites, hyponatremia, hypokalemia, thrombocytopenia, and AYAH.  During his stay he had intermittent fevers. Infectious Diseases evaluated him for fever and elevated Fungitell.  It was noted that blood and urine culture along with chest x-ray were negative.  They felt the Fungitell was nonspecific and the patient had no clear evidence of fungal infection.  HIV, RPR, and respiratory viral panels were noted to be negative.  MRI of the abdomen on August 20 had no cholelithiasis or biliary dilatation.  He remains on Rocephin currently.      Encephalopathy improved during his stay.  Current diagnoses included acute liver injury with decompensated cirrhosis, acute on chronic anemia, SIRS (no obvious source of infection) nonbleeding varices, and improving (but not resolved) encephalopathy.  He noted last alcohol intake was around July 7.      Hemoglobin remained low during his stay with levels ranging 6.6-8.0.  He had no overt evidence of bleeding. They noted fibrinogen and haptoglobin were low and LDH was elevated.  Ceruloplasmin is pending. EGD on August 21 had 1 column of large varices with no bleeding and no stigmata of recent bleeding in the lower 3rd of the esophagus.  No red Rigoberto sign present.  Banding was not performed.  Moderate portal hypertensive gastropathy in the entire examined stomach.   Examined duodenum was normal.     He remains hemodynamically stable in a [facility] bed.  He is awake and alert.  Will plan transfer to Hospital Medicine at [facility] for Hepatology evaluation.     August 22: White blood cells 8.8 (down from 14.4 on August 17), hemoglobin 6.9, hematocrit 20, platelets 40, sodium 134, potassium 3.3, chloride 104, CO2 18, BUN 9, creatinine 0.72, glucose 94, INR 3, total bilirubin 34.8, AST 45, ALT 22 August 20: RPR nonreactive     August 19: HIV nonreactive, COVID negative, influenza negative     August 18 urine culture had no growth     August 16: Acute hepatitis panel nonreactive, blood cultures had no growth at 5 days  -chest x-ray had no acute cardiopulmonary abnormality.     August 15: Abdominal ultrasound had cirrhosis without obvious focal mass.  No bile duct dilation.  Gallbladder is nondistended but contains sludge.  Gallbladder wall is thickened likely reactive to hepatic disease.  Moderate volume of ascites.  Splenomegaly.     August 14:  Peritoneal fluid culture had no growth after 3 days  -white blood cells 16.3, hemoglobin 10.4, hematocrit 28.6, platelets 66, sodium 124, potassium 2.8, chloride 88, CO2 25, BUN 28, creatinine 1.48, glucose 96, INR 2.1      Overview/Hospital Course:  No notes on file    Interval History:   Patient have no new complaints today. Hepatology following.  Listed for transplant today 9/1/23. Will transfer to liver transplant service in AM.       Objective:     Vital Signs (Most Recent):  Temp: 98.6 °F (37 °C) (09/01/23 0800)  Pulse: 95 (09/01/23 0800)  Resp: 18 (09/01/23 0800)  BP: 118/60 (09/01/23 0800)  SpO2: 99 % (09/01/23 0800) Vital Signs (24h Range):  Temp:  [98.1 °F (36.7 °C)-98.6 °F (37 °C)] 98.6 °F (37 °C)  Pulse:  [] 95  Resp:  [18] 18  SpO2:  [97 %-100 %] 99 %  BP: (118-137)/(60-69) 118/60     Weight: 64.6 kg (142 lb 6.7 oz)  Body mass index is 20.43 kg/m².    Intake/Output Summary (Last 24 hours) at 9/1/2023 1243  Last  data filed at 2023 0659  Gross per 24 hour   Intake 240 ml   Output --   Net 240 ml           Physical Exam  Constitutional:       Appearance: He is not ill-appearing.   HENT:      Head: Normocephalic and atraumatic.      Right Ear: External ear normal.      Left Ear: External ear normal.      Nose: Nose normal.      Mouth/Throat:      Mouth: Mucous membranes are moist.      Pharynx: No oropharyngeal exudate.   Eyes:      General: No scleral icterus.     Pupils: Pupils are equal, round, and reactive to light.   Cardiovascular:      Pulses: Normal pulses.      Heart sounds: No murmur heard.  Pulmonary:      Effort: Pulmonary effort is normal. No respiratory distress.      Breath sounds: No wheezing.   Abdominal:      General: There is distension.      Tenderness: There is no abdominal tenderness.   Musculoskeletal:      Right lower leg: No edema.      Left lower leg: No edema.   Skin:     Coloration: Skin is jaundiced.      Findings: No erythema.   Neurological:      General: No focal deficit present.      Mental Status: He is oriented to person, place, and time.   Psychiatric:         Mood and Affect: Mood normal.         Behavior: Behavior normal.             Significant Labs: All pertinent labs within the past 24 hours have been reviewed.    Significant Imaging: I have reviewed all pertinent imaging results/findings within the past 24 hours.      Assessment/Plan:      * Alcoholic cirrhosis of liver with ascites  MELD 3.0: 30 at 2023  6:03 AM  MELD-Na: 32 at 2023  6:03 AM  Calculated from:  Serum Creatinine: 0.9 mg/dL (Using min of 1 mg/dL) at 2023  6:03 AM  Serum Sodium: 129 mmol/L at 2023  6:03 AM  Total Bilirubin: 32.2 mg/dL at 2023  6:03 AM  Serum Albumin: 2.9 g/dL at 2023  6:03 AM  INR(ratio): 2.3 at 2023  6:03 AM  Age at listin years  Sex: Male at 2023  6:03 AM      Lactulose/rifaximin  Vitamin K   Liver ultrasound doppler  Hepatology consult  PET  Psych  consult  Paracentesis        Encounter for pre-transplant evaluation for liver transplant  Approved for liver transplant.Hepatology following   Listed for transplant today 9/1/23  Will transfer to liver transplant service 9/2    Decompensated hepatic cirrhosis    See liver cirrhosis    Alcohol use disorder, severe, in early remission  Consult addiction psychiatry         VTE Risk Mitigation (From admission, onward)         Ordered     IP VTE LOW RISK PATIENT  Once         08/24/23 1002     Place AURORA hose  Until discontinued         08/24/23 1002     Place sequential compression device  Until discontinued         08/24/23 1002                Discharge Planning   GEORGE: 9/5/2023     Code Status: Full Code   Is the patient medically ready for discharge?:     Reason for patient still in hospital (select all that apply): Patient trending condition  Discharge Plan A: Home, Home with family, Home Health   Discharge Delays: None known at this time              Judy Narvaez MD  Department of Hospital Medicine   [facility] - Transplant Step[facility]

## 2023-09-01 NOTE — TELEPHONE ENCOUNTER
"Informed patient of insurance approval to list him for liver transplant.  Patient expressed "happiness and good new this morning".  Asked patient for SSN which is needed to complete liver transplant listing. Patient provided SSN which was then added to demographic.   Spoke with patient about importance of answering cell phone, keeping volume up and phone charged. Instructed to answer all calls, any time day or night. Patient verbalized understanding.  We then discussed time frame for being transplanted.  Explained that transplant is dependent on MELD score, Blood type and and body size.  Patient may begin getting offers soon. Patient expressed understanding.     "

## 2023-09-01 NOTE — PLAN OF CARE
AAOX4  VVS  PT PICKED PIMP ON NOSE AND PRESSURE APPLIED . BANDAGE PLACE EDUCATION DONE ON PICKING SKIN AND ADRIAN OF BLEEDING AND INFECTION .  1500 FR IN PLACE   PT OFFERED LIVER , NO TIMES FOR OR SET BUT ON 9/2/23.   COVID AND LABS SENT   SAFETY IN PLACE WITH VERBAL UNDERSTANDING NOTED TO CALL PRN

## 2023-09-01 NOTE — PLAN OF CARE
Pt AOX4, VSS, afebrile.   No c/o pain. Zofran given for co/ of nausea  Gave Rifaximin, lactulose. Abdomen distended. Jaundiced. PETH test negative.  ml.  Pt in lowest settings, call light w/in reach, nonskid socks when ambulating, remains free from falls. NAD. WCTM.

## 2023-09-01 NOTE — SUBJECTIVE & OBJECTIVE
Interval History:   Patient have no new complaints today. Hepatology following.  Listed for transplant today 9/1/23. Will transfer to liver transplant service in AM.       Objective:     Vital Signs (Most Recent):  Temp: 98.6 °F (37 °C) (09/01/23 0800)  Pulse: 95 (09/01/23 0800)  Resp: 18 (09/01/23 0800)  BP: 118/60 (09/01/23 0800)  SpO2: 99 % (09/01/23 0800) Vital Signs (24h Range):  Temp:  [98.1 °F (36.7 °C)-98.6 °F (37 °C)] 98.6 °F (37 °C)  Pulse:  [] 95  Resp:  [18] 18  SpO2:  [97 %-100 %] 99 %  BP: (118-137)/(60-69) 118/60     Weight: 64.6 kg (142 lb 6.7 oz)  Body mass index is 20.43 kg/m².    Intake/Output Summary (Last 24 hours) at 9/1/2023 1243  Last data filed at 9/1/2023 0659  Gross per 24 hour   Intake 240 ml   Output --   Net 240 ml           Physical Exam  Constitutional:       Appearance: He is not ill-appearing.   HENT:      Head: Normocephalic and atraumatic.      Right Ear: External ear normal.      Left Ear: External ear normal.      Nose: Nose normal.      Mouth/Throat:      Mouth: Mucous membranes are moist.      Pharynx: No oropharyngeal exudate.   Eyes:      General: No scleral icterus.     Pupils: Pupils are equal, round, and reactive to light.   Cardiovascular:      Pulses: Normal pulses.      Heart sounds: No murmur heard.  Pulmonary:      Effort: Pulmonary effort is normal. No respiratory distress.      Breath sounds: No wheezing.   Abdominal:      General: There is distension.      Tenderness: There is no abdominal tenderness.   Musculoskeletal:      Right lower leg: No edema.      Left lower leg: No edema.   Skin:     Coloration: Skin is jaundiced.      Findings: No erythema.   Neurological:      General: No focal deficit present.      Mental Status: He is oriented to person, place, and time.   Psychiatric:         Mood and Affect: Mood normal.         Behavior: Behavior normal.             Significant Labs: All pertinent labs within the past 24 hours have been  reviewed.    Significant Imaging: I have reviewed all pertinent imaging results/findings within the past 24 hours.

## 2023-09-02 ENCOUNTER — TELEPHONE (OUTPATIENT)
Dept: TRANSPLANT | Facility: CLINIC | Age: 38
End: 2023-09-02
Payer: COMMERCIAL

## 2023-09-02 PROBLEM — Z76.82 PRE-LIVER TRANSPLANT, LISTED: Status: ACTIVE | Noted: 2023-09-02

## 2023-09-02 PROBLEM — Z01.818 ENCOUNTER FOR PRE-TRANSPLANT EVALUATION FOR LIVER TRANSPLANT: Status: RESOLVED | Noted: 2023-08-24 | Resolved: 2023-09-02

## 2023-09-02 LAB
ABO + RH BLD: ABNORMAL
ALBUMIN SERPL BCP-MCNC: 2.8 G/DL (ref 3.5–5.2)
ALP SERPL-CCNC: 130 U/L (ref 55–135)
ALT SERPL W/O P-5'-P-CCNC: 30 U/L (ref 10–44)
ANION GAP SERPL CALC-SCNC: 10 MMOL/L (ref 8–16)
AST SERPL-CCNC: 70 U/L (ref 10–40)
BASOPHILS # BLD AUTO: 0.15 K/UL (ref 0–0.2)
BASOPHILS NFR BLD: 1.5 % (ref 0–1.9)
BILIRUB SERPL-MCNC: 31.4 MG/DL (ref 0.1–1)
BLD GP AB SCN CELLS X3 SERPL QL: ABNORMAL
BLOOD GROUP ANTIBODIES SERPL: NORMAL
BUN SERPL-MCNC: 11 MG/DL (ref 6–20)
CALCIUM SERPL-MCNC: 8.5 MG/DL (ref 8.7–10.5)
CHLORIDE SERPL-SCNC: 104 MMOL/L (ref 95–110)
CO2 SERPL-SCNC: 15 MMOL/L (ref 23–29)
CREAT SERPL-MCNC: 1 MG/DL (ref 0.5–1.4)
DIFFERENTIAL METHOD: ABNORMAL
EOSINOPHIL # BLD AUTO: 0.3 K/UL (ref 0–0.5)
EOSINOPHIL NFR BLD: 3.3 % (ref 0–8)
ERYTHROCYTE [DISTWIDTH] IN BLOOD BY AUTOMATED COUNT: 18.4 % (ref 11.5–14.5)
EST. GFR  (NO RACE VARIABLE): >60 ML/MIN/1.73 M^2
GLUCOSE SERPL-MCNC: 88 MG/DL (ref 70–110)
HCT VFR BLD AUTO: 22.8 % (ref 40–54)
HGB BLD-MCNC: 7.7 G/DL (ref 14–18)
IMM GRANULOCYTES # BLD AUTO: 0.19 K/UL (ref 0–0.04)
IMM GRANULOCYTES NFR BLD AUTO: 1.9 % (ref 0–0.5)
INR PPP: 2.2 (ref 0.8–1.2)
LYMPHOCYTES # BLD AUTO: 1.5 K/UL (ref 1–4.8)
LYMPHOCYTES NFR BLD: 14.9 % (ref 18–48)
MAGNESIUM SERPL-MCNC: 1.8 MG/DL (ref 1.6–2.6)
MCH RBC QN AUTO: 35 PG (ref 27–31)
MCHC RBC AUTO-ENTMCNC: 33.8 G/DL (ref 32–36)
MCV RBC AUTO: 104 FL (ref 82–98)
MONOCYTES # BLD AUTO: 1.8 K/UL (ref 0.3–1)
MONOCYTES NFR BLD: 17.6 % (ref 4–15)
NEUTROPHILS # BLD AUTO: 6.2 K/UL (ref 1.8–7.7)
NEUTROPHILS NFR BLD: 60.8 % (ref 38–73)
NRBC BLD-RTO: 0 /100 WBC
PHOSPHATE SERPL-MCNC: 3.2 MG/DL (ref 2.7–4.5)
PLATELET # BLD AUTO: 72 K/UL (ref 150–450)
PMV BLD AUTO: 10 FL (ref 9.2–12.9)
POTASSIUM SERPL-SCNC: 3.8 MMOL/L (ref 3.5–5.1)
PROT SERPL-MCNC: 5.2 G/DL (ref 6–8.4)
PROTHROMBIN TIME: 22.7 SEC (ref 9–12.5)
RBC # BLD AUTO: 2.2 M/UL (ref 4.6–6.2)
SODIUM SERPL-SCNC: 129 MMOL/L (ref 136–145)
SPECIMEN OUTDATE: ABNORMAL
WBC # BLD AUTO: 10.13 K/UL (ref 3.9–12.7)

## 2023-09-02 PROCEDURE — 99233 PR SUBSEQUENT HOSPITAL CARE,LEVL III: ICD-10-PCS | Mod: ,,, | Performed by: NURSE PRACTITIONER

## 2023-09-02 PROCEDURE — 25000003 PHARM REV CODE 250: Performed by: STUDENT IN AN ORGANIZED HEALTH CARE EDUCATION/TRAINING PROGRAM

## 2023-09-02 PROCEDURE — 36415 COLL VENOUS BLD VENIPUNCTURE: CPT | Performed by: STUDENT IN AN ORGANIZED HEALTH CARE EDUCATION/TRAINING PROGRAM

## 2023-09-02 PROCEDURE — 25000003 PHARM REV CODE 250: Performed by: HOSPITALIST

## 2023-09-02 PROCEDURE — 25000003 PHARM REV CODE 250: Performed by: NURSE PRACTITIONER

## 2023-09-02 PROCEDURE — 85025 COMPLETE CBC W/AUTO DIFF WBC: CPT | Performed by: STUDENT IN AN ORGANIZED HEALTH CARE EDUCATION/TRAINING PROGRAM

## 2023-09-02 PROCEDURE — 85610 PROTHROMBIN TIME: CPT | Performed by: STUDENT IN AN ORGANIZED HEALTH CARE EDUCATION/TRAINING PROGRAM

## 2023-09-02 PROCEDURE — 99233 SBSQ HOSP IP/OBS HIGH 50: CPT | Mod: ,,, | Performed by: NURSE PRACTITIONER

## 2023-09-02 PROCEDURE — 84100 ASSAY OF PHOSPHORUS: CPT | Performed by: STUDENT IN AN ORGANIZED HEALTH CARE EDUCATION/TRAINING PROGRAM

## 2023-09-02 PROCEDURE — 83735 ASSAY OF MAGNESIUM: CPT | Performed by: STUDENT IN AN ORGANIZED HEALTH CARE EDUCATION/TRAINING PROGRAM

## 2023-09-02 PROCEDURE — 80053 COMPREHEN METABOLIC PANEL: CPT | Performed by: STUDENT IN AN ORGANIZED HEALTH CARE EDUCATION/TRAINING PROGRAM

## 2023-09-02 PROCEDURE — 20600001 HC STEP DOWN PRIVATE ROOM

## 2023-09-02 RX ORDER — SODIUM BICARBONATE 650 MG/1
1300 TABLET ORAL 2 TIMES DAILY
Status: DISCONTINUED | OUTPATIENT
Start: 2023-09-02 | End: 2023-09-05

## 2023-09-02 RX ADMIN — HYDROXYZINE PAMOATE 25 MG: 25 CAPSULE ORAL at 06:09

## 2023-09-02 RX ADMIN — RIFAXIMIN 550 MG: 550 TABLET ORAL at 08:09

## 2023-09-02 RX ADMIN — SODIUM BICARBONATE 650 MG TABLET 1300 MG: at 08:09

## 2023-09-02 RX ADMIN — SODIUM BICARBONATE 650 MG TABLET 1300 MG: at 10:09

## 2023-09-02 RX ADMIN — LACTULOSE 30 G: 20 SOLUTION ORAL at 08:09

## 2023-09-02 RX ADMIN — Medication 6 MG: at 08:09

## 2023-09-02 RX ADMIN — FLUCONAZOLE 400 MG: 200 TABLET ORAL at 08:09

## 2023-09-02 RX ADMIN — PANTOPRAZOLE SODIUM 40 MG: 40 TABLET, DELAYED RELEASE ORAL at 08:09

## 2023-09-02 NOTE — PLAN OF CARE
Pt AOX4, VSS, afebrile.   No c/o pain/N/V.   INR 2.3, Na+ 129, liver enzymes elevated. Pt offered liver, Is listed as of 9/1. No OR or NPO times yet. X-ray and EKG done, covid swab done. Consents to be obtained.   Pt in lowest position.  Skin yellow. Pt has scab on nose with bandaid in place.

## 2023-09-02 NOTE — PLAN OF CARE
Pt aao x3. Vss. Pt steady on his feet. Pt to go for a liver transplant tonight. OR for 1900 and cut time for 2030. See assessment for full chart details. Will continue to monitor, assess and adjust care as needed.

## 2023-09-02 NOTE — TELEPHONE ENCOUNTER
Update for [id number].    Per procurement, pt to OR 9/2/23 at 1900, cute for 2030. NPO for 1100 today. Transplant team & patient updated.

## 2023-09-03 ENCOUNTER — TELEPHONE (OUTPATIENT)
Dept: TRANSPLANT | Facility: CLINIC | Age: 38
End: 2023-09-03
Payer: COMMERCIAL

## 2023-09-03 LAB
ALBUMIN SERPL BCP-MCNC: 2.9 G/DL (ref 3.5–5.2)
ALP SERPL-CCNC: 149 U/L (ref 55–135)
ALT SERPL W/O P-5'-P-CCNC: 31 U/L (ref 10–44)
ANION GAP SERPL CALC-SCNC: 11 MMOL/L (ref 8–16)
AST SERPL-CCNC: 76 U/L (ref 10–40)
BASOPHILS # BLD AUTO: 0.15 K/UL (ref 0–0.2)
BASOPHILS NFR BLD: 1.5 % (ref 0–1.9)
BILIRUB SERPL-MCNC: 31.5 MG/DL (ref 0.1–1)
BUN SERPL-MCNC: 12 MG/DL (ref 6–20)
CALCIUM SERPL-MCNC: 8.3 MG/DL (ref 8.7–10.5)
CHLORIDE SERPL-SCNC: 103 MMOL/L (ref 95–110)
CO2 SERPL-SCNC: 16 MMOL/L (ref 23–29)
CREAT SERPL-MCNC: 0.9 MG/DL (ref 0.5–1.4)
DIFFERENTIAL METHOD: ABNORMAL
EOSINOPHIL # BLD AUTO: 0.2 K/UL (ref 0–0.5)
EOSINOPHIL NFR BLD: 2.4 % (ref 0–8)
ERYTHROCYTE [DISTWIDTH] IN BLOOD BY AUTOMATED COUNT: 18.4 % (ref 11.5–14.5)
EST. GFR  (NO RACE VARIABLE): >60 ML/MIN/1.73 M^2
GLUCOSE SERPL-MCNC: 89 MG/DL (ref 70–110)
HCT VFR BLD AUTO: 23.6 % (ref 40–54)
HGB BLD-MCNC: 7.8 G/DL (ref 14–18)
IMM GRANULOCYTES # BLD AUTO: 0.17 K/UL (ref 0–0.04)
IMM GRANULOCYTES NFR BLD AUTO: 1.7 % (ref 0–0.5)
INR PPP: 2.1 (ref 0.8–1.2)
LYMPHOCYTES # BLD AUTO: 1.3 K/UL (ref 1–4.8)
LYMPHOCYTES NFR BLD: 12.7 % (ref 18–48)
MCH RBC QN AUTO: 34.5 PG (ref 27–31)
MCHC RBC AUTO-ENTMCNC: 33.1 G/DL (ref 32–36)
MCV RBC AUTO: 104 FL (ref 82–98)
MONOCYTES # BLD AUTO: 1.6 K/UL (ref 0.3–1)
MONOCYTES NFR BLD: 16.4 % (ref 4–15)
NEUTROPHILS # BLD AUTO: 6.5 K/UL (ref 1.8–7.7)
NEUTROPHILS NFR BLD: 65.3 % (ref 38–73)
NRBC BLD-RTO: 0 /100 WBC
PLATELET # BLD AUTO: 68 K/UL (ref 150–450)
PMV BLD AUTO: 10.2 FL (ref 9.2–12.9)
POTASSIUM SERPL-SCNC: 3.8 MMOL/L (ref 3.5–5.1)
PROT SERPL-MCNC: 5.5 G/DL (ref 6–8.4)
PROTHROMBIN TIME: 21.4 SEC (ref 9–12.5)
RBC # BLD AUTO: 2.26 M/UL (ref 4.6–6.2)
SODIUM SERPL-SCNC: 130 MMOL/L (ref 136–145)
WBC # BLD AUTO: 9.99 K/UL (ref 3.9–12.7)

## 2023-09-03 PROCEDURE — 85610 PROTHROMBIN TIME: CPT | Performed by: STUDENT IN AN ORGANIZED HEALTH CARE EDUCATION/TRAINING PROGRAM

## 2023-09-03 PROCEDURE — 99499 UNLISTED E&M SERVICE: CPT | Mod: ,,, | Performed by: TRANSPLANT SURGERY

## 2023-09-03 PROCEDURE — 36415 COLL VENOUS BLD VENIPUNCTURE: CPT | Performed by: STUDENT IN AN ORGANIZED HEALTH CARE EDUCATION/TRAINING PROGRAM

## 2023-09-03 PROCEDURE — 85025 COMPLETE CBC W/AUTO DIFF WBC: CPT | Performed by: STUDENT IN AN ORGANIZED HEALTH CARE EDUCATION/TRAINING PROGRAM

## 2023-09-03 PROCEDURE — 25000003 PHARM REV CODE 250: Performed by: STUDENT IN AN ORGANIZED HEALTH CARE EDUCATION/TRAINING PROGRAM

## 2023-09-03 PROCEDURE — 25000003 PHARM REV CODE 250: Performed by: HOSPITALIST

## 2023-09-03 PROCEDURE — 25000003 PHARM REV CODE 250: Performed by: NURSE PRACTITIONER

## 2023-09-03 PROCEDURE — 99499 NO LOS: ICD-10-PCS | Mod: ,,, | Performed by: TRANSPLANT SURGERY

## 2023-09-03 PROCEDURE — 80053 COMPREHEN METABOLIC PANEL: CPT | Performed by: STUDENT IN AN ORGANIZED HEALTH CARE EDUCATION/TRAINING PROGRAM

## 2023-09-03 PROCEDURE — 20600001 HC STEP DOWN PRIVATE ROOM

## 2023-09-03 RX ORDER — HEPARIN SODIUM 5000 [USP'U]/ML
5000 INJECTION, SOLUTION INTRAVENOUS; SUBCUTANEOUS EVERY 8 HOURS
Status: CANCELLED | OUTPATIENT
Start: 2023-09-03

## 2023-09-03 RX ADMIN — RIFAXIMIN 550 MG: 550 TABLET ORAL at 08:09

## 2023-09-03 RX ADMIN — FLUCONAZOLE 400 MG: 200 TABLET ORAL at 08:09

## 2023-09-03 RX ADMIN — PANTOPRAZOLE SODIUM 40 MG: 40 TABLET, DELAYED RELEASE ORAL at 08:09

## 2023-09-03 RX ADMIN — LACTULOSE 30 G: 20 SOLUTION ORAL at 07:09

## 2023-09-03 RX ADMIN — HYDROXYZINE PAMOATE 25 MG: 25 CAPSULE ORAL at 08:09

## 2023-09-03 RX ADMIN — SODIUM BICARBONATE 650 MG TABLET 1300 MG: at 08:09

## 2023-09-03 RX ADMIN — HYDROXYZINE PAMOATE 25 MG: 25 CAPSULE ORAL at 02:09

## 2023-09-03 RX ADMIN — Medication 6 MG: at 08:09

## 2023-09-03 RX ADMIN — LACTULOSE 30 G: 20 SOLUTION ORAL at 08:09

## 2023-09-03 NOTE — SUBJECTIVE & OBJECTIVE
Scheduled Meds:   fluconazole  400 mg Oral Daily    lactulose  30 g Oral TID    melatonin  6 mg Oral Nightly    pantoprazole  40 mg Oral Daily    rifAXImin  550 mg Oral BID    sodium bicarbonate  1,300 mg Oral BID     Continuous Infusions:  PRN Meds:0.9%  NaCl infusion (for blood administration), acetaminophen, aluminum & magnesium hydroxide-simethicone, ampicillin-sulbactim (UNASYN) IVPB, hydrOXYzine pamoate, methylPREDNISolone sodium succinate, mupirocin, ondansetron, oxyCODONE, simethicone, sodium chloride 0.9%    Review of Systems   Constitutional:  Negative for activity change and appetite change.   Eyes:  Negative for visual disturbance.   Respiratory:  Negative for cough and shortness of breath.    Cardiovascular:  Negative for chest pain, palpitations and leg swelling.   Gastrointestinal:  Positive for abdominal distention. Negative for abdominal pain, constipation, diarrhea, nausea and vomiting.   Genitourinary:  Negative for difficulty urinating.   Musculoskeletal:  Negative for arthralgias.   Skin:  Negative for wound.   Neurological:  Negative for dizziness.   Hematological:  Negative for adenopathy. Does not bruise/bleed easily.   Psychiatric/Behavioral:  Negative for agitation.      Objective:     Vital Signs (Most Recent):  Temp: 98.5 °F (36.9 °C) (09/02/23 1540)  Pulse: 104 (09/02/23 1540)  Resp: 16 (09/02/23 1540)  BP: (!) 131/57 (09/02/23 1540)  SpO2: 100 % (09/02/23 1103) Vital Signs (24h Range):  Temp:  [98.1 °F (36.7 °C)-98.6 °F (37 °C)] 98.5 °F (36.9 °C)  Pulse:  [] 104  Resp:  [16-18] 16  SpO2:  [98 %-100 %] 100 %  BP: (113-149)/(57-70) 131/57     Weight: 64.4 kg (141 lb 15.6 oz)  Body mass index is 20.37 kg/m².    Intake/Output - Last 3 Shifts         08/31 0700  09/01 0659 09/01 0700  09/02 0659 09/02 0700  09/03 0659    P.O. 240 480     Total Intake(mL/kg) 240 (3.7) 480 (7.5)     Urine (mL/kg/hr)       Total Output       Net +240 +480            Urine Occurrence 3 x 2 x 1 x    Stool  Occurrence 0 x 1 x 1 x             Physical Exam  Vitals and nursing note reviewed.   Constitutional:       Appearance: He is well-developed.   HENT:      Head: Normocephalic.   Eyes:      General: Scleral icterus present.      Conjunctiva/sclera: Conjunctivae normal.   Cardiovascular:      Rate and Rhythm: Normal rate and regular rhythm.      Heart sounds: No murmur heard.  Pulmonary:      Effort: Pulmonary effort is normal.      Breath sounds: Normal breath sounds.   Abdominal:      General: Bowel sounds are normal. There is distension.      Palpations: Abdomen is soft.      Tenderness: There is no abdominal tenderness.   Musculoskeletal:         General: Normal range of motion.      Cervical back: Normal range of motion.   Skin:     General: Skin is warm and dry.      Capillary Refill: Capillary refill takes less than 2 seconds.      Coloration: Skin is jaundiced.   Neurological:      Mental Status: He is alert and oriented to person, place, and time.   Psychiatric:         Behavior: Behavior normal.         Thought Content: Thought content normal.         Judgment: Judgment normal.          Laboratory:  Immunosuppressants       None          CBC:   Recent Labs   Lab 09/02/23  0655   WBC 10.13   RBC 2.20*   HGB 7.7*   HCT 22.8*   PLT 72*   *   MCH 35.0*   MCHC 33.8     BMP:   Recent Labs   Lab 09/02/23  0655   GLU 88   *   K 3.8      CO2 15*   BUN 11   CREATININE 1.0   CALCIUM 8.5*     CMP:   Recent Labs   Lab 09/02/23  0655   GLU 88   CALCIUM 8.5*   ALBUMIN 2.8*   PROT 5.2*   *   K 3.8   CO2 15*      BUN 11   CREATININE 1.0   ALKPHOS 130   ALT 30   AST 70*   BILITOT 31.4*     Coagulation:   Recent Labs   Lab 09/02/23  0655   INR 2.2*     Labs within the past 24 hours have been reviewed.    Diagnostic Results:  XR Chest: No results found for this or any previous visit.    Debility/Functional status: Patient debilitated by evidence of Weakness. Physical and occupational therapy  ordered daily to evaluate and treat. Debility was: present on admission.

## 2023-09-03 NOTE — PLAN OF CARE
Patient AAOX4. Independent. Patient got another offer for a new liver but has no time yet. Patient was notified. Patient stated he was anxious. Declined PRN hydroxyzine. Pt also states he is excited about the new liver offer information. Pt had 2 BM. 2 urine occurrences. Regular diet. Fluid restriction 1000 ml. INR: 2.2. Na + 131. Pts mother plans to visit this A.M

## 2023-09-03 NOTE — HOSPITAL COURSE
Patient is now s/p DBD liver transplant on 9/7/2023 (CMV D+/R-).  Surgery notable for hematoma at the upper surface of the right lobe which was opened and managed with surgicel, surgiflo and fibrillar. POD#1 Liver US stable. Stepped down to TSU POD#2. All drains out as of 9/11.     Interval History: No adverse events overnight. Incisional pain and anxiety relieved with PRN med. Patient reports that he feels good today. Endorses moderate anxiety. Mental Status is improving. AAO x 4, sat > 97%. Encouraged him to speak with Psychiatry/Therapy and rely on family support. Patient agrees. Encouraged to take Lorazepam PRN during the day. Taking Lorazepam and Trazodone nightly. Liver Enzymes trending down. Liver US today. Endorses Pruritis on incision sites, no visible rash, skin changes. Given Hydroxyzine with relief. Cont. Hold Lasix, monitor hypervolemia. ID following Fungitell. 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 Tolerating regular diet. Denies N/V/D. Input and output adequate. + BM, passing flatus. Patient is ambulating. PT/OT following, recommended outpt PT/OT. Pain well controlled. Discharge postponed today for possible fever 101.5. RN reports at that time, patient was working with PT and had a sweatshirt on. Temp rechecked ~3 minutes later and was 99.1. Infectious work up sent and decision made to monitor patient inpatient for another day.

## 2023-09-03 NOTE — HPI
Sukdheep Grimes is a 38 y.o. male  PMHx of Alcoholic decompensated cirrhosis. Pt was admitted from OSH  for hepatology evaluation  D/T ascites, hyponatremia, hypokalemia, thrombocytopenia, and AYAH and weakness. On 9/1 patient was made active on transplant list MELD 30

## 2023-09-03 NOTE — PROGRESS NOTES
[facility] - Transplant Stepdown  Liver Transplant  Progress Note    Patient Name: Sukhdeep Grimes  MRN: [id number]  Admission Date: 8/24/2023  Hospital Length of Stay: 9 days  Code Status: Full Code  Primary Care Provider: No, Primary Doctor  Post-Operative Day:     ORGAN:   LIVER  Disease Etiology: Alcohol-Associated Cirrhosis Without Acute Alcohol-Associated Hepatitis  Donor Type:   Donation after Brain Death  CDC High Risk:   No  Donor CMV Status:   Donor CMV Status: Positive  Donor HBcAB:   Negative  Donor HCV Status:   Negative  Donor HBV GOGO:   Donor HCV GOGO: Negative  Whole or Partial:   Biliary Anastomosis:   Arterial Anatomy:   Subjective:     History of Present Illness:  Sukhdeep Grimes is a 38 y.o. male  PMHx of Alcoholic decompensated cirrhosis. Pt was admitted from OSH  for hepatology evaluation  D/T ascites, hyponatremia, hypokalemia, thrombocytopenia, and AYAH and weakness. On 9/1 patient was made active on transplant list MELD 30       Hospital Course:  Hospital Interval: NAEON, patient made NPO for liver transplant today, unfortunately D/T organ size liver transplant was cancelled.  Resumed medications and diet.  Patient to continue to work with PT/OT.  Fluid restricted today to 1000 ml d/t decreased Na. Started on Oral bicarb.  VSS, monitor           Scheduled Meds:   fluconazole  400 mg Oral Daily    lactulose  30 g Oral TID    melatonin  6 mg Oral Nightly    pantoprazole  40 mg Oral Daily    rifAXImin  550 mg Oral BID    sodium bicarbonate  1,300 mg Oral BID     Continuous Infusions:  PRN Meds:0.9%  NaCl infusion (for blood administration), acetaminophen, aluminum & magnesium hydroxide-simethicone, ampicillin-sulbactim (UNASYN) IVPB, hydrOXYzine pamoate, methylPREDNISolone sodium succinate, mupirocin, ondansetron, oxyCODONE, simethicone, sodium chloride 0.9%    Review of Systems   Constitutional:  Negative for activity change and appetite change.   Eyes:  Negative for visual disturbance.    Respiratory:  Negative for cough and shortness of breath.    Cardiovascular:  Negative for chest pain, palpitations and leg swelling.   Gastrointestinal:  Positive for abdominal distention. Negative for abdominal pain, constipation, diarrhea, nausea and vomiting.   Genitourinary:  Negative for difficulty urinating.   Musculoskeletal:  Negative for arthralgias.   Skin:  Negative for wound.   Neurological:  Negative for dizziness.   Hematological:  Negative for adenopathy. Does not bruise/bleed easily.   Psychiatric/Behavioral:  Negative for agitation.      Objective:     Vital Signs (Most Recent):  Temp: 98.5 °F (36.9 °C) (09/02/23 1540)  Pulse: 104 (09/02/23 1540)  Resp: 16 (09/02/23 1540)  BP: (!) 131/57 (09/02/23 1540)  SpO2: 100 % (09/02/23 1103) Vital Signs (24h Range):  Temp:  [98.1 °F (36.7 °C)-98.6 °F (37 °C)] 98.5 °F (36.9 °C)  Pulse:  [] 104  Resp:  [16-18] 16  SpO2:  [98 %-100 %] 100 %  BP: (113-149)/(57-70) 131/57     Weight: 64.4 kg (141 lb 15.6 oz)  Body mass index is 20.37 kg/m².    Intake/Output - Last 3 Shifts         08/31 0700  09/01 0659 09/01 0700  09/02 0659 09/02 0700  09/03 0659    P.O. 240 480     Total Intake(mL/kg) 240 (3.7) 480 (7.5)     Urine (mL/kg/hr)       Total Output       Net +240 +480            Urine Occurrence 3 x 2 x 1 x    Stool Occurrence 0 x 1 x 1 x             Physical Exam  Vitals and nursing note reviewed.   Constitutional:       Appearance: He is well-developed.   HENT:      Head: Normocephalic.   Eyes:      General: Scleral icterus present.      Conjunctiva/sclera: Conjunctivae normal.   Cardiovascular:      Rate and Rhythm: Normal rate and regular rhythm.      Heart sounds: No murmur heard.  Pulmonary:      Effort: Pulmonary effort is normal.      Breath sounds: Normal breath sounds.   Abdominal:      General: Bowel sounds are normal. There is distension.      Palpations: Abdomen is soft.      Tenderness: There is no abdominal tenderness.   Musculoskeletal:          General: Normal range of motion.      Cervical back: Normal range of motion.   Skin:     General: Skin is warm and dry.      Capillary Refill: Capillary refill takes less than 2 seconds.      Coloration: Skin is jaundiced.   Neurological:      Mental Status: He is alert and oriented to person, place, and time.   Psychiatric:         Behavior: Behavior normal.         Thought Content: Thought content normal.         Judgment: Judgment normal.          Laboratory:  Immunosuppressants       None          CBC:   Recent Labs   Lab 09/02/23  0655   WBC 10.13   RBC 2.20*   HGB 7.7*   HCT 22.8*   PLT 72*   *   MCH 35.0*   MCHC 33.8     BMP:   Recent Labs   Lab 09/02/23  0655   GLU 88   *   K 3.8      CO2 15*   BUN 11   CREATININE 1.0   CALCIUM 8.5*     CMP:   Recent Labs   Lab 09/02/23  0655   GLU 88   CALCIUM 8.5*   ALBUMIN 2.8*   PROT 5.2*   *   K 3.8   CO2 15*      BUN 11   CREATININE 1.0   ALKPHOS 130   ALT 30   AST 70*   BILITOT 31.4*     Coagulation:   Recent Labs   Lab 09/02/23  0655   INR 2.2*     Labs within the past 24 hours have been reviewed.    Diagnostic Results:  XR Chest: No results found for this or any previous visit.    Debility/Functional status: Patient debilitated by evidence of Weakness. Physical and occupational therapy ordered daily to evaluate and treat. Debility was: present on admission.    Assessment/Plan:     * Alcoholic cirrhosis of liver with ascites  Hx: ETOH, last drink 7/7, HRS(?) portal HTN, varices, hx rehab       Pre-liver transplant, listed  - listed MELD 30        Moderate malnutrition  - encouraged oral intake   - Boost supplements TID  - monitor prealbumin level       Decompensated hepatic cirrhosis  - Ascites   - last PARA 8/24 - 5 liters removed         VTE Risk Mitigation (From admission, onward)         Ordered     IP VTE HIGH RISK PATIENT  Once         09/01/23 1806     Send SCD stockings and AURORA hose with patient to OR  Continuous          09/01/23 1806     Place AURORA hose  Until discontinued         08/24/23 1002     Place sequential compression device  Until discontinued         08/24/23 1002                The patients clinical status was discussed at multidisplinary rounds, involving transplant surgery, transplant medicine, pharmacy, nursing, nutrition, and social work    Discharge Planning:  No Patient Care Coordination Note on file.      Berenice Tapia, MEDINAP  Liver Transplant  [facility] - Transplant Stepdown

## 2023-09-03 NOTE — TELEPHONE ENCOUNTER
"[facility] LIVER COORDINATOR ORGAN OFFER NOTE   UNOS# [id number]  Notified by Hilaria Cardoso, , that Sukhdeep Grimes is eligible for liver as a primary recipient offer.  Spoke with patient and identified no acute medical issues with telephone assessment. Protocol script read to patient regarding N/A, standard donor offer.  Patient was informed that if he turned down the offer A transplant doctor will call you after we hang up."  Patient was also informed that if he turned down this donor, he would not be punished or removed from the transplant list, that he  would remain on the list at his current status/MELD score. However, by waiting on the list longer rather than receiving this transplant, he will face a greater risk of death than any risk from the slight possibility of transmitted infection.  Patient was asked if they have had a positive COVID-19 test or if they have any signs or symptoms. Informed patient that they will be tested for COVID-19 upon arrival to the hospital, unless have a previous positive result. If tested and result is positive, the transplant will not be able to occur, they will be inactivated on the wait list for 21 days per protocol and required to quarantine.   Patient verbalized understanding, all questions answered, patient accepts organ offer.   Patient notified of plan, is currently admitted and states understanding.      "

## 2023-09-04 LAB
ALBUMIN SERPL BCP-MCNC: 2.5 G/DL (ref 3.5–5.2)
ALP SERPL-CCNC: 123 U/L (ref 55–135)
ALT SERPL W/O P-5'-P-CCNC: 29 U/L (ref 10–44)
ANION GAP SERPL CALC-SCNC: 8 MMOL/L (ref 8–16)
AST SERPL-CCNC: 67 U/L (ref 10–40)
BASOPHILS # BLD AUTO: 0.12 K/UL (ref 0–0.2)
BASOPHILS NFR BLD: 1.4 % (ref 0–1.9)
BILIRUB SERPL-MCNC: 27.9 MG/DL (ref 0.1–1)
BUN SERPL-MCNC: 11 MG/DL (ref 6–20)
CALCIUM SERPL-MCNC: 8.1 MG/DL (ref 8.7–10.5)
CHLORIDE SERPL-SCNC: 105 MMOL/L (ref 95–110)
CO2 SERPL-SCNC: 16 MMOL/L (ref 23–29)
CREAT SERPL-MCNC: 0.9 MG/DL (ref 0.5–1.4)
DIFFERENTIAL METHOD: ABNORMAL
EOSINOPHIL # BLD AUTO: 0.2 K/UL (ref 0–0.5)
EOSINOPHIL NFR BLD: 2.5 % (ref 0–8)
ERYTHROCYTE [DISTWIDTH] IN BLOOD BY AUTOMATED COUNT: 18.4 % (ref 11.5–14.5)
EST. GFR  (NO RACE VARIABLE): >60 ML/MIN/1.73 M^2
GLUCOSE SERPL-MCNC: 96 MG/DL (ref 70–110)
HCT VFR BLD AUTO: 20.7 % (ref 40–54)
HGB BLD-MCNC: 6.8 G/DL (ref 14–18)
IMM GRANULOCYTES # BLD AUTO: 0.09 K/UL (ref 0–0.04)
IMM GRANULOCYTES NFR BLD AUTO: 1.1 % (ref 0–0.5)
INR PPP: 2.3 (ref 0.8–1.2)
LYMPHOCYTES # BLD AUTO: 1.1 K/UL (ref 1–4.8)
LYMPHOCYTES NFR BLD: 12.7 % (ref 18–48)
MCH RBC QN AUTO: 33.5 PG (ref 27–31)
MCHC RBC AUTO-ENTMCNC: 32.9 G/DL (ref 32–36)
MCV RBC AUTO: 102 FL (ref 82–98)
MONOCYTES # BLD AUTO: 1.5 K/UL (ref 0.3–1)
MONOCYTES NFR BLD: 18 % (ref 4–15)
NEUTROPHILS # BLD AUTO: 5.5 K/UL (ref 1.8–7.7)
NEUTROPHILS NFR BLD: 64.3 % (ref 38–73)
NRBC BLD-RTO: 0 /100 WBC
PLATELET # BLD AUTO: 58 K/UL (ref 150–450)
PMV BLD AUTO: 10.2 FL (ref 9.2–12.9)
POTASSIUM SERPL-SCNC: 3.8 MMOL/L (ref 3.5–5.1)
PROT SERPL-MCNC: 4.8 G/DL (ref 6–8.4)
PROTHROMBIN TIME: 23.7 SEC (ref 9–12.5)
RBC # BLD AUTO: 2.03 M/UL (ref 4.6–6.2)
SODIUM SERPL-SCNC: 129 MMOL/L (ref 136–145)
WBC # BLD AUTO: 8.48 K/UL (ref 3.9–12.7)

## 2023-09-04 PROCEDURE — 99231 PR SUBSEQUENT HOSPITAL CARE,LEVL I: ICD-10-PCS | Mod: ,,, | Performed by: INTERNAL MEDICINE

## 2023-09-04 PROCEDURE — 85025 COMPLETE CBC W/AUTO DIFF WBC: CPT | Performed by: STUDENT IN AN ORGANIZED HEALTH CARE EDUCATION/TRAINING PROGRAM

## 2023-09-04 PROCEDURE — 25000003 PHARM REV CODE 250: Performed by: NURSE PRACTITIONER

## 2023-09-04 PROCEDURE — 36415 COLL VENOUS BLD VENIPUNCTURE: CPT | Performed by: STUDENT IN AN ORGANIZED HEALTH CARE EDUCATION/TRAINING PROGRAM

## 2023-09-04 PROCEDURE — 25000003 PHARM REV CODE 250: Performed by: HOSPITALIST

## 2023-09-04 PROCEDURE — 20600001 HC STEP DOWN PRIVATE ROOM

## 2023-09-04 PROCEDURE — 80053 COMPREHEN METABOLIC PANEL: CPT | Performed by: STUDENT IN AN ORGANIZED HEALTH CARE EDUCATION/TRAINING PROGRAM

## 2023-09-04 PROCEDURE — 99231 SBSQ HOSP IP/OBS SF/LOW 25: CPT | Mod: ,,, | Performed by: INTERNAL MEDICINE

## 2023-09-04 PROCEDURE — 85610 PROTHROMBIN TIME: CPT | Performed by: STUDENT IN AN ORGANIZED HEALTH CARE EDUCATION/TRAINING PROGRAM

## 2023-09-04 PROCEDURE — 94761 N-INVAS EAR/PLS OXIMETRY MLT: CPT

## 2023-09-04 PROCEDURE — 25000003 PHARM REV CODE 250: Performed by: STUDENT IN AN ORGANIZED HEALTH CARE EDUCATION/TRAINING PROGRAM

## 2023-09-04 RX ORDER — HEPARIN SODIUM 5000 [USP'U]/ML
5000 INJECTION, SOLUTION INTRAVENOUS; SUBCUTANEOUS EVERY 8 HOURS
Status: DISCONTINUED | OUTPATIENT
Start: 2023-09-04 | End: 2023-09-07

## 2023-09-04 RX ADMIN — FLUCONAZOLE 400 MG: 200 TABLET ORAL at 08:09

## 2023-09-04 RX ADMIN — SODIUM BICARBONATE 650 MG TABLET 1300 MG: at 08:09

## 2023-09-04 RX ADMIN — HYDROXYZINE PAMOATE 25 MG: 25 CAPSULE ORAL at 04:09

## 2023-09-04 RX ADMIN — ONDANSETRON 8 MG: 8 TABLET, ORALLY DISINTEGRATING ORAL at 06:09

## 2023-09-04 RX ADMIN — LACTULOSE 30 G: 20 SOLUTION ORAL at 08:09

## 2023-09-04 RX ADMIN — PANTOPRAZOLE SODIUM 40 MG: 40 TABLET, DELAYED RELEASE ORAL at 08:09

## 2023-09-04 RX ADMIN — RIFAXIMIN 550 MG: 550 TABLET ORAL at 08:09

## 2023-09-04 RX ADMIN — Medication 6 MG: at 08:09

## 2023-09-04 RX ADMIN — LACTULOSE 30 G: 20 SOLUTION ORAL at 02:09

## 2023-09-04 NOTE — PROGRESS NOTES
38 y.o. male decompensated EtOH cirrhosis listed for OLTx. No acute events O/N. Two liver offers in last 48 hours but donor livers unsuitable.

## 2023-09-04 NOTE — PT/OT/SLP DISCHARGE
Occupational Therapy Discharge Summary    Sukhdeep Grimes  MRN: [id number]   Principal Problem: Alcoholic cirrhosis of liver with ascites      Patient Discharged from acute Occupational Therapy on 9/1/23.    Assessment:       Pt is performing ADL (including showering) safely and without A. Pt is no longer in need of skilled OT services    Objective:     GOALS:   Multidisciplinary Problems       Occupational Therapy Goals          Problem: Occupational Therapy    Goal Priority Disciplines Outcome Interventions   Occupational Therapy Goal     OT, PT/OT     Description: Goals to be met by: 9/8/2023     Patient will increase functional independence with ADLs by performing:    LE Dressing with [place].  Toileting from toilet with [place] for hygiene and clothing management.   Supine to sit with [place].  Step transfer with [place]  Toilet transfer to toilet with [place].                         Reasons for Discontinuation of Therapy Services  Satisfactory goal achievement.      Plan:     Patient Discharged to:  TSU awaiting transplant    9/4/2023

## 2023-09-04 NOTE — PROGRESS NOTES
Liver Transplant Service  Hepatology progress Note        HPI:     Sukhdeep Grimes is a 38 y.o. male  PMHx of Alcoholic decompensated cirrhosis. Pt was admitted from OSH  for hepatology evaluation  D/T ascites, hyponatremia, hypokalemia, thrombocytopenia, and AYAH and weakness. On 9/1 patient was made active on transplant list MELD 30     Interval History: No acute events overnight. Donor yesterday was unsuitable.        Sub/Obj:     Review of Systems   Constitutional:  Negative for activity change and appetite change.   Eyes:  Negative for visual disturbance.   Respiratory:  Negative for cough and shortness of breath.    Cardiovascular:  Negative for chest pain, palpitations and leg swelling.   Gastrointestinal:  Positive for abdominal distention. Negative for abdominal pain, constipation, diarrhea, nausea and vomiting.   Genitourinary:  Negative for difficulty urinating.   Musculoskeletal:  Negative for arthralgias.   Skin:  Negative for wound.   Neurological:  Negative for dizziness.   Hematological:  Negative for adenopathy. Does not bruise/bleed easily.   Psychiatric/Behavioral:  Negative for agitation.        Physical Exam  Vitals and nursing note reviewed.   Constitutional:       Appearance: He is well-developed.   HENT:      Head: Normocephalic.   Eyes:      General: Scleral icterus present.      Conjunctiva/sclera: Conjunctivae normal.   Cardiovascular:      Rate and Rhythm: Normal rate and regular rhythm.      Heart sounds: No murmur heard.  Pulmonary:      Effort: Pulmonary effort is normal.      Breath sounds: Normal breath sounds.   Abdominal:      General: Bowel sounds are normal. There is distension.      Palpations: Abdomen is soft.      Tenderness: There is no abdominal tenderness.   Musculoskeletal:         General: Normal range of motion.      Cervical back: Normal range of motion.   Skin:     General: Skin is warm and dry.      Capillary Refill: Capillary refill takes less than 2 seconds.       Coloration: Skin is jaundiced.   Neurological:      Mental Status: He is alert and oriented to person, place, and time.   Psychiatric:         Behavior: Behavior normal.         Thought Content: Thought content normal.         Judgment: Judgment normal.     Labs:  Labs within the past 24 hours have been reviewed.      MELD 3.0: 31 at 2023  5:51 AM  MELD-Na: 31 at 2023  5:51 AM  Calculated from:  Serum Creatinine: 0.9 mg/dL (Using min of 1 mg/dL) at 2023  5:51 AM  Serum Sodium: 129 mmol/L at 2023  5:51 AM  Total Bilirubin: 27.9 mg/dL at 2023  5:51 AM  Serum Albumin: 2.5 g/dL at 2023  5:51 AM  INR(ratio): 2.3 at 2023  5:51 AM  Age at listin years  Sex: Male at 2023  5:51 AM        Assessment/Plan:  Alcoholic cirrhosis of liver with ascites  Hx: ETOH, last drink , HRS(?) portal HTN, varices, hx rehab         Pre-liver transplant, listed  - listed MELD 30. MELD today 31. Will re-MELD.           Moderate malnutrition  - encouraged oral intake   - Boost supplements TID  - monitor prealbumin level         Decompensated hepatic cirrhosis  - Ascites   - last PARA  - 5 liters removed

## 2023-09-04 NOTE — PLAN OF CARE
Pt aao x3. Vss. No acute distress. Pt steady on his feet. Mom ar bedside. Pt accidentally picked scab on his nose and his nose bled for 30 minutes. Bandaid placed to nose. Pt to have a T&S in AM. See assessment for full chart details. Will continue to monitor, assess and adjust care as needed.

## 2023-09-04 NOTE — PLAN OF CARE
Pt alert and oriented. VS stable. H and H 6.8 and 20.7. Surgery cancelled due to organ quality. Pt ate and mother at bedside. Pt had 1 BM and 2 urine occurrences.

## 2023-09-05 PROBLEM — D69.6 THROMBOCYTOPENIA, UNSPECIFIED: Status: ACTIVE | Noted: 2023-09-05

## 2023-09-05 PROBLEM — E87.1 HYPONATREMIA: Status: ACTIVE | Noted: 2023-09-05

## 2023-09-05 PROBLEM — D68.4 ACQUIRED COAGULATION FACTOR DEFICIENCY: Status: ACTIVE | Noted: 2023-09-05

## 2023-09-05 PROBLEM — E87.20 METABOLIC ACIDOSIS: Status: ACTIVE | Noted: 2023-09-05

## 2023-09-05 LAB
ABO + RH BLD: ABNORMAL
ALBUMIN SERPL BCP-MCNC: 2.7 G/DL (ref 3.5–5.2)
ALP SERPL-CCNC: 131 U/L (ref 55–135)
ALT SERPL W/O P-5'-P-CCNC: 28 U/L (ref 10–44)
ANION GAP SERPL CALC-SCNC: 12 MMOL/L (ref 8–16)
AST SERPL-CCNC: 71 U/L (ref 10–40)
BASOPHILS # BLD AUTO: 0.18 K/UL (ref 0–0.2)
BASOPHILS NFR BLD: 1.8 % (ref 0–1.9)
BILIRUB SERPL-MCNC: 29.3 MG/DL (ref 0.1–1)
BLD GP AB SCN CELLS X3 SERPL QL: ABNORMAL
BLD PROD TYP BPU: NORMAL
BLOOD UNIT EXPIRATION DATE: NORMAL
BLOOD UNIT TYPE CODE: 600
BLOOD UNIT TYPE CODE: 9500
BLOOD UNIT TYPE: NORMAL
BUN SERPL-MCNC: 11 MG/DL (ref 6–20)
CALCIUM SERPL-MCNC: 8.1 MG/DL (ref 8.7–10.5)
CHLORIDE SERPL-SCNC: 103 MMOL/L (ref 95–110)
CO2 SERPL-SCNC: 15 MMOL/L (ref 23–29)
CODING SYSTEM: NORMAL
COPPER URINE COLLECTION DURATION: 24 H
COPPER URINE VOLUME: 700 ML
COPPER, 24 HOUR URINE: 28 MCG/24 H (ref 9–71)
CREAT SERPL-MCNC: 0.7 MG/DL (ref 0.5–1.4)
CROSSMATCH INTERPRETATION: NORMAL
DIFFERENTIAL METHOD: ABNORMAL
DISPENSE STATUS: NORMAL
EOSINOPHIL # BLD AUTO: 0.3 K/UL (ref 0–0.5)
EOSINOPHIL NFR BLD: 3 % (ref 0–8)
ERYTHROCYTE [DISTWIDTH] IN BLOOD BY AUTOMATED COUNT: 18.5 % (ref 11.5–14.5)
EST. GFR  (NO RACE VARIABLE): >60 ML/MIN/1.73 M^2
GLUCOSE SERPL-MCNC: 83 MG/DL (ref 70–110)
HCT VFR BLD AUTO: 23.6 % (ref 40–54)
HCV RNA SERPL QL NAA+PROBE: NOT DETECTED
HCV RNA SPEC NAA+PROBE-ACNC: NOT DETECTED IU/ML
HGB BLD-MCNC: 7.8 G/DL (ref 14–18)
IMM GRANULOCYTES # BLD AUTO: 0.13 K/UL (ref 0–0.04)
IMM GRANULOCYTES NFR BLD AUTO: 1.3 % (ref 0–0.5)
INR PPP: 2.1 (ref 0.8–1.2)
LYMPHOCYTES # BLD AUTO: 1.5 K/UL (ref 1–4.8)
LYMPHOCYTES NFR BLD: 14.7 % (ref 18–48)
MCH RBC QN AUTO: 34.4 PG (ref 27–31)
MCHC RBC AUTO-ENTMCNC: 33.1 G/DL (ref 32–36)
MCV RBC AUTO: 104 FL (ref 82–98)
MONOCYTES # BLD AUTO: 1.6 K/UL (ref 0.3–1)
MONOCYTES NFR BLD: 15.9 % (ref 4–15)
NEUTROPHILS # BLD AUTO: 6.4 K/UL (ref 1.8–7.7)
NEUTROPHILS NFR BLD: 63.3 % (ref 38–73)
NRBC BLD-RTO: 0 /100 WBC
NUM UNITS TRANS PACKED RBC: NORMAL
PLATELET # BLD AUTO: 64 K/UL (ref 150–450)
PMV BLD AUTO: 10.4 FL (ref 9.2–12.9)
POTASSIUM SERPL-SCNC: 3.9 MMOL/L (ref 3.5–5.1)
PROT SERPL-MCNC: 5.4 G/DL (ref 6–8.4)
PROTHROMBIN TIME: 21.3 SEC (ref 9–12.5)
RBC # BLD AUTO: 2.27 M/UL (ref 4.6–6.2)
SODIUM SERPL-SCNC: 130 MMOL/L (ref 136–145)
SPECIMEN OUTDATE: ABNORMAL
WBC # BLD AUTO: 10.09 K/UL (ref 3.9–12.7)

## 2023-09-05 PROCEDURE — 86900 BLOOD TYPING SEROLOGIC ABO: CPT

## 2023-09-05 PROCEDURE — 94761 N-INVAS EAR/PLS OXIMETRY MLT: CPT

## 2023-09-05 PROCEDURE — 99233 PR SUBSEQUENT HOSPITAL CARE,LEVL III: ICD-10-PCS | Mod: ,,,

## 2023-09-05 PROCEDURE — 80053 COMPREHEN METABOLIC PANEL: CPT | Performed by: STUDENT IN AN ORGANIZED HEALTH CARE EDUCATION/TRAINING PROGRAM

## 2023-09-05 PROCEDURE — 36415 COLL VENOUS BLD VENIPUNCTURE: CPT

## 2023-09-05 PROCEDURE — 25000003 PHARM REV CODE 250: Performed by: STUDENT IN AN ORGANIZED HEALTH CARE EDUCATION/TRAINING PROGRAM

## 2023-09-05 PROCEDURE — 99233 SBSQ HOSP IP/OBS HIGH 50: CPT | Mod: ,,,

## 2023-09-05 PROCEDURE — 25000003 PHARM REV CODE 250: Performed by: HOSPITALIST

## 2023-09-05 PROCEDURE — 85610 PROTHROMBIN TIME: CPT | Performed by: STUDENT IN AN ORGANIZED HEALTH CARE EDUCATION/TRAINING PROGRAM

## 2023-09-05 PROCEDURE — 25000003 PHARM REV CODE 250

## 2023-09-05 PROCEDURE — 87040 BLOOD CULTURE FOR BACTERIA: CPT | Mod: 59

## 2023-09-05 PROCEDURE — 20600001 HC STEP DOWN PRIVATE ROOM

## 2023-09-05 PROCEDURE — 85025 COMPLETE CBC W/AUTO DIFF WBC: CPT | Performed by: STUDENT IN AN ORGANIZED HEALTH CARE EDUCATION/TRAINING PROGRAM

## 2023-09-05 PROCEDURE — 63600175 PHARM REV CODE 636 W HCPCS

## 2023-09-05 RX ORDER — FLUCONAZOLE 200 MG/1
400 TABLET ORAL DAILY
Status: DISCONTINUED | OUTPATIENT
Start: 2023-09-06 | End: 2023-09-10

## 2023-09-05 RX ORDER — SODIUM BICARBONATE 650 MG/1
1950 TABLET ORAL 2 TIMES DAILY
Status: DISCONTINUED | OUTPATIENT
Start: 2023-09-05 | End: 2023-09-06

## 2023-09-05 RX ADMIN — SODIUM BICARBONATE 650 MG TABLET 1950 MG: at 08:09

## 2023-09-05 RX ADMIN — LACTULOSE 30 G: 20 SOLUTION ORAL at 08:09

## 2023-09-05 RX ADMIN — FLUCONAZOLE 400 MG: 200 TABLET ORAL at 08:09

## 2023-09-05 RX ADMIN — HYDROXYZINE PAMOATE 25 MG: 25 CAPSULE ORAL at 08:09

## 2023-09-05 RX ADMIN — Medication 6 MG: at 08:09

## 2023-09-05 RX ADMIN — PANTOPRAZOLE SODIUM 40 MG: 40 TABLET, DELAYED RELEASE ORAL at 08:09

## 2023-09-05 RX ADMIN — LACTULOSE 30 G: 20 SOLUTION ORAL at 04:09

## 2023-09-05 RX ADMIN — HEPARIN SODIUM 5000 UNITS: 5000 INJECTION INTRAVENOUS; SUBCUTANEOUS at 08:09

## 2023-09-05 RX ADMIN — RIFAXIMIN 550 MG: 550 TABLET ORAL at 08:09

## 2023-09-05 NOTE — PROGRESS NOTES
[facility] - Transplant Stepdown  Liver Transplant  Progress Note    Patient Name: Sukhdeep Grimes  MRN: [id number]  Admission Date: 8/24/2023  Hospital Length of Stay: 12 days  Code Status: Full Code  Primary Care Provider: No, Primary Doctor  Post-Operative Day:     ORGAN:   LIVER  Disease Etiology: Alcohol-Associated Cirrhosis Without Acute Alcohol-Associated Hepatitis  Donor Type:   Donation after Brain Death  CDC High Risk:   No  Donor CMV Status:   Donor CMV Status: Positive  Donor HBcAB:   Negative  Donor HCV Status:   Negative  Donor HBV GOGO:   Donor HCV GOGO: Negative  Whole or Partial:   Biliary Anastomosis:   Arterial Anatomy:   Subjective:     History of Present Illness:  Sukhdeep Grimes is a 38 y.o. male  PMHx of Alcoholic decompensated cirrhosis. Pt was admitted from OSH  for hepatology evaluation  D/T ascites, hyponatremia, hypokalemia, thrombocytopenia, and AYAH and weakness. On 9/1 patient was made active on transplant list MELD 30       Hospital Course:  Patient made active on transplant list on 9/1. Had 1 liver transplant offers on 9/2 and 9/3; however, surgeries cancelled due to size of donor liver and poor organ quality.      Interval History: NAEON. Patient reports feeling well this morning. MELD 30 today, listed with MELD 31 till 9/11. H&H stable. Na 130. Renal function WNL. CO2 15, increased dose of dsoidum bicarb tablets to 1950mg BID. Surveillance blood cultures ordered today. Last para 8/24 with 5 L of ascites removed. No need for para at this time, continue to monitor daily. He is tolerating a diet and ambulating in the hallways independently. Continue 1L fluid restriction. VSS, monitor           Scheduled Meds:   [START ON 9/6/2023] fluconazole  400 mg Oral Daily    heparin (porcine)  5,000 Units Subcutaneous Q8H    lactulose  30 g Oral TID    melatonin  6 mg Oral Nightly    pantoprazole  40 mg Oral Daily    rifAXImin  550 mg Oral BID    sodium bicarbonate  1,950 mg Oral BID      Continuous Infusions:  PRN Meds:acetaminophen, aluminum & magnesium hydroxide-simethicone, hydrOXYzine pamoate, mupirocin, ondansetron, oxyCODONE, simethicone, sodium chloride 0.9%    Review of Systems   Constitutional:  Negative for activity change and appetite change.   HENT:  Negative for facial swelling and trouble swallowing.    Eyes:  Negative for visual disturbance.   Respiratory:  Negative for cough and shortness of breath.    Cardiovascular:  Negative for chest pain, palpitations and leg swelling.   Gastrointestinal:  Positive for abdominal distention. Negative for abdominal pain, constipation, diarrhea, nausea and vomiting.   Genitourinary:  Negative for decreased urine volume and difficulty urinating.   Musculoskeletal:  Negative for arthralgias.   Skin:  Positive for color change (jaundice). Negative for wound.   Neurological:  Negative for dizziness and tremors.   Hematological:  Negative for adenopathy. Does not bruise/bleed easily.   Psychiatric/Behavioral:  Negative for agitation, behavioral problems, confusion, decreased concentration and hallucinations.      Objective:     Vital Signs (Most Recent):  Temp: 97.7 °F (36.5 °C) (09/05/23 0836)  Pulse: 91 (09/05/23 0836)  Resp: 20 (09/05/23 0836)  BP: 126/69 (09/05/23 0836)  SpO2: 98 % (09/05/23 0836) Vital Signs (24h Range):  Temp:  [97.7 °F (36.5 °C)-98 °F (36.7 °C)] 97.7 °F (36.5 °C)  Pulse:  [] 91  Resp:  [14-20] 20  SpO2:  [97 %-100 %] 98 %  BP: (103-126)/(62-74) 126/69     Weight: 63.5 kg (140 lb 1.6 oz)  Body mass index is 20.1 kg/m².    Intake/Output - Last 3 Shifts         09/03 0700 09/04 0659 09/04 0700 09/05 0659 09/05 0700 09/06 0659    P.O. 380 360     Total Intake(mL/kg) 380 (6) 360 (5.7)     Net +380 +360            Urine Occurrence 2 x 3 x     Stool Occurrence 2 x 1 x              Physical Exam  Vitals and nursing note reviewed.   Constitutional:       General: He is not in acute distress.     Appearance: He is not  toxic-appearing or diaphoretic.   HENT:      Head: Normocephalic.   Eyes:      General: Scleral icterus present.      Conjunctiva/sclera: Conjunctivae normal.   Cardiovascular:      Rate and Rhythm: Normal rate and regular rhythm.      Heart sounds: Murmur heard.   Pulmonary:      Effort: Pulmonary effort is normal.      Breath sounds: Normal breath sounds. No wheezing, rhonchi or rales.   Abdominal:      General: Bowel sounds are normal. There is distension.      Palpations: Abdomen is soft.      Tenderness: There is no abdominal tenderness. There is no guarding or rebound.   Musculoskeletal:         General: Normal range of motion.      Cervical back: Normal range of motion.      Right lower leg: No edema.      Left lower leg: No edema.   Skin:     General: Skin is warm and dry.      Capillary Refill: Capillary refill takes less than 2 seconds.      Coloration: Skin is jaundiced.   Neurological:      Mental Status: He is alert and oriented to person, place, and time.      GCS: GCS eye subscore is 4. GCS verbal subscore is 5. GCS motor subscore is 6.   Psychiatric:         Attention and Perception: Attention normal.         Mood and Affect: Mood is anxious.         Speech: Speech normal.         Behavior: Behavior normal. Behavior is cooperative.         Thought Content: Thought content normal.         Judgment: Judgment normal.          Laboratory:  Immunosuppressants       None          CBC:   Recent Labs   Lab 09/05/23  0555   WBC 10.09   RBC 2.27*   HGB 7.8*   HCT 23.6*   PLT 64*   *   MCH 34.4*   MCHC 33.1     CMP:   Recent Labs   Lab 09/05/23  0555   GLU 83   CALCIUM 8.1*   ALBUMIN 2.7*   PROT 5.4*   *   K 3.9   CO2 15*      BUN 11   CREATININE 0.7   ALKPHOS 131   ALT 28   AST 71*   BILITOT 29.3*     Coagulation:   Recent Labs   Lab 09/05/23  0555   INR 2.1*     Labs within the past 24 hours have been reviewed.    Diagnostic Results:  Diagnostic results within the past 24 hours have been  reviewed      Debility/Functional status: No debility noted.    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 Had 1 liver transplant offers on  and 9/3; however, surgeries cancelled due to size of donor liver and poor organ quality.     - listed MELD 31 till   - surveillance blood cultures ordered       Moderate malnutrition  - encouraged oral intake   - Boost supplements TID  - monitor prealbumin level       Decompensated hepatic cirrhosis  - Ascites   - last PARA  - 5 liters removed   - no need for para at this time. Monitor daily    MELD 3.0: 30 at 2023  5:55 AM  MELD-Na: 31 at 2023  5:55 AM  Calculated from:  Serum Creatinine: 0.7 mg/dL (Using min of 1 mg/dL) at 2023  5:55 AM  Serum Sodium: 130 mmol/L at 2023  5:55 AM  Total Bilirubin: 29.3 mg/dL at 2023  5:55 AM  Serum Albumin: 2.7 g/dL at 2023  5:55 AM  INR(ratio): 2.1 at 2023  5:55 AM  Age at listin years  Sex: Male at 2023  5:55 AM          VTE Risk Mitigation (From admission, onward)         Ordered     heparin (porcine) injection 5,000 Units  Every 8 hours         23 1113     IP VTE HIGH RISK PATIENT  Once         23 180     Send SCD stockings and AURORA hose with patient to OR  Continuous         23 1806     Place ARUORA hose  Until discontinued         23 1002     Place sequential compression device  Until discontinued         23 1002                   Greater than 30 minutes spent with patient discussing diagnosis including reviewing labs and imaging and plan of care.     Discharge Planning:  Not a candidate at this time     Loretta Augustine NP  Liver Transplant  [facility] - Transplant Stepdown

## 2023-09-05 NOTE — PLAN OF CARE
Pt is aaox4 vswnl and no c/o pain. Pt ambulates independently. Mother at bedside. Bed in low position and callbell within reach. 1000cc FR. Ascites noted and scab on nose with bandaid intact. INR =2.3 and pt refusing heparin SQ as he walks frequently.

## 2023-09-05 NOTE — ASSESSMENT & PLAN NOTE
- Patient made active on transplant list on 9/1. Had 1 liver transplant offers on 9/2 and 9/3; however, surgeries cancelled due to size of donor liver and poor organ quality.     - listed MELD 31 till 9/11  - surveillance blood cultures ordered 9/5

## 2023-09-05 NOTE — ASSESSMENT & PLAN NOTE
- Ascites   - last PARA  - 5 liters removed   - no need for para at this time. Monitor daily    MELD 3.0: 30 at 2023  5:55 AM  MELD-Na: 31 at 2023  5:55 AM  Calculated from:  Serum Creatinine: 0.7 mg/dL (Using min of 1 mg/dL) at 2023  5:55 AM  Serum Sodium: 130 mmol/L at 2023  5:55 AM  Total Bilirubin: 29.3 mg/dL at 2023  5:55 AM  Serum Albumin: 2.7 g/dL at 2023  5:55 AM  INR(ratio): 2.1 at 2023  5:55 AM  Age at listin years  Sex: Male at 2023  5:55 AM

## 2023-09-05 NOTE — SUBJECTIVE & OBJECTIVE
Scheduled Meds:   [START ON 9/6/2023] fluconazole  400 mg Oral Daily    heparin (porcine)  5,000 Units Subcutaneous Q8H    lactulose  30 g Oral TID    melatonin  6 mg Oral Nightly    pantoprazole  40 mg Oral Daily    rifAXImin  550 mg Oral BID    sodium bicarbonate  1,950 mg Oral BID     Continuous Infusions:  PRN Meds:acetaminophen, aluminum & magnesium hydroxide-simethicone, hydrOXYzine pamoate, mupirocin, ondansetron, oxyCODONE, simethicone, sodium chloride 0.9%    Review of Systems   Constitutional:  Negative for activity change and appetite change.   HENT:  Negative for facial swelling and trouble swallowing.    Eyes:  Negative for visual disturbance.   Respiratory:  Negative for cough and shortness of breath.    Cardiovascular:  Negative for chest pain, palpitations and leg swelling.   Gastrointestinal:  Positive for abdominal distention. Negative for abdominal pain, constipation, diarrhea, nausea and vomiting.   Genitourinary:  Negative for decreased urine volume and difficulty urinating.   Musculoskeletal:  Negative for arthralgias.   Skin:  Positive for color change (jaundice). Negative for wound.   Neurological:  Negative for dizziness and tremors.   Hematological:  Negative for adenopathy. Does not bruise/bleed easily.   Psychiatric/Behavioral:  Negative for agitation, behavioral problems, confusion, decreased concentration and hallucinations.      Objective:     Vital Signs (Most Recent):  Temp: 97.7 °F (36.5 °C) (09/05/23 0836)  Pulse: 91 (09/05/23 0836)  Resp: 20 (09/05/23 0836)  BP: 126/69 (09/05/23 0836)  SpO2: 98 % (09/05/23 0836) Vital Signs (24h Range):  Temp:  [97.7 °F (36.5 °C)-98 °F (36.7 °C)] 97.7 °F (36.5 °C)  Pulse:  [] 91  Resp:  [14-20] 20  SpO2:  [97 %-100 %] 98 %  BP: (103-126)/(62-74) 126/69     Weight: 63.5 kg (140 lb 1.6 oz)  Body mass index is 20.1 kg/m².    Intake/Output - Last 3 Shifts         09/03 0700 09/04 0659 09/04 0700 09/05 0659 09/05 0700 09/06 0659    P.O. 380  360     Total Intake(mL/kg) 380 (6) 360 (5.7)     Net +380 +360            Urine Occurrence 2 x 3 x     Stool Occurrence 2 x 1 x              Physical Exam  Vitals and nursing note reviewed.   Constitutional:       General: He is not in acute distress.     Appearance: He is not toxic-appearing or diaphoretic.   HENT:      Head: Normocephalic.   Eyes:      General: Scleral icterus present.      Conjunctiva/sclera: Conjunctivae normal.   Cardiovascular:      Rate and Rhythm: Normal rate and regular rhythm.      Heart sounds: Murmur heard.   Pulmonary:      Effort: Pulmonary effort is normal.      Breath sounds: Normal breath sounds. No wheezing, rhonchi or rales.   Abdominal:      General: Bowel sounds are normal. There is distension.      Palpations: Abdomen is soft.      Tenderness: There is no abdominal tenderness. There is no guarding or rebound.   Musculoskeletal:         General: Normal range of motion.      Cervical back: Normal range of motion.      Right lower leg: No edema.      Left lower leg: No edema.   Skin:     General: Skin is warm and dry.      Capillary Refill: Capillary refill takes less than 2 seconds.      Coloration: Skin is jaundiced.   Neurological:      Mental Status: He is alert and oriented to person, place, and time.      GCS: GCS eye subscore is 4. GCS verbal subscore is 5. GCS motor subscore is 6.   Psychiatric:         Attention and Perception: Attention normal.         Mood and Affect: Mood is anxious.         Speech: Speech normal.         Behavior: Behavior normal. Behavior is cooperative.         Thought Content: Thought content normal.         Judgment: Judgment normal.          Laboratory:  Immunosuppressants       None          CBC:   Recent Labs   Lab 09/05/23  0555   WBC 10.09   RBC 2.27*   HGB 7.8*   HCT 23.6*   PLT 64*   *   MCH 34.4*   MCHC 33.1     CMP:   Recent Labs   Lab 09/05/23  0555   GLU 83   CALCIUM 8.1*   ALBUMIN 2.7*   PROT 5.4*   *   K 3.9   CO2 15*       BUN 11   CREATININE 0.7   ALKPHOS 131   ALT 28   AST 71*   BILITOT 29.3*     Coagulation:   Recent Labs   Lab 09/05/23  0555   INR 2.1*     Labs within the past 24 hours have been reviewed.    Diagnostic Results:  Diagnostic results within the past 24 hours have been reviewed      Debility/Functional status: No debility noted.

## 2023-09-05 NOTE — PLAN OF CARE
38 year-old male admitted 8/24/23 for ETOH cirrhosis/txp workup. Pt has hx esophageal varices, ascites, hyponatremia, AYAH  -AAOx4, ambulates independently  -20 G Rt FA  -1 L FR  -Lactulose TID  -2 BMs this shift   -abdomen distended   -blood cultures pending  -mother at bedside, pt sitting up onside of bed, bed in lowest position, wheels locked, non-skid socks in place, call light within reach

## 2023-09-06 ENCOUNTER — TELEPHONE (OUTPATIENT)
Dept: TRANSPLANT | Facility: CLINIC | Age: 38
End: 2023-09-06
Payer: COMMERCIAL

## 2023-09-06 LAB
ALBUMIN SERPL BCP-MCNC: 2.5 G/DL (ref 3.5–5.2)
ALP SERPL-CCNC: 135 U/L (ref 55–135)
ALT SERPL W/O P-5'-P-CCNC: 26 U/L (ref 10–44)
ANION GAP SERPL CALC-SCNC: 9 MMOL/L (ref 8–16)
AST SERPL-CCNC: 65 U/L (ref 10–40)
BASOPHILS # BLD AUTO: 0.13 K/UL (ref 0–0.2)
BASOPHILS # BLD AUTO: 0.17 K/UL (ref 0–0.2)
BASOPHILS NFR BLD: 1.5 % (ref 0–1.9)
BASOPHILS NFR BLD: 1.6 % (ref 0–1.9)
BILIRUB SERPL-MCNC: 28.3 MG/DL (ref 0.1–1)
BLOOD BANK HEPATITIS FREEZE AND HOLD: NORMAL
BUN SERPL-MCNC: 11 MG/DL (ref 6–20)
CALCIUM SERPL-MCNC: 7.8 MG/DL (ref 8.7–10.5)
CHLORIDE SERPL-SCNC: 106 MMOL/L (ref 95–110)
CO2 SERPL-SCNC: 17 MMOL/L (ref 23–29)
CREAT SERPL-MCNC: 0.8 MG/DL (ref 0.5–1.4)
DIFFERENTIAL METHOD: ABNORMAL
DIFFERENTIAL METHOD: ABNORMAL
EOSINOPHIL # BLD AUTO: 0.2 K/UL (ref 0–0.5)
EOSINOPHIL # BLD AUTO: 0.3 K/UL (ref 0–0.5)
EOSINOPHIL NFR BLD: 2 % (ref 0–8)
EOSINOPHIL NFR BLD: 2.9 % (ref 0–8)
ERYTHROCYTE [DISTWIDTH] IN BLOOD BY AUTOMATED COUNT: 17.7 % (ref 11.5–14.5)
ERYTHROCYTE [DISTWIDTH] IN BLOOD BY AUTOMATED COUNT: 17.7 % (ref 11.5–14.5)
EST. GFR  (NO RACE VARIABLE): >60 ML/MIN/1.73 M^2
GLUCOSE SERPL-MCNC: 83 MG/DL (ref 70–110)
HCT VFR BLD AUTO: 21.2 % (ref 40–54)
HCT VFR BLD AUTO: 23.2 % (ref 40–54)
HGB BLD-MCNC: 7.1 G/DL (ref 14–18)
HGB BLD-MCNC: 7.6 G/DL (ref 14–18)
IMM GRANULOCYTES # BLD AUTO: 0.09 K/UL (ref 0–0.04)
IMM GRANULOCYTES # BLD AUTO: 0.15 K/UL (ref 0–0.04)
IMM GRANULOCYTES NFR BLD AUTO: 1 % (ref 0–0.5)
IMM GRANULOCYTES NFR BLD AUTO: 1.4 % (ref 0–0.5)
INR PPP: 2.1 (ref 0.8–1.2)
INR PPP: 2.2 (ref 0.8–1.2)
LYMPHOCYTES # BLD AUTO: 1.2 K/UL (ref 1–4.8)
LYMPHOCYTES # BLD AUTO: 1.3 K/UL (ref 1–4.8)
LYMPHOCYTES NFR BLD: 12.1 % (ref 18–48)
LYMPHOCYTES NFR BLD: 14.1 % (ref 18–48)
MCH RBC QN AUTO: 34.2 PG (ref 27–31)
MCH RBC QN AUTO: 34.6 PG (ref 27–31)
MCHC RBC AUTO-ENTMCNC: 32.8 G/DL (ref 32–36)
MCHC RBC AUTO-ENTMCNC: 33.5 G/DL (ref 32–36)
MCV RBC AUTO: 103 FL (ref 82–98)
MCV RBC AUTO: 105 FL (ref 82–98)
MONOCYTES # BLD AUTO: 1.3 K/UL (ref 0.3–1)
MONOCYTES # BLD AUTO: 1.5 K/UL (ref 0.3–1)
MONOCYTES NFR BLD: 13.9 % (ref 4–15)
MONOCYTES NFR BLD: 14.7 % (ref 4–15)
NEUTROPHILS # BLD AUTO: 5.7 K/UL (ref 1.8–7.7)
NEUTROPHILS # BLD AUTO: 7.5 K/UL (ref 1.8–7.7)
NEUTROPHILS NFR BLD: 65.8 % (ref 38–73)
NEUTROPHILS NFR BLD: 69 % (ref 38–73)
NRBC BLD-RTO: 0 /100 WBC
NRBC BLD-RTO: 0 /100 WBC
PLATELET # BLD AUTO: 54 K/UL (ref 150–450)
PLATELET # BLD AUTO: 69 K/UL (ref 150–450)
PMV BLD AUTO: 10.2 FL (ref 9.2–12.9)
PMV BLD AUTO: 11.1 FL (ref 9.2–12.9)
POTASSIUM SERPL-SCNC: 3.8 MMOL/L (ref 3.5–5.1)
PROT SERPL-MCNC: 5.1 G/DL (ref 6–8.4)
PROTHROMBIN TIME: 21.4 SEC (ref 9–12.5)
PROTHROMBIN TIME: 22.1 SEC (ref 9–12.5)
RBC # BLD AUTO: 2.05 M/UL (ref 4.6–6.2)
RBC # BLD AUTO: 2.22 M/UL (ref 4.6–6.2)
SODIUM SERPL-SCNC: 132 MMOL/L (ref 136–145)
WBC # BLD AUTO: 10.89 K/UL (ref 3.9–12.7)
WBC # BLD AUTO: 8.7 K/UL (ref 3.9–12.7)

## 2023-09-06 PROCEDURE — 20600001 HC STEP DOWN PRIVATE ROOM

## 2023-09-06 PROCEDURE — 93010 PR ELECTROCARDIOGRAM REPORT: ICD-10-PCS | Mod: NTX,,, | Performed by: INTERNAL MEDICINE

## 2023-09-06 PROCEDURE — 93005 ELECTROCARDIOGRAM TRACING: CPT

## 2023-09-06 PROCEDURE — 94761 N-INVAS EAR/PLS OXIMETRY MLT: CPT

## 2023-09-06 PROCEDURE — 99000 SPECIMEN HANDLING OFFICE-LAB: CPT | Performed by: CLINICAL NURSE SPECIALIST

## 2023-09-06 PROCEDURE — 86704 HEP B CORE ANTIBODY TOTAL: CPT | Performed by: CLINICAL NURSE SPECIALIST

## 2023-09-06 PROCEDURE — 93010 ELECTROCARDIOGRAM REPORT: CPT | Mod: NTX,,, | Performed by: INTERNAL MEDICINE

## 2023-09-06 PROCEDURE — 87522 HEPATITIS C REVRS TRNSCRPJ: CPT | Performed by: CLINICAL NURSE SPECIALIST

## 2023-09-06 PROCEDURE — 25000003 PHARM REV CODE 250: Performed by: HOSPITALIST

## 2023-09-06 PROCEDURE — 36415 COLL VENOUS BLD VENIPUNCTURE: CPT | Performed by: STUDENT IN AN ORGANIZED HEALTH CARE EDUCATION/TRAINING PROGRAM

## 2023-09-06 PROCEDURE — 86902 BLOOD TYPE ANTIGEN DONOR EA: CPT | Performed by: CLINICAL NURSE SPECIALIST

## 2023-09-06 PROCEDURE — 99233 SBSQ HOSP IP/OBS HIGH 50: CPT | Mod: ,,,

## 2023-09-06 PROCEDURE — 87340 HEPATITIS B SURFACE AG IA: CPT | Performed by: CLINICAL NURSE SPECIALIST

## 2023-09-06 PROCEDURE — 85025 COMPLETE CBC W/AUTO DIFF WBC: CPT | Mod: 91 | Performed by: CLINICAL NURSE SPECIALIST

## 2023-09-06 PROCEDURE — 86850 RBC ANTIBODY SCREEN: CPT | Performed by: CLINICAL NURSE SPECIALIST

## 2023-09-06 PROCEDURE — 85610 PROTHROMBIN TIME: CPT | Performed by: STUDENT IN AN ORGANIZED HEALTH CARE EDUCATION/TRAINING PROGRAM

## 2023-09-06 PROCEDURE — 80053 COMPREHEN METABOLIC PANEL: CPT | Performed by: STUDENT IN AN ORGANIZED HEALTH CARE EDUCATION/TRAINING PROGRAM

## 2023-09-06 PROCEDURE — 63600175 PHARM REV CODE 636 W HCPCS

## 2023-09-06 PROCEDURE — 85025 COMPLETE CBC W/AUTO DIFF WBC: CPT | Performed by: STUDENT IN AN ORGANIZED HEALTH CARE EDUCATION/TRAINING PROGRAM

## 2023-09-06 PROCEDURE — 25000003 PHARM REV CODE 250

## 2023-09-06 PROCEDURE — 86803 HEPATITIS C AB TEST: CPT | Performed by: CLINICAL NURSE SPECIALIST

## 2023-09-06 PROCEDURE — 99233 PR SUBSEQUENT HOSPITAL CARE,LEVL III: ICD-10-PCS | Mod: ,,,

## 2023-09-06 PROCEDURE — 85610 PROTHROMBIN TIME: CPT | Mod: 91 | Performed by: CLINICAL NURSE SPECIALIST

## 2023-09-06 PROCEDURE — 36415 COLL VENOUS BLD VENIPUNCTURE: CPT | Performed by: CLINICAL NURSE SPECIALIST

## 2023-09-06 PROCEDURE — 87389 HIV-1 AG W/HIV-1&-2 AB AG IA: CPT | Performed by: CLINICAL NURSE SPECIALIST

## 2023-09-06 PROCEDURE — 80053 COMPREHEN METABOLIC PANEL: CPT | Mod: 91 | Performed by: CLINICAL NURSE SPECIALIST

## 2023-09-06 PROCEDURE — 86922 COMPATIBILITY TEST ANTIGLOB: CPT | Performed by: CLINICAL NURSE SPECIALIST

## 2023-09-06 PROCEDURE — 86706 HEP B SURFACE ANTIBODY: CPT | Mod: 91 | Performed by: CLINICAL NURSE SPECIALIST

## 2023-09-06 PROCEDURE — 25000003 PHARM REV CODE 250: Performed by: STUDENT IN AN ORGANIZED HEALTH CARE EDUCATION/TRAINING PROGRAM

## 2023-09-06 RX ORDER — HYDROCODONE BITARTRATE AND ACETAMINOPHEN 500; 5 MG/1; MG/1
TABLET ORAL
Status: DISCONTINUED | OUTPATIENT
Start: 2023-09-06 | End: 2023-09-14

## 2023-09-06 RX ORDER — MUPIROCIN 20 MG/G
OINTMENT TOPICAL
Status: DISCONTINUED | OUTPATIENT
Start: 2023-09-06 | End: 2023-09-08

## 2023-09-06 RX ORDER — METHYLPREDNISOLONE SODIUM SUCCINATE 500 MG/8ML
500 INJECTION INTRAMUSCULAR; INTRAVENOUS
Status: DISCONTINUED | OUTPATIENT
Start: 2023-09-06 | End: 2023-09-08

## 2023-09-06 RX ORDER — SODIUM BICARBONATE 650 MG/1
1950 TABLET ORAL 3 TIMES DAILY
Status: DISCONTINUED | OUTPATIENT
Start: 2023-09-06 | End: 2023-09-07

## 2023-09-06 RX ADMIN — RIFAXIMIN 550 MG: 550 TABLET ORAL at 08:09

## 2023-09-06 RX ADMIN — LACTULOSE 30 G: 20 SOLUTION ORAL at 08:09

## 2023-09-06 RX ADMIN — Medication 6 MG: at 08:09

## 2023-09-06 RX ADMIN — SODIUM BICARBONATE 650 MG TABLET 1950 MG: at 08:09

## 2023-09-06 RX ADMIN — SODIUM BICARBONATE 650 MG TABLET 1950 MG: at 02:09

## 2023-09-06 RX ADMIN — HYDROXYZINE PAMOATE 25 MG: 25 CAPSULE ORAL at 08:09

## 2023-09-06 RX ADMIN — LACTULOSE 30 G: 20 SOLUTION ORAL at 02:09

## 2023-09-06 RX ADMIN — PANTOPRAZOLE SODIUM 40 MG: 40 TABLET, DELAYED RELEASE ORAL at 08:09

## 2023-09-06 RX ADMIN — FLUCONAZOLE 400 MG: 200 TABLET ORAL at 08:09

## 2023-09-06 NOTE — SUBJECTIVE & OBJECTIVE
Scheduled Meds:   fluconazole  400 mg Oral Daily    heparin (porcine)  5,000 Units Subcutaneous Q8H    lactulose  30 g Oral TID    melatonin  6 mg Oral Nightly    pantoprazole  40 mg Oral Daily    rifAXImin  550 mg Oral BID    sodium bicarbonate  1,950 mg Oral TID     Continuous Infusions:  PRN Meds:acetaminophen, aluminum & magnesium hydroxide-simethicone, hydrOXYzine pamoate, mupirocin, ondansetron, oxyCODONE, simethicone, sodium chloride 0.9%    Review of Systems   Constitutional:  Negative for activity change and appetite change.   HENT:  Negative for facial swelling and trouble swallowing.    Eyes:  Negative for visual disturbance.   Respiratory:  Negative for cough and shortness of breath.    Cardiovascular:  Negative for chest pain, palpitations and leg swelling.   Gastrointestinal:  Positive for abdominal distention. Negative for abdominal pain, constipation, diarrhea, nausea and vomiting.   Genitourinary:  Negative for decreased urine volume and difficulty urinating.   Musculoskeletal:  Negative for arthralgias.   Skin:  Positive for color change (jaundice). Negative for wound.   Neurological:  Negative for dizziness and tremors.   Hematological:  Negative for adenopathy. Does not bruise/bleed easily.   Psychiatric/Behavioral:  Negative for agitation, behavioral problems, confusion, decreased concentration and hallucinations.      Objective:     Vital Signs (Most Recent):  Temp: 97 °F (36.1 °C) (09/06/23 0815)  Pulse: 97 (09/06/23 0815)  Resp: 17 (09/06/23 0815)  BP: 118/61 (09/06/23 0815)  SpO2: 99 % (09/06/23 0815) Vital Signs (24h Range):  Temp:  [97 °F (36.1 °C)-99 °F (37.2 °C)] 97 °F (36.1 °C)  Pulse:  [] 97  Resp:  [17-18] 17  SpO2:  [96 %-100 %] 99 %  BP: (118-133)/(60-63) 118/61     Weight: 64.8 kg (142 lb 13.7 oz)  Body mass index is 20.5 kg/m².    Intake/Output - Last 3 Shifts         09/04 0700 09/05 0659 09/05 0700 09/06 0659 09/06 0700 09/07 0659    P.O. 360 965     I.V. (mL/kg)  0 (0)      Other  0     Total Intake(mL/kg) 360 (5.7) 965 (14.9)     Urine (mL/kg/hr)  245 (0.2)     Emesis/NG output  0     Other  0     Stool  0     Blood  0     Total Output  245     Net +360 +720            Urine Occurrence 3 x 2 x     Stool Occurrence 1 x 3 x     Emesis Occurrence  0 x              Physical Exam  Vitals and nursing note reviewed.   Constitutional:       General: He is not in acute distress.     Appearance: He is not toxic-appearing or diaphoretic.   HENT:      Head: Normocephalic.   Eyes:      General: Scleral icterus present.      Conjunctiva/sclera: Conjunctivae normal.   Cardiovascular:      Rate and Rhythm: Normal rate and regular rhythm.      Heart sounds: Murmur heard.   Pulmonary:      Effort: Pulmonary effort is normal.      Breath sounds: Normal breath sounds. No wheezing, rhonchi or rales.   Abdominal:      General: Bowel sounds are normal. There is distension.      Palpations: Abdomen is soft.      Tenderness: There is no abdominal tenderness. There is no guarding or rebound.   Musculoskeletal:         General: Normal range of motion.      Cervical back: Normal range of motion.      Right lower leg: No edema.      Left lower leg: No edema.   Skin:     General: Skin is warm and dry.      Capillary Refill: Capillary refill takes less than 2 seconds.      Coloration: Skin is jaundiced.   Neurological:      Mental Status: He is alert and oriented to person, place, and time.      GCS: GCS eye subscore is 4. GCS verbal subscore is 5. GCS motor subscore is 6.   Psychiatric:         Attention and Perception: Attention normal.         Mood and Affect: Mood is anxious.         Speech: Speech normal.         Behavior: Behavior normal. Behavior is cooperative.         Thought Content: Thought content normal.         Judgment: Judgment normal.          Laboratory:  Immunosuppressants       None          CBC:   Recent Labs   Lab 09/06/23  0545   WBC 8.70   RBC 2.05*   HGB 7.1*   HCT 21.2*   PLT 54*   MCV  103*   MCH 34.6*   MCHC 33.5     CMP:   Recent Labs   Lab 09/06/23  0545   GLU 83   CALCIUM 7.8*   ALBUMIN 2.5*   PROT 5.1*   *   K 3.8   CO2 17*      BUN 11   CREATININE 0.8   ALKPHOS 135   ALT 26   AST 65*   BILITOT 28.3*     Coagulation:   Recent Labs   Lab 09/06/23  0545   INR 2.2*     Labs within the past 24 hours have been reviewed.    Diagnostic Results:  I have personally reviewed all pertinent imaging studies.    Debility/Functional status: No debility noted.

## 2023-09-06 NOTE — ASSESSMENT & PLAN NOTE
- Patient made active on transplant list on 9/1. Had 1 liver transplant offers on 9/2 and 9/3; however, surgeries cancelled due to size of donor liver and poor organ quality.     - listed MELD 31 till 9/11  - surveillance blood cultures ordered 9/5; NGTD

## 2023-09-06 NOTE — PLAN OF CARE
AAOx3, afebrile, w/o c/o pain. Pt had complaints of anxiety-prn vistaril given. Blood cx from 9/5 in process. Educated pt on the meaning of his FR.  PO lactulose continued. Mother at the bedside last night. Pt able to position self independently.  Pt in lowest position, side rails up x2, non-skid foot wear in place, call light within reach, pt verbalized understanding to call RN when needed.  Hand hygiene practiced per protocol.  Will continue to monitor.

## 2023-09-06 NOTE — ASSESSMENT & PLAN NOTE
- Ascites   - last PARA  - 5 liters removed   - no need for para at this time. Monitor daily    MELD 3.0: 30 at 2023  5:45 AM  MELD-Na: 30 at 2023  5:45 AM  Calculated from:  Serum Creatinine: 0.8 mg/dL (Using min of 1 mg/dL) at 2023  5:45 AM  Serum Sodium: 132 mmol/L at 2023  5:45 AM  Total Bilirubin: 28.3 mg/dL at 2023  5:45 AM  Serum Albumin: 2.5 g/dL at 2023  5:45 AM  INR(ratio): 2.2 at 2023  5:45 AM  Age at listin years  Sex: Male at 2023  5:45 AM

## 2023-09-06 NOTE — PROGRESS NOTES
Update    Please see previous social work notes for continuity.   presented to the patient's room for follow up and continuity of care. Patient observed resting in bed with his mother, Maribel in the room. Maribel shared that she will be in [place] until Saturday and the family is hopeful that patient will receive an offer before that. Patient's brother is supposed to come on Saturday.     Patient shared that he is trying to stay positive but has been feeling very anxious about the whole process.  SW offered validation and supportive listening.  SW has made referrals to pastoral care and has requested art supplies and pet therapy from . SW answered questions that mother had about post transplant lodging at [facility]. Patient and family denied any other concerns or needs at this time.    SW remains available and will continue to follow, providing psychosocial support, education and assistance as needed. Patient and family states understanding of how to access team and resources as needed.

## 2023-09-06 NOTE — PROGRESS NOTES
[facility] - Transplant Stepdown  Liver Transplant  Progress Note    Patient Name: Sukhdeep Grimes  MRN: [id number]  Admission Date: 8/24/2023  Hospital Length of Stay: 13 days  Code Status: Full Code  Primary Care Provider: No, Primary Doctor  Post-Operative Day:     ORGAN:   LIVER  Disease Etiology: Alcohol-Associated Cirrhosis Without Acute Alcohol-Associated Hepatitis  Donor Type:   Donation after Brain Death  CDC High Risk:   No  Donor CMV Status:   Donor CMV Status: Positive  Donor HBcAB:   Negative  Donor HCV Status:   Negative  Donor HBV GOGO:   Donor HCV GOGO: Negative  Whole or Partial:   Biliary Anastomosis:   Arterial Anatomy:   Subjective:     History of Present Illness:  Sukhdeep Grimes is a 38 y.o. male  PMHx of Alcoholic decompensated cirrhosis. Pt was admitted from OSH  for hepatology evaluation  D/T ascites, hyponatremia, hypokalemia, thrombocytopenia, and AYAH and weakness. On 9/1 patient was made active on transplant list MELD 30       Hospital Course:  Patient made active on transplant list on 9/1. Had 1 liver transplant offers on 9/2 and 9/3; however, surgeries cancelled due to size of donor liver and poor organ quality.      Interval History: NAEON. Patient reports feeling well this morning. MELD 30 today, listed with MELD 31 till 9/11. H&H slightly down trending, hemodynamically stable. No overt s/s of bleeding present. Will cont to monitor daily cbc.  Na 132. Renal function WNL. CO2 17, increased dose of sodium bicarb tablets to 1950mg TID. Surveillance blood cultures obtained 9/5, NGTD. Last para 8/24 with 5 L of ascites removed. No need for para at this time, continue to monitor daily. He is tolerating a diet and ambulating in the hallways independently. Continue 1L fluid restriction. VSS, monitor           Scheduled Meds:   fluconazole  400 mg Oral Daily    heparin (porcine)  5,000 Units Subcutaneous Q8H    lactulose  30 g Oral TID    melatonin  6 mg Oral Nightly    pantoprazole  40 mg Oral  Daily    rifAXImin  550 mg Oral BID    sodium bicarbonate  1,950 mg Oral TID     Continuous Infusions:  PRN Meds:acetaminophen, aluminum & magnesium hydroxide-simethicone, hydrOXYzine pamoate, mupirocin, ondansetron, oxyCODONE, simethicone, sodium chloride 0.9%    Review of Systems   Constitutional:  Negative for activity change and appetite change.   HENT:  Negative for facial swelling and trouble swallowing.    Eyes:  Negative for visual disturbance.   Respiratory:  Negative for cough and shortness of breath.    Cardiovascular:  Negative for chest pain, palpitations and leg swelling.   Gastrointestinal:  Positive for abdominal distention. Negative for abdominal pain, constipation, diarrhea, nausea and vomiting.   Genitourinary:  Negative for decreased urine volume and difficulty urinating.   Musculoskeletal:  Negative for arthralgias.   Skin:  Positive for color change (jaundice). Negative for wound.   Neurological:  Negative for dizziness and tremors.   Hematological:  Negative for adenopathy. Does not bruise/bleed easily.   Psychiatric/Behavioral:  Negative for agitation, behavioral problems, confusion, decreased concentration and hallucinations.      Objective:     Vital Signs (Most Recent):  Temp: 97 °F (36.1 °C) (09/06/23 0815)  Pulse: 97 (09/06/23 0815)  Resp: 17 (09/06/23 0815)  BP: 118/61 (09/06/23 0815)  SpO2: 99 % (09/06/23 0815) Vital Signs (24h Range):  Temp:  [97 °F (36.1 °C)-99 °F (37.2 °C)] 97 °F (36.1 °C)  Pulse:  [] 97  Resp:  [17-18] 17  SpO2:  [96 %-100 %] 99 %  BP: (118-133)/(60-63) 118/61     Weight: 64.8 kg (142 lb 13.7 oz)  Body mass index is 20.5 kg/m².    Intake/Output - Last 3 Shifts         09/04 0700 09/05 0659 09/05 0700 09/06 0659 09/06 0700  09/07 0659    P.O. 360 965     I.V. (mL/kg)  0 (0)     Other  0     Total Intake(mL/kg) 360 (5.7) 965 (14.9)     Urine (mL/kg/hr)  245 (0.2)     Emesis/NG output  0     Other  0     Stool  0     Blood  0     Total Output  245     Net  +360 +720            Urine Occurrence 3 x 2 x     Stool Occurrence 1 x 3 x     Emesis Occurrence  0 x              Physical Exam  Vitals and nursing note reviewed.   Constitutional:       General: He is not in acute distress.     Appearance: He is not toxic-appearing or diaphoretic.   HENT:      Head: Normocephalic.   Eyes:      General: Scleral icterus present.      Conjunctiva/sclera: Conjunctivae normal.   Cardiovascular:      Rate and Rhythm: Normal rate and regular rhythm.      Heart sounds: Murmur heard.   Pulmonary:      Effort: Pulmonary effort is normal.      Breath sounds: Normal breath sounds. No wheezing, rhonchi or rales.   Abdominal:      General: Bowel sounds are normal. There is distension.      Palpations: Abdomen is soft.      Tenderness: There is no abdominal tenderness. There is no guarding or rebound.   Musculoskeletal:         General: Normal range of motion.      Cervical back: Normal range of motion.      Right lower leg: No edema.      Left lower leg: No edema.   Skin:     General: Skin is warm and dry.      Capillary Refill: Capillary refill takes less than 2 seconds.      Coloration: Skin is jaundiced.   Neurological:      Mental Status: He is alert and oriented to person, place, and time.      GCS: GCS eye subscore is 4. GCS verbal subscore is 5. GCS motor subscore is 6.   Psychiatric:         Attention and Perception: Attention normal.         Mood and Affect: Mood is anxious.         Speech: Speech normal.         Behavior: Behavior normal. Behavior is cooperative.         Thought Content: Thought content normal.         Judgment: Judgment normal.          Laboratory:  Immunosuppressants       None          CBC:   Recent Labs   Lab 09/06/23  0545   WBC 8.70   RBC 2.05*   HGB 7.1*   HCT 21.2*   PLT 54*   *   MCH 34.6*   MCHC 33.5     CMP:   Recent Labs   Lab 09/06/23  0545   GLU 83   CALCIUM 7.8*   ALBUMIN 2.5*   PROT 5.1*   *   K 3.8   CO2 17*      BUN 11   CREATININE  0.8   ALKPHOS 135   ALT 26   AST 65*   BILITOT 28.3*     Coagulation:   Recent Labs   Lab 23  0545   INR 2.2*     Labs within the past 24 hours have been reviewed.    Diagnostic Results:  I have personally reviewed all pertinent imaging studies.    Debility/Functional status: No debility noted.    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 Had 1 liver transplant offers on  and 9/3; however, surgeries cancelled due to size of donor liver and poor organ quality.     - listed MELD 31 till   - surveillance blood cultures ordered ; NGTD      Moderate malnutrition  - encouraged oral intake   - Boost supplements TID  - monitor prealbumin level       Decompensated hepatic cirrhosis  - Ascites   - last PARA  - 5 liters removed   - no need for para at this time. Monitor daily    MELD 3.0: 30 at 2023  5:45 AM  MELD-Na: 30 at 2023  5:45 AM  Calculated from:  Serum Creatinine: 0.8 mg/dL (Using min of 1 mg/dL) at 2023  5:45 AM  Serum Sodium: 132 mmol/L at 2023  5:45 AM  Total Bilirubin: 28.3 mg/dL at 2023  5:45 AM  Serum Albumin: 2.5 g/dL at 2023  5:45 AM  INR(ratio): 2.2 at 2023  5:45 AM  Age at listin years  Sex: Male at 2023  5:45 AM          VTE Risk Mitigation (From admission, onward)         Ordered     heparin (porcine) injection 5,000 Units  Every 8 hours         23 1113     IP VTE HIGH RISK PATIENT  Once         23 5996      Send SCD stockings and AURORA hose with patient to OR  Continuous         09/01/23 1806     Place AURORA hose  Until discontinued         08/24/23 1002     Place sequential compression device  Until discontinued         08/24/23 1002                The patients clinical status was discussed at multidisplinary rounds, involving transplant surgery, transplant medicine, pharmacy, nursing, nutrition, and social work    Discharge Planning:  Not a candidate at this time       Loretta Augustine NP  Liver Transplant  [facility] - Transplant Step[facility]

## 2023-09-06 NOTE — PLAN OF CARE
Pt remains AAOx4, VSS on bedside monitor, afebrile, on RA. Na 132, 1L FR reinforced. Jaundice present. Abdomen distended. Mother at bedside today. Pt is up with standby-assist, remains free from falls/injuries so far today. Nonskid socks on, call bell within reach, bed locked/lowest position. Pt verbalized understanding to call for needs/assistance. Hourly rounding completed. Encouraging ambulation. Skin tear to nose, Band-Aid in place.

## 2023-09-07 ENCOUNTER — ANESTHESIA EVENT (OUTPATIENT)
Dept: SURGERY | Facility: HOSPITAL | Age: 38
DRG: 006 | End: 2023-09-07
Payer: COMMERCIAL

## 2023-09-07 ENCOUNTER — ANESTHESIA (OUTPATIENT)
Dept: SURGERY | Facility: HOSPITAL | Age: 38
DRG: 006 | End: 2023-09-07
Payer: COMMERCIAL

## 2023-09-07 LAB
ABO + RH BLD: ABNORMAL
ALBUMIN SERPL BCP-MCNC: 2.3 G/DL (ref 3.5–5.2)
ALBUMIN SERPL BCP-MCNC: 2.4 G/DL (ref 3.5–5.2)
ALBUMIN SERPL BCP-MCNC: 2.5 G/DL (ref 3.5–5.2)
ALBUMIN SERPL BCP-MCNC: 2.6 G/DL (ref 3.5–5.2)
ALLENS TEST: ABNORMAL
ALLENS TEST: ABNORMAL
ALP SERPL-CCNC: 128 U/L (ref 55–135)
ALP SERPL-CCNC: 138 U/L (ref 55–135)
ALP SERPL-CCNC: 75 U/L (ref 55–135)
ALT SERPL W/O P-5'-P-CCNC: 30 U/L (ref 10–44)
ALT SERPL W/O P-5'-P-CCNC: 32 U/L (ref 10–44)
ALT SERPL W/O P-5'-P-CCNC: 408 U/L (ref 10–44)
ALT SERPL W/O P-5'-P-CCNC: 540 U/L (ref 10–44)
AMYLASE SERPL-CCNC: 58 U/L (ref 20–110)
ANION GAP SERPL CALC-SCNC: 11 MMOL/L (ref 8–16)
ANION GAP SERPL CALC-SCNC: 13 MMOL/L (ref 8–16)
ANION GAP SERPL CALC-SCNC: 14 MMOL/L (ref 8–16)
APPEARANCE FLD: CLEAR
APTT PPP: 34.8 SEC (ref 21–32)
APTT PPP: 35 SEC (ref 21–32)
APTT PPP: 54.9 SEC (ref 21–32)
APTT PPP: 57.2 SEC (ref 21–32)
AST SERPL-CCNC: 431 U/L (ref 10–40)
AST SERPL-CCNC: 70 U/L (ref 10–40)
AST SERPL-CCNC: 72 U/L (ref 10–40)
AST SERPL-CCNC: 754 U/L (ref 10–40)
BASOPHILS # BLD AUTO: 0.03 K/UL (ref 0–0.2)
BASOPHILS # BLD AUTO: 0.12 K/UL (ref 0–0.2)
BASOPHILS NFR BLD: 0.3 % (ref 0–1.9)
BASOPHILS NFR BLD: 1.2 % (ref 0–1.9)
BILIRUB SERPL-MCNC: 12.9 MG/DL (ref 0.1–1)
BILIRUB SERPL-MCNC: 27.7 MG/DL (ref 0.1–1)
BILIRUB SERPL-MCNC: 29.9 MG/DL (ref 0.1–1)
BLD GP AB SCN CELLS X3 SERPL QL: ABNORMAL
BLD PROD TYP BPU: NORMAL
BLOOD UNIT EXPIRATION DATE: NORMAL
BLOOD UNIT TYPE CODE: 2800
BLOOD UNIT TYPE CODE: 5100
BLOOD UNIT TYPE CODE: 6200
BLOOD UNIT TYPE: NORMAL
BODY FLD TYPE: NORMAL
BUN SERPL-MCNC: 11 MG/DL (ref 6–20)
BUN SERPL-MCNC: 11 MG/DL (ref 6–20)
BUN SERPL-MCNC: 14 MG/DL (ref 6–20)
CA-I BLDV-SCNC: 0.91 MMOL/L (ref 1.06–1.42)
CA-I BLDV-SCNC: 1.06 MMOL/L (ref 1.06–1.42)
CA-I BLDV-SCNC: 1.07 MMOL/L (ref 1.06–1.42)
CALCIUM SERPL-MCNC: 7.9 MG/DL (ref 8.7–10.5)
CALCIUM SERPL-MCNC: 7.9 MG/DL (ref 8.7–10.5)
CALCIUM SERPL-MCNC: 8 MG/DL (ref 8.7–10.5)
CHLORIDE SERPL-SCNC: 102 MMOL/L (ref 95–110)
CHLORIDE SERPL-SCNC: 103 MMOL/L (ref 95–110)
CHLORIDE SERPL-SCNC: 98 MMOL/L (ref 95–110)
CO2 SERPL-SCNC: 18 MMOL/L (ref 23–29)
CO2 SERPL-SCNC: 18 MMOL/L (ref 23–29)
CO2 SERPL-SCNC: 22 MMOL/L (ref 23–29)
CODING SYSTEM: NORMAL
COLOR FLD: YELLOW
CREAT SERPL-MCNC: 0.8 MG/DL (ref 0.5–1.4)
CREAT SERPL-MCNC: 0.9 MG/DL (ref 0.5–1.4)
CREAT SERPL-MCNC: 1 MG/DL (ref 0.5–1.4)
CROSSMATCH INTERPRETATION: NORMAL
DIFFERENTIAL METHOD: ABNORMAL
DIFFERENTIAL METHOD: ABNORMAL
DISPENSE STATUS: NORMAL
EOSINOPHIL # BLD AUTO: 0 K/UL (ref 0–0.5)
EOSINOPHIL # BLD AUTO: 0.3 K/UL (ref 0–0.5)
EOSINOPHIL NFR BLD: 0.2 % (ref 0–8)
EOSINOPHIL NFR BLD: 3 % (ref 0–8)
ERYTHROCYTE [DISTWIDTH] IN BLOOD BY AUTOMATED COUNT: 17.6 % (ref 11.5–14.5)
ERYTHROCYTE [DISTWIDTH] IN BLOOD BY AUTOMATED COUNT: 18.3 % (ref 11.5–14.5)
EST. GFR  (NO RACE VARIABLE): >60 ML/MIN/1.73 M^2
FIBRINOGEN PPP-MCNC: 270 MG/DL (ref 182–400)
FIBRINOGEN PPP-MCNC: 298 MG/DL (ref 182–400)
FIBRINOGEN PPP-MCNC: 309 MG/DL (ref 182–400)
FIBRINOGEN PPP-MCNC: <70 MG/DL (ref 182–400)
GLUCOSE SERPL-MCNC: 107 MG/DL (ref 70–110)
GLUCOSE SERPL-MCNC: 262 MG/DL (ref 70–110)
GLUCOSE SERPL-MCNC: 267 MG/DL (ref 70–110)
GLUCOSE SERPL-MCNC: 283 MG/DL (ref 70–110)
GLUCOSE SERPL-MCNC: 88 MG/DL (ref 70–110)
GLUCOSE SERPL-MCNC: 91 MG/DL (ref 70–110)
GRAM STN SPEC: NORMAL
GRAM STN SPEC: NORMAL
HBV CORE AB SERPL QL IA: NORMAL
HBV SURFACE AB SER-ACNC: 696.46 MIU/ML
HBV SURFACE AB SER-ACNC: REACTIVE M[IU]/ML
HBV SURFACE AG SERPL QL IA: NORMAL
HCO3 UR-SCNC: 30.2 MMOL/L (ref 24–28)
HCT VFR BLD AUTO: 19.5 % (ref 40–54)
HCT VFR BLD AUTO: 21.4 % (ref 40–54)
HCT VFR BLD AUTO: 21.9 % (ref 40–54)
HCT VFR BLD AUTO: 24.6 % (ref 40–54)
HCT VFR BLD AUTO: 26 % (ref 40–54)
HCT VFR BLD CALC: 24 %PCV (ref 36–54)
HCV AB SERPL QL IA: NORMAL
HCV RNA SERPL QL NAA+PROBE: NOT DETECTED
HCV RNA SPEC NAA+PROBE-ACNC: NOT DETECTED IU/ML
HGB BLD-MCNC: 6.4 G/DL (ref 14–18)
HGB BLD-MCNC: 7.1 G/DL (ref 14–18)
HGB BLD-MCNC: 7.2 G/DL (ref 14–18)
HGB BLD-MCNC: 8.3 G/DL (ref 14–18)
HGB BLD-MCNC: 8.5 G/DL (ref 14–18)
HIV 1+2 AB+HIV1 P24 AG SERPL QL IA: NORMAL
IMM GRANULOCYTES # BLD AUTO: 0.1 K/UL (ref 0–0.04)
IMM GRANULOCYTES # BLD AUTO: 0.16 K/UL (ref 0–0.04)
IMM GRANULOCYTES NFR BLD AUTO: 1 % (ref 0–0.5)
IMM GRANULOCYTES NFR BLD AUTO: 1.3 % (ref 0–0.5)
INR PPP: 1.3 (ref 0.8–1.2)
INR PPP: 1.4 (ref 0.8–1.2)
INR PPP: 1.5 (ref 0.8–1.2)
INR PPP: 2.1 (ref 0.8–1.2)
INR PPP: 2.1 (ref 0.8–1.2)
LACTATE SERPL-SCNC: 2.3 MMOL/L (ref 0.5–2.2)
LDH SERPL L TO P-CCNC: 1339 U/L (ref 110–260)
LDH SERPL L TO P-CCNC: 2.55 MMOL/L (ref 0.36–1.25)
LYMPHOCYTES # BLD AUTO: 0.4 K/UL (ref 1–4.8)
LYMPHOCYTES # BLD AUTO: 1.4 K/UL (ref 1–4.8)
LYMPHOCYTES NFR BLD: 14.4 % (ref 18–48)
LYMPHOCYTES NFR BLD: 3.4 % (ref 18–48)
LYMPHOCYTES NFR FLD MANUAL: 10 %
MAGNESIUM SERPL-MCNC: 1.8 MG/DL (ref 1.6–2.6)
MAGNESIUM SERPL-MCNC: 1.8 MG/DL (ref 1.6–2.6)
MAGNESIUM SERPL-MCNC: 1.9 MG/DL (ref 1.6–2.6)
MAGNESIUM SERPL-MCNC: 2 MG/DL (ref 1.6–2.6)
MCH RBC QN AUTO: 31.4 PG (ref 27–31)
MCH RBC QN AUTO: 34.6 PG (ref 27–31)
MCHC RBC AUTO-ENTMCNC: 32.7 G/DL (ref 32–36)
MCHC RBC AUTO-ENTMCNC: 33.2 G/DL (ref 32–36)
MCV RBC AUTO: 104 FL (ref 82–98)
MCV RBC AUTO: 96 FL (ref 82–98)
MESOTHL CELL NFR FLD MANUAL: 4 %
MONOCYTES # BLD AUTO: 0.6 K/UL (ref 0.3–1)
MONOCYTES # BLD AUTO: 1.4 K/UL (ref 0.3–1)
MONOCYTES NFR BLD: 14.5 % (ref 4–15)
MONOCYTES NFR BLD: 4.8 % (ref 4–15)
MONOS+MACROS NFR FLD MANUAL: 82 %
NEUTROPHILS # BLD AUTO: 10.7 K/UL (ref 1.8–7.7)
NEUTROPHILS # BLD AUTO: 6.4 K/UL (ref 1.8–7.7)
NEUTROPHILS NFR BLD: 65.9 % (ref 38–73)
NEUTROPHILS NFR BLD: 90 % (ref 38–73)
NEUTROPHILS NFR FLD MANUAL: 4 %
NRBC BLD-RTO: 0 /100 WBC
NRBC BLD-RTO: 0 /100 WBC
PCO2 BLDA: 47.9 MMHG (ref 35–45)
PH SMN: 7.41 [PH] (ref 7.35–7.45)
PHOSPHATE SERPL-MCNC: 5.6 MG/DL (ref 2.7–4.5)
PLATELET # BLD AUTO: 104 K/UL (ref 150–450)
PLATELET # BLD AUTO: 54 K/UL (ref 150–450)
PLATELET # BLD AUTO: 57 K/UL (ref 150–450)
PLATELET # BLD AUTO: 68 K/UL (ref 150–450)
PLATELET # BLD AUTO: 91 K/UL (ref 150–450)
PLATELET BLD QL SMEAR: ABNORMAL
PMV BLD AUTO: 10.9 FL (ref 9.2–12.9)
PMV BLD AUTO: 9.4 FL (ref 9.2–12.9)
PMV BLD AUTO: 9.5 FL (ref 9.2–12.9)
PMV BLD AUTO: 9.8 FL (ref 9.2–12.9)
PMV BLD AUTO: 9.8 FL (ref 9.2–12.9)
PO2 BLDA: 610 MMHG (ref 80–100)
POC BE: 5 MMOL/L
POC IONIZED CALCIUM: 1.15 MMOL/L (ref 1.06–1.42)
POC SATURATED O2: 100 % (ref 95–100)
POC TCO2: 32 MMOL/L (ref 23–27)
POCT GLUCOSE: 301 MG/DL (ref 70–110)
POOLED CRYOPPT GVN BPU: NORMAL
POTASSIUM BLD-SCNC: 3.4 MMOL/L (ref 3.5–5.1)
POTASSIUM SERPL-SCNC: 3.4 MMOL/L (ref 3.5–5.1)
POTASSIUM SERPL-SCNC: 3.5 MMOL/L (ref 3.5–5.1)
POTASSIUM SERPL-SCNC: 3.6 MMOL/L (ref 3.5–5.1)
POTASSIUM SERPL-SCNC: 3.8 MMOL/L (ref 3.5–5.1)
POTASSIUM SERPL-SCNC: 3.8 MMOL/L (ref 3.5–5.1)
POTASSIUM SERPL-SCNC: 4 MMOL/L (ref 3.5–5.1)
PROT SERPL-MCNC: 4.6 G/DL (ref 6–8.4)
PROT SERPL-MCNC: 5.3 G/DL (ref 6–8.4)
PROT SERPL-MCNC: 5.6 G/DL (ref 6–8.4)
PROTHROMBIN TIME: 13.8 SEC (ref 9–12.5)
PROTHROMBIN TIME: 14.3 SEC (ref 9–12.5)
PROTHROMBIN TIME: 15.3 SEC (ref 9–12.5)
PROTHROMBIN TIME: 21.2 SEC (ref 9–12.5)
PROTHROMBIN TIME: 21.8 SEC (ref 9–12.5)
RBC # BLD AUTO: 2.05 M/UL (ref 4.6–6.2)
RBC # BLD AUTO: 2.71 M/UL (ref 4.6–6.2)
SAMPLE: ABNORMAL
SAMPLE: ABNORMAL
SITE: ABNORMAL
SITE: ABNORMAL
SODIUM BLD-SCNC: 134 MMOL/L (ref 136–145)
SODIUM SERPL-SCNC: 131 MMOL/L (ref 136–145)
SODIUM SERPL-SCNC: 132 MMOL/L (ref 136–145)
SODIUM SERPL-SCNC: 132 MMOL/L (ref 136–145)
SODIUM SERPL-SCNC: 133 MMOL/L (ref 136–145)
SODIUM SERPL-SCNC: 134 MMOL/L (ref 136–145)
SODIUM SERPL-SCNC: 134 MMOL/L (ref 136–145)
SPECIMEN OUTDATE: ABNORMAL
UNIT NUMBER: NORMAL
UNIT NUMBER: NORMAL
WBC # BLD AUTO: 11.9 K/UL (ref 3.9–12.7)
WBC # BLD AUTO: 9.77 K/UL (ref 3.9–12.7)
WBC # FLD: 108 /CU MM

## 2023-09-07 PROCEDURE — 85520 HEPARIN ASSAY: CPT

## 2023-09-07 PROCEDURE — 25000003 PHARM REV CODE 250: Performed by: STUDENT IN AN ORGANIZED HEALTH CARE EDUCATION/TRAINING PROGRAM

## 2023-09-07 PROCEDURE — P9035 PLATELET PHERES LEUKOREDUCED: HCPCS | Performed by: ANESTHESIOLOGY

## 2023-09-07 PROCEDURE — 63600175 PHARM REV CODE 636 W HCPCS: Mod: NTX | Performed by: CLINICAL NURSE SPECIALIST

## 2023-09-07 PROCEDURE — 85730 THROMBOPLASTIN TIME PARTIAL: CPT | Mod: 91 | Performed by: STUDENT IN AN ORGANIZED HEALTH CARE EDUCATION/TRAINING PROGRAM

## 2023-09-07 PROCEDURE — 47135 TRANSPLANTATION OF LIVER: CPT | Mod: 82,,, | Performed by: TRANSPLANT SURGERY

## 2023-09-07 PROCEDURE — P9047 ALBUMIN (HUMAN), 25%, 50ML: HCPCS | Mod: JG | Performed by: NURSE ANESTHETIST, CERTIFIED REGISTERED

## 2023-09-07 PROCEDURE — 82800 BLOOD PH: CPT

## 2023-09-07 PROCEDURE — D9220A PRA ANESTHESIA: Mod: CRNA,,, | Performed by: NURSE ANESTHETIST, CERTIFIED REGISTERED

## 2023-09-07 PROCEDURE — 88307 PR  SURG PATH,LEVEL V: ICD-10-PCS | Mod: 26,,, | Performed by: PATHOLOGY

## 2023-09-07 PROCEDURE — 88313 PR  SPECIAL STAINS,GROUP II: ICD-10-PCS | Mod: 26,,, | Performed by: PATHOLOGY

## 2023-09-07 PROCEDURE — 88307 TISSUE EXAM BY PATHOLOGIST: CPT | Performed by: PATHOLOGY

## 2023-09-07 PROCEDURE — 36620 ARTERIAL: ICD-10-PCS | Mod: ,,, | Performed by: NURSE ANESTHETIST, CERTIFIED REGISTERED

## 2023-09-07 PROCEDURE — 36620 ARTERIAL: ICD-10-PCS | Mod: 59,,, | Performed by: ANESTHESIOLOGY

## 2023-09-07 PROCEDURE — 76937 CENTRAL LINE: ICD-10-PCS | Mod: 26,,, | Performed by: ANESTHESIOLOGY

## 2023-09-07 PROCEDURE — 85025 COMPLETE CBC W/AUTO DIFF WBC: CPT | Performed by: STUDENT IN AN ORGANIZED HEALTH CARE EDUCATION/TRAINING PROGRAM

## 2023-09-07 PROCEDURE — 85002 BLEEDING TIME TEST: CPT

## 2023-09-07 PROCEDURE — 25000003 PHARM REV CODE 250: Performed by: NURSE ANESTHETIST, CERTIFIED REGISTERED

## 2023-09-07 PROCEDURE — 47135 PR TRANSPLANT LIVER,ALLOTRANSPLANT: ICD-10-PCS | Mod: 82,,, | Performed by: TRANSPLANT SURGERY

## 2023-09-07 PROCEDURE — 84132 ASSAY OF SERUM POTASSIUM: CPT | Mod: 91 | Performed by: SURGERY

## 2023-09-07 PROCEDURE — 47135 TRANSPLANTATION OF LIVER: CPT | Mod: ,,, | Performed by: SURGERY

## 2023-09-07 PROCEDURE — 36000930 HC OR TIME LEV VII 1ST 15 MIN: Performed by: SURGERY

## 2023-09-07 PROCEDURE — 93503 SWAN GANZ LINE: ICD-10-PCS | Mod: ,,, | Performed by: ANESTHESIOLOGY

## 2023-09-07 PROCEDURE — 85610 PROTHROMBIN TIME: CPT | Mod: 91 | Performed by: SURGERY

## 2023-09-07 PROCEDURE — 85610 PROTHROMBIN TIME: CPT | Mod: 91 | Performed by: STUDENT IN AN ORGANIZED HEALTH CARE EDUCATION/TRAINING PROGRAM

## 2023-09-07 PROCEDURE — 82330 ASSAY OF CALCIUM: CPT

## 2023-09-07 PROCEDURE — 82803 BLOOD GASES ANY COMBINATION: CPT

## 2023-09-07 PROCEDURE — 82947 ASSAY GLUCOSE BLOOD QUANT: CPT | Mod: 91 | Performed by: SURGERY

## 2023-09-07 PROCEDURE — 25000003 PHARM REV CODE 250: Performed by: HOSPITALIST

## 2023-09-07 PROCEDURE — 63600175 PHARM REV CODE 636 W HCPCS: Performed by: NURSE ANESTHETIST, CERTIFIED REGISTERED

## 2023-09-07 PROCEDURE — 85730 THROMBOPLASTIN TIME PARTIAL: CPT | Mod: 91 | Performed by: SURGERY

## 2023-09-07 PROCEDURE — 86965 POOLING BLOOD PLATELETS: CPT | Performed by: ANESTHESIOLOGY

## 2023-09-07 PROCEDURE — 88307 TISSUE EXAM BY PATHOLOGIST: CPT | Mod: 26,,, | Performed by: PATHOLOGY

## 2023-09-07 PROCEDURE — 36415 COLL VENOUS BLD VENIPUNCTURE: CPT | Performed by: STUDENT IN AN ORGANIZED HEALTH CARE EDUCATION/TRAINING PROGRAM

## 2023-09-07 PROCEDURE — 83735 ASSAY OF MAGNESIUM: CPT | Mod: 91 | Performed by: INTERNAL MEDICINE

## 2023-09-07 PROCEDURE — P9016 RBC LEUKOCYTES REDUCED: HCPCS | Performed by: CLINICAL NURSE SPECIALIST

## 2023-09-07 PROCEDURE — 36556 CENTRAL LINE: ICD-10-PCS | Mod: 59,,, | Performed by: ANESTHESIOLOGY

## 2023-09-07 PROCEDURE — 20000000 HC ICU ROOM

## 2023-09-07 PROCEDURE — 63600175 PHARM REV CODE 636 W HCPCS: Mod: JG | Performed by: NURSE ANESTHETIST, CERTIFIED REGISTERED

## 2023-09-07 PROCEDURE — 80053 COMPREHEN METABOLIC PANEL: CPT | Performed by: STUDENT IN AN ORGANIZED HEALTH CARE EDUCATION/TRAINING PROGRAM

## 2023-09-07 PROCEDURE — 99233 PR SUBSEQUENT HOSPITAL CARE,LEVL III: ICD-10-PCS | Mod: ,,, | Performed by: NURSE PRACTITIONER

## 2023-09-07 PROCEDURE — 87205 SMEAR GRAM STAIN: CPT | Performed by: SURGERY

## 2023-09-07 PROCEDURE — P9017 PLASMA 1 DONOR FRZ W/IN 8 HR: HCPCS | Performed by: CLINICAL NURSE SPECIALIST

## 2023-09-07 PROCEDURE — 27201423 OPTIME MED/SURG SUP & DEVICES STERILE SUPPLY: Performed by: SURGERY

## 2023-09-07 PROCEDURE — 85018 HEMOGLOBIN: CPT | Mod: 91 | Performed by: SURGERY

## 2023-09-07 PROCEDURE — 82040 ASSAY OF SERUM ALBUMIN: CPT | Mod: 91 | Performed by: SURGERY

## 2023-09-07 PROCEDURE — 80053 COMPREHEN METABOLIC PANEL: CPT | Mod: 91 | Performed by: STUDENT IN AN ORGANIZED HEALTH CARE EDUCATION/TRAINING PROGRAM

## 2023-09-07 PROCEDURE — 36620 INSERTION CATHETER ARTERY: CPT | Mod: ,,, | Performed by: NURSE ANESTHETIST, CERTIFIED REGISTERED

## 2023-09-07 PROCEDURE — 83735 ASSAY OF MAGNESIUM: CPT | Performed by: SURGERY

## 2023-09-07 PROCEDURE — 87070 CULTURE OTHR SPECIMN AEROBIC: CPT | Performed by: SURGERY

## 2023-09-07 PROCEDURE — 88313 SPECIAL STAINS GROUP 2: CPT | Mod: 26,,, | Performed by: PATHOLOGY

## 2023-09-07 PROCEDURE — 63600175 PHARM REV CODE 636 W HCPCS: Performed by: STUDENT IN AN ORGANIZED HEALTH CARE EDUCATION/TRAINING PROGRAM

## 2023-09-07 PROCEDURE — 83605 ASSAY OF LACTIC ACID: CPT | Performed by: INTERNAL MEDICINE

## 2023-09-07 PROCEDURE — 93503 INSERT/PLACE HEART CATHETER: CPT | Mod: ,,, | Performed by: ANESTHESIOLOGY

## 2023-09-07 PROCEDURE — P9012 CRYOPRECIPITATE EACH UNIT: HCPCS | Performed by: ANESTHESIOLOGY

## 2023-09-07 PROCEDURE — 94002 VENT MGMT INPAT INIT DAY: CPT

## 2023-09-07 PROCEDURE — 25000003 PHARM REV CODE 250

## 2023-09-07 PROCEDURE — 27201041 HC RESERVOIR, CARDIOTOMY

## 2023-09-07 PROCEDURE — 81300000 HC LIVER ACQUISITION CHARGE

## 2023-09-07 PROCEDURE — 82330 ASSAY OF CALCIUM: CPT | Performed by: SURGERY

## 2023-09-07 PROCEDURE — 88309 TISSUE EXAM BY PATHOLOGIST: CPT | Performed by: PATHOLOGY

## 2023-09-07 PROCEDURE — P9021 RED BLOOD CELLS UNIT: HCPCS | Performed by: CLINICAL NURSE SPECIALIST

## 2023-09-07 PROCEDURE — 84295 ASSAY OF SERUM SODIUM: CPT | Mod: 91 | Performed by: SURGERY

## 2023-09-07 PROCEDURE — 85014 HEMATOCRIT: CPT

## 2023-09-07 PROCEDURE — 88309 TISSUE EXAM BY PATHOLOGIST: CPT | Mod: 26,,, | Performed by: PATHOLOGY

## 2023-09-07 PROCEDURE — 84100 ASSAY OF PHOSPHORUS: CPT | Performed by: INTERNAL MEDICINE

## 2023-09-07 PROCEDURE — 37000008 HC ANESTHESIA 1ST 15 MINUTES: Performed by: SURGERY

## 2023-09-07 PROCEDURE — 88313 SPECIAL STAINS GROUP 2: CPT | Mod: 59 | Performed by: PATHOLOGY

## 2023-09-07 PROCEDURE — 82150 ASSAY OF AMYLASE: CPT | Performed by: STUDENT IN AN ORGANIZED HEALTH CARE EDUCATION/TRAINING PROGRAM

## 2023-09-07 PROCEDURE — 87075 CULTR BACTERIA EXCEPT BLOOD: CPT | Performed by: SURGERY

## 2023-09-07 PROCEDURE — 97110 THERAPEUTIC EXERCISES: CPT | Mod: CQ

## 2023-09-07 PROCEDURE — 76937 US GUIDE VASCULAR ACCESS: CPT | Mod: 26,,, | Performed by: ANESTHESIOLOGY

## 2023-09-07 PROCEDURE — 94761 N-INVAS EAR/PLS OXIMETRY MLT: CPT

## 2023-09-07 PROCEDURE — 85384 FIBRINOGEN ACTIVITY: CPT | Mod: 91 | Performed by: INTERNAL MEDICINE

## 2023-09-07 PROCEDURE — 88309 PR  SURG PATH,LEVEL VI: ICD-10-PCS | Mod: 26,,, | Performed by: PATHOLOGY

## 2023-09-07 PROCEDURE — 25000003 PHARM REV CODE 250: Performed by: CLINICAL NURSE SPECIALIST

## 2023-09-07 PROCEDURE — 84295 ASSAY OF SERUM SODIUM: CPT

## 2023-09-07 PROCEDURE — 89051 BODY FLUID CELL COUNT: CPT | Performed by: SURGERY

## 2023-09-07 PROCEDURE — 63600175 PHARM REV CODE 636 W HCPCS: Mod: JZ,JG | Performed by: SURGERY

## 2023-09-07 PROCEDURE — 37799 UNLISTED PX VASCULAR SURGERY: CPT

## 2023-09-07 PROCEDURE — 81300005 HC LIVER TRANSPORT, GROUND 4-5 HOURS

## 2023-09-07 PROCEDURE — 85610 PROTHROMBIN TIME: CPT | Performed by: STUDENT IN AN ORGANIZED HEALTH CARE EDUCATION/TRAINING PROGRAM

## 2023-09-07 PROCEDURE — 99900035 HC TECH TIME PER 15 MIN (STAT)

## 2023-09-07 PROCEDURE — 99233 SBSQ HOSP IP/OBS HIGH 50: CPT | Mod: ,,, | Performed by: NURSE PRACTITIONER

## 2023-09-07 PROCEDURE — D9220A PRA ANESTHESIA: Mod: ANES,,, | Performed by: ANESTHESIOLOGY

## 2023-09-07 PROCEDURE — 87102 FUNGUS ISOLATION CULTURE: CPT | Performed by: SURGERY

## 2023-09-07 PROCEDURE — 85014 HEMATOCRIT: CPT | Performed by: SURGERY

## 2023-09-07 PROCEDURE — 99900026 HC AIRWAY MAINTENANCE (STAT)

## 2023-09-07 PROCEDURE — 27000191 HC C-V MONITORING

## 2023-09-07 PROCEDURE — 47135 PR TRANSPLANT LIVER,ALLOTRANSPLANT: ICD-10-PCS | Mod: ,,, | Performed by: SURGERY

## 2023-09-07 PROCEDURE — 36556 INSERT NON-TUNNEL CV CATH: CPT | Mod: 59,,, | Performed by: ANESTHESIOLOGY

## 2023-09-07 PROCEDURE — 84450 TRANSFERASE (AST) (SGOT): CPT | Performed by: SURGERY

## 2023-09-07 PROCEDURE — 84460 ALANINE AMINO (ALT) (SGPT): CPT | Performed by: SURGERY

## 2023-09-07 PROCEDURE — 27000221 HC OXYGEN, UP TO 24 HOURS

## 2023-09-07 PROCEDURE — 36000931 HC OR TIME LEV VII EA ADD 15 MIN: Performed by: SURGERY

## 2023-09-07 PROCEDURE — 84132 ASSAY OF SERUM POTASSIUM: CPT

## 2023-09-07 PROCEDURE — 36620 INSERTION CATHETER ARTERY: CPT | Mod: 59,,, | Performed by: ANESTHESIOLOGY

## 2023-09-07 PROCEDURE — 47143 PR TRANSPLANT,PREP DONOR LIVER, WHOLE: ICD-10-PCS | Mod: 51,,, | Performed by: TRANSPLANT SURGERY

## 2023-09-07 PROCEDURE — D9220A PRA ANESTHESIA: ICD-10-PCS | Mod: ANES,,, | Performed by: ANESTHESIOLOGY

## 2023-09-07 PROCEDURE — P9045 ALBUMIN (HUMAN), 5%, 250 ML: HCPCS | Mod: JZ,JG | Performed by: SURGERY

## 2023-09-07 PROCEDURE — 37000009 HC ANESTHESIA EA ADD 15 MINS: Performed by: SURGERY

## 2023-09-07 PROCEDURE — 85384 FIBRINOGEN ACTIVITY: CPT | Mod: 91 | Performed by: SURGERY

## 2023-09-07 PROCEDURE — 27100088 HC CELL SAVER

## 2023-09-07 PROCEDURE — 63600175 PHARM REV CODE 636 W HCPCS: Performed by: CLINICAL NURSE SPECIALIST

## 2023-09-07 PROCEDURE — C1729 CATH, DRAINAGE: HCPCS | Performed by: SURGERY

## 2023-09-07 PROCEDURE — 85025 COMPLETE CBC W/AUTO DIFF WBC: CPT | Mod: 91 | Performed by: INTERNAL MEDICINE

## 2023-09-07 PROCEDURE — 85049 AUTOMATED PLATELET COUNT: CPT | Mod: 91 | Performed by: SURGERY

## 2023-09-07 PROCEDURE — 83605 ASSAY OF LACTIC ACID: CPT

## 2023-09-07 PROCEDURE — D9220A PRA ANESTHESIA: ICD-10-PCS | Mod: CRNA,,, | Performed by: NURSE ANESTHETIST, CERTIFIED REGISTERED

## 2023-09-07 PROCEDURE — 83615 LACTATE (LD) (LDH) ENZYME: CPT | Performed by: STUDENT IN AN ORGANIZED HEALTH CARE EDUCATION/TRAINING PROGRAM

## 2023-09-07 PROCEDURE — 36415 COLL VENOUS BLD VENIPUNCTURE: CPT | Performed by: SURGERY

## 2023-09-07 RX ORDER — FENTANYL CITRATE 50 UG/ML
INJECTION, SOLUTION INTRAMUSCULAR; INTRAVENOUS
Status: DISCONTINUED | OUTPATIENT
Start: 2023-09-07 | End: 2023-09-07

## 2023-09-07 RX ORDER — ALBUMIN HUMAN 250 G/1000ML
SOLUTION INTRAVENOUS CONTINUOUS PRN
Status: DISCONTINUED | OUTPATIENT
Start: 2023-09-07 | End: 2023-09-07

## 2023-09-07 RX ORDER — FAMOTIDINE 10 MG/ML
20 INJECTION INTRAVENOUS EVERY 12 HOURS
Status: DISCONTINUED | OUTPATIENT
Start: 2023-09-07 | End: 2023-09-08

## 2023-09-07 RX ORDER — SULFAMETHOXAZOLE AND TRIMETHOPRIM 400; 80 MG/1; MG/1
1 TABLET ORAL EVERY MORNING
Status: DISCONTINUED | OUTPATIENT
Start: 2023-09-14 | End: 2023-09-14 | Stop reason: HOSPADM

## 2023-09-07 RX ORDER — DEXTROSE MONOHYDRATE AND SODIUM CHLORIDE 5; .9 G/100ML; G/100ML
INJECTION, SOLUTION INTRAVENOUS CONTINUOUS
Status: DISCONTINUED | OUTPATIENT
Start: 2023-09-07 | End: 2023-09-09

## 2023-09-07 RX ORDER — ROCURONIUM BROMIDE 10 MG/ML
INJECTION, SOLUTION INTRAVENOUS
Status: DISCONTINUED | OUTPATIENT
Start: 2023-09-07 | End: 2023-09-07

## 2023-09-07 RX ORDER — PREDNISONE 20 MG/1
20 TABLET ORAL DAILY
Status: DISCONTINUED | OUTPATIENT
Start: 2023-09-13 | End: 2023-09-14 | Stop reason: HOSPADM

## 2023-09-07 RX ORDER — NOREPINEPHRINE BITARTRATE 1 MG/ML
INJECTION, SOLUTION INTRAVENOUS
Status: DISCONTINUED | OUTPATIENT
Start: 2023-09-07 | End: 2023-09-07

## 2023-09-07 RX ORDER — POTASSIUM CHLORIDE 14.9 MG/ML
60 INJECTION INTRAVENOUS
Status: DISCONTINUED | OUTPATIENT
Start: 2023-09-08 | End: 2023-09-09

## 2023-09-07 RX ORDER — FUROSEMIDE 10 MG/ML
INJECTION INTRAMUSCULAR; INTRAVENOUS
Status: DISCONTINUED | OUTPATIENT
Start: 2023-09-07 | End: 2023-09-07

## 2023-09-07 RX ORDER — CALCIUM GLUCONATE 20 MG/ML
1 INJECTION, SOLUTION INTRAVENOUS
Status: DISCONTINUED | OUTPATIENT
Start: 2023-09-08 | End: 2023-09-09

## 2023-09-07 RX ORDER — ALBUMIN HUMAN 50 G/1000ML
SOLUTION INTRAVENOUS
Status: COMPLETED | OUTPATIENT
Start: 2023-09-07 | End: 2023-09-07

## 2023-09-07 RX ORDER — HEPARIN SODIUM 1000 [USP'U]/ML
INJECTION, SOLUTION INTRAVENOUS; SUBCUTANEOUS
Status: DISCONTINUED | OUTPATIENT
Start: 2023-09-07 | End: 2023-09-07

## 2023-09-07 RX ORDER — MAGNESIUM SULFATE HEPTAHYDRATE 40 MG/ML
2 INJECTION, SOLUTION INTRAVENOUS
Status: DISCONTINUED | OUTPATIENT
Start: 2023-09-08 | End: 2023-09-09

## 2023-09-07 RX ORDER — POTASSIUM CHLORIDE 29.8 MG/ML
80 INJECTION INTRAVENOUS
Status: DISCONTINUED | OUTPATIENT
Start: 2023-09-08 | End: 2023-09-09

## 2023-09-07 RX ORDER — METHYLPREDNISOLONE SOD SUCC 125 MG
120 VIAL (EA) INJECTION DAILY
Status: COMPLETED | OUTPATIENT
Start: 2023-09-10 | End: 2023-09-10

## 2023-09-07 RX ORDER — PROPOFOL 10 MG/ML
0-50 INJECTION, EMULSION INTRAVENOUS CONTINUOUS
Status: DISCONTINUED | OUTPATIENT
Start: 2023-09-07 | End: 2023-09-08

## 2023-09-07 RX ORDER — PROPOFOL 10 MG/ML
VIAL (ML) INTRAVENOUS CONTINUOUS PRN
Status: DISCONTINUED | OUTPATIENT
Start: 2023-09-07 | End: 2023-09-07

## 2023-09-07 RX ORDER — NYSTATIN 100000 [USP'U]/ML
500000 SUSPENSION ORAL
Status: DISCONTINUED | OUTPATIENT
Start: 2023-09-08 | End: 2023-09-08

## 2023-09-07 RX ORDER — CALCIUM GLUCONATE 20 MG/ML
3 INJECTION, SOLUTION INTRAVENOUS
Status: DISCONTINUED | OUTPATIENT
Start: 2023-09-08 | End: 2023-09-09

## 2023-09-07 RX ORDER — KETAMINE HCL IN 0.9 % NACL 50 MG/5 ML
SYRINGE (ML) INTRAVENOUS
Status: DISCONTINUED | OUTPATIENT
Start: 2023-09-07 | End: 2023-09-07

## 2023-09-07 RX ORDER — HYDROMORPHONE HYDROCHLORIDE 1 MG/ML
INJECTION, SOLUTION INTRAMUSCULAR; INTRAVENOUS; SUBCUTANEOUS
Status: DISCONTINUED | OUTPATIENT
Start: 2023-09-07 | End: 2023-09-07

## 2023-09-07 RX ORDER — MANNITOL 250 MG/ML
INJECTION, SOLUTION INTRAVENOUS
Status: DISCONTINUED | OUTPATIENT
Start: 2023-09-07 | End: 2023-09-07

## 2023-09-07 RX ORDER — ONDANSETRON 2 MG/ML
INJECTION INTRAMUSCULAR; INTRAVENOUS
Status: DISCONTINUED | OUTPATIENT
Start: 2023-09-07 | End: 2023-09-07

## 2023-09-07 RX ORDER — HALOPERIDOL 5 MG/ML
INJECTION INTRAMUSCULAR
Status: DISCONTINUED | OUTPATIENT
Start: 2023-09-07 | End: 2023-09-07

## 2023-09-07 RX ORDER — HEPARIN SODIUM 1000 [USP'U]/ML
INJECTION, SOLUTION INTRAVENOUS; SUBCUTANEOUS
Status: DISCONTINUED | OUTPATIENT
Start: 2023-09-07 | End: 2023-09-07 | Stop reason: HOSPADM

## 2023-09-07 RX ORDER — POTASSIUM CHLORIDE 29.8 MG/ML
40 INJECTION INTRAVENOUS
Status: DISCONTINUED | OUTPATIENT
Start: 2023-09-08 | End: 2023-09-09

## 2023-09-07 RX ORDER — MYCOPHENOLATE MOFETIL 200 MG/ML
1000 POWDER, FOR SUSPENSION ORAL 2 TIMES DAILY
Status: DISCONTINUED | OUTPATIENT
Start: 2023-09-07 | End: 2023-09-08

## 2023-09-07 RX ORDER — CALCIUM GLUCONATE 20 MG/ML
2 INJECTION, SOLUTION INTRAVENOUS
Status: DISCONTINUED | OUTPATIENT
Start: 2023-09-08 | End: 2023-09-09

## 2023-09-07 RX ORDER — PROTAMINE SULFATE 10 MG/ML
INJECTION, SOLUTION INTRAVENOUS
Status: DISCONTINUED | OUTPATIENT
Start: 2023-09-07 | End: 2023-09-07

## 2023-09-07 RX ORDER — MAGNESIUM SULFATE HEPTAHYDRATE 40 MG/ML
4 INJECTION, SOLUTION INTRAVENOUS
Status: DISCONTINUED | OUTPATIENT
Start: 2023-09-08 | End: 2023-09-09

## 2023-09-07 RX ORDER — SODIUM CHLORIDE 0.9 % (FLUSH) 0.9 %
10 SYRINGE (ML) INJECTION
Status: DISCONTINUED | OUTPATIENT
Start: 2023-09-07 | End: 2023-09-14 | Stop reason: HOSPADM

## 2023-09-07 RX ORDER — MIDAZOLAM HYDROCHLORIDE 1 MG/ML
INJECTION INTRAMUSCULAR; INTRAVENOUS
Status: DISCONTINUED | OUTPATIENT
Start: 2023-09-07 | End: 2023-09-07

## 2023-09-07 RX ORDER — PROPOFOL 10 MG/ML
VIAL (ML) INTRAVENOUS
Status: DISCONTINUED | OUTPATIENT
Start: 2023-09-07 | End: 2023-09-07

## 2023-09-07 RX ORDER — METHYLPREDNISOLONE SOD SUCC 125 MG
160 VIAL (EA) INJECTION DAILY
Status: COMPLETED | OUTPATIENT
Start: 2023-09-09 | End: 2023-09-09

## 2023-09-07 RX ORDER — HYDROMORPHONE HYDROCHLORIDE 1 MG/ML
0.2 INJECTION, SOLUTION INTRAMUSCULAR; INTRAVENOUS; SUBCUTANEOUS
Status: DISCONTINUED | OUTPATIENT
Start: 2023-09-07 | End: 2023-09-08

## 2023-09-07 RX ORDER — HEPARIN SODIUM 5000 [USP'U]/ML
5000 INJECTION, SOLUTION INTRAVENOUS; SUBCUTANEOUS EVERY 8 HOURS
Status: DISCONTINUED | OUTPATIENT
Start: 2023-09-07 | End: 2023-09-14 | Stop reason: HOSPADM

## 2023-09-07 RX ORDER — MUPIROCIN 20 MG/G
1 OINTMENT TOPICAL 2 TIMES DAILY
Status: COMPLETED | OUTPATIENT
Start: 2023-09-07 | End: 2023-09-12

## 2023-09-07 RX ORDER — LIDOCAINE HYDROCHLORIDE 20 MG/ML
INJECTION INTRAVENOUS
Status: DISCONTINUED | OUTPATIENT
Start: 2023-09-07 | End: 2023-09-07

## 2023-09-07 RX ORDER — VALGANCICLOVIR 450 MG/1
450 TABLET, FILM COATED ORAL DAILY
Status: DISCONTINUED | OUTPATIENT
Start: 2023-09-17 | End: 2023-09-14 | Stop reason: HOSPADM

## 2023-09-07 RX ORDER — METHYLPREDNISOLONE SOD SUCC 125 MG
200 VIAL (EA) INJECTION DAILY
Status: COMPLETED | OUTPATIENT
Start: 2023-09-08 | End: 2023-09-08

## 2023-09-07 RX ADMIN — METHYLPREDNISOLONE SODIUM SUCCINATE 500 MG: 500 INJECTION, POWDER, FOR SOLUTION INTRAMUSCULAR; INTRAVENOUS at 02:09

## 2023-09-07 RX ADMIN — ONDANSETRON 1000 MG: 2 INJECTION INTRAMUSCULAR; INTRAVENOUS at 06:09

## 2023-09-07 RX ADMIN — TACROLIMUS 1 MG: 1 CAPSULE ORAL at 10:09

## 2023-09-07 RX ADMIN — RIFAXIMIN 550 MG: 550 TABLET ORAL at 07:09

## 2023-09-07 RX ADMIN — Medication 30 MG: at 01:09

## 2023-09-07 RX ADMIN — FLUCONAZOLE 400 MG: 200 TABLET ORAL at 07:09

## 2023-09-07 RX ADMIN — SODIUM CHLORIDE 3 UNITS/HR: 9 INJECTION, SOLUTION INTRAVENOUS at 07:09

## 2023-09-07 RX ADMIN — AMPICILLIN SODIUM AND SULBACTAM SODIUM 3 G: 2; 1 INJECTION, POWDER, FOR SOLUTION INTRAMUSCULAR; INTRAVENOUS at 05:09

## 2023-09-07 RX ADMIN — PROTAMINE SULFATE 20 MG: 10 INJECTION, SOLUTION INTRAVENOUS at 07:09

## 2023-09-07 RX ADMIN — Medication 10 MG: at 07:09

## 2023-09-07 RX ADMIN — SODIUM CHLORIDE, SODIUM ACETATE ANHYDROUS, SODIUM GLUCONATE, POTASSIUM CHLORIDE, AND MAGNESIUM CHLORIDE: 526; 222; 502; 37; 30 INJECTION, SOLUTION INTRAVENOUS at 01:09

## 2023-09-07 RX ADMIN — ROCURONIUM BROMIDE 160 MG: 10 INJECTION, SOLUTION INTRAVENOUS at 01:09

## 2023-09-07 RX ADMIN — FAMOTIDINE 20 MG: 10 INJECTION, SOLUTION INTRAVENOUS at 10:09

## 2023-09-07 RX ADMIN — ONDANSETRON 1000 MG: 2 INJECTION INTRAMUSCULAR; INTRAVENOUS at 05:09

## 2023-09-07 RX ADMIN — AMPICILLIN SODIUM AND SULBACTAM SODIUM 3 G: 2; 1 INJECTION, POWDER, FOR SOLUTION INTRAMUSCULAR; INTRAVENOUS at 10:09

## 2023-09-07 RX ADMIN — HEPARIN SODIUM 5000 UNITS: 5000 INJECTION INTRAVENOUS; SUBCUTANEOUS at 10:09

## 2023-09-07 RX ADMIN — ONDANSETRON 4 MG: 2 INJECTION INTRAMUSCULAR; INTRAVENOUS at 07:09

## 2023-09-07 RX ADMIN — AMPICILLIN SODIUM AND SULBACTAM SODIUM 3 G: 2; 1 INJECTION, POWDER, FOR SOLUTION INTRAMUSCULAR; INTRAVENOUS at 07:09

## 2023-09-07 RX ADMIN — AMPICILLIN SODIUM AND SULBACTAM SODIUM 3 G: 2; 1 INJECTION, POWDER, FOR SOLUTION INTRAMUSCULAR; INTRAVENOUS at 03:09

## 2023-09-07 RX ADMIN — MIDAZOLAM HYDROCHLORIDE 2 MG: 1 INJECTION INTRAMUSCULAR; INTRAVENOUS at 01:09

## 2023-09-07 RX ADMIN — NOREPINEPHRINE BITARTRATE 32 MCG: 1 INJECTION, SOLUTION, CONCENTRATE INTRAVENOUS at 05:09

## 2023-09-07 RX ADMIN — SODIUM CHLORIDE, SODIUM ACETATE ANHYDROUS, SODIUM GLUCONATE, POTASSIUM CHLORIDE, AND MAGNESIUM CHLORIDE: 526; 222; 502; 37; 30 INJECTION, SOLUTION INTRAVENOUS at 02:09

## 2023-09-07 RX ADMIN — MUPIROCIN 1 G: 20 OINTMENT TOPICAL at 10:09

## 2023-09-07 RX ADMIN — FENTANYL CITRATE 100 MCG: 50 INJECTION, SOLUTION INTRAMUSCULAR; INTRAVENOUS at 01:09

## 2023-09-07 RX ADMIN — LIDOCAINE HYDROCHLORIDE 100 MG: 20 INJECTION INTRAVENOUS at 01:09

## 2023-09-07 RX ADMIN — FUROSEMIDE 66 MG: 10 INJECTION, SOLUTION INTRAMUSCULAR; INTRAVENOUS at 03:09

## 2023-09-07 RX ADMIN — HEPARIN SODIUM 2000 UNITS: 1000 INJECTION, SOLUTION INTRAVENOUS; SUBCUTANEOUS at 04:09

## 2023-09-07 RX ADMIN — NOREPINEPHRINE BITARTRATE 0.04 MCG/KG/MIN: 1 INJECTION, SOLUTION, CONCENTRATE INTRAVENOUS at 03:09

## 2023-09-07 RX ADMIN — PROPOFOL 50 MCG/KG/MIN: 10 INJECTION, EMULSION INTRAVENOUS at 09:09

## 2023-09-07 RX ADMIN — SODIUM BICARBONATE 650 MG TABLET 1950 MG: at 07:09

## 2023-09-07 RX ADMIN — PANTOPRAZOLE SODIUM 40 MG: 40 TABLET, DELAYED RELEASE ORAL at 07:09

## 2023-09-07 RX ADMIN — VASOPRESSIN 0.04 UNITS/MIN: 20 INJECTION INTRAVENOUS at 03:09

## 2023-09-07 RX ADMIN — MYCOPHENOLATE MOFETIL 1000 MG: 200 POWDER, FOR SUSPENSION ORAL at 10:09

## 2023-09-07 RX ADMIN — HALOPERIDOL LACTATE 1 MG: 5 INJECTION, SOLUTION INTRAMUSCULAR at 07:09

## 2023-09-07 RX ADMIN — HYDROMORPHONE HYDROCHLORIDE 1 MG: 1 INJECTION, SOLUTION INTRAMUSCULAR; INTRAVENOUS; SUBCUTANEOUS at 07:09

## 2023-09-07 RX ADMIN — DEXTROSE AND SODIUM CHLORIDE: 5; 900 INJECTION, SOLUTION INTRAVENOUS at 10:09

## 2023-09-07 RX ADMIN — FENTANYL CITRATE 100 MCG: 50 INJECTION, SOLUTION INTRAMUSCULAR; INTRAVENOUS at 07:09

## 2023-09-07 RX ADMIN — MANNITOL 66 ML: 12.5 INJECTION, SOLUTION INTRAVENOUS at 03:09

## 2023-09-07 RX ADMIN — HYDROMORPHONE HYDROCHLORIDE 0.2 MG: 0.5 INJECTION, SOLUTION INTRAMUSCULAR; INTRAVENOUS; SUBCUTANEOUS at 10:09

## 2023-09-07 RX ADMIN — ALBUMIN (HUMAN): 12.5 SOLUTION INTRAVENOUS at 03:09

## 2023-09-07 RX ADMIN — POTASSIUM CHLORIDE 60 MEQ: 200 INJECTION, SOLUTION INTRAVENOUS at 11:09

## 2023-09-07 RX ADMIN — PHYTONADIONE 10 MG: 10 INJECTION, EMULSION INTRAMUSCULAR; INTRAVENOUS; SUBCUTANEOUS at 11:09

## 2023-09-07 RX ADMIN — PROPOFOL 130 MG: 10 INJECTION, EMULSION INTRAVENOUS at 01:09

## 2023-09-07 NOTE — OP NOTE
Certification of Assistant at Surgery       Surgery Date: 9/7/2023     Participating Surgeons:  Surgeon(s) and Role:     * Leonardo Manuel MD - Primary     * Jamaal Elaine MD - Resident - Assisting     * Tai Prieto MD - Fellow     * Mel Murry MD - Co-Surgeon    Procedures:  Procedure(s) (LRB):  TRANSPLANT, LIVER (N/A)    Assistant Surgeon's Certification of Necessity:  I understand that section 1842 (b) (6) (d) of the Social Security Act generally prohibits Medicare Part B reasonable charge payment for the services of assistants at surgery in teaching hospitals when qualified residents are available to furnish such services. I certify that the services for which payment is claimed were medically necessary, and that no qualified resident was available to perform the services. I further understand that these services are subject to post-payment review by the Medicare carrier.      Mel Murry MD    09/07/2023  6:05 PM

## 2023-09-07 NOTE — ASSESSMENT & PLAN NOTE
- Patient made active on transplant list on . Had 1 liver transplant offers on  and 9/3; however, surgeries cancelled due to size of donor liver and poor organ quality.     - listed MELD 31 till   - surveillance blood cultures ordered ; NGTD  - To OR today for LTX    MELD 3.0: 30 at 2023  5:10 AM  MELD-Na: 30 at 2023  5:10 AM  Calculated from:  Serum Creatinine: 0.8 mg/dL (Using min of 1 mg/dL) at 2023  5:10 AM  Serum Sodium: 131 mmol/L at 2023  5:10 AM  Total Bilirubin: 27.7 mg/dL at 2023  5:10 AM  Serum Albumin: 2.5 g/dL at 2023  5:10 AM  INR(ratio): 2.1 at 2023  5:10 AM  Age at listin years  Sex: Male at 2023  5:10 AM

## 2023-09-07 NOTE — ANESTHESIA PROCEDURE NOTES
[facility]    Diagnosis: ESLD  Patient location during procedure: done in OR  Procedure start time: 9/7/2023 2:30 PM  Timeout: 9/7/2023 2:30 PM  Procedure end time: 9/7/2023 2:50 PM    Staffing  Authorizing Provider: Jonatan Ariza MD  Performing Provider: Jonatan Ariza MD    Staffing  Performed by: Jonatan Ariza MD  Authorized by: Jonatan Ariza MD    Anesthesiologist was present at the time of the procedure.  Preanesthetic Checklist  Completed: patient identified, IV checked, site marked, risks and benefits discussed, surgical consent, monitors and equipment checked, pre-op evaluation, timeout performed and anesthesia consent given  [facility]  Skin Prep: chlorhexidine gluconate  Local Infiltration: none  Location: right,  internal jugular vein  Vessel Caliber: medium, patent, compressibility normal  Vascular Doppler:  not done  Introducer: 9 Fr single lumen, manometry used.  Device: standard thermodilution catheter   Catheter Size: 7.5 Fr  Catheter placement by yes. Heme positive aspiration all ports. PAC floated with balloon up not wedgedSterile sheath used  Locked at: 40 cm.Insertion Attempts: 1  Indication: hemodynamic monitoring  Ultrasound Guidance  Needle advanced into vessel with real time Ultrasound guidance.  Guidewire confirmed in vessel.  Sterile sheath used.  Assessment  Central Line Bundle Protocol followed. Hand hygiene before procedure, surgical cap worn, surgical mask worn, sterile surgical gloves worn, large sterile drape used.  Verification: blood return and ultrasound  Dressing: sutured in place and taped and tegaderm  Patient: Tolerated Well

## 2023-09-07 NOTE — PT/OT/SLP PROGRESS
"Physical Therapy Treatment    Patient Name:  Sukhdeep Grimes   MRN:  [id number]    Recommendations:     Discharge Recommendations: home  Discharge Equipment Recommendations: to be determined by next level of care, none  Barriers to discharge: None    Assessment:     Sukhdeep Grimes is a 38 y.o. male admitted with a medical diagnosis of Alcoholic cirrhosis of liver with ascites.  He presents with the following impairments/functional limitations: weakness, impaired self care skills, impaired functional mobility, gait instability, impaired balance, decreased safety awareness, decreased lower extremity function.    Rehab Prognosis: Good; patient would benefit from acute skilled PT services to address these deficits and reach maximum level of function.    Recent Surgery: Procedure(s) (LRB):  TRANSPLANT, LIVER (N/A) Day of Surgery    Plan:     During this hospitalization, patient to be seen 1 x/week to address the identified rehab impairments via gait training, therapeutic activities, therapeutic exercises, neuromuscular re-education and progress toward the following goals:    Plan of Care Expires:  09/27/23    Subjective     Chief Complaint: no c/o  Patient/Family Comments/goals: "I'm hoping I get my liver today."  Pain/Comfort:  Pain Rating 1: 0/10  Pain Rating Post-Intervention 1: 0/10      Objective:     Communicated with RN prior to session.  Patient found HOB elevated with telemetry upon PTA entry to room. Mother at bedside.     General Precautions: Standard, fall  Orthopedic Precautions: N/A  Braces: N/A  Respiratory Status: Room air     Functional Mobility:  Transfers:     Sit to Stand:  independence with no AD  Gait: Pt found ambulatory in room with no AD, mildly unsteady but with no overt LOB. Pt with long step length bilaterally and excessive heel strike with minimal knee flexion in swing phases. Normal haydee. Overall pt independent with gait with no AD needed to ambulate throughout transplant unit on level " surface.       AM-PAC 6 CLICK MOBILITY  Turning over in bed (including adjusting bedclothes, sheets and blankets)?: 4  Sitting down on and standing up from a chair with arms (e.g., wheelchair, bedside commode, etc.): 4  Moving from lying on back to sitting on the side of the bed?: 4  Moving to and from a bed to a chair (including a wheelchair)?: 4  Need to walk in hospital room?: 4  Climbing 3-5 steps with a railing?: 3  Basic Mobility Total Score: 23       Patient left sitting edge of bed with all lines intact, call button in reach, and mother present..    GOALS:   Multidisciplinary Problems       Physical Therapy Goals          Problem: Physical Therapy    Goal Priority Disciplines Outcome Goal Variances Interventions   Physical Therapy Goal     PT, PT/OT Ongoing, Progressing     Description: Goals to be met by: 23     Patient will increase functional independence with mobility by performin. Sit to stand transfer with [place]  2. Gait  x 150 feet with [place] using No Assistive Device.   3. Ascend/descend 4 stair with no Handrails [place] using No Assistive Device.   4. Stand for 2 minutes with [place] using No Assistive Device  5. Lower extremity exercise program x15 reps per handout, with independence                         Time Tracking:     PT Received On: 23  PT Start Time: 1006     PT Stop Time: 1019  PT Total Time (min): 13 min     Billable Minutes: Therapeutic Exercise 13    Treatment Type: Treatment  PT/PTA: PTA     Number of PTA visits since last PT visit: 2023

## 2023-09-07 NOTE — PROGRESS NOTES
[facility] - Transplant Stepdown  Liver Transplant  Progress Note    Patient Name: Sukhdeep Grimes  MRN: [id number]  Admission Date: 8/24/2023  Hospital Length of Stay: 14 days  Code Status: Full Code  Primary Care Provider: Martine, Primary Doctor  Post-Operative Day:     Subjective:     History of Present Illness:  Sukhdeep Grimes is a 38 y.o. male  PMHx of Alcoholic decompensated cirrhosis. Pt was admitted from OSH  for hepatology evaluation  D/T ascites, hyponatremia, hypokalemia, thrombocytopenia, and AYAH and weakness. On 9/1 patient was made active on transplant list MELD 30       Hospital Course:  Patient made active on transplant list on 9/1. Had 1 liver transplant offers on 9/2 and 9/3; however, surgeries cancelled due to size of donor liver and poor organ quality.      Interval History: no acute events overnight. Feels well today. MELD 30, listed with a 31 until 9/11. Has a primary liver offer today, OR time 1:30pm with cut time 3pm. H/H trending down, hemodynamically stable, no overt signs of bleeding. Na stable.           Scheduled Meds:   fluconazole  400 mg Oral Daily    heparin (porcine)  5,000 Units Subcutaneous Q8H    lactulose  30 g Oral TID    melatonin  6 mg Oral Nightly    pantoprazole  40 mg Oral Daily    rifAXImin  550 mg Oral BID    sodium bicarbonate  1,950 mg Oral TID     Continuous Infusions:  PRN Meds:0.9%  NaCl infusion (for blood administration), acetaminophen, aluminum & magnesium hydroxide-simethicone, ampicillin-sulbactim (UNASYN) IVPB, hydrOXYzine pamoate, methylPREDNISolone sodium succinate, mupirocin, ondansetron, oxyCODONE, simethicone, sodium chloride 0.9%    Review of Systems   Constitutional:  Negative for activity change and appetite change.   HENT:  Negative for facial swelling and trouble swallowing.    Eyes:  Negative for visual disturbance.   Respiratory:  Negative for cough and shortness of breath.    Cardiovascular:  Negative for chest pain, palpitations and leg swelling.    Gastrointestinal:  Positive for abdominal distention. Negative for abdominal pain, constipation, diarrhea, nausea and vomiting.   Genitourinary:  Negative for decreased urine volume and difficulty urinating.   Musculoskeletal:  Negative for arthralgias.   Skin:  Positive for color change (jaundice). Negative for wound.   Neurological:  Negative for dizziness and tremors.   Hematological:  Negative for adenopathy. Does not bruise/bleed easily.   Psychiatric/Behavioral:  Negative for agitation, behavioral problems, confusion, decreased concentration and hallucinations.      Objective:     Vital Signs (Most Recent):  Temp: 98.8 °F (37.1 °C) (09/07/23 0743)  Pulse: 104 (09/07/23 0743)  Resp: 18 (09/07/23 0743)  BP: (!) 128/58 (09/07/23 0743)  SpO2: 100 % (09/07/23 0743) Vital Signs (24h Range):  Temp:  [97.4 °F (36.3 °C)-98.8 °F (37.1 °C)] 98.8 °F (37.1 °C)  Pulse:  [] 104  Resp:  [12-18] 18  SpO2:  [97 %-100 %] 100 %  BP: (121-135)/(58-75) 128/58     Weight: 65.6 kg (144 lb 11.7 oz)  Body mass index is 20.77 kg/m².    Intake/Output - Last 3 Shifts         09/05 0700 09/06 0659 09/06 0700 09/07 0659 09/07 0700 09/08 0659    P.O. 965 680     I.V. (mL/kg) 0 (0)      Other 0      Total Intake(mL/kg) 965 (14.9) 680 (10.4)     Urine (mL/kg/hr) 245 (0.2) 250 (0.2)     Emesis/NG output 0      Other 0      Stool 0 0     Blood 0      Total Output 245 250     Net +720 +430            Urine Occurrence 2 x 1 x     Stool Occurrence 3 x 1 x     Emesis Occurrence 0 x               Physical Exam  Vitals and nursing note reviewed.   Constitutional:       General: He is not in acute distress.     Appearance: He is not toxic-appearing or diaphoretic.   HENT:      Head: Normocephalic.   Eyes:      General: Scleral icterus present.      Conjunctiva/sclera: Conjunctivae normal.   Cardiovascular:      Rate and Rhythm: Normal rate and regular rhythm.      Heart sounds: Murmur heard.   Pulmonary:      Effort: Pulmonary effort is  normal.      Breath sounds: Normal breath sounds. No wheezing, rhonchi or rales.   Abdominal:      General: Bowel sounds are normal. There is distension.      Palpations: Abdomen is soft.      Tenderness: There is no abdominal tenderness. There is no guarding or rebound.   Musculoskeletal:         General: Normal range of motion.      Cervical back: Normal range of motion.      Right lower leg: No edema.      Left lower leg: No edema.   Skin:     General: Skin is warm and dry.      Capillary Refill: Capillary refill takes less than 2 seconds.      Coloration: Skin is jaundiced.   Neurological:      Mental Status: He is alert and oriented to person, place, and time.      GCS: GCS eye subscore is 4. GCS verbal subscore is 5. GCS motor subscore is 6.   Psychiatric:         Attention and Perception: Attention normal.         Mood and Affect: Mood is anxious.         Speech: Speech normal.         Behavior: Behavior normal. Behavior is cooperative.         Thought Content: Thought content normal.         Judgment: Judgment normal.          Laboratory:  Immunosuppressants       None          CBC:   Recent Labs   Lab 09/07/23  0510   WBC 9.77   RBC 2.05*   HGB 7.1*   HCT 21.4*   PLT 54*   *   MCH 34.6*   MCHC 33.2     CMP:   Recent Labs   Lab 09/07/23  0510   GLU 88   CALCIUM 7.9*   ALBUMIN 2.5*   PROT 5.3*   *   K 3.5   CO2 18*      BUN 11   CREATININE 0.8   ALKPHOS 128   ALT 30   AST 70*   BILITOT 27.7*     Coagulation:   Recent Labs   Lab 09/07/23  0510   INR 2.1*     Labs within the past 24 hours have been reviewed.    Diagnostic Results:  I have personally reviewed all pertinent imaging studies.    Debility/Functional status: No debility noted.    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 Had 1 liver transplant offers on  and 9/3; however, surgeries cancelled due to size of donor liver and poor organ quality.     - listed MELD 31 till   - surveillance blood cultures ordered ; NGTD  - To OR today for LTX    MELD 3.0: 30 at 2023  5:10 AM  MELD-Na: 30 at 2023  5:10 AM  Calculated from:  Serum Creatinine: 0.8 mg/dL (Using min of 1 mg/dL) at 2023  5:10 AM  Serum Sodium: 131 mmol/L at 2023  5:10 AM  Total Bilirubin: 27.7 mg/dL at 2023  5:10 AM  Serum Albumin: 2.5 g/dL at 2023  5:10 AM  INR(ratio): 2.1 at 2023  5:10 AM  Age at listin years  Sex: Male at 2023  5:10 AM      Moderate malnutrition  - encouraged oral intake   - Boost supplements TID  - monitor prealbumin level       Decompensated hepatic cirrhosis  - see pre liver transplant, listed        VTE Risk Mitigation (From admission, onward)         Ordered     IP VTE HIGH RISK PATIENT  Once         23 2226     Send SCD stockings and AURORA hose with patient to OR  Continuous         23     heparin (porcine) injection 5,000 Units  Every 8 hours         23 1113     Place AURORA hose  Until discontinued         23 1002     Place sequential compression device  Until discontinued         23 1002                The patients clinical status was discussed at multidisplinary rounds, involving transplant surgery, transplant medicine, pharmacy, nursing, nutrition, and social work    Discharge Planning: not stable for dc at this time.     [place], DNP  Liver Transplant  [facility] - Transplant Stepdown

## 2023-09-07 NOTE — ANESTHESIA PROCEDURE NOTES
Intubation    Date/Time: 9/7/2023 1:58 PM    Performed by: Kwame Berry CRNA  Authorized by: Jonatan Ariza MD    Intubation:     Induction:  Intravenous    Intubated:  Postinduction    Mask Ventilation:  N/a    Attempts:  1    Attempted By:  TAMI    Blade:  [place] 3    Laryngeal View Grade: Grade I - full view of cords      Difficult Airway Encountered?: No      Complications:  None    Airway Device:  Oral endotracheal tube    Airway Device Size:  7.5    Style/Cuff Inflation:  Cuffed    Inflation Amount (mL):  7    Tube secured:  21    Secured at:  The teeth    Placement Verified By:  Capnometry    Complicating Factors:  None    Findings Post-Intubation:  BS equal bilateral

## 2023-09-07 NOTE — ANESTHESIA PROCEDURE NOTES
Arterial    Diagnosis: ESLD    Patient location during procedure: done in OR  Procedure start time: 9/7/2023 2:05 PM  Timeout: 9/7/2023 2:05 PM  Procedure end time: 9/7/2023 2:15 PM    Staffing  Authorizing Provider: Jonatan Ariza MD  Performing Provider: Jonatan Ariza MD    Staffing  Performed by: Jonatan Ariza MD  Authorized by: Jonatan Ariza MD    Anesthesiologist was present at the time of the procedure.    Preanesthetic Checklist  Completed: patient identified, IV checked, site marked, risks and benefits discussed, surgical consent, monitors and equipment checked, pre-op evaluation, timeout performed and anesthesia consent givenArterial  Skin Prep: chlorhexidine gluconate  Local Infiltration: none  Orientation: right  Location: radial    Catheter Size: 20 G  Catheter placement by Ultrasound guidance. Heme positive aspiration all ports.   Vessel Caliber: medium, patent, compressibility normal  Vascular Doppler:  not done  Needle advanced into vessel with real time Ultrasound guidance.  Guidewire confirmed in vessel.  Sterile sheath used.Insertion Attempts: 1  Assessment  Dressing: sutured in place and taped and tegaderm  Patient: Tolerated well

## 2023-09-07 NOTE — PLAN OF CARE
Recommendations     Pt scheduled for liver transplant today. Following procedure - If/when able resume Regular diet w/ fluid restriction per MD. Add Boost Plus ONS PRN.  If unable to resume diet, initiate TFs of Impact Peptide (goal rate TBD).  RD to follow-up w/ post transplant diet education.      Goals: Meet % EEN, EPN by RD f/u date  Nutrition Goal Status: progressing towards goal  Communication of RD Recs: reviewed with RN

## 2023-09-07 NOTE — ANESTHESIA PROCEDURE NOTES
Arterial    Diagnosis: ESLD    Patient location during procedure: done in OR  Procedure start time: 9/7/2023 2:00 PM  Timeout: 9/7/2023 2:00 PM    Staffing  Authorizing Provider: Jonatan Ariza MD  Performing Provider: Kwame Berry CRNA    Staffing  Performed by: Kwame Berry CRNA  Authorized by: Jonatan Ariza MD    Anesthesiologist was present at the time of the procedure.    Preanesthetic Checklist  Completed: patient identified, IV checked, site marked, risks and benefits discussed, surgical consent, monitors and equipment checked, pre-op evaluation, timeout performed and anesthesia consent givenArterial  Skin Prep: chlorhexidine gluconate  Local Infiltration: none  Orientation: left  Location: radial    Catheter Size: 20 G  Catheter placement by Anatomical landmarks. Heme positive aspiration all ports. Insertion Attempts: 3  Assessment  Dressing: secured with tape and tegaderm  Patient: Tolerated well

## 2023-09-07 NOTE — ANESTHESIA PROCEDURE NOTES
Central Line    Diagnosis: ESLD  Patient location during procedure: done in OR  Timeout: 9/7/2023 2:30 PM  Procedure end time: 9/7/2023 2:50 PM    Staffing  Authorizing Provider: Jonatan Ariza MD  Performing Provider: Jonatan Ariza MD    Staffing  Performed: anesthesiologist   Anesthesiologist: Jonatan Ariza MD  Performed by: Jonatan Ariza MD  Authorized by: Jonatan Ariza MD    Anesthesiologist was present at the time of the procedure.  Preanesthetic Checklist  Completed: patient identified, IV checked, site marked, risks and benefits discussed, surgical consent, monitors and equipment checked, pre-op evaluation, timeout performed and anesthesia consent given  Indication   Indication: vascular access     Anesthesia   general anesthesia    Central Line   Skin Prep: skin prepped with ChloraPrep, skin prep agent completely dried prior to procedure  Sterile Barriers Followed: Yes    All five maximal barriers used- gloves, gown, cap, mask, and large sterile sheet    hand hygiene performed prior to central venous catheter insertion  Location: right internal jugular.   Catheter type: triple lumen  Catheter Size: 12 Fr  Inserted Catheter Length: 16 cm  Ultrasound: vascular probe with ultrasound   Vessel Caliber: medium, patent  Vascular Doppler:  not done, compressibility normal  Needle advanced into vessel with real time Ultrasound guidance.  Image recorded and saved.  sterile gel and probe cover used in ultrasound-guided central venous catheter insertion   Manometry: Venous cannualation confirmed by visual estimation of blood vessel pressure using manometry.  Insertion Attempts: 1   Securement:line sutured, chlorhexidine patch, sterile dressing applied and blood return through all ports    Post-Procedure    Adverse Events:none      Guidewire  Guidewire removed intact, verified with nurse.

## 2023-09-07 NOTE — ANESTHESIA PROCEDURE NOTES
Peripheral IV Insertion        Staffing  Authorizing Provider: Jonatan Ariza MD  Performing Provider: Kwame Berry CRNA    Staffing  Performed by: Kwame Berry CRNA  Authorized by: Jonatan Ariza MD    Peripheral IV Insertion

## 2023-09-07 NOTE — SUBJECTIVE & OBJECTIVE
Scheduled Meds:   fluconazole  400 mg Oral Daily    heparin (porcine)  5,000 Units Subcutaneous Q8H    lactulose  30 g Oral TID    melatonin  6 mg Oral Nightly    pantoprazole  40 mg Oral Daily    rifAXImin  550 mg Oral BID    sodium bicarbonate  1,950 mg Oral TID     Continuous Infusions:  PRN Meds:0.9%  NaCl infusion (for blood administration), acetaminophen, aluminum & magnesium hydroxide-simethicone, ampicillin-sulbactim (UNASYN) IVPB, hydrOXYzine pamoate, methylPREDNISolone sodium succinate, mupirocin, ondansetron, oxyCODONE, simethicone, sodium chloride 0.9%    Review of Systems   Constitutional:  Negative for activity change and appetite change.   HENT:  Negative for facial swelling and trouble swallowing.    Eyes:  Negative for visual disturbance.   Respiratory:  Negative for cough and shortness of breath.    Cardiovascular:  Negative for chest pain, palpitations and leg swelling.   Gastrointestinal:  Positive for abdominal distention. Negative for abdominal pain, constipation, diarrhea, nausea and vomiting.   Genitourinary:  Negative for decreased urine volume and difficulty urinating.   Musculoskeletal:  Negative for arthralgias.   Skin:  Positive for color change (jaundice). Negative for wound.   Neurological:  Negative for dizziness and tremors.   Hematological:  Negative for adenopathy. Does not bruise/bleed easily.   Psychiatric/Behavioral:  Negative for agitation, behavioral problems, confusion, decreased concentration and hallucinations.      Objective:     Vital Signs (Most Recent):  Temp: 98.8 °F (37.1 °C) (09/07/23 0743)  Pulse: 104 (09/07/23 0743)  Resp: 18 (09/07/23 0743)  BP: (!) 128/58 (09/07/23 0743)  SpO2: 100 % (09/07/23 0743) Vital Signs (24h Range):  Temp:  [97.4 °F (36.3 °C)-98.8 °F (37.1 °C)] 98.8 °F (37.1 °C)  Pulse:  [] 104  Resp:  [12-18] 18  SpO2:  [97 %-100 %] 100 %  BP: (121-135)/(58-75) 128/58     Weight: 65.6 kg (144 lb 11.7 oz)  Body mass index is 20.77  kg/m².    Intake/Output - Last 3 Shifts         09/05 0700  09/06 0659 09/06 0700  09/07 0659 09/07 0700  09/08 0659    P.O. 965 680     I.V. (mL/kg) 0 (0)      Other 0      Total Intake(mL/kg) 965 (14.9) 680 (10.4)     Urine (mL/kg/hr) 245 (0.2) 250 (0.2)     Emesis/NG output 0      Other 0      Stool 0 0     Blood 0      Total Output 245 250     Net +720 +430            Urine Occurrence 2 x 1 x     Stool Occurrence 3 x 1 x     Emesis Occurrence 0 x               Physical Exam  Vitals and nursing note reviewed.   Constitutional:       General: He is not in acute distress.     Appearance: He is not toxic-appearing or diaphoretic.   HENT:      Head: Normocephalic.   Eyes:      General: Scleral icterus present.      Conjunctiva/sclera: Conjunctivae normal.   Cardiovascular:      Rate and Rhythm: Normal rate and regular rhythm.      Heart sounds: Murmur heard.   Pulmonary:      Effort: Pulmonary effort is normal.      Breath sounds: Normal breath sounds. No wheezing, rhonchi or rales.   Abdominal:      General: Bowel sounds are normal. There is distension.      Palpations: Abdomen is soft.      Tenderness: There is no abdominal tenderness. There is no guarding or rebound.   Musculoskeletal:         General: Normal range of motion.      Cervical back: Normal range of motion.      Right lower leg: No edema.      Left lower leg: No edema.   Skin:     General: Skin is warm and dry.      Capillary Refill: Capillary refill takes less than 2 seconds.      Coloration: Skin is jaundiced.   Neurological:      Mental Status: He is alert and oriented to person, place, and time.      GCS: GCS eye subscore is 4. GCS verbal subscore is 5. GCS motor subscore is 6.   Psychiatric:         Attention and Perception: Attention normal.         Mood and Affect: Mood is anxious.         Speech: Speech normal.         Behavior: Behavior normal. Behavior is cooperative.         Thought Content: Thought content normal.         Judgment: Judgment  normal.          Laboratory:  Immunosuppressants       None          CBC:   Recent Labs   Lab 09/07/23  0510   WBC 9.77   RBC 2.05*   HGB 7.1*   HCT 21.4*   PLT 54*   *   MCH 34.6*   MCHC 33.2     CMP:   Recent Labs   Lab 09/07/23  0510   GLU 88   CALCIUM 7.9*   ALBUMIN 2.5*   PROT 5.3*   *   K 3.5   CO2 18*      BUN 11   CREATININE 0.8   ALKPHOS 128   ALT 30   AST 70*   BILITOT 27.7*     Coagulation:   Recent Labs   Lab 09/07/23  0510   INR 2.1*     Labs within the past 24 hours have been reviewed.    Diagnostic Results:  I have personally reviewed all pertinent imaging studies.    Debility/Functional status: No debility noted.

## 2023-09-07 NOTE — ANESTHESIA PREPROCEDURE EVALUATION
[facility]  Anesthesia Pre-Operative Evaluation   09/07/2023        Sukhdeep Grimes, 1985  [id number]  Procedure(s) (LRB):  TRANSPLANT, LIVER (N/A)    Subjective    Sukhdeep Grimes is a 38 y.o. male w/ a significant PMHx of Decompensated ETOH cirrhosis (MELD 30). Pt was admitted to OSH early august with increasing abdominal girth, somnolence, and weakness. He was transferred to [facility] for hepatology evaluation and now presents for liver transplant.  Had 2 liver transplant offers on 9/2 and 9/3; however, surgeries cancelled due to size of donor liver and poor organ quality.      Patient now presents for above procedure(s). Anesthesia E-consent obtained 9/3/23.    Level of Care: Stepdown  Hemodynamics: No vasopressor or inotropic support.  Respiratory Status: Room air  IV Access: PIV 20G x1   NPO: 9/7/23 0500    TTE 8/25/23:    Left Ventricle: The left ventricle is normal in size. Normal wall thickness. Normal wall motion. There is normal systolic function. There is normal diastolic function.    Right Ventricle: Normal right ventricular cavity size. Wall thickness is normal. Right ventricle wall motion  is normal. Systolic function is normal.    Aortic Valve: The aortic valve is a trileaflet valve.    IVC/SVC: Normal venous pressure at 3 mmHg.    Study is negative for intra cardiac shunt. Bubbles on the left side on the 4th beat emanating from pulmonary veins consistent with transpulmonary shunting.       Prev Airway: None documented.    LDA:       Peripheral IV - Single Lumen 09/05/23 2210 20 G Anterior;Left Forearm (Active)   Site Assessment Clean;Dry;Intact 09/06/23 2042   Extremity Assessment Distal to IV No redness;No swelling;No warmth;No abnormal discoloration 09/06/23 0940   Line Status Flushed 09/06/23 0940   Dressing Status Clean;Dry;Intact 09/06/23 0940   Dressing Intervention Integrity maintained 09/06/23 0940   Dressing Change Due 09/09/23 09/05/23 2211   Site Change Due 09/09/23  "09/05/23 2211   Reason Not Rotated Not due 09/05/23 2211   Number of days: 1       Drips: None documented.      Patient Active Problem List   Diagnosis    Alcohol use disorder, severe, in early remission    Alcoholic cirrhosis of liver with ascites    Decompensated hepatic cirrhosis    Moderate malnutrition    Pre-liver transplant, listed    Thrombocytopenia, unspecified    Acquired coagulation factor deficiency    Metabolic acidosis    Hyponatremia       Review of patient's allergies indicates:  No Active Allergies    Current Inpatient Medications:    fluconazole  400 mg Oral Daily    heparin (porcine)  5,000 Units Subcutaneous Q8H    lactulose  30 g Oral TID    melatonin  6 mg Oral Nightly    pantoprazole  40 mg Oral Daily    rifAXImin  550 mg Oral BID    sodium bicarbonate  1,950 mg Oral TID       No current facility-administered medications on file prior to encounter.     Current Outpatient Medications on File Prior to Encounter   Medication Sig Dispense Refill    albuterol (VENTOLIN HFA) 90 mcg/actuation inhaler Inhale 2 puffs into the lungs every 6 (six) hours as needed for Wheezing. Rescue      potassium chloride (KLOR-CON) 10 MEQ TbSR Take 10 mEq by mouth 2 (two) times daily.         Past Surgical History:   Procedure Laterality Date    EGD - EXTERNAL RESULT  08/21/2023    "1 column of large varices with no bleeding and no stigmata of recent bleeding in the lower 3rd of the esophagus.  No red Rigoberto sign present.  Banding was not performed.  Moderate portal hypertensive gastropathy in the entire examined stomach.  Examined duodenum was normal." EGD done in [facility])    LIVER TRANSPLANT N/A 9/3/2023    Procedure: TRANSPLANT, LIVER;  Surgeon: Phil Ariza MD;  [facility]: [facility] OR [address] [place] [address];  Service: Transplant;  Laterality: N/A;    PARACENTESIS  08/24/2023       Social History:  Tobacco Use: Not on file       Alcohol Use: Unknown (8/25/2023)    AUDIT-C     Frequency of " Alcohol Consumption: Patient refused     Average Number of Drinks: Patient refused     Frequency of Binge Drinking: Patient refused       Objective    Vital Signs Range:  BMI Readings from Last 1 Encounters:   09/07/23 20.77 kg/m²       Temp:  [36.7 °C (98 °F)]   Pulse:  [100-111]   Resp:  [18]   BP: (133-135)/(64-75)   SpO2:  [99 %]        Significant Labs:        Component Value Date/Time    WBC 10.89 09/06/2023 2303    HGB 7.6 (L) 09/06/2023 2303    HCT 23.2 (L) 09/06/2023 2303    PLT 69 (L) 09/06/2023 2303     (L) 09/06/2023 2303    K 3.6 09/06/2023 2303     09/06/2023 2303    CO2 18 (L) 09/06/2023 2303     09/06/2023 2303    BUN 11 09/06/2023 2303    CREATININE 0.9 09/06/2023 2303    MG 1.8 09/02/2023 0655    PHOS 3.2 09/02/2023 0655    CALCIUM 8.0 (L) 09/06/2023 2303    ALBUMIN 2.6 (L) 09/06/2023 2303    PROT 5.6 (L) 09/06/2023 2303    ALKPHOS 138 (H) 09/06/2023 2303    BILITOT 29.9 (H) 09/06/2023 2303    AST 72 (H) 09/06/2023 2303    ALT 32 09/06/2023 2303    INR 2.1 (H) 09/06/2023 2303    HGBA1C <4.0 08/01/2023 1550        Please see Results Review for additional labs.     Diagnostic Studies: All relevant studies, reviewed.      EKG:   Results for orders placed or performed during the hospital encounter of 08/24/23   EKG 12-lead    Collection Time: 09/01/23  6:21 PM    Narrative    Test Reason : Z01.818,    Vent. Rate : 096 BPM     Atrial Rate : 096 BPM     P-R Int : 142 ms          QRS Dur : 088 ms      QT Int : 362 ms       P-R-T Axes : 037 012 011 degrees     QTc Int : 457 ms    Normal sinus rhythm  Normal ECG  When compared with ECG of 28-AUG-2023 10:54,  T wave amplitude has decreased in Anterior leads  Confirmed by Onel Walker MD ([id number]) on 9/2/2023 11:12:23 AM    Referred By: PROVIDER NOTINSYSTEM           Confirmed By:Onel Walker MD       ECHO:  Results for orders placed during the hospital encounter of 08/24/23    Echo Saline Bubble? Yes    Interpretation Summary     Left Ventricle: The left ventricle is normal in size. Normal wall thickness. Normal wall motion. There is normal systolic function. There is normal diastolic function.    Right Ventricle: Normal right ventricular cavity size. Wall thickness is normal. Right ventricle wall motion  is normal. Systolic function is normal.    Aortic Valve: The aortic valve is a trileaflet valve.    IVC/SVC: Normal venous pressure at 3 mmHg.    Study is negative for intra cardiac shunt. Bubbles on the left side on the 4th beat emanating from pulmonary veins consistent with transpulmonary shunting.                                                                                                                 09/07/2023  Sukhdeep Grimes is a 38 y.o., male.      Pre-op Assessment    I have reviewed the Patient Summary Reports.     I have reviewed the Nursing Notes. I have reviewed the NPO Status.   I have reviewed the Medications.     Review of Systems  Anesthesia Hx:  No problems with previous Anesthesia  History of prior surgery of interest to airway management or planning: Denies Family Hx of Anesthesia complications.   Denies Personal Hx of Anesthesia complications.   Cardiovascular:   Exercise tolerance: good Denies Hypertension.  Denies MI.  Denies CAD.    Denies CABG/stent.   Denies CHF. no hyperlipidemia ECG has been reviewed.    Pulmonary:   Denies COPD.  Denies Asthma.  Denies Sleep Apnea.    Renal/:   Denies Chronic Renal Disease.     Hepatic/GI:   Denies GERD. Liver Disease, Hepatitis    Neurological:  Neurology Normal  Denies CVA.  Denies Headaches. Denies Seizures.    Endocrine:   Denies Diabetes.  Denies Obesity / BMI > 30  Psych:   Denies Psychiatric History.             Anesthesia Plan  Type of Anesthesia, risks & benefits discussed:    Anesthesia Type: Gen ETT  Intra-op Monitoring Plan: Standard ASA Monitors, Art Line, Central Line and PA  Post Op Pain Control Plan: multimodal analgesia and IV/PO Opioids  PRN  Induction:  IV  Airway Plan: Direct and Video, Post-Induction  ASA Score: 4  Day of Surgery Review of History & Physical: H&P Update referred to the surgeon/provider.    Ready For Surgery From Anesthesia Perspective.     .

## 2023-09-07 NOTE — OP NOTE
Operative Report    Date of Procedure: 9/7/2023    Surgeon: eMl Murry MD  First Assistant: Jamaal Elaine MD    Pre-operative Diagnosis: Allograft liver for transplantation  Post-operative Diagnosis: Same    Procedure(s) Performed:   Back Table Preparation of Liver with needle biopsy    .    Anesthesia: Not applicable  Estimated Blood Loss: Not applicable  Fluids Administered: Not applicable    Findings: Estimated steatosis 0-10%, normal vascular anatomy.  Drains: Not applicable  Specimens: Core biopsy of allograft liver      Preamble  Indications: This report describes only the backbench preparation of the liver prior to transplantation.  The transplant operation itself is described in a separate report.    ABO Confirmation: Immediately following arrival of the donor organ and prior to implantation, a formal ABO confirmation was done according to hospital and [facility] policies.  I confirmed the [employer] ID number of the donor organ and the donor and recipient ABO types, directly verifying these data by comparison with the [employer] [facility] report.  This confirmation was personally done by an attending surgeon and circulating nurse, and is officially documented elsewhere.    Time-Out: A complete time out was carried out prior to the procedure, with confirmation of patient identity, correct procedure, correct operative site, appropriate antibiotic prophylaxis, review of any known allergies, and presence of all needed equipment.    Procedure in Detail  The liver was recovered from the transport cooler and inspected for vascular anatomy and overall suitability for transplantation, with findings as noted above.  The remnant of the diaphragm was dissected away.  The phrenic veins and adrenal vein were identified and ligated or sutured as appropriate.  The portal vein and hepatic artery were mobilized toward the hilum of the liver, and extrahepatic branches were ligated.  No vascular reconstruction was required.  The vessels  were tested for leaks.  A needle biopsy was obtained for permanent section histology using a spring-loaded biopsy device. The liver was kept in ice temperature organ preservation solution until the time of implantation.

## 2023-09-07 NOTE — PROGRESS NOTES
[facility] - Transplant Stepdown  Adult Nutrition  Progress Note    SUMMARY       Recommendations    Pt scheduled for liver transplant today. Following procedure - If/when able resume Regular diet w/ fluid restriction per MD. Add Boost Plus ONS PRN.  If unable to resume diet, initiate TFs of Impact Peptide (goal rate TBD).  RD to follow-up w/ post transplant diet education.     Goals: Meet % EEN, EPN by RD f/u date  Nutrition Goal Status: progressing towards goal  Communication of RD Recs: reviewed with RN    Assessment and Plan    Moderate malnutrition    Malnutrition Type:  Context: acute illness or injury  Level: moderate    Related to (etiology):   Alcoholic cirrhosis of liver with ascites     Signs and Symptoms (as evidenced by):   Mild to moderate fat/muscle wasting     Malnutrition Characteristic Summary:  Subcutaneous Fat (Malnutrition): moderate depletion  Muscle Mass (Malnutrition): moderate depletion  Fluid Accumulation (Malnutrition):  (ascites)    Interventions/Recommendations (treatment strategy):  Collaboration of nutrition care w/ other providers    Nutrition Diagnosis Status:   Continues    Malnutrition Assessment    Malnutrition Context: acute illness or injury  Malnutrition Level: moderate  Skin (Micronutrient): yellow     Subcutaneous Fat (Malnutrition): moderate depletion  Muscle Mass (Malnutrition): moderate depletion  Fluid Accumulation (Malnutrition):  (ascites)   Orbital Region (Subcutaneous Fat Loss): moderate depletion  Upper Arm Region (Subcutaneous Fat Loss): mild depletion  Thoracic and Lumbar Region: moderate depletion   Tenriism Region (Muscle Loss): moderate depletion  Clavicle Bone Region (Muscle Loss): moderate depletion  Clavicle and Acromion Bone Region (Muscle Loss): moderate depletion  Dorsal Hand (Muscle Loss): mild depletion     Reason for Assessment    Reason For Assessment: RD follow-up  Diagnosis: liver disease (Alcoholic cirrhosis of liver with ascites)  Relevant  "Medical History: EtOH abuse, Decompensated hepatic cirrhosis  Interdisciplinary Rounds: did not attend    General Information Comments: NPO for liver transplant today. Prior to NPO status, pt tolerating diet + ONS w/ 25-50% PO intake. NFPE complete 8/26 - moderate malnutrition diagnosis continues. Please see PES statement for details.  Nutrition Discharge Planning: Low Na diet education complete 8/26, ANASTASIIA RD to educate following liver transplant.    Nutrition/Diet History    Spiritual, Cultural Beliefs, Jain Practices, Values that Affect Care: no  Factors Affecting Nutritional Intake: NPO    Anthropometrics    Temp: 98.8 °F (37.1 °C)  Height Method: Stated  Height: 5' 10" (177.8 cm)  Height (inches): 70 in  Weight Method: Standard Scale  Weight: 65.6 kg (144 lb 10 oz)  Weight (lb): 144.62 lb  Ideal Body Weight (IBW), Male: 166 lb  % Ideal Body Weight, Male (lb): 87.12 %  BMI (Calculated): 20.8  BMI Grade: 18.5-24.9 - normal    Lab/Procedures/Meds    Pertinent Labs Reviewed: reviewed  Pertinent Labs Comments: Bili 27.7  Pertinent Medications Reviewed: reviewed  Pertinent Medications Comments: Lactulose    Estimated/Assessed Needs    Weight Used For Calorie Calculations: 65.6 kg (144 lb 10 oz)    Energy Calorie Requirements (kcal): 1984 kcal/d  Energy Need Method: [place]-St Jeor (1.25 PAL)    Protein Requirements: 85 g/d (1.3 g/kg)  Weight Used For Protein Calculations: 65.6 kg (144 lb 10 oz)    Estimated Fluid Requirement Method: other (see comments) (Per MD or 1 mL/kcal)  RDA Method (mL): 1984    Nutrition Prescription Ordered    Current Diet Order: NPO  Nutrition Order Comments: Was on Regular diet + ONS w/ FR per MD.    Evaluation of Received Nutrient/Fluid Intake    I/O: -1.5L since admit    Comments: LBM: 9/6    Tolerance: was tolerating    Nutrition Risk    Level of Risk/Frequency of Follow-up:  (2x/week)     Monitor and Evaluation    Food and Nutrient Intake: energy intake, food and beverage intake  Food " and Nutrient Adminstration: diet order  Knowledge/Beliefs/Attitudes: food and nutrition knowledge/skill, beliefs and attitudes  Physical Activity and Function: nutrition-related ADLs and IADLs  Anthropometric Measurements: weight, weight change, body mass index  Biochemical Data, Medical Tests and Procedures: electrolyte and renal panel, gastrointestinal profile, glucose/endocrine profile, inflammatory profile, lipid profile  Nutrition-Focused Physical Findings: overall appearance, skin     Nutrition Follow-Up    RD Follow-up?: Yes

## 2023-09-07 NOTE — PLAN OF CARE
Pt has call bell in reach, non slip socks on, and bedrails up x2.  He has his mom at bedside attentive to pt.  Pt has atarax for anxiety.  He is encouraged to wash his hands.  Pt on 1 liter fluid restriction.  He has labs in am.  Pt refuses the hep shots and has been encouraged to keep walking on unit.

## 2023-09-07 NOTE — TELEPHONE ENCOUNTER
"[facility]P LIVER COORDINATOR ORGAN OFFER NOTE   UNOS# [id number]  Notified by Brady Moctezuma, , that Sukhdeep Grimes is eligible for liver as a primary recipient offer.  Spoke with patient and identified no acute medical issues with telephone assessment. Protocol script read to patient regarding N/A, standard donor offer.  Patient was informed that if he turned down the offer A transplant doctor will call you after we hang up."  Patient was also informed that if he turned down this donor, he would not be punished or removed from the transplant list, that he  would remain on the list at his current status/MELD score. However, by waiting on the list longer rather than receiving this transplant, he will face a greater risk of death than any risk from the slight possibility of transmitted infection.  Patient was asked if they have had a positive COVID-19 test or if they have any signs or symptoms. Informed patient that they will be tested for COVID-19 upon arrival to the hospital, unless have a previous positive result. If tested and result is positive, the transplant will not be able to occur, they will be inactivated on the wait list for 21 days per protocol and required to quarantine.   Patient verbalized understanding, all questions answered, patient accepts organ offer.   Patient and mom notified of plan that the patient will be called to inform him of the times for surgery and when he will need to be NPO. Pt is admitted room 23151. Understanding stated.      "

## 2023-09-08 PROBLEM — T38.0X5A STEROID-INDUCED HYPERGLYCEMIA: Status: ACTIVE | Noted: 2023-09-08

## 2023-09-08 PROBLEM — Z29.89 PROPHYLACTIC IMMUNOTHERAPY: Status: ACTIVE | Noted: 2023-09-08

## 2023-09-08 PROBLEM — T38.0X5A ADRENAL CORTICAL STEROIDS CAUSING ADVERSE EFFECT IN THERAPEUTIC USE: Status: ACTIVE | Noted: 2023-09-08

## 2023-09-08 PROBLEM — R73.9 STEROID-INDUCED HYPERGLYCEMIA: Status: ACTIVE | Noted: 2023-09-08

## 2023-09-08 LAB
ABO + RH BLD: NORMAL
ALBUMIN SERPL BCP-MCNC: 2.7 G/DL (ref 3.5–5.2)
ALLENS TEST: ABNORMAL
ALLENS TEST: ABNORMAL
ALLENS TEST: NORMAL
ALP SERPL-CCNC: 54 U/L (ref 55–135)
ALT SERPL W/O P-5'-P-CCNC: 425 U/L (ref 10–44)
ANION GAP SERPL CALC-SCNC: 10 MMOL/L (ref 8–16)
ANION GAP SERPL CALC-SCNC: 11 MMOL/L (ref 8–16)
ANION GAP SERPL CALC-SCNC: 9 MMOL/L (ref 8–16)
ANISOCYTOSIS BLD QL SMEAR: SLIGHT
ANISOCYTOSIS BLD QL SMEAR: SLIGHT
APTT PPP: 41.3 SEC (ref 21–32)
AST SERPL-CCNC: 244 U/L (ref 10–40)
AST SERPL-CCNC: 268 U/L (ref 10–40)
AST SERPL-CCNC: 331 U/L (ref 10–40)
AST SERPL-CCNC: 427 U/L (ref 10–40)
AST SERPL-CCNC: 596 U/L (ref 10–40)
AST SERPL-CCNC: 596 U/L (ref 10–40)
AST SERPL-CCNC: 756 U/L (ref 10–40)
BASO STIPL BLD QL SMEAR: ABNORMAL
BASOPHILS # BLD AUTO: 0.01 K/UL (ref 0–0.2)
BASOPHILS NFR BLD: 0.1 % (ref 0–1.9)
BILIRUB SERPL-MCNC: 11.4 MG/DL (ref 0.1–1)
BLD GP AB SCN CELLS X3 SERPL QL: NORMAL
BLD PROD TYP BPU: NORMAL
BLOOD UNIT EXPIRATION DATE: NORMAL
BLOOD UNIT TYPE CODE: 5100
BLOOD UNIT TYPE CODE: 6200
BLOOD UNIT TYPE: NORMAL
BUN SERPL-MCNC: 16 MG/DL (ref 6–20)
BUN SERPL-MCNC: 16 MG/DL (ref 6–20)
BUN SERPL-MCNC: 18 MG/DL (ref 6–20)
BURR CELLS BLD QL SMEAR: ABNORMAL
BURR CELLS BLD QL SMEAR: ABNORMAL
CA-I BLDV-SCNC: 1.1 MMOL/L (ref 1.06–1.42)
CALCIUM SERPL-MCNC: 7.6 MG/DL (ref 8.7–10.5)
CALCIUM SERPL-MCNC: 7.7 MG/DL (ref 8.7–10.5)
CALCIUM SERPL-MCNC: 7.7 MG/DL (ref 8.7–10.5)
CHLORIDE SERPL-SCNC: 105 MMOL/L (ref 95–110)
CHLORIDE SERPL-SCNC: 108 MMOL/L (ref 95–110)
CHLORIDE SERPL-SCNC: 108 MMOL/L (ref 95–110)
CO2 SERPL-SCNC: 18 MMOL/L (ref 23–29)
CO2 SERPL-SCNC: 20 MMOL/L (ref 23–29)
CO2 SERPL-SCNC: 22 MMOL/L (ref 23–29)
CODING SYSTEM: NORMAL
CREAT SERPL-MCNC: 0.8 MG/DL (ref 0.5–1.4)
CREAT SERPL-MCNC: 1 MG/DL (ref 0.5–1.4)
CREAT SERPL-MCNC: 1 MG/DL (ref 0.5–1.4)
CROSSMATCH INTERPRETATION: NORMAL
DELSYS: ABNORMAL
DIFFERENTIAL METHOD: ABNORMAL
DISPENSE STATUS: NORMAL
DOHLE BOD BLD QL SMEAR: PRESENT
EOSINOPHIL # BLD AUTO: 0 K/UL (ref 0–0.5)
EOSINOPHIL NFR BLD: 0 % (ref 0–8)
EOSINOPHIL NFR BLD: 0.1 % (ref 0–8)
ERYTHROCYTE [DISTWIDTH] IN BLOOD BY AUTOMATED COUNT: 17.7 % (ref 11.5–14.5)
ERYTHROCYTE [DISTWIDTH] IN BLOOD BY AUTOMATED COUNT: 17.9 % (ref 11.5–14.5)
ERYTHROCYTE [DISTWIDTH] IN BLOOD BY AUTOMATED COUNT: 18.1 % (ref 11.5–14.5)
ERYTHROCYTE [DISTWIDTH] IN BLOOD BY AUTOMATED COUNT: 18.2 % (ref 11.5–14.5)
ERYTHROCYTE [DISTWIDTH] IN BLOOD BY AUTOMATED COUNT: 18.6 % (ref 11.5–14.5)
ERYTHROCYTE [DISTWIDTH] IN BLOOD BY AUTOMATED COUNT: 18.8 % (ref 11.5–14.5)
EST. GFR  (NO RACE VARIABLE): >60 ML/MIN/1.73 M^2
GLUCOSE SERPL-MCNC: 123 MG/DL (ref 70–110)
GLUCOSE SERPL-MCNC: 140 MG/DL (ref 70–110)
GLUCOSE SERPL-MCNC: 198 MG/DL (ref 70–110)
HCO3 UR-SCNC: 19.9 MMOL/L (ref 24–28)
HCO3 UR-SCNC: 25.4 MMOL/L (ref 24–28)
HCT VFR BLD AUTO: 21.6 % (ref 40–54)
HCT VFR BLD AUTO: 21.6 % (ref 40–54)
HCT VFR BLD AUTO: 23.3 % (ref 40–54)
HCT VFR BLD AUTO: 23.4 % (ref 40–54)
HCT VFR BLD AUTO: 23.7 % (ref 40–54)
HCT VFR BLD AUTO: 24 % (ref 40–54)
HCT VFR BLD AUTO: 24.6 % (ref 40–54)
HCT VFR BLD AUTO: 25.1 % (ref 40–54)
HCT VFR BLD CALC: 21 %PCV (ref 36–54)
HCT VFR BLD CALC: 21 %PCV (ref 36–54)
HGB BLD-MCNC: 7.3 G/DL (ref 14–18)
HGB BLD-MCNC: 7.3 G/DL (ref 14–18)
HGB BLD-MCNC: 8 G/DL (ref 14–18)
HGB BLD-MCNC: 8.1 G/DL (ref 14–18)
HGB BLD-MCNC: 8.1 G/DL (ref 14–18)
HGB BLD-MCNC: 8.2 G/DL (ref 14–18)
HGB BLD-MCNC: 8.4 G/DL (ref 14–18)
HGB BLD-MCNC: 8.7 G/DL (ref 14–18)
HYPOCHROMIA BLD QL SMEAR: ABNORMAL
IMM GRANULOCYTES # BLD AUTO: 0.04 K/UL (ref 0–0.04)
IMM GRANULOCYTES # BLD AUTO: 0.06 K/UL (ref 0–0.04)
IMM GRANULOCYTES # BLD AUTO: 0.07 K/UL (ref 0–0.04)
IMM GRANULOCYTES # BLD AUTO: 0.08 K/UL (ref 0–0.04)
IMM GRANULOCYTES # BLD AUTO: 0.09 K/UL (ref 0–0.04)
IMM GRANULOCYTES NFR BLD AUTO: 0.5 % (ref 0–0.5)
IMM GRANULOCYTES NFR BLD AUTO: 0.6 % (ref 0–0.5)
IMM GRANULOCYTES NFR BLD AUTO: 0.6 % (ref 0–0.5)
IMM GRANULOCYTES NFR BLD AUTO: 0.7 % (ref 0–0.5)
IMM GRANULOCYTES NFR BLD AUTO: 0.8 % (ref 0–0.5)
IMM GRANULOCYTES NFR BLD AUTO: 0.8 % (ref 0–0.5)
INR PPP: 1.1 (ref 0.8–1.2)
INR PPP: 1.2 (ref 0.8–1.2)
LDH SERPL L TO P-CCNC: 0.77 MMOL/L (ref 0.36–1.25)
LYMPHOCYTES # BLD AUTO: 0.3 K/UL (ref 1–4.8)
LYMPHOCYTES # BLD AUTO: 0.4 K/UL (ref 1–4.8)
LYMPHOCYTES NFR BLD: 2.7 % (ref 18–48)
LYMPHOCYTES NFR BLD: 2.8 % (ref 18–48)
LYMPHOCYTES NFR BLD: 2.8 % (ref 18–48)
LYMPHOCYTES NFR BLD: 3.2 % (ref 18–48)
LYMPHOCYTES NFR BLD: 4.6 % (ref 18–48)
LYMPHOCYTES NFR BLD: 4.6 % (ref 18–48)
MAGNESIUM SERPL-MCNC: 2.1 MG/DL (ref 1.6–2.6)
MAGNESIUM SERPL-MCNC: 2.2 MG/DL (ref 1.6–2.6)
MAGNESIUM SERPL-MCNC: 2.7 MG/DL (ref 1.6–2.6)
MCH RBC QN AUTO: 31.5 PG (ref 27–31)
MCH RBC QN AUTO: 31.5 PG (ref 27–31)
MCH RBC QN AUTO: 31.6 PG (ref 27–31)
MCH RBC QN AUTO: 31.7 PG (ref 27–31)
MCH RBC QN AUTO: 31.9 PG (ref 27–31)
MCH RBC QN AUTO: 32 PG (ref 27–31)
MCHC RBC AUTO-ENTMCNC: 33.8 G/DL (ref 32–36)
MCHC RBC AUTO-ENTMCNC: 34.1 G/DL (ref 32–36)
MCHC RBC AUTO-ENTMCNC: 34.2 G/DL (ref 32–36)
MCHC RBC AUTO-ENTMCNC: 34.6 G/DL (ref 32–36)
MCHC RBC AUTO-ENTMCNC: 34.7 G/DL (ref 32–36)
MCHC RBC AUTO-ENTMCNC: 34.8 G/DL (ref 32–36)
MCV RBC AUTO: 91 FL (ref 82–98)
MCV RBC AUTO: 92 FL (ref 82–98)
MCV RBC AUTO: 93 FL (ref 82–98)
MCV RBC AUTO: 93 FL (ref 82–98)
MCV RBC AUTO: 94 FL (ref 82–98)
MCV RBC AUTO: 94 FL (ref 82–98)
MODE: ABNORMAL
MONOCYTES # BLD AUTO: 0.3 K/UL (ref 0.3–1)
MONOCYTES # BLD AUTO: 0.3 K/UL (ref 0.3–1)
MONOCYTES # BLD AUTO: 0.4 K/UL (ref 0.3–1)
MONOCYTES # BLD AUTO: 0.5 K/UL (ref 0.3–1)
MONOCYTES # BLD AUTO: 0.5 K/UL (ref 0.3–1)
MONOCYTES # BLD AUTO: 0.7 K/UL (ref 0.3–1)
MONOCYTES NFR BLD: 2.7 % (ref 4–15)
MONOCYTES NFR BLD: 3.1 % (ref 4–15)
MONOCYTES NFR BLD: 3.4 % (ref 4–15)
MONOCYTES NFR BLD: 4.1 % (ref 4–15)
MONOCYTES NFR BLD: 4.2 % (ref 4–15)
MONOCYTES NFR BLD: 6.1 % (ref 4–15)
MONOCYTES NFR BLD: 6.6 % (ref 4–15)
MONOCYTES NFR BLD: 6.6 % (ref 4–15)
NEUTROPHILS # BLD AUTO: 10.8 K/UL (ref 1.8–7.7)
NEUTROPHILS # BLD AUTO: 11.3 K/UL (ref 1.8–7.7)
NEUTROPHILS # BLD AUTO: 6.5 K/UL (ref 1.8–7.7)
NEUTROPHILS # BLD AUTO: 6.5 K/UL (ref 1.8–7.7)
NEUTROPHILS # BLD AUTO: 8.2 K/UL (ref 1.8–7.7)
NEUTROPHILS # BLD AUTO: 9.2 K/UL (ref 1.8–7.7)
NEUTROPHILS # BLD AUTO: 9.3 K/UL (ref 1.8–7.7)
NEUTROPHILS # BLD AUTO: 9.8 K/UL (ref 1.8–7.7)
NEUTROPHILS NFR BLD: 87.9 % (ref 38–73)
NEUTROPHILS NFR BLD: 87.9 % (ref 38–73)
NEUTROPHILS NFR BLD: 89.8 % (ref 38–73)
NEUTROPHILS NFR BLD: 92 % (ref 38–73)
NEUTROPHILS NFR BLD: 92.3 % (ref 38–73)
NEUTROPHILS NFR BLD: 92.7 % (ref 38–73)
NEUTROPHILS NFR BLD: 93.4 % (ref 38–73)
NEUTROPHILS NFR BLD: 93.8 % (ref 38–73)
NRBC BLD-RTO: 0 /100 WBC
NUM UNITS TRANS FFP: NORMAL
OVALOCYTES BLD QL SMEAR: ABNORMAL
PAPPENHEIMER BOD BLD QL SMEAR: PRESENT
PCO2 BLDA: 28.3 MMHG (ref 35–45)
PCO2 BLDA: 37 MMHG (ref 35–45)
PH SMN: 7.45 [PH] (ref 7.35–7.45)
PH SMN: 7.46 [PH] (ref 7.35–7.45)
PHOSPHATE SERPL-MCNC: 3.9 MG/DL (ref 2.7–4.5)
PHOSPHATE SERPL-MCNC: 5.2 MG/DL (ref 2.7–4.5)
PHOSPHATE SERPL-MCNC: 5.4 MG/DL (ref 2.7–4.5)
PLATELET # BLD AUTO: 46 K/UL (ref 150–450)
PLATELET # BLD AUTO: 52 K/UL (ref 150–450)
PLATELET # BLD AUTO: 54 K/UL (ref 150–450)
PLATELET # BLD AUTO: 57 K/UL (ref 150–450)
PLATELET # BLD AUTO: 59 K/UL (ref 150–450)
PLATELET # BLD AUTO: 64 K/UL (ref 150–450)
PLATELET # BLD AUTO: 64 K/UL (ref 150–450)
PLATELET # BLD AUTO: 83 K/UL (ref 150–450)
PLATELET BLD QL SMEAR: ABNORMAL
PMV BLD AUTO: 10 FL (ref 9.2–12.9)
PMV BLD AUTO: 10 FL (ref 9.2–12.9)
PMV BLD AUTO: 10.1 FL (ref 9.2–12.9)
PMV BLD AUTO: 10.1 FL (ref 9.2–12.9)
PMV BLD AUTO: 10.3 FL (ref 9.2–12.9)
PMV BLD AUTO: 10.6 FL (ref 9.2–12.9)
PMV BLD AUTO: 10.6 FL (ref 9.2–12.9)
PMV BLD AUTO: 9.9 FL (ref 9.2–12.9)
PO2 BLDA: 144 MMHG (ref 80–100)
PO2 BLDA: 200 MMHG (ref 80–100)
POC BE: -4 MMOL/L
POC BE: 1 MMOL/L
POC IONIZED CALCIUM: 1.14 MMOL/L (ref 1.06–1.42)
POC IONIZED CALCIUM: 1.16 MMOL/L (ref 1.06–1.42)
POC SATURATED O2: 100 % (ref 95–100)
POC SATURATED O2: 99 % (ref 95–100)
POC TCO2: 21 MMOL/L (ref 23–27)
POC TCO2: 27 MMOL/L (ref 23–27)
POCT GLUCOSE: 119 MG/DL (ref 70–110)
POCT GLUCOSE: 124 MG/DL (ref 70–110)
POCT GLUCOSE: 124 MG/DL (ref 70–110)
POCT GLUCOSE: 139 MG/DL (ref 70–110)
POCT GLUCOSE: 143 MG/DL (ref 70–110)
POCT GLUCOSE: 149 MG/DL (ref 70–110)
POCT GLUCOSE: 150 MG/DL (ref 70–110)
POCT GLUCOSE: 164 MG/DL (ref 70–110)
POCT GLUCOSE: 170 MG/DL (ref 70–110)
POCT GLUCOSE: 171 MG/DL (ref 70–110)
POCT GLUCOSE: 181 MG/DL (ref 70–110)
POCT GLUCOSE: 181 MG/DL (ref 70–110)
POCT GLUCOSE: 187 MG/DL (ref 70–110)
POCT GLUCOSE: 201 MG/DL (ref 70–110)
POCT GLUCOSE: 211 MG/DL (ref 70–110)
POCT GLUCOSE: 252 MG/DL (ref 70–110)
POCT GLUCOSE: 267 MG/DL (ref 70–110)
POIKILOCYTOSIS BLD QL SMEAR: SLIGHT
POLYCHROMASIA BLD QL SMEAR: ABNORMAL
POTASSIUM BLD-SCNC: 3.1 MMOL/L (ref 3.5–5.1)
POTASSIUM BLD-SCNC: 3.9 MMOL/L (ref 3.5–5.1)
POTASSIUM SERPL-SCNC: 3.5 MMOL/L (ref 3.5–5.1)
POTASSIUM SERPL-SCNC: 3.9 MMOL/L (ref 3.5–5.1)
POTASSIUM SERPL-SCNC: 4.2 MMOL/L (ref 3.5–5.1)
PROT SERPL-MCNC: 4.5 G/DL (ref 6–8.4)
PROTHROMBIN TIME: 11.3 SEC (ref 9–12.5)
PROTHROMBIN TIME: 11.3 SEC (ref 9–12.5)
PROTHROMBIN TIME: 11.7 SEC (ref 9–12.5)
PROTHROMBIN TIME: 12.9 SEC (ref 9–12.5)
RBC # BLD AUTO: 2.31 M/UL (ref 4.6–6.2)
RBC # BLD AUTO: 2.31 M/UL (ref 4.6–6.2)
RBC # BLD AUTO: 2.51 M/UL (ref 4.6–6.2)
RBC # BLD AUTO: 2.56 M/UL (ref 4.6–6.2)
RBC # BLD AUTO: 2.57 M/UL (ref 4.6–6.2)
RBC # BLD AUTO: 2.59 M/UL (ref 4.6–6.2)
RBC # BLD AUTO: 2.67 M/UL (ref 4.6–6.2)
RBC # BLD AUTO: 2.72 M/UL (ref 4.6–6.2)
SAMPLE: ABNORMAL
SAMPLE: ABNORMAL
SAMPLE: NORMAL
SCHISTOCYTES BLD QL SMEAR: ABNORMAL
SITE: ABNORMAL
SITE: ABNORMAL
SITE: NORMAL
SODIUM BLD-SCNC: 137 MMOL/L (ref 136–145)
SODIUM BLD-SCNC: 140 MMOL/L (ref 136–145)
SODIUM SERPL-SCNC: 137 MMOL/L (ref 136–145)
SPECIMEN OUTDATE: NORMAL
SPHEROCYTES BLD QL SMEAR: ABNORMAL
TACROLIMUS BLD-MCNC: 5.5 NG/ML (ref 5–15)
TOXIC GRANULES BLD QL SMEAR: PRESENT
UNIT NUMBER: NORMAL
WBC # BLD AUTO: 10.58 K/UL (ref 3.9–12.7)
WBC # BLD AUTO: 12.03 K/UL (ref 3.9–12.7)
WBC # BLD AUTO: 12.07 K/UL (ref 3.9–12.7)
WBC # BLD AUTO: 7.42 K/UL (ref 3.9–12.7)
WBC # BLD AUTO: 7.42 K/UL (ref 3.9–12.7)
WBC # BLD AUTO: 8.85 K/UL (ref 3.9–12.7)
WBC # BLD AUTO: 9.75 K/UL (ref 3.9–12.7)
WBC # BLD AUTO: 9.98 K/UL (ref 3.9–12.7)

## 2023-09-08 PROCEDURE — 83605 ASSAY OF LACTIC ACID: CPT

## 2023-09-08 PROCEDURE — 83735 ASSAY OF MAGNESIUM: CPT | Mod: 91 | Performed by: SURGERY

## 2023-09-08 PROCEDURE — 82330 ASSAY OF CALCIUM: CPT

## 2023-09-08 PROCEDURE — 84132 ASSAY OF SERUM POTASSIUM: CPT

## 2023-09-08 PROCEDURE — 25000003 PHARM REV CODE 250: Performed by: STUDENT IN AN ORGANIZED HEALTH CARE EDUCATION/TRAINING PROGRAM

## 2023-09-08 PROCEDURE — P9045 ALBUMIN (HUMAN), 5%, 250 ML: HCPCS | Mod: JZ,JG

## 2023-09-08 PROCEDURE — 85610 PROTHROMBIN TIME: CPT | Mod: 91 | Performed by: STUDENT IN AN ORGANIZED HEALTH CARE EDUCATION/TRAINING PROGRAM

## 2023-09-08 PROCEDURE — 99223 PR INITIAL HOSPITAL CARE,LEVL III: ICD-10-PCS | Mod: ,,, | Performed by: PHYSICIAN ASSISTANT

## 2023-09-08 PROCEDURE — 85014 HEMATOCRIT: CPT

## 2023-09-08 PROCEDURE — 20000000 HC ICU ROOM

## 2023-09-08 PROCEDURE — 63600175 PHARM REV CODE 636 W HCPCS: Performed by: STUDENT IN AN ORGANIZED HEALTH CARE EDUCATION/TRAINING PROGRAM

## 2023-09-08 PROCEDURE — 84100 ASSAY OF PHOSPHORUS: CPT | Mod: 91 | Performed by: SURGERY

## 2023-09-08 PROCEDURE — 99291 CRITICAL CARE FIRST HOUR: CPT | Mod: 24,25,, | Performed by: SURGERY

## 2023-09-08 PROCEDURE — 84100 ASSAY OF PHOSPHORUS: CPT | Mod: 91 | Performed by: STUDENT IN AN ORGANIZED HEALTH CARE EDUCATION/TRAINING PROGRAM

## 2023-09-08 PROCEDURE — 99900035 HC TECH TIME PER 15 MIN (STAT)

## 2023-09-08 PROCEDURE — 63600175 PHARM REV CODE 636 W HCPCS

## 2023-09-08 PROCEDURE — 80053 COMPREHEN METABOLIC PANEL: CPT | Performed by: STUDENT IN AN ORGANIZED HEALTH CARE EDUCATION/TRAINING PROGRAM

## 2023-09-08 PROCEDURE — 85025 COMPLETE CBC W/AUTO DIFF WBC: CPT | Mod: 91

## 2023-09-08 PROCEDURE — 84450 TRANSFERASE (AST) (SGOT): CPT | Performed by: STUDENT IN AN ORGANIZED HEALTH CARE EDUCATION/TRAINING PROGRAM

## 2023-09-08 PROCEDURE — 36430 TRANSFUSION BLD/BLD COMPNT: CPT

## 2023-09-08 PROCEDURE — 82800 BLOOD PH: CPT

## 2023-09-08 PROCEDURE — 99900026 HC AIRWAY MAINTENANCE (STAT)

## 2023-09-08 PROCEDURE — P9035 PLATELET PHERES LEUKOREDUCED: HCPCS | Performed by: STUDENT IN AN ORGANIZED HEALTH CARE EDUCATION/TRAINING PROGRAM

## 2023-09-08 PROCEDURE — 83735 ASSAY OF MAGNESIUM: CPT | Mod: 91 | Performed by: STUDENT IN AN ORGANIZED HEALTH CARE EDUCATION/TRAINING PROGRAM

## 2023-09-08 PROCEDURE — 93005 ELECTROCARDIOGRAM TRACING: CPT

## 2023-09-08 PROCEDURE — 27000221 HC OXYGEN, UP TO 24 HOURS

## 2023-09-08 PROCEDURE — 85730 THROMBOPLASTIN TIME PARTIAL: CPT | Performed by: STUDENT IN AN ORGANIZED HEALTH CARE EDUCATION/TRAINING PROGRAM

## 2023-09-08 PROCEDURE — 80048 BASIC METABOLIC PNL TOTAL CA: CPT | Mod: XB

## 2023-09-08 PROCEDURE — 63600175 PHARM REV CODE 636 W HCPCS: Performed by: PHYSICIAN ASSISTANT

## 2023-09-08 PROCEDURE — 99291 PR CRITICAL CARE, E/M 30-74 MINUTES: ICD-10-PCS | Mod: 24,25,, | Performed by: SURGERY

## 2023-09-08 PROCEDURE — 86901 BLOOD TYPING SEROLOGIC RH(D): CPT | Performed by: STUDENT IN AN ORGANIZED HEALTH CARE EDUCATION/TRAINING PROGRAM

## 2023-09-08 PROCEDURE — 94003 VENT MGMT INPAT SUBQ DAY: CPT

## 2023-09-08 PROCEDURE — 84295 ASSAY OF SERUM SODIUM: CPT

## 2023-09-08 PROCEDURE — 87449 NOS EACH ORGANISM AG IA: CPT

## 2023-09-08 PROCEDURE — 25000003 PHARM REV CODE 250

## 2023-09-08 PROCEDURE — 80197 ASSAY OF TACROLIMUS: CPT | Performed by: STUDENT IN AN ORGANIZED HEALTH CARE EDUCATION/TRAINING PROGRAM

## 2023-09-08 PROCEDURE — 25000003 PHARM REV CODE 250: Performed by: PHYSICIAN ASSISTANT

## 2023-09-08 PROCEDURE — P9021 RED BLOOD CELLS UNIT: HCPCS | Performed by: CLINICAL NURSE SPECIALIST

## 2023-09-08 PROCEDURE — 37799 UNLISTED PX VASCULAR SURGERY: CPT

## 2023-09-08 PROCEDURE — 84100 ASSAY OF PHOSPHORUS: CPT

## 2023-09-08 PROCEDURE — 83735 ASSAY OF MAGNESIUM: CPT

## 2023-09-08 PROCEDURE — 27100171 HC OXYGEN HIGH FLOW UP TO 24 HOURS

## 2023-09-08 PROCEDURE — 93010 EKG 12-LEAD: ICD-10-PCS | Mod: ,,, | Performed by: INTERNAL MEDICINE

## 2023-09-08 PROCEDURE — 99223 1ST HOSP IP/OBS HIGH 75: CPT | Mod: ,,, | Performed by: PHYSICIAN ASSISTANT

## 2023-09-08 PROCEDURE — 94761 N-INVAS EAR/PLS OXIMETRY MLT: CPT

## 2023-09-08 PROCEDURE — 84450 TRANSFERASE (AST) (SGOT): CPT | Mod: 91 | Performed by: STUDENT IN AN ORGANIZED HEALTH CARE EDUCATION/TRAINING PROGRAM

## 2023-09-08 PROCEDURE — 93010 ELECTROCARDIOGRAM REPORT: CPT | Mod: ,,, | Performed by: INTERNAL MEDICINE

## 2023-09-08 PROCEDURE — 85025 COMPLETE CBC W/AUTO DIFF WBC: CPT | Performed by: STUDENT IN AN ORGANIZED HEALTH CARE EDUCATION/TRAINING PROGRAM

## 2023-09-08 PROCEDURE — 82803 BLOOD GASES ANY COMBINATION: CPT

## 2023-09-08 RX ORDER — ALBUMIN HUMAN 50 G/1000ML
25 SOLUTION INTRAVENOUS ONCE
Status: COMPLETED | OUTPATIENT
Start: 2023-09-08 | End: 2023-09-08

## 2023-09-08 RX ORDER — HYDROMORPHONE HYDROCHLORIDE 1 MG/ML
0.5 INJECTION, SOLUTION INTRAMUSCULAR; INTRAVENOUS; SUBCUTANEOUS
Status: DISCONTINUED | OUTPATIENT
Start: 2023-09-08 | End: 2023-09-10

## 2023-09-08 RX ORDER — FAMOTIDINE 20 MG/1
20 TABLET, FILM COATED ORAL NIGHTLY
Status: DISCONTINUED | OUTPATIENT
Start: 2023-09-08 | End: 2023-09-14 | Stop reason: HOSPADM

## 2023-09-08 RX ORDER — PREDNISONE 5 MG/1
TABLET ORAL
Qty: 70 TABLET | Refills: 0 | Status: SHIPPED | OUTPATIENT
Start: 2023-09-13 | End: 2024-02-05

## 2023-09-08 RX ORDER — VALGANCICLOVIR 450 MG/1
450 TABLET, FILM COATED ORAL DAILY
Qty: 30 TABLET | Refills: 5 | Status: SHIPPED | OUTPATIENT
Start: 2023-09-17

## 2023-09-08 RX ORDER — FLUCONAZOLE 2 MG/ML
400 INJECTION, SOLUTION INTRAVENOUS
Status: CANCELLED | OUTPATIENT
Start: 2023-09-08

## 2023-09-08 RX ORDER — TACROLIMUS 1 MG/1
6 CAPSULE ORAL EVERY 12 HOURS
Qty: 360 CAPSULE | Refills: 11 | Status: SHIPPED | OUTPATIENT
Start: 2023-09-08 | End: 2023-09-13 | Stop reason: SDUPTHER

## 2023-09-08 RX ORDER — DEXMEDETOMIDINE HYDROCHLORIDE 4 UG/ML
INJECTION, SOLUTION INTRAVENOUS
Status: COMPLETED
Start: 2023-09-08 | End: 2023-09-08

## 2023-09-08 RX ORDER — HYDROMORPHONE HYDROCHLORIDE 1 MG/ML
0.5 INJECTION, SOLUTION INTRAMUSCULAR; INTRAVENOUS; SUBCUTANEOUS
Status: DISCONTINUED | OUTPATIENT
Start: 2023-09-08 | End: 2023-09-08

## 2023-09-08 RX ORDER — OXYCODONE HYDROCHLORIDE 10 MG/1
10 TABLET ORAL EVERY 4 HOURS PRN
Status: DISCONTINUED | OUTPATIENT
Start: 2023-09-08 | End: 2023-09-14 | Stop reason: HOSPADM

## 2023-09-08 RX ORDER — GLUCAGON 1 MG
1 KIT INJECTION
Status: DISCONTINUED | OUTPATIENT
Start: 2023-09-08 | End: 2023-09-10

## 2023-09-08 RX ORDER — OXYCODONE HYDROCHLORIDE 5 MG/1
5 TABLET ORAL EVERY 4 HOURS PRN
Status: DISCONTINUED | OUTPATIENT
Start: 2023-09-08 | End: 2023-09-14 | Stop reason: HOSPADM

## 2023-09-08 RX ORDER — INSULIN ASPART 100 [IU]/ML
0-10 INJECTION, SOLUTION INTRAVENOUS; SUBCUTANEOUS
Status: DISCONTINUED | OUTPATIENT
Start: 2023-09-08 | End: 2023-09-10

## 2023-09-08 RX ORDER — OXYCODONE HYDROCHLORIDE 10 MG/1
10 TABLET ORAL EVERY 6 HOURS PRN
Status: DISCONTINUED | OUTPATIENT
Start: 2023-09-08 | End: 2023-09-08

## 2023-09-08 RX ORDER — TACROLIMUS 1 MG/1
1 CAPSULE ORAL 2 TIMES DAILY
Status: DISCONTINUED | OUTPATIENT
Start: 2023-09-08 | End: 2023-09-09

## 2023-09-08 RX ORDER — HYDROCODONE BITARTRATE AND ACETAMINOPHEN 500; 5 MG/1; MG/1
TABLET ORAL
Status: DISCONTINUED | OUTPATIENT
Start: 2023-09-08 | End: 2023-09-09

## 2023-09-08 RX ORDER — DOCUSATE SODIUM 100 MG/1
100 CAPSULE, LIQUID FILLED ORAL 3 TIMES DAILY
Status: DISCONTINUED | OUTPATIENT
Start: 2023-09-08 | End: 2023-09-14 | Stop reason: HOSPADM

## 2023-09-08 RX ORDER — DEXMEDETOMIDINE HYDROCHLORIDE 4 UG/ML
0-1.4 INJECTION, SOLUTION INTRAVENOUS CONTINUOUS
Status: DISCONTINUED | OUTPATIENT
Start: 2023-09-08 | End: 2023-09-08

## 2023-09-08 RX ORDER — IBUPROFEN 200 MG
24 TABLET ORAL
Status: DISCONTINUED | OUTPATIENT
Start: 2023-09-08 | End: 2023-09-10

## 2023-09-08 RX ORDER — HYDROXYZINE PAMOATE 25 MG/1
25 CAPSULE ORAL EVERY 6 HOURS PRN
Status: DISCONTINUED | OUTPATIENT
Start: 2023-09-08 | End: 2023-09-09

## 2023-09-08 RX ORDER — MYCOPHENOLATE MOFETIL 250 MG/1
1000 CAPSULE ORAL 2 TIMES DAILY
Status: DISCONTINUED | OUTPATIENT
Start: 2023-09-08 | End: 2023-09-14 | Stop reason: HOSPADM

## 2023-09-08 RX ORDER — HYDROMORPHONE HYDROCHLORIDE 1 MG/ML
0.5 INJECTION, SOLUTION INTRAMUSCULAR; INTRAVENOUS; SUBCUTANEOUS EVERY 6 HOURS PRN
Status: DISCONTINUED | OUTPATIENT
Start: 2023-09-08 | End: 2023-09-08

## 2023-09-08 RX ORDER — MYCOPHENOLATE MOFETIL 250 MG/1
1000 CAPSULE ORAL 2 TIMES DAILY
Qty: 240 CAPSULE | Refills: 2 | Status: SHIPPED | OUTPATIENT
Start: 2023-09-08 | End: 2023-12-19

## 2023-09-08 RX ORDER — SULFAMETHOXAZOLE AND TRIMETHOPRIM 400; 80 MG/1; MG/1
1 TABLET ORAL EVERY MORNING
Qty: 30 TABLET | Refills: 5 | Status: SHIPPED | OUTPATIENT
Start: 2023-09-14

## 2023-09-08 RX ORDER — IBUPROFEN 200 MG
16 TABLET ORAL
Status: DISCONTINUED | OUTPATIENT
Start: 2023-09-08 | End: 2023-09-10

## 2023-09-08 RX ADMIN — TACROLIMUS 1 MG: 1 CAPSULE ORAL at 05:09

## 2023-09-08 RX ADMIN — TACROLIMUS 1 MG: 1 CAPSULE ORAL at 08:09

## 2023-09-08 RX ADMIN — DEXMEDETOMIDINE HYDROCHLORIDE 0.4 MCG/KG/HR: 4 INJECTION, SOLUTION INTRAVENOUS at 12:09

## 2023-09-08 RX ADMIN — DOCUSATE SODIUM 100 MG: 100 CAPSULE, LIQUID FILLED ORAL at 08:09

## 2023-09-08 RX ADMIN — HYDROMORPHONE HYDROCHLORIDE 0.5 MG: 0.5 INJECTION, SOLUTION INTRAMUSCULAR; INTRAVENOUS; SUBCUTANEOUS at 12:09

## 2023-09-08 RX ADMIN — FAMOTIDINE 20 MG: 20 TABLET ORAL at 08:09

## 2023-09-08 RX ADMIN — OXYCODONE HYDROCHLORIDE 10 MG: 10 TABLET ORAL at 06:09

## 2023-09-08 RX ADMIN — OXYCODONE HYDROCHLORIDE 10 MG: 10 TABLET ORAL at 02:09

## 2023-09-08 RX ADMIN — DOCUSATE SODIUM 100 MG: 100 CAPSULE, LIQUID FILLED ORAL at 02:09

## 2023-09-08 RX ADMIN — DEXTROSE AND SODIUM CHLORIDE: 5; 900 INJECTION, SOLUTION INTRAVENOUS at 11:09

## 2023-09-08 RX ADMIN — PHYTONADIONE 10 MG: 10 INJECTION, EMULSION INTRAMUSCULAR; INTRAVENOUS; SUBCUTANEOUS at 02:09

## 2023-09-08 RX ADMIN — OXYCODONE HYDROCHLORIDE 10 MG: 10 TABLET ORAL at 11:09

## 2023-09-08 RX ADMIN — MYCOPHENOLATE MOFETIL 1000 MG: 250 CAPSULE ORAL at 08:09

## 2023-09-08 RX ADMIN — FAMOTIDINE 20 MG: 10 INJECTION, SOLUTION INTRAVENOUS at 09:09

## 2023-09-08 RX ADMIN — OXYCODONE HYDROCHLORIDE 5 MG: 5 TABLET ORAL at 11:09

## 2023-09-08 RX ADMIN — MUPIROCIN 1 G: 20 OINTMENT TOPICAL at 08:09

## 2023-09-08 RX ADMIN — FLUCONAZOLE 400 MG: 200 TABLET ORAL at 11:09

## 2023-09-08 RX ADMIN — DEXTROSE AND SODIUM CHLORIDE: 5; 900 INJECTION, SOLUTION INTRAVENOUS at 09:09

## 2023-09-08 RX ADMIN — POTASSIUM CHLORIDE 40 MEQ: 29.8 INJECTION, SOLUTION INTRAVENOUS at 04:09

## 2023-09-08 RX ADMIN — METHYLPREDNISOLONE SODIUM SUCCINATE 200 MG: 125 INJECTION, POWDER, FOR SOLUTION INTRAMUSCULAR; INTRAVENOUS at 09:09

## 2023-09-08 RX ADMIN — CALCIUM GLUCONATE 1 G: 20 INJECTION, SOLUTION INTRAVENOUS at 04:09

## 2023-09-08 RX ADMIN — HYDROMORPHONE HYDROCHLORIDE 0.5 MG: 0.5 INJECTION, SOLUTION INTRAMUSCULAR; INTRAVENOUS; SUBCUTANEOUS at 02:09

## 2023-09-08 RX ADMIN — DEXMEDETOMIDINE HYDROCHLORIDE 0.4 MCG/KG/HR: 4 INJECTION INTRAVENOUS at 12:09

## 2023-09-08 RX ADMIN — MAGNESIUM SULFATE 2 G: 2 INJECTION INTRAVENOUS at 12:09

## 2023-09-08 RX ADMIN — AMPICILLIN SODIUM AND SULBACTAM SODIUM 3 G: 2; 1 INJECTION, POWDER, FOR SOLUTION INTRAMUSCULAR; INTRAVENOUS at 12:09

## 2023-09-08 RX ADMIN — INSULIN HUMAN 1.4 UNITS/HR: 1 INJECTION, SOLUTION INTRAVENOUS at 02:09

## 2023-09-08 RX ADMIN — HEPARIN SODIUM 5000 UNITS: 5000 INJECTION INTRAVENOUS; SUBCUTANEOUS at 01:09

## 2023-09-08 RX ADMIN — HEPARIN SODIUM 5000 UNITS: 5000 INJECTION INTRAVENOUS; SUBCUTANEOUS at 05:09

## 2023-09-08 RX ADMIN — ALBUMIN (HUMAN) 25 G: 12.5 SOLUTION INTRAVENOUS at 12:09

## 2023-09-08 RX ADMIN — MUPIROCIN 1 G: 20 OINTMENT TOPICAL at 10:09

## 2023-09-08 RX ADMIN — PROPOFOL 50 MCG/KG/MIN: 10 INJECTION, EMULSION INTRAVENOUS at 12:09

## 2023-09-08 RX ADMIN — HYDROMORPHONE HYDROCHLORIDE 0.5 MG: 0.5 INJECTION, SOLUTION INTRAMUSCULAR; INTRAVENOUS; SUBCUTANEOUS at 06:09

## 2023-09-08 RX ADMIN — PHYTONADIONE 10 MG: 10 INJECTION, EMULSION INTRAMUSCULAR; INTRAVENOUS; SUBCUTANEOUS at 05:09

## 2023-09-08 RX ADMIN — AMPICILLIN SODIUM AND SULBACTAM SODIUM 3 G: 2; 1 INJECTION, POWDER, FOR SOLUTION INTRAMUSCULAR; INTRAVENOUS at 07:09

## 2023-09-08 RX ADMIN — HYDROXYZINE PAMOATE 25 MG: 25 CAPSULE ORAL at 08:09

## 2023-09-08 RX ADMIN — HYDROMORPHONE HYDROCHLORIDE 0.5 MG: 0.5 INJECTION, SOLUTION INTRAMUSCULAR; INTRAVENOUS; SUBCUTANEOUS at 01:09

## 2023-09-08 RX ADMIN — HYDROMORPHONE HYDROCHLORIDE 0.5 MG: 0.5 INJECTION, SOLUTION INTRAMUSCULAR; INTRAVENOUS; SUBCUTANEOUS at 05:09

## 2023-09-08 RX ADMIN — MYCOPHENOLATE MOFETIL 1000 MG: 250 CAPSULE ORAL at 11:09

## 2023-09-08 RX ADMIN — INSULIN HUMAN 1.1 UNITS/HR: 1 INJECTION, SOLUTION INTRAVENOUS at 08:09

## 2023-09-08 RX ADMIN — INSULIN ASPART 2 UNITS: 100 INJECTION, SOLUTION INTRAVENOUS; SUBCUTANEOUS at 04:09

## 2023-09-08 RX ADMIN — ALBUMIN (HUMAN) 25 G: 12.5 SOLUTION INTRAVENOUS at 02:09

## 2023-09-08 RX ADMIN — HEPARIN SODIUM 5000 UNITS: 5000 INJECTION INTRAVENOUS; SUBCUTANEOUS at 09:09

## 2023-09-08 NOTE — H&P
[facility] - Surgical Intensive Care  Critical Care - Surgery  History & Physical    Patient Name: Sukhdeep Grimes  MRN: [id number]  Admission Date: 8/24/2023  Code Status: Prior  Attending Physician: Leonardo Manuel MD   Primary Care Provider: Martine, Primary Doctor   Principal Problem: Alcoholic cirrhosis of liver with ascites    Subjective:     HPI:  Sukhdeep Grimes is a 38 y.o. male  PMHx of Alcoholic decompensated cirrhosis. Pt was admitted from OSH  for hepatology evaluation  D/T ascites, hyponatremia, hypokalemia, thrombocytopenia, and AYAH and weakness. On 9/1 patient was made active on transplant list MELD 30.    He presents to the SICU now s/p DBD liver transplant on 9/7. On admission he is intubated, sedated, and in stable condition. Blood pressures are stable with MAPs maintaining greater than 65 off of pressors. He has a RIJ CVC, PIVx2, R rad and left femoral art lines, a henderson, and 2x HOOD drains.    Intraoperatively he received 5 L of crystalloid, 100 mL of albumin, 5 of PRBC, 4 FFP, 3 Cryo, and 337 of Cell Saver. Urine output intraoperatively recorded as 3500 mL, with 5500 mL of ascites, a 1 L of EBL.    Immediate post-operative plans include hemodynamic stabilization and weaning of cardiac and respiratory support. Plans to wean vasopressors as tolerated, wean vent support with goals of extubation, and closely monitor fluid status with strict I's+O's and continued fluid resuscitation as needed.    Anti-JkA (Lee); with 10 units of pRBCs prepared (5 given in the OR).       Hospital/ICU Course:  No notes on file    Follow-up For: Procedure(s) (LRB):  TRANSPLANT, LIVER (N/A)    Post-Operative Day: Day of Surgery     Past Medical History:   Diagnosis Date    Alcoholic cirrhosis of liver with ascites     Esophageal varices without bleeding     Hepatic encephalopathy     Hypokalemia     Hyponatremia     Thrombocytopenia     Transfusion history 08/2022       Past Surgical History:   Procedure Laterality Date  "   EGD - EXTERNAL RESULT  08/21/2023    "1 column of large varices with no bleeding and no stigmata of recent bleeding in the lower 3rd of the esophagus.  No red Rigoberto sign present.  Banding was not performed.  Moderate portal hypertensive gastropathy in the entire examined stomach.  Examined duodenum was normal." [facility])    LIVER TRANSPLANT N/A 9/3/2023    Procedure: TRANSPLANT, LIVER;  Surgeon: Phil Ariza MD;  Location: [facility] OR [address] [place] [address];  Service: Transplant;  Laterality: N/A;    PARACENTESIS  08/24/2023       Review of patient's allergies indicates:  No Known Allergies    Family History    None       Tobacco Use    Smoking status: Not on file    Smokeless tobacco: Not on file   Substance and Sexual Activity    Alcohol use: Not on file    Drug use: Not on file    Sexual activity: Not on file      Review of Systems   Unable to perform ROS: Intubated     Objective:     Vital Signs (Most Recent):  Temp: 99.1 °F (37.3 °C) (09/07/23 2130)  Pulse: (!) 115 (09/07/23 2130)  Resp: (!) 23 (09/07/23 2130)  BP: 122/71 (09/07/23 1201)  SpO2: 100 % (09/07/23 2130) Vital Signs (24h Range):  Temp:  [98 °F (36.7 °C)-99.1 °F (37.3 °C)] 99.1 °F (37.3 °C)  Pulse:  [] 115  Resp:  [18-23] 23  SpO2:  [99 %-100 %] 100 %  BP: (122-133)/(58-71) 122/71  Arterial Line BP: (133)/(61) 133/61     Weight: 65.6 kg (144 lb 10 oz)  Body mass index is 20.75 kg/m².      Intake/Output Summary (Last 24 hours) at 9/7/2023 2138  Last data filed at 9/7/2023 2130  Gross per 24 hour   Intake 8967 ml   Output 51106 ml   Net -1448 ml          Physical Exam  Vitals reviewed.   Constitutional:       General: He is not in acute distress.     Comments: Intubated and sedated   Cardiovascular:      Rate and Rhythm: Normal rate.      Pulses: Normal pulses.   Pulmonary:      Comments: Mechanically ventilated  Abdominal:      General: Abdomen is flat.      Palpations: Abdomen is soft.      Comments: Dressings CDI. 2 HOOD " drains.   Skin:     General: Skin is warm and dry.   Neurological:      General: No focal deficit present.            Vents:  Oxygen Concentration (%): 50 (09/07/23 2130)    Lines/Drains/Airways       Central Venous Catheter Line  Duration             Percutaneous Central Line Insertion/Assessment - Triple Lumen  09/07/23 1553 Internal Jugular Right <1 day    Pulmonary Artery Catheter Assessment  09/07/23 1430 <1 day              Drain  Duration                  Closed/Suction Drain 09/07/23 2034 Tube - 1 Right Abdomen Bulb 19 Fr. <1 day         Closed/Suction Drain 09/07/23 2034 Tube - 2 Right Abdomen Bulb 19 Fr. <1 day         Urethral Catheter 09/07/23 1420 Non-latex;Straight-tip 16 Fr. <1 day              Airway  Duration                  Airway - Non-Surgical 09/07/23 1358 <1 day              Arterial Line  Duration             Arterial Line 09/07/23 1400 Left Radial <1 day    Arterial Line 09/07/23 1405 Right Radial <1 day              Peripheral Intravenous Line  Duration                  Peripheral IV - Single Lumen 09/05/23 2210 20 G Anterior;Left Forearm 1 day         JUDI 09/07/23 1407 Left Antecubital <1 day                    Significant Labs:    CBC/Anemia Profile:  Recent Labs   Lab 09/06/23  0545 09/06/23 2303 09/07/23  0510 09/07/23  1510 09/07/23 1812 09/07/23 1920 09/07/23 2130   WBC 8.70 10.89 9.77  --   --   --   --    HGB 7.1* 7.6* 7.1* 7.2* 6.4* 8.3*  --    HCT 21.2* 23.2* 21.4* 21.9* 19.5* 24.6* 24*   PLT 54* 69* 54* 57* 68* 104*  --    * 105* 104*  --   --   --   --    RDW 17.7* 17.7* 17.6*  --   --   --   --         Chemistries:  Recent Labs   Lab 09/06/23  0545 09/06/23 2303 09/07/23  0510 09/07/23  1510 09/07/23 1812 09/07/23 1920   * 134* 131* 132* 132* 133*   K 3.8 3.6 3.5 3.8 4.0 3.8    103 102  --   --   --    CO2 17* 18* 18*  --   --   --    BUN 11 11 11  --   --   --    CREATININE 0.8 0.9 0.8  --   --   --    CALCIUM 7.8* 8.0* 7.9*  --   --   --    ALBUMIN  2.5* 2.6* 2.5* 2.4* 2.3* 2.4*   PROT 5.1* 5.6* 5.3*  --   --   --    BILITOT 28.3* 29.9* 27.7*  --   --   --    ALKPHOS 135 138* 128  --   --   --    ALT 26 32 30  --  408*  --    AST 65* 72* 70*  --  431*  --    MG  --   --   --  2.0 1.8 1.9       ABGs:   Recent Labs   Lab 09/07/23 2130   PH 7.407   PCO2 47.9*   HCO3 30.2*   POCSATURATED 100   BE 5       Significant Imaging: I have reviewed all pertinent imaging results/findings within the past 24 hours.    Assessment/Plan:     GI  * Alcoholic cirrhosis of liver with ascites    Neuro/Psych:     - Sedation: propofol    - Pain:    - Dilaudid PRN while intubated             Cardiac:     - BP Goal: SBP > 100    - Pressors: none        Pulmonary:     - Goal SpO2 >92%    - Will wean ventilator support as tolerated to extubate    - ABGs PRN      Renal:    - Trend BUN/Cr     - Maintain Rangel, record strict Is/Os    Recent Labs   Lab 09/06/23  0545 09/06/23 2303 09/07/23  0510   BUN 11 11 11   CREATININE 0.8 0.9 0.8         FEN / GI:     - Daily CMP, PRN K/Mag/Phos per protocol     - Replace electrolytes as needed    - Nutrition: NPO pending extubation    - Liver US: pending for the am    - Drains: 2x HOOD      ID:     - Afebrile    - WBC stable    - Abx: Complete perioperative Unasyn    - Immunosuppression: methylprednisone, tacrolimus, mycophenylate    Recent Labs   Lab 09/06/23  0545 09/06/23 2303 09/07/23  0510   WBC 8.70 10.89 9.77         Heme:     - Hgb 7.2 pre-operatively    - CBC q4h    Recent Labs   Lab 09/07/23  1510 09/07/23  1812 09/07/23  1920   HGB 7.2* 6.4* 8.3*   PLT 57* 68* 104*   APTT 57.2* 54.9* 35.0*   INR 2.1* 1.5* 1.4*         Endocrine:     - Goal -140    - HgbA1c: < 4.0    - Insulin gtt    - Endocrinology consulted for insulin management      PPx:   Feeding: NPO  Analgesia/Sedation: propofol/dilaudid  Thromboembolic Prevention: heparin  HOB >30: Yes  Stress Ulcer: pepcid  Glucose Control: Yes, insulin management per Endocrinology      Lines/Drains/Airway:   ETT   Right radial and right femoral arterial line   RIJ CVC   Rangel   HOOD drains x2      Dispo/Code Status/Palliative:     - Continue SICU Care    - Full Code      Critical secondary to Patient has a condition that poses threat to life and bodily function: s/p liver transplant     Critical care was time spent personally by me on the following activities: development of treatment plan with patient or surrogate and bedside caregivers, discussions with consultants, evaluation of patient's response to treatment, examination of patient, ordering and performing treatments and interventions, ordering and review of laboratory studies, ordering and review of radiographic studies, pulse oximetry, re-evaluation of patient's condition.  This critical care time did not overlap with that of any other provider or involve time for any procedures.     Martin Mendenhall MD  Critical Care - Surgery  [facility] - Surgical Intensive Care

## 2023-09-08 NOTE — PROGRESS NOTES
Saw patient in the ICU.  Patient was extubated and sitting up in bed.   Gave him My New Journey: Living Smart After My Liver Transplant.  Asked him to read the book in preparation for education next week.  Allowed time for questions and answers.

## 2023-09-08 NOTE — CONSULTS
[facility] - Surgical Intensive Care  Endocrinology  Diabetes Consult Note    Consult Requested by: Lenoardo Manuel MD   Reason for admit: Alcoholic cirrhosis of liver with ascites    HISTORY OF PRESENT ILLNESS:  Reason for Consult: Management of Hyperglycemia     Surgical Procedure and Date: liver transplant 9/7/23      HPI:   Patient is a 38 y.o. male with a diagnosis of Alcoholic decompensated cirrhosis. Pt was admitted from OSH  for hepatology evaluation  D/T ascites, hyponatremia, hypokalemia, thrombocytopenia, and AYAH and weakness. On 9/1 patient was made active on transplant list MELD 30. Patient s/p liver transplant. No personal history of DM. Endocrinology consulted for BG management post-operatively.       Lab Results   Component Value Date    HGBA1C <4.0 08/01/2023           Interval HPI:   Overnight events: Received in SICU. BG at or above goal; IV insulin infusion rates 2-6 u/hr. 1 Day Post-Op. Steroid taper per primary. Intubated and sedated.  Eating:   Diet NPO  Nausea: No  Hypoglycemia and intervention: No  Fever: No  TPN and/or TF: No      PMH, PSH, FH, SH updated and reviewed     ROS:    Review of Systems   Constitutional:  Negative for activity change, appetite change and fatigue.   Respiratory:  Negative for cough and shortness of breath.    Gastrointestinal:  Negative for abdominal distention and abdominal pain.   Musculoskeletal:  Negative for arthralgias and back pain.   Skin:  Negative for wound.   Psychiatric/Behavioral:  Negative for agitation.        Current Medications and/or Treatments Impacting Glycemic Control  Immunotherapy:    Immunosuppressants           Stop Route Frequency     mycophenolate mofetil 200 mg/mL suspension 1,000 mg         -- Oral 2 times daily     tacrolimus (PROGRAF) 1 mg/mL oral syringe         -- Oral 2 times daily          Steroids:   Hormones (From admission, onward)      Start     Stop Route Frequency Ordered    09/13/23 0900  predniSONE tablet 20 mg   (methylprednisolone taper panel)        See Hyperspace for full Linked Orders Report.    -- Oral Daily 09/07/23 2117 09/12/23 0900  methylPREDNISolone sodium succinate injection 40 mg  (methylprednisolone taper panel)        See Hyperspace for full Linked Orders Report.    09/13/23 0859 IV Daily 09/07/23 2117 09/11/23 0900  methylPREDNISolone sodium succinate injection 80 mg  (methylprednisolone taper panel)        See Hyperspace for full Linked Orders Report.    09/12/23 0859 IV Daily 09/07/23 2117    09/10/23 0900  methylPREDNISolone sodium succinate injection 120 mg  (methylprednisolone taper panel)        See Hyperspace for full Linked Orders Report.    09/11/23 0859 IV Daily 09/07/23 2117 09/09/23 0900  methylPREDNISolone sodium succinate injection 160 mg  (methylprednisolone taper panel)        See Hyperspace for full Linked Orders Report.    09/10/23 0859 IV Daily 09/07/23 2117 09/08/23 0900  methylPREDNISolone sodium succinate injection 200 mg  (methylprednisolone taper panel)        See Hyperspace for full Linked Orders Report.    09/09/23 0859 IV Daily 09/07/23 2117 09/06/23 2223  methylPREDNISolone sodium succinate injection 500 mg  (Med - Immunosuppression Induction Therapy (Methylprednisolone))         -- IV On Call Procedure 09/06/23 2226          Pressors:    Autonomic Drugs (From admission, onward)      None          Hyperglycemia/Diabetes Medications:   Antihyperglycemics (From admission, onward)      Start     Stop Route Frequency Ordered    09/07/23 2215  insulin regular in 0.9 % NaCl 100 unit/100 mL (1 unit/mL) infusion        Question Answer Comment   Insulin Rate Adjustment (DO NOT MODIFY ANSWER) [email or web address]    Enter initial dose from Infusion Protocol Chart (Units/hr): 1        -- IV Continuous 09/07/23 2117             PHYSICAL EXAMINATION:  Vitals:    09/08/23 0645   BP:    Pulse: 61   Resp: (!) 8   Temp:   "    Body mass index is 21.26 kg/m².     Physical Exam  Constitutional: Well developed, well nourished, NAD.  ENT: External ears no masses with nose patent; normal hearing.  Neck: Supple; trachea midline  Cardiovascular: Normal heart sounds, no LE edema. DP +2 bilaterally.  Lungs: Normal effort; lungs anterior bilaterally clear to auscultation.  Abdomen: Soft, no masses, no hernias.  MS: No clubbing or cyanosis of nails noted;  unable to assess gait.  Skin: No rashes, lesions, or ulcers; no nodules. Chevron abdominal incision with dressing; HOOD x2.   Psychiatric: Good judgement and insight; normal mood and affect.  Neurological: Cranial nerves are grossly intact.   Foot: nails in good condition, no amputations noted.          Labs Reviewed and Include   Recent Labs   Lab 09/08/23  0308 09/08/23  0812 09/08/23  1208   *  --   --    CALCIUM 7.7*  --   --    ALBUMIN 2.7*  --   --    PROT 4.5*  --   --      --   --    K 3.9  --   --    CO2 22*  --   --      --   --    BUN 16  --   --    CREATININE 1.0  --   --    ALKPHOS 54*  --   --    *  --   --    *  596*   < > 331*   BILITOT 11.4*  --   --     < > = values in this interval not displayed.     Lab Results   Component Value Date    WBC 8.85 09/08/2023    HGB 8.1 (L) 09/08/2023    HCT 23.3 (L) 09/08/2023    MCV 91 09/08/2023    PLT 57 (L) 09/08/2023     No results for input(s): "TSH", "FREET4" in the last 168 hours.  Lab Results   Component Value Date    HGBA1C <4.0 08/01/2023       Nutritional status:   Body mass index is 21.26 kg/m².  Lab Results   Component Value Date    ALBUMIN 2.7 (L) 09/08/2023    ALBUMIN 2.4 (L) 09/07/2023    ALBUMIN 2.4 (L) 09/07/2023     No results found for: "PREALBUMIN"    Estimated Creatinine Clearance: 96.1 mL/min (based on SCr of 1 mg/dL).    Accu-Checks  Recent Labs     09/08/23  0301 09/08/23  0358 09/08/23  0500 09/08/23  0550 09/08/23  0715 09/08/23  0808 09/08/23  0909 09/08/23  1007 09/08/23  1106 " 09/08/23  1207   POCTGLUCOSE 143* 150* 171* 170* 181* 187* 149* 124* 119* 164*        ASSESSMENT and PLAN    Immunology/Multi System  Prophylactic immunotherapy  May increase insulin resistance.         Endocrine  Steroid-induced hyperglycemia  BG goal: 140-180   T2DM. S/p Liver txp.  On IIP.  Will discontinue IIP and start on transition drip.    --Start Transition insulin drip at 1.4 u/hr w/ stepdown parameters  - Start Novolog Moderate dose correction with ISF 25 starting at 180    - POCT Glucose every 4 hours  - Hypoglycemia protocol in place      ** Please notify Endocrine for any change and/or advance in diet**  ** Please call Endocrine for any BG related issues **     Discharge Planning:   TBD. Please notify endocrinology prior to discharge.        GI  * Alcoholic cirrhosis of liver with ascites  S/p liver transplant  Optimize BG for surgical wound healing.       Other  Adrenal cortical steroids causing adverse effect in therapeutic use  Glucocorticoids markedly increase prandial glucoses. Expect the steroid taper will help glucose control.             Plan discussed with patient, family, and RN at bedside.     Martin Granado PA-C  Endocrinology  [facility] - Surgical Intensive Care

## 2023-09-08 NOTE — PROGRESS NOTES
TRANSPLANT NOTE:    Admit Date: 8/24/2023    ORGAN:   LIVER  Disease Etiology: Alcohol-Associated Cirrhosis Without Acute Alcohol-Associated Hepatitis  Donor CMV Status: Positive  Donor HCV Status: Negative  Donor HBcAb: Negative  Donor HBV GOGO:   Donor HCV GOGO: Negative  Whole or Partial: Whole Liver  Biliary Anastomosis: End to End  Arterial Anatomy: Standard    Sukhdeep Grimes is a 38 y.o. male s/p    Donation after Brain Death liver transplant on 9/7/2023 (Liver) for Alcohol-Associated Cirrhosis Without Acute Alcohol-Associated Hepatitis.  This patient will follow the Steroid Induction protocol.  This patients immunosuppression will include a steroid taper over 5 weeks, Cellcept for 3 months and Prograf maintenance.  Opportunistic infection prophylaxis will include Valcyte for 6 months (CMV D+ R-), Bactrim for 6 months, and fluconazole for 1 month.  Osteoporosis risk assessment identifies (no dexa on file), therapy will include daily ca/vit D.  I have reviewed the pre-op medications and those have been restarted those, as appropriate.

## 2023-09-08 NOTE — PROGRESS NOTES
[facility] - Surgical Intensive Care  Critical Care - Surgery  Progress Note     Patient Name: Sukhdeep Grimes  MRN: [id number]  Admission Date: 8/24/2023  Code Status: Prior  Attending Physician: Leonardo Manuel MD   Primary Care Provider: Martine, Primary Doctor   Principal Problem: Alcoholic cirrhosis of liver with ascites    CRITICAL CARE ATTENDING ATTESTATION:  Critical Care issues in this patient include:    1. Status post liver transplant.  LFTs down trending appropriately.  Continue to monitor.  Avoid hepatotoxic medications.  Immunosuppression per transplant team.      2. Mechanical ventilation.  Currently oxygenating well on minimal ventilator settings.  Continue lung protective ventilation and wean to extubate.    3. At risk for hyperglycemia.  Well controlled with insulin infusion.  Continue.      4. Tubes and lines reviewed and are currently appropriate.      5. On appropriate prophylaxis.    Critical care time spent: 35 min    Date of Service: 9/8/23    Critical care was time spent personally by me on the following activities: development of treatment plan with patient or surrogate and bedside caregivers, discussions with consultants, evaluation of patient's response to treatment, examination of patient, ordering and performing treatments and interventions, ordering and review of laboratory studies, ordering and review of radiographic studies, pulse oximetry, re-evaluation of patient's condition.  This critical care time did not overlap with that of any other provider or involve time for any procedures.      Subjective:   HPI:   Sukhdeep Grimes is a 38 y.o. male  PMHx of Alcoholic decompensated cirrhosis. Pt was admitted from OSH  for hepatology evaluation  D/T ascites, hyponatremia, hypokalemia, thrombocytopenia, and AYAH and weakness. On 9/1 patient was made active on transplant list MELD 30.     He presents to the SICU now s/p DBD liver transplant on 9/7. On admission he is intubated, sedated, and in stable  condition. Blood pressures are stable with MAPs maintaining greater than 65 off of pressors. He has a RIJ CVC, PIVx2, R rad and left femoral art lines, a henderson, and 2x HOOD drains.     Intraoperatively he received 5 L of crystalloid, 100 mL of albumin, 5 of PRBC, 4 FFP, 3 Cryo, and 337 of Cell Saver. Urine output intraoperatively recorded as 3500 mL, with 5500 mL of ascites, a 1 L of EBL.    Interval History/Significant Events:   NAEON  AF HDS  Patient received a unit of pRBC over night and 1 L of albumin, due to Hgb of 7.4 and decreased UOP.   Intubated (, PEEP 5, Fi 30%) and sedated propofol and precedex  Henderson in place 2.7 L over night (25-35 last three hours)      Follow-up For: Procedure(s) (LRB):  TRANSPLANT, LIVER (N/A)    Post-Operative Day: 1 Day Post-Op    Objective:     Vital Signs (Most Recent):  Temp: 97.6 °F (36.4 °C) (09/08/23 0515)  Pulse: 62 (09/08/23 0600)  Resp: 16 (09/08/23 0600)  BP: 118/79 (09/08/23 0600)  SpO2: 100 % (09/08/23 0600) Vital Signs (24h Range):  Temp:  [97.6 °F (36.4 °C)-99.1 °F (37.3 °C)] 97.6 °F (36.4 °C)  Pulse:  [] 62  Resp:  [14-23] 16  SpO2:  [100 %] 100 %  BP: ()/(53-88) 118/79  Arterial Line BP: ()/(53-80) 123/74     Weight: 65.6 kg (144 lb 10 oz)  Body mass index is 20.75 kg/m².      Intake/Output Summary (Last 24 hours) at 9/8/2023 0627  Last data filed at 9/8/2023 0600  Gross per 24 hour   Intake 30096.78 ml   Output 38859 ml   Net -339.22 ml          Physical Exam     Constitutional: NAD, intubated and sedated  CV: Normal rate  Pulmonary: Mechanically ventilated  Abdominal: flat and soft, incision c/d/I, HOOD drains X 2 on right side of abdomen with biliary fluid  Skin: warm and dry, jaundice  Neurological: sedated    Vents:  Vent Mode: A/C (09/08/23 0416)  Set Rate: 16 BPM (09/08/23 0416)  Vt Set: 450 mL (09/08/23 0416)  PEEP/CPAP: 5 cmH20 (09/08/23 0416)  Oxygen Concentration (%): 30 (09/08/23 0600)  Peak Airway Pressure: 16 cmH20 (09/08/23  0416)  Plateau Pressure: 0 cmH20 (09/08/23 0416)  Total Ve: 7.34 L/m (09/08/23 0416)  Negative Inspiratory Force (cm H2O): 0 (09/08/23 0416)  F/VT Ratio<105 (RSBI): (!) 34.86 (09/08/23 0416)    Lines/Drains/Airways       Central Venous Catheter Line  Duration              Introducer Single Lumen 09/07/23 Internal Jugular Right 1 day    Trialysis (Dialysis) Catheter 09/07/23 right internal jugular 1 day    Pulmonary Artery Catheter Assessment  09/07/23 1430 Internal Jugular Right <1 day              Drain  Duration                  NG/OG Tube 09/07/23 Center mouth 1 day         Closed/Suction Drain 09/07/23 2034 Tube - 1 Right Abdomen Bulb 19 Fr. <1 day         Closed/Suction Drain 09/07/23 2034 Tube - 2 Right Abdomen Bulb 19 Fr. <1 day         Urethral Catheter 09/07/23 1420 Non-latex;Straight-tip 16 Fr. <1 day              Airway  Duration                  Airway - Non-Surgical 09/07/23 1358 <1 day              Arterial Line  Duration             Arterial Line 09/07/23 1400 Right Femoral <1 day    Arterial Line 09/07/23 1405 Right Radial <1 day              Peripheral Intravenous Line  Duration                  Peripheral IV - Single Lumen 09/05/23 2210 20 G Anterior;Left Forearm 2 days         JUDI 09/07/23 1407 Left Antecubital <1 day                    Significant Labs:    CBC/Anemia Profile:  Recent Labs   Lab 09/08/23  0009 09/08/23  0207 09/08/23  0308 09/08/23  0552   WBC 10.58  --  7.42  7.42 12.03   HGB 8.7*  --  7.3*  7.3* 8.1*   HCT 25.1* 21* 21.6*  21.6* 24.0*   PLT 83*  --  64*  64* 52*   MCV 92  --  94  94 93   RDW 18.6*  --  18.6*  18.6* 17.7*        Chemistries:  Recent Labs   Lab 09/07/23  0510 09/07/23  1510 09/07/23  1812 09/07/23  1920 09/07/23  2129 09/08/23  0009 09/08/23  0308   *   < > 132* 133* 134*  --  137   K 3.5   < > 4.0 3.8 3.4*  --  3.9     --   --   --  98  --  105   CO2 18*  --   --   --  22*  --  22*   BUN 11  --   --   --  14  --  16   CREATININE 0.8  --   --    --  1.0  --  1.0   CALCIUM 7.9*  --   --   --  7.9*  --  7.7*   ALBUMIN 2.5*   < > 2.3* 2.4* 2.4*  --  2.7*   PROT 5.3*  --   --   --  4.6*  --  4.5*   BILITOT 27.7*  --   --   --  12.9*  --  11.4*   ALKPHOS 128  --   --   --  75  --  54*   ALT 30  --  408*  --  540*  --  425*   AST 70*  --  431*  --  754* 756* 596*  596*   MG  --    < > 1.8 1.9 1.8  --  2.7*   PHOS  --   --   --   --  5.6*  --  3.9    < > = values in this interval not displayed.     ABG:   Recent Labs     09/08/23  0207   PH 7.445   PCO2 37.0   PO2 200*   HCO3 25.4   POCSATURATED 100   BE 1        All pertinent labs within the past 24 hours have been reviewed.    Significant Imaging:  I have reviewed all pertinent imaging results/findings within the past 24 hours.  Pending liver ultrasound this morning     Assessment:     Neuro/Psych:   -- Sedation: Propofol and precedex. Wean propofol as able.   -- Pain: dilaudid PRN             Cards:   -- HDS  -- BP Goal: SBP>100  -- Off pressors      Pulm:   -- Goal O2 sat > 90%  -- Intubated (, PEEP 5, Fi 30%, RR 16) Wean as able  -- SBT this AM, extubate if able after liver u/s      Renal:  -- Keep henderson for strict I/O  -- Trend BUN/Cr: 16/1      FEN / GI:   -- Net -339.2  -- D5% NS @100 ml/hr  -- Daily CMP  -- Replace lytes as needed  -- Nutrition: NPO until extubated  -- Liver u/s this AM  - Monitor HOOD drain output X 2      ID:   -- Tm: afebrile; WBC 12 (11)  -- Abx: complete perioperative Unasyn, fluconazole, nystatin, bactrim, valganciclovir   -- Immunosuppressants: methylprednisolone, mycophenolate mofetil, tacrolimus      Heme/Onc:   -- H/H stable 8.1 (7.3), s/p 1 U pRBC over night   -- CBC q 4 first 24 hrs, then daily CBC  -- INR 1.2 (1.3, 2.1)  -- heparin 5000 u SQ q8hr for DVT ppx      Endo:   -- Gluc goal 140-180  -- Glucose 140 this AM  -- On insulin drip, endocrine consulted for insulin management       PPx:   Feeding: NPO  Analgesia/Sedation: as above  Thromboembolic prevention:  heparin  HOB >30: yes  Stress Ulcer ppx: famotidine  Glucose control: Critical care goal 140-180 g/dl, ISS    Lines/Drains/Airway: ETT, PIV, A-line X2 (R radial and R femoral), CVC, henderson, OGT, JPX2      Dispo/Code Status/Palliative:   -- SICU / Full Code    Kip Denis MD  [employer] General Surgery PGY-2

## 2023-09-08 NOTE — ASSESSMENT & PLAN NOTE
  Neuro/Psych:     - Sedation: propofol    - Pain:    - Dilaudid PRN while intubated             Cardiac:     - BP Goal: SBP > 100    - Pressors: none        Pulmonary:     - Goal SpO2 >92%    - Will wean ventilator support as tolerated to extubate    - ABGs PRN      Renal:    - Trend BUN/Cr     - Maintain Rangel, record strict Is/Os    Recent Labs   Lab 09/06/23  0545 09/06/23  2303 09/07/23  0510   BUN 11 11 11   CREATININE 0.8 0.9 0.8         FEN / GI:     - Daily CMP, PRN K/Mag/Phos per protocol     - Replace electrolytes as needed    - Nutrition: NPO pending extubation    - Liver US: pending for the am    - Drains: 2x HOOD      ID:     - Afebrile    - WBC stable    - Abx: Complete perioperative Unasyn    - Immunosuppression: methylprednisone, tacrolimus, mycophenylate    Recent Labs   Lab 09/06/23  0545 09/06/23  2303 09/07/23  0510   WBC 8.70 10.89 9.77         Heme:     - Hgb 7.2 pre-operatively    - CBC q4h    Recent Labs   Lab 09/07/23  1510 09/07/23  1812 09/07/23  1920   HGB 7.2* 6.4* 8.3*   PLT 57* 68* 104*   APTT 57.2* 54.9* 35.0*   INR 2.1* 1.5* 1.4*         Endocrine:     - Goal -140    - HgbA1c: < 4.0    - Insulin gtt    - Endocrinology consulted for insulin management      PPx:   Feeding: NPO  Analgesia/Sedation: propofol/dilaudid  Thromboembolic Prevention: heparin  HOB >30: Yes  Stress Ulcer: pepcid  Glucose Control: Yes, insulin management per Endocrinology     Lines/Drains/Airway:   ETT   Right radial and right femoral arterial line   RIJ CVC   Rangel   HOOD drains x2      Dispo/Code Status/Palliative:     - Continue SICU Care    - Full Code

## 2023-09-08 NOTE — CARE UPDATE
Pt doing well post-op, AAOx4, VSS. Pt on room air, 02sats>99%. Afebrile. Midabdominal incision dressing CDI. HOOD drains with nadine output. Pt due to void at 2000. Pt tolerating diet, reports no N/V.  Pt reports pain as well controlled. Pt with no skin breakdown to sacrum or coccyx. Plan to stepdown tomorrow, WCTM.

## 2023-09-08 NOTE — ASSESSMENT & PLAN NOTE
BG goal: 140-180   T2DM. S/p Liver txp.  On IIP.  Will discontinue IIP and start on transition drip.    --Start Transition insulin drip at 1.4 u/hr w/ stepdown parameters  - Start Novolog Moderate dose correction with ISF 25 starting at 180    - POCT Glucose every 4 hours  - Hypoglycemia protocol in place      ** Please notify Endocrine for any change and/or advance in diet**  ** Please call Endocrine for any BG related issues **     Discharge Planning:   TBD. Please notify endocrinology prior to discharge.

## 2023-09-08 NOTE — PROGRESS NOTES
Spoke to patient's mother, Maribel.  Informed her that [facility] is at patient's bedside for her to read.  She stated patient's brother , Nitesh, will be coming to [place] to be with patient for approximately one week.  Informed her that she and her son, Yung will need to be on the phone for the education.  Allowed time for questions and answers.

## 2023-09-08 NOTE — PROGRESS NOTES
Transplant Note      presented to the patient's room for follow up and continuity of care.   Patient received a liver transplant on 9/7/2023. Patient observed sitting up in bed and patient's mother came to bedside after a little while. Patient's mother is leaving for home tomorrow and brother Jesu will come from [place] tomorrow. Patient has been placed on wait list for [employer] and the fee is $0.  Patient's mother denies any other concerns or needs at this time.     SW remains available and will continue to follow, providing psychosocial support, education and assistance as needed. Patient and family states understanding of how to access team and resources as needed.

## 2023-09-08 NOTE — SUBJECTIVE & OBJECTIVE
Interval HPI:   Overnight events: Received in SICU. BG at or above goal; IV insulin infusion rates 2-6 u/hr. 1 Day Post-Op. Steroid taper per primary. Intubated and sedated.  Eating:   Diet NPO  Nausea: No  Hypoglycemia and intervention: No  Fever: No  TPN and/or TF: No      PMH, PSH, FH, SH updated and reviewed     ROS:    Review of Systems   Constitutional:  Negative for activity change, appetite change and fatigue.   Respiratory:  Negative for cough and shortness of breath.    Gastrointestinal:  Negative for abdominal distention and abdominal pain.   Musculoskeletal:  Negative for arthralgias and back pain.   Skin:  Negative for wound.   Psychiatric/Behavioral:  Negative for agitation.        Current Medications and/or Treatments Impacting Glycemic Control  Immunotherapy:    Immunosuppressants           Stop Route Frequency     mycophenolate mofetil 200 mg/mL suspension 1,000 mg         -- Oral 2 times daily     tacrolimus (PROGRAF) 1 mg/mL oral syringe         -- Oral 2 times daily          Steroids:   Hormones (From admission, onward)      Start     Stop Route Frequency Ordered    09/13/23 0900  predniSONE tablet 20 mg  (methylprednisolone taper panel)        See Hyperspace for full Linked Orders Report.    -- Oral Daily 09/07/23 2117 09/12/23 0900  methylPREDNISolone sodium succinate injection 40 mg  (methylprednisolone taper panel)        See Hyperspace for full Linked Orders Report.    09/13/23 0859 IV Daily 09/07/23 2117 09/11/23 0900  methylPREDNISolone sodium succinate injection 80 mg  (methylprednisolone taper panel)        See Hyperspace for full Linked Orders Report.    09/12/23 0859 IV Daily 09/07/23 2117    09/10/23 0900  methylPREDNISolone sodium succinate injection 120 mg  (methylprednisolone taper panel)        See Hyperspace for full Linked Orders Report.    09/11/23 0859 IV Daily 09/07/23 2117 09/09/23 0900  methylPREDNISolone sodium succinate injection 160 mg  (methylprednisolone taper  panel)        See Hyperspace for full Linked Orders Report.    09/10/23 0859 IV Daily 09/07/23 2117 09/08/23 0900  methylPREDNISolone sodium succinate injection 200 mg  (methylprednisolone taper panel)        See Hyperspace for full Linked Orders Report.    09/09/23 0859 IV Daily 09/07/23 2117 09/06/23 2223  methylPREDNISolone sodium succinate injection 500 mg  (Med - Immunosuppression Induction Therapy (Methylprednisolone))         -- IV On Call Procedure 09/06/23 2226          Pressors:    Autonomic Drugs (From admission, onward)      None          Hyperglycemia/Diabetes Medications:   Antihyperglycemics (From admission, onward)      Start     Stop Route Frequency Ordered    09/07/23 2215  insulin regular in 0.9 % NaCl 100 unit/100 mL (1 unit/mL) infusion        Question Answer Comment   Insulin Rate Adjustment (DO NOT MODIFY ANSWER) \\[email or web address]    Enter initial dose from Infusion Protocol Chart (Units/hr): 1        -- IV Continuous 09/07/23 2117             PHYSICAL EXAMINATION:  Vitals:    09/08/23 0645   BP:    Pulse: 61   Resp: (!) 8   Temp:      Body mass index is 21.26 kg/m².     Physical Exam  Constitutional: Well developed, well nourished, NAD.  ENT: External ears no masses with nose patent; normal hearing.  Neck: Supple; trachea midline  Cardiovascular: Normal heart sounds, no LE edema. DP +2 bilaterally.  Lungs: Normal effort; lungs anterior bilaterally clear to auscultation.  Abdomen: Soft, no masses, no hernias.  MS: No clubbing or cyanosis of nails noted;  unable to assess gait.  Skin: No rashes, lesions, or ulcers; no nodules. Chevron abdominal incision with dressing; HOOD x2.   Psychiatric: Good judgement and insight; normal mood and affect.  Neurological: Cranial nerves are grossly intact.   Foot: nails in good condition, no amputations noted.

## 2023-09-08 NOTE — PLAN OF CARE
Recommendations     Recommend if and when medically feasible to ADAT, with goal of regular diet- adding Low Na diet modifiers as needed- fluid per MD.      2.   Recommend Boost Plus TID if PO intake <50% to help optimize PRO/Kcal intake.     3.   RD to monitor wt, PO intake, skin, labs- RD to continue to answer any questions relating to Post Tx diet.         Goals: Meet % EEN, EPN by RD f/u date  Nutrition Goal Status: progressing towards goal  Communication of RD Recs: reviewed with RN

## 2023-09-08 NOTE — SIGNIFICANT EVENT
Pre-operative Discussion Note  Liver Transplant Surgery    This discussion occurred in pateint room ay 7am, 7 Sept, 2023    Sukhdeep Grimes is a 38 y.o. male admitted for liver transplant.  I discussed the planned procedure in detail, including expected hospital course and outcomes, benefits, risks, and potential complications.  Complications discussed included death, graft failure, bleeding, infection, rejection, and neurologic problems.  I discussed the risks of anesthesia, as well as the potential need for re-operation.  The possibility of other complications not specifically mentioned was also discussed.  Also, I discussed the need for lifelong immunosuppression and the possibility of serious complications from immunosuppressive drugs.    The discussion included the risks that the patient will incur if he elects to not have the proposed procedure.    Relevant donor-specific risk factors were disclosed and discussed with the patient, including:   none    Specific PHS donor risk criteria for the organ donor include:  None    HCV Infection Not applicable.    Hepatocellular Carcinoma Recurrence: Not applicable.    The patient was SARS-CoV-2 /COVID-19 tested with negative results.    COVID-19: I discussed the possibility of COVID-19 transmission with the patient. Although GOGO testing is available for the virus using a technique which in theory should be very accurate, there is no data yet regarding the likelihood of a  false-negative test leading to virus transmission. Based on accuracy of testing for other viruses, it is expected that this risk is extremely small.     I also discussed that transplant immunosuppression will increase susceptibility to COVID-19 and other viruses, and that although we use stringent precautions to protect patients from infection, it is possible for a transplant recipient to contract this infection. If COVID-19 infection should occur, it would be a serious matter with a significant risk  of death.    All questions were answered.  The patient and available family members voice understanding and agree to proceed with the transplant.    UNOS Patient Status  Note on scores:  ICU = 10 = total assistance  TSU = 20-30 = partial assistance  Outpatient admitted for transplant requiring medical care in last year = 40-50 = partial assistance  Scores 60 or higher indicate no assistance, meaning no need for medical care in last year. This would be very unusual for a transplant candidate.    Functional Status: 20% - Very sick, hospitalization necessary: active treatment necessary  Physical Capacity: Limited Mobility

## 2023-09-08 NOTE — CARE UPDATE
LTS team at the , rounding completed. Updated on pt VS, assessment and hemodynamic parameters. Pt extubated per orders to 3L NC. Tolerated procedure well. Orders noted. Will continue to monitor.

## 2023-09-08 NOTE — OP NOTE
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 year-old male with Alcohol-Associated Cirrhosis Without Acute Alcohol-Associated Hepatitis,  (UNOS terminology) who has been evaluated for a liver transplant.  The procedure was thoroughly discussed with the patient, including potential risks, complications, and alternatives. Specific complications mentioned included death, graft non-function, bleeding, infection, rejection, renal failure, respiratory failure, and neurologic deficits, as well as the possibility of other complications not specifically mentioned.    Donor Risk Factors:  Prior to the operation, the patient was advised of any donor-specific risk factors requiring specific disclosure. Factors in this case included nothing that required specific disclosure or   .      Specific PHS Donor Risk criteria for the organ donor include:  None    All questions were answered, the patient voiced appropriate understanding, and he agreed to proceed with the planned procedure.    ABO Confirmation:  Immediately following arrival of the donor organ and prior to implantation, a formal ABO confirmation was done according to hospital and UNOS policies.  I confirmed the UNOS ID number of the donor organ ([id number]) and the donor and recipient ABO types, directly verifying these data by comparison with the UNOS Match Run report ([id number].  This confirmation was personally done by an attending surgeon and circulating nurse, and is officially documented elsewhere.    Time-Out: A complete time out was carried out prior to incision, with confirmation of patient identity, correct procedure, correct operative site, appropriate antibiotic prophylaxis, review of any known allergies, and presence of all needed equipment.    Procedure in Detail  Following the induction of general endotracheal anesthesia, appropriate arterial and venous lines were placed by the anesthesia team.  Care was taken to pad all pressure points and avoid any potential traction injuries from positioning. The urinary bladder was catheterized.  Sequential compression boots and Lexie Huggers were used. The chest, abdomen, and upper thighs were sterilely prepped and draped.     The abdomen was entered via a wide bilateral subcostal incision. 5,500 mL of ascites was removed. The round ligament was ligated and divided. The falciform, left triangular, and gastrohepatic ligaments were divided using the electrocautery, with 2-0 silk ties as needed. The Sullivan self-retaining retractor was placed to provide exposure. Adhesions between the abdominal viscera and the abdominal wall and the undersurface of the liver were divided as needed using cautery dissection and silk ties or suture ligatures. Hilar dissection was then carried out, with ligation of the right and left hepatic arteries as well as the cystic duct and the common hepatic duct. The recipient arterial anatomy was found to be: standard. The portal vein was exposed circumferentially from its bifurcation down  to the superior border of the pancreas. The portal vein was found to be patent.  Attention was next directed to the right side of the liver where the right triangular ligament was taken down, exposing the bare area of the liver, and the IVC. The infrahepatic IVC was isolated, followed by mobilization of the retrohepatic cava, division of the right adrenal vein, and isolation of the suprahepatic IVC. The patient was given 2000 units of heparin prior to caval cross-clamping. . After assuring adequate stability after cross-clamping, the native liver was excised and the allograft liver was brought to the operative field.  The liver was perfused with cold 5% albumin during implantation to displace the organ preservation solution.  IVC reconstruction technique consisted of end to end ivc using 3-0 and 4-0 polypropylene as appropriate.  The donor portal vein was then anastomosed to the recipient portal inflow site (end portal vein) with 5-0 polypropylene. Once this was completed, anesthesia was notified and the liver was re-perfused. Copious irrigation with warm saline and temporary finger occlusion of portal inflow were used as needed to avoid any problems with hypothermia. Temporary packing, electrocautery, and polypropylene suture ligatures were used as appropriate to provide hemostasis. Once this was satisfactory, attention was directed to the arterial reconstruction. This liver did not come from a DCD donor. Arterial inflow was provided to the graft by anastomosing the recipient right-left hepatic branch patch to the donor  common hepatic artery using 6-0 polypropylene. The liver was assessed for adequacy of perfusion, which was satisfactory. The gallbladder was then removed from the allograft liver, and a temporary packing period was carried out to help assure hemostatis.     There was a hematoma at the upper surface of the right lobe which was opened and managed with surgicel, surgiflo and fibrillar    Attention was  next directed toward the bile duct. The donor bile duct was prepared and assessed for adequate vascular supply. Biliary reconstruction was then performed by anastomosing the recipient common bile duct to the donor duct using 6-0 PDS sutures.  The biliary stent used was: none.  A final check for hemostasis was made. 2 19f Fredi drains were placed through separate incisions below the transverse abdominal incision and placed in the usual positions. The cavity was irrigated with saline. The abdomen was closed in layers using running #1 PDS suture. The incision was irrigated and the skin was closed with staples. At the end of the case all instrument, needle, and sponge counts were correct. The patient was taken to the ICU in good condition.     Fluids Administered:   Crystalloid (mL) 5,000   Colloid (mL) 100   RBC (Units) 5   FFP (Units) 4   Cryoprecipitate (Units)  3   Platelets (Units) 2      Specimens: Explant liver  Drains: 19f Fredi drains x 2    Additional Findings:    Estimated Blood Loss: 1,000 mL  Ascites Evacuated: 5,500 mL    Ischemic Times:  Time of portal reperfusion: 9/7/2023  5:30 PM  Time of arterial reperfusion: 9/7/2023  6:01 PM  Anastomosis (warm ischemia) time: 35 minutes  Cold ischemia time: 243 minutes    Surgical Data:  Graft type (whole vs. partial): whole liver  IVC reconstruction: end to end ivc  Portal vein status: patent  Portal graft performed? no  Donor arterial anatomy: standard  Donor arterial inflow: common hepatic artery  Recipient arterial anatomy: standard  Recipient arterial inflow: right-left hepatic branch patch  Arterial graft performed? no  Biliary reconstruction: end to end (donor cbd to recipient common bile duct)  Biliary stent: none  Disposition of Vessels from donor of transplanted organ: sent to blood bank  Damage during procurement: no  Procurement damage comments: none    Donor Factors:  UNOS ID: [id number]  UNOS Match Run: [id number]  Donor type: donation after brain  death  Donor with reported PHS risk criteria: no  Donor CMV serologic status: Positive  Donor with known cancer: no  Donor HCV serologic status: Negative  Donor HBcAb: Negative  Donor HBV GOGO:   Donor HCV GOGO: Negative  Liver weight: 1607 grams

## 2023-09-08 NOTE — NURSING
SICU PLAN OF CARE NOTE    Dx: Alcoholic cirrhosis of liver with ascites    Goals of Care: Wean sedation to extubate. Trend labs    Vital Signs (last 12 hours):   Temp:  [97.6 °F (36.4 °C)-99.1 °F (37.3 °C)]   Pulse:  []   Resp:  [14-23]   BP: ()/(53-88)   SpO2:  [100 %]   Arterial Line BP: ()/(53-80)      Neuro: Arouses to Voice, Follows Commands, and Moves All Extremities    Cardiac: SR 60s-70s    Respiratory: Ventilator    Gtts: Propofol, Precedex, Insulin, and MIVF    Urine Output: Urinary Catheter 30-60 cc/hour      Drains: HOOD Drain, total output 287 cc / shift     HOOD Drain, total output 100 cc / shift  Diet: NPO      Restraints BL soft wrist restraints in place     Labs/Accuchecks: Q4 AST CBC PT INR.    Skin: No new skin breakdown noted. Pt repositioned q2h and PRN. Protective foams intact, specialty bed in use    Shift Events: 1l 5% albumin 1 PRBC    Nurses Note -- 4 Eyes      9/8/2023   6:16 AM      Skin assessed during: Admit      [x] No Altered Skin Integrity Present    [x]Prevention Measures Documented      [] Yes- Altered Skin Integrity Present or Discovered   [] LDA Added if Not in Epic (Describe Wound)   [] New Altered Skin Integrity was Present on Admit and Documented in LDA   [] Wound Image Taken    Wound Care Consulted? No    Attending Nurse:  HALINA Hutton RN    Second RN/Staff Member:  ROMMEL Hunter RN

## 2023-09-08 NOTE — TRANSFER OF CARE
"Anesthesia Transfer of Care Note    Patient: Sukhdeep Grimes    Procedure(s) Performed: Procedure(s) (LRB):  TRANSPLANT, LIVER (N/A)    Patient location: ICU    Anesthesia Type: general    Transport from OR: Transported from OR intubated on 100% O2  with adequate ventilation controlled by transport ventilator. Continuous ECG monitoring in transport. Continuous SpO2 monitoring in transport. Continuos invasive BP monitoring in transport    Post pain: adequate analgesia    Post assessment: no apparent anesthetic complications and tolerated procedure well    Post vital signs: stable    Level of consciousness: sedated    Nausea/Vomiting: no nausea/vomiting    Complications: none    Transfer of care protocol was followed      Last vitals:   Visit Vitals  /71 (BP Location: Left arm, Patient Position: Sitting)   Pulse 97   Temp 36.8 °C (98.3 °F) (Oral)   Resp 18   Ht 5' 10" (1.778 m)   Wt 65.6 kg (144 lb 10 oz)   SpO2 100%   BMI 20.75 kg/m²     "

## 2023-09-08 NOTE — PROGRESS NOTES
Autotransfusion/Rapid Infusion Record:      09/07/2023  Autotransfusionist:  Khai Stuart    Surgeon(s) and Role:     * Leonardo Manuel MD - Primary     * Jamaal Elaine MD - Resident - Assisting     * Tai Prieto MD - Fellow     * Mel Murry MD - Co-Surgeon  Anesthesiologist:  Tito Wise MD    Past Medical History:   Diagnosis Date    Alcoholic cirrhosis of liver with ascites     Esophageal varices without bleeding     Hepatic encephalopathy     Hypokalemia     Hyponatremia     Thrombocytopenia     Transfusion history 08/2022       Procedure(s) (LRB):  TRANSPLANT, LIVER (N/A)     8:36 PM    Equipment:    Cell Saver     R.I.S.  : [employer] Model: CATSmart or CATSplus : [facility]   Model: [id number]     Serial number: [id number]   Serial number:    Disposable lot #: [id number]   Disposable lot #:      Were extra cardiotomies used for cell saver?  yes   if yes, #:  1    Solutions:  Anticoagulant: ACD-A   Expiration date: Feb/24 Volume used: 4000ml   Wash solution: 0.9% NaCl   Expiration date: Jan/25 Volume used: 6394ml     Cell saver checklist  Time completed:           []   Circuit assembled correctly     []   Cell saver powered and operational     []   Vacuum connected, functional, adjust to max -150mmHg     []   Anticoagulant drip rate adjusted     []   Transfer bag properly labeled with patient name, expiration time, volume,       anticoagulant, OR number, and initials     []   Cell saver disinfected after use (completed at end of case)       Cell Saver volumes:    Total volume processed:     51636 mL     Total volume pRBCs recovered     337 mL     Volume pRBCs infused     337 mL         RIS checklist   Time completed:  []   RIS circuit assembled correctly     []   RIS power and operational     []   RIS disinfected after use (completed at end of case)       RIS volumes:    Total volume infused:    (see anesthesia record for blood       product information)    mL        Additional comments:

## 2023-09-08 NOTE — ANESTHESIA POSTPROCEDURE EVALUATION
Anesthesia Post Evaluation    Patient: Sukhdeep Grimes    Procedure(s) Performed: Procedure(s) (LRB):  TRANSPLANT, LIVER (N/A)    Final Anesthesia Type: general      Patient location during evaluation: ICU  Patient participation: No - Unable to Participate, Intubation  Level of consciousness: sedated  Post-procedure vital signs: reviewed and stable  Pain management: adequate  Airway patency: patent    PONV status at discharge: No PONV  Anesthetic complications: no      Cardiovascular status: hemodynamically stable  Respiratory status: intubated and ventilator  Hydration status: euvolemic  Follow-up not needed.          Vitals Value Taken Time   /87 09/08/23 0702   Temp 36.4 °C (97.6 °F) 09/08/23 0515   Pulse 62 09/08/23 0706   Resp 16 09/08/23 0706   SpO2 100 % 09/08/23 0706   Vitals shown include unvalidated device data.      No case tracking events are documented in the log.      Pain/Nasrin Score: Pain Rating Prior to Med Admin: 7 (9/8/2023  5:52 AM)  Pain Rating Post Med Admin: 2 (9/8/2023  6:15 AM)

## 2023-09-08 NOTE — CONSULTS
[facility] - Surgical Intensive Care  Adult Nutrition  Consult Note    SUMMARY   RD consulted for s/p liver tx      Recommendations    Recommend if and when medically feasible to ADAT, with goal of regular diet- adding Low Na diet modifiers as needed- fluid per MD.     2.   Recommend Boost Plus TID if PO intake <50% to help optimize PRO/Kcal intake.    3.   RD to monitor wt, PO intake, skin, labs- RD to continue to answer any questions relating to Post Tx diet.       Goals: Meet % EEN, EPN by RD f/u date  Nutrition Goal Status: progressing towards goal  Communication of RD Recs: reviewed with RN    Assessment and Plan    Endocrine  Moderate malnutrition  Malnutrition Type:  Context: acute illness or injury  Level: moderate    Related to (etiology):   Alcoholic cirrhosis of liver with ascites     Signs and Symptoms (as evidenced by):   Mild to moderate fat/muscle wasting     Malnutrition Characteristic Summary:  Subcutaneous Fat (Malnutrition): moderate depletion  Muscle Mass (Malnutrition): moderate depletion  Fluid Accumulation (Malnutrition):  (ascites)      Interventions/Recommendations (treatment strategy):  1.) Recommend continuing with Low na diet as tolerated, fluid per MD. 2.) Recommend continuing Boost Plus daily to help optimize PRO/Kcal intake. 3.) RD to monitor wt, PO intake, skin, labs.    Nutrition Diagnosis Status:   New         Malnutrition Assessment  Malnutrition Context: acute illness or injury  Malnutrition Level: moderate  Skin (Micronutrient): yellow       Subcutaneous Fat (Malnutrition): moderate depletion  Muscle Mass (Malnutrition): moderate depletion  Fluid Accumulation (Malnutrition):  (ascites)   Orbital Region (Subcutaneous Fat Loss): moderate depletion  Upper Arm Region (Subcutaneous Fat Loss): mild depletion  Thoracic and Lumbar Region: moderate depletion   [place] Region (Muscle Loss): moderate depletion  Clavicle Bone Region (Muscle Loss): moderate depletion  Clavicle and  "Acromion Bone Region (Muscle Loss): moderate depletion  Dorsal Hand (Muscle Loss): mild depletion     Reason for Assessment    Reason For Assessment: consult (RD consulted for s/p liver transplant on 9/7)  Diagnosis: liver disease (Alcoholic cirrhosis of liver with ascites)  Relevant Medical History: EtOH abuse, Decompensated hepatic cirrhosis  Interdisciplinary Rounds: did not attend  General Information Comments: RD consulted for s/p liver tx. Pt denies n/v/d/c. Pt endorses good appetite, consuming 50-75% of their CLD. Pt endorses good appetite prior to the surgery. RD went over food safety guidelines with pt. RD went over foods that pt liked and helped them determine if item was appropriate or not. Pt stated that they were looking forward to eating a meal. Malnutrition dx continues, but improving. Trace edema noted.  Nutrition Discharge Planning: Low Na diet edu provided on 8/26. Post transplant nutrition education provided on 9/8. Food safety/drug interactions emphasized. General healthy/low salt diet recommended. Appropriate education material with RD contact information provided. All questions and comments were addressed. No other needs identified.    Nutrition Risk Screen    Nutrition Risk Screen: no indicators present    Nutrition/Diet History    Spiritual, Cultural Beliefs, Spiritism Practices, Values that Affect Care: no  Factors Affecting Nutritional Intake: NPO    Anthropometrics    Temp: 98.2 °F (36.8 °C)  Height Method: Stated  Height: 5' 10" (177.8 cm)  Height (inches): 70 in  Weight Method: Bed Scale  Weight: 67.2 kg (148 lb 2.4 oz)  Weight (lb): 148.15 lb  Ideal Body Weight (IBW), Male: 166 lb  % Ideal Body Weight, Male (lb): 87.12 %  BMI (Calculated): 21.3  BMI Grade: 18.5-24.9 - normal    Lab/Procedures/Meds    Pertinent Labs Reviewed: reviewed  Pertinent Labs Comments: gluc: 198, Ca: 7.6, phos: 5.2, alk phos: 54, PRO: 4.5, alb: 2.7, tbili: 11.4, AST: 268, ALT: 425  Pertinent Medications Reviewed: " reviewed  Pertinent Medications Comments: docusate, famotidine, heparin, methylprednisolone, mycophenolate, abx, tacrolimus, insulin, D5    Estimated/Assessed Needs    Weight Used For Calorie Calculations: 65.6 kg (144 lb 10 oz)  Energy Calorie Requirements (kcal): 1984 kcal/d  Energy Need Method: Foxworth-St Jeor (1.25 PAL)  Protein Requirements: 79- 99g (1.2-1.5g/kg)  Weight Used For Protein Calculations: 65.6 kg (144 lb 10 oz)  Fluid Requirements (mL): -  Estimated Fluid Requirement Method: other (see comments) (Per MD or 1 mL/kcal)  RDA Method (mL): 1984    Nutrition Prescription Ordered    Current Diet Order: CLD  Nutrition Order Comments: was NPO    Evaluation of Received Nutrient/Fluid Intake    I/O: +387ml sincea admit  Energy Calories Required: not meeting needs  Protein Required: not meeting needs  Fluid Required:  (as per MD)  Comments: LBM: 9/6  Tolerance: tolerating  % Intake of Estimated Energy Needs: 0 - 25 %  % Meal Intake: Other: CLD    Nutrition Risk    Level of Risk/Frequency of Follow-up:  (2x/week)     Monitor and Evaluation    Food and Nutrient Intake: energy intake, food and beverage intake  Food and Nutrient Adminstration: diet order  Knowledge/Beliefs/Attitudes: food and nutrition knowledge/skill, beliefs and attitudes  Physical Activity and Function: nutrition-related ADLs and IADLs  Anthropometric Measurements: weight, weight change, body mass index  Biochemical Data, Medical Tests and Procedures: electrolyte and renal panel, gastrointestinal profile, glucose/endocrine profile, inflammatory profile, lipid profile  Nutrition-Focused Physical Findings: overall appearance, skin     Nutrition Follow-Up    RD Follow-up?: Yes

## 2023-09-08 NOTE — SUBJECTIVE & OBJECTIVE
"Follow-up For: Procedure(s) (LRB):  TRANSPLANT, LIVER (N/A)    Post-Operative Day: Day of Surgery     Past Medical History:   Diagnosis Date    Alcoholic cirrhosis of liver with ascites     Esophageal varices without bleeding     Hepatic encephalopathy     Hypokalemia     Hyponatremia     Thrombocytopenia     Transfusion history 08/2022       Past Surgical History:   Procedure Laterality Date    EGD - EXTERNAL RESULT  08/21/2023    "1 column of large varices with no bleeding and no stigmata of recent bleeding in the lower 3rd of the esophagus.  No red Rigoberto sign present.  Banding was not performed.  Moderate portal hypertensive gastropathy in the entire examined stomach.  Examined duodenum was normal." EGD done in [facility])    LIVER TRANSPLANT N/A 9/3/2023    Procedure: TRANSPLANT, LIVER;  Surgeon: Phil Ariza MD;  Location: [facility] OR [address] [place] [address];  Service: Transplant;  Laterality: N/A;    PARACENTESIS  08/24/2023       Review of patient's allergies indicates:  No Known Allergies    Family History    None       Tobacco Use    Smoking status: Not on file    Smokeless tobacco: Not on file   Substance and Sexual Activity    Alcohol use: Not on file    Drug use: Not on file    Sexual activity: Not on file      Review of Systems   Unable to perform ROS: Intubated     Objective:     Vital Signs (Most Recent):  Temp: 99.1 °F (37.3 °C) (09/07/23 2130)  Pulse: (!) 115 (09/07/23 2130)  Resp: (!) 23 (09/07/23 2130)  BP: 122/71 (09/07/23 1201)  SpO2: 100 % (09/07/23 2130) Vital Signs (24h Range):  Temp:  [98 °F (36.7 °C)-99.1 °F (37.3 °C)] 99.1 °F (37.3 °C)  Pulse:  [] 115  Resp:  [18-23] 23  SpO2:  [99 %-100 %] 100 %  BP: (122-133)/(58-71) 122/71  Arterial Line BP: (133)/(61) 133/61     Weight: 65.6 kg (144 lb 10 oz)  Body mass index is 20.75 kg/m².      Intake/Output Summary (Last 24 hours) at 9/7/2023 2138  Last data filed at 9/7/2023 2130  Gross per 24 hour   Intake 8967 ml   Output 17637 ml   Net -1448 ml "          Physical Exam  Vitals reviewed.   Constitutional:       General: He is not in acute distress.     Comments: Intubated and sedated   Cardiovascular:      Rate and Rhythm: Normal rate.      Pulses: Normal pulses.   Pulmonary:      Comments: Mechanically ventilated  Abdominal:      General: Abdomen is flat.      Palpations: Abdomen is soft.      Comments: Dressings CDI. 2 HOOD drains.   Skin:     General: Skin is warm and dry.   Neurological:      General: No focal deficit present.            Vents:  Oxygen Concentration (%): 50 (09/07/23 2130)    Lines/Drains/Airways       Central Venous Catheter Line  Duration             Percutaneous Central Line Insertion/Assessment - Triple Lumen  09/07/23 1553 Internal Jugular Right <1 day    Pulmonary Artery Catheter Assessment  09/07/23 1430 <1 day              Drain  Duration                  Closed/Suction Drain 09/07/23 2034 Tube - 1 Right Abdomen Bulb 19 Fr. <1 day         Closed/Suction Drain 09/07/23 2034 Tube - 2 Right Abdomen Bulb 19 Fr. <1 day         Urethral Catheter 09/07/23 1420 Non-latex;Straight-tip 16 Fr. <1 day              Airway  Duration                  Airway - Non-Surgical 09/07/23 1358 <1 day              Arterial Line  Duration             Arterial Line 09/07/23 1400 Left Radial <1 day    Arterial Line 09/07/23 1405 Right Radial <1 day              Peripheral Intravenous Line  Duration                  Peripheral IV - Single Lumen 09/05/23 2210 20 G Anterior;Left Forearm 1 day         JUDI 09/07/23 1407 Left Antecubital <1 day                    Significant Labs:    CBC/Anemia Profile:  Recent Labs   Lab 09/06/23  0545 09/06/23  2303 09/07/23  0510 09/07/23  1510 09/07/23  1812 09/07/23  1920 09/07/23  2130   WBC 8.70 10.89 9.77  --   --   --   --    HGB 7.1* 7.6* 7.1* 7.2* 6.4* 8.3*  --    HCT 21.2* 23.2* 21.4* 21.9* 19.5* 24.6* 24*   PLT 54* 69* 54* 57* 68* 104*  --    * 105* 104*  --   --   --   --    RDW 17.7* 17.7* 17.6*  --   --   --    --         Chemistries:  Recent Labs   Lab 09/06/23  0545 09/06/23  2303 09/07/23  0510 09/07/23  1510 09/07/23  1812 09/07/23  1920   * 134* 131* 132* 132* 133*   K 3.8 3.6 3.5 3.8 4.0 3.8    103 102  --   --   --    CO2 17* 18* 18*  --   --   --    BUN 11 11 11  --   --   --    CREATININE 0.8 0.9 0.8  --   --   --    CALCIUM 7.8* 8.0* 7.9*  --   --   --    ALBUMIN 2.5* 2.6* 2.5* 2.4* 2.3* 2.4*   PROT 5.1* 5.6* 5.3*  --   --   --    BILITOT 28.3* 29.9* 27.7*  --   --   --    ALKPHOS 135 138* 128  --   --   --    ALT 26 32 30  --  408*  --    AST 65* 72* 70*  --  431*  --    MG  --   --   --  2.0 1.8 1.9       ABGs:   Recent Labs   Lab 09/07/23  2130   PH 7.407   PCO2 47.9*   HCO3 30.2*   POCSATURATED 100   BE 5       Significant Imaging: I have reviewed all pertinent imaging results/findings within the past 24 hours.

## 2023-09-08 NOTE — HPI
Reason for Consult: Management of Hyperglycemia     Surgical Procedure and Date: liver transplant 9/7/23      HPI:   Patient is a 38 y.o. male with a diagnosis of Alcoholic decompensated cirrhosis. Pt was admitted from OSH  for hepatology evaluation  D/T ascites, hyponatremia, hypokalemia, thrombocytopenia, and AYAH and weakness. On 9/1 patient was made active on transplant list MELD 30. Patient s/p liver transplant. No personal history of DM. Endocrinology consulted for BG management post-operatively.       Lab Results   Component Value Date    HGBA1C <4.0 08/01/2023

## 2023-09-08 NOTE — HPI
Sukhdeep Grimes is a 38 y.o. male  PMHx of Alcoholic decompensated cirrhosis. Pt was admitted from OSH  for hepatology evaluation  D/T ascites, hyponatremia, hypokalemia, thrombocytopenia, and AYAH and weakness. On 9/1 patient was made active on transplant list MELD 30.    He presents to the SICU now s/p DBD liver transplant on 9/7. On admission he is intubated, sedated, and in stable condition. Blood pressures are stable with MAPs maintaining greater than 65 off of pressors. He has a RIJ CVC, PIVx2, R rad and left femoral art lines, a henderson, and 2x HOOD drains.    Intraoperatively he received 5 L of crystalloid, 100 mL of albumin, 5 of PRBC, 4 FFP, 3 Cryo, and 337 of Cell Saver. Urine output intraoperatively recorded as 3500 mL, with 5500 mL of ascites, a 1 L of EBL.    Immediate post-operative plans include hemodynamic stabilization and weaning of cardiac and respiratory support. Plans to wean vasopressors as tolerated, wean vent support with goals of extubation, and closely monitor fluid status with strict I's+O's and continued fluid resuscitation as needed.    Anti-JkA (Lee); with 10 units of pRBCs prepared (5 given in the OR).

## 2023-09-09 LAB
ALBUMIN SERPL BCP-MCNC: 2.5 G/DL (ref 3.5–5.2)
ALBUMIN SERPL BCP-MCNC: 2.5 G/DL (ref 3.5–5.2)
ALP SERPL-CCNC: 169 U/L (ref 55–135)
ALP SERPL-CCNC: 57 U/L (ref 55–135)
ALT SERPL W/O P-5'-P-CCNC: 270 U/L (ref 10–44)
ALT SERPL W/O P-5'-P-CCNC: 319 U/L (ref 10–44)
ANION GAP SERPL CALC-SCNC: 11 MMOL/L (ref 8–16)
ANION GAP SERPL CALC-SCNC: 8 MMOL/L (ref 8–16)
APTT PPP: 44.8 SEC (ref 21–32)
AST SERPL-CCNC: 121 U/L (ref 10–40)
AST SERPL-CCNC: 204 U/L (ref 10–40)
BASOPHILS # BLD AUTO: 0.01 K/UL (ref 0–0.2)
BASOPHILS # BLD AUTO: 0.01 K/UL (ref 0–0.2)
BASOPHILS NFR BLD: 0.1 % (ref 0–1.9)
BASOPHILS NFR BLD: 0.1 % (ref 0–1.9)
BILIRUB SERPL-MCNC: 7.8 MG/DL (ref 0.1–1)
BILIRUB SERPL-MCNC: 8.1 MG/DL (ref 0.1–1)
BUN SERPL-MCNC: 21 MG/DL (ref 6–20)
BUN SERPL-MCNC: 30 MG/DL (ref 6–20)
CA-I BLDV-SCNC: 1.09 MMOL/L (ref 1.06–1.42)
CALCIUM SERPL-MCNC: 7.5 MG/DL (ref 8.7–10.5)
CALCIUM SERPL-MCNC: 8 MG/DL (ref 8.7–10.5)
CHLORIDE SERPL-SCNC: 105 MMOL/L (ref 95–110)
CHLORIDE SERPL-SCNC: 106 MMOL/L (ref 95–110)
CO2 SERPL-SCNC: 17 MMOL/L (ref 23–29)
CO2 SERPL-SCNC: 19 MMOL/L (ref 23–29)
CREAT SERPL-MCNC: 1.1 MG/DL (ref 0.5–1.4)
CREAT SERPL-MCNC: 1.2 MG/DL (ref 0.5–1.4)
DIFFERENTIAL METHOD: ABNORMAL
DIFFERENTIAL METHOD: ABNORMAL
EOSINOPHIL # BLD AUTO: 0 K/UL (ref 0–0.5)
EOSINOPHIL # BLD AUTO: 0 K/UL (ref 0–0.5)
EOSINOPHIL NFR BLD: 0 % (ref 0–8)
EOSINOPHIL NFR BLD: 0 % (ref 0–8)
ERYTHROCYTE [DISTWIDTH] IN BLOOD BY AUTOMATED COUNT: 18.1 % (ref 11.5–14.5)
ERYTHROCYTE [DISTWIDTH] IN BLOOD BY AUTOMATED COUNT: 18.7 % (ref 11.5–14.5)
EST. GFR  (NO RACE VARIABLE): >60 ML/MIN/1.73 M^2
EST. GFR  (NO RACE VARIABLE): >60 ML/MIN/1.73 M^2
GLUCOSE SERPL-MCNC: 137 MG/DL (ref 70–110)
GLUCOSE SERPL-MCNC: 151 MG/DL (ref 70–110)
HCT VFR BLD AUTO: 24.8 % (ref 40–54)
HCT VFR BLD AUTO: 25.6 % (ref 40–54)
HGB BLD-MCNC: 8.3 G/DL (ref 14–18)
HGB BLD-MCNC: 8.9 G/DL (ref 14–18)
IMM GRANULOCYTES # BLD AUTO: 0.07 K/UL (ref 0–0.04)
IMM GRANULOCYTES # BLD AUTO: 0.08 K/UL (ref 0–0.04)
IMM GRANULOCYTES NFR BLD AUTO: 0.6 % (ref 0–0.5)
IMM GRANULOCYTES NFR BLD AUTO: 0.6 % (ref 0–0.5)
INR PPP: 1 (ref 0.8–1.2)
LYMPHOCYTES # BLD AUTO: 0.3 K/UL (ref 1–4.8)
LYMPHOCYTES # BLD AUTO: 0.4 K/UL (ref 1–4.8)
LYMPHOCYTES NFR BLD: 2.4 % (ref 18–48)
LYMPHOCYTES NFR BLD: 2.9 % (ref 18–48)
MAGNESIUM SERPL-MCNC: 2.2 MG/DL (ref 1.6–2.6)
MAGNESIUM SERPL-MCNC: 2.2 MG/DL (ref 1.6–2.6)
MCH RBC QN AUTO: 31.1 PG (ref 27–31)
MCH RBC QN AUTO: 32.2 PG (ref 27–31)
MCHC RBC AUTO-ENTMCNC: 33.5 G/DL (ref 32–36)
MCHC RBC AUTO-ENTMCNC: 34.8 G/DL (ref 32–36)
MCV RBC AUTO: 93 FL (ref 82–98)
MCV RBC AUTO: 93 FL (ref 82–98)
MONOCYTES # BLD AUTO: 0.5 K/UL (ref 0.3–1)
MONOCYTES # BLD AUTO: 0.6 K/UL (ref 0.3–1)
MONOCYTES NFR BLD: 4.3 % (ref 4–15)
MONOCYTES NFR BLD: 5.3 % (ref 4–15)
NEUTROPHILS # BLD AUTO: 11 K/UL (ref 1.8–7.7)
NEUTROPHILS # BLD AUTO: 11.7 K/UL (ref 1.8–7.7)
NEUTROPHILS NFR BLD: 91.6 % (ref 38–73)
NEUTROPHILS NFR BLD: 92.1 % (ref 38–73)
NRBC BLD-RTO: 0 /100 WBC
NRBC BLD-RTO: 0 /100 WBC
PHOSPHATE SERPL-MCNC: 5.6 MG/DL (ref 2.7–4.5)
PLATELET # BLD AUTO: 45 K/UL (ref 150–450)
PLATELET # BLD AUTO: 56 K/UL (ref 150–450)
PMV BLD AUTO: 10.3 FL (ref 9.2–12.9)
PMV BLD AUTO: 10.3 FL (ref 9.2–12.9)
POCT GLUCOSE: 130 MG/DL (ref 70–110)
POCT GLUCOSE: 142 MG/DL (ref 70–110)
POCT GLUCOSE: 144 MG/DL (ref 70–110)
POCT GLUCOSE: 154 MG/DL (ref 70–110)
POCT GLUCOSE: 161 MG/DL (ref 70–110)
POCT GLUCOSE: 167 MG/DL (ref 70–110)
POTASSIUM SERPL-SCNC: 4.3 MMOL/L (ref 3.5–5.1)
POTASSIUM SERPL-SCNC: 4.4 MMOL/L (ref 3.5–5.1)
PROT SERPL-MCNC: 4.6 G/DL (ref 6–8.4)
PROT SERPL-MCNC: 5 G/DL (ref 6–8.4)
PROTHROMBIN TIME: 10.6 SEC (ref 9–12.5)
RBC # BLD AUTO: 2.67 M/UL (ref 4.6–6.2)
RBC # BLD AUTO: 2.76 M/UL (ref 4.6–6.2)
SODIUM SERPL-SCNC: 133 MMOL/L (ref 136–145)
SODIUM SERPL-SCNC: 133 MMOL/L (ref 136–145)
TACROLIMUS BLD-MCNC: 2.5 NG/ML (ref 5–15)
TROPONIN I SERPL DL<=0.01 NG/ML-MCNC: 0.04 NG/ML (ref 0–0.03)
WBC # BLD AUTO: 11.95 K/UL (ref 3.9–12.7)
WBC # BLD AUTO: 12.69 K/UL (ref 3.9–12.7)

## 2023-09-09 PROCEDURE — 97164 PT RE-EVAL EST PLAN CARE: CPT

## 2023-09-09 PROCEDURE — 63600175 PHARM REV CODE 636 W HCPCS: Performed by: TRANSPLANT SURGERY

## 2023-09-09 PROCEDURE — 99222 1ST HOSP IP/OBS MODERATE 55: CPT | Mod: ,,, | Performed by: PHYSICIAN ASSISTANT

## 2023-09-09 PROCEDURE — 25000003 PHARM REV CODE 250: Performed by: STUDENT IN AN ORGANIZED HEALTH CARE EDUCATION/TRAINING PROGRAM

## 2023-09-09 PROCEDURE — 20600001 HC STEP DOWN PRIVATE ROOM

## 2023-09-09 PROCEDURE — 80053 COMPREHEN METABOLIC PANEL: CPT | Performed by: STUDENT IN AN ORGANIZED HEALTH CARE EDUCATION/TRAINING PROGRAM

## 2023-09-09 PROCEDURE — 63600175 PHARM REV CODE 636 W HCPCS: Performed by: STUDENT IN AN ORGANIZED HEALTH CARE EDUCATION/TRAINING PROGRAM

## 2023-09-09 PROCEDURE — 80197 ASSAY OF TACROLIMUS: CPT | Performed by: STUDENT IN AN ORGANIZED HEALTH CARE EDUCATION/TRAINING PROGRAM

## 2023-09-09 PROCEDURE — 93005 ELECTROCARDIOGRAM TRACING: CPT

## 2023-09-09 PROCEDURE — 85610 PROTHROMBIN TIME: CPT | Performed by: STUDENT IN AN ORGANIZED HEALTH CARE EDUCATION/TRAINING PROGRAM

## 2023-09-09 PROCEDURE — 97165 OT EVAL LOW COMPLEX 30 MIN: CPT

## 2023-09-09 PROCEDURE — 25000242 PHARM REV CODE 250 ALT 637 W/ HCPCS: Performed by: NURSE PRACTITIONER

## 2023-09-09 PROCEDURE — 93010 EKG 12-LEAD: ICD-10-PCS | Mod: ,,, | Performed by: INTERNAL MEDICINE

## 2023-09-09 PROCEDURE — 83735 ASSAY OF MAGNESIUM: CPT | Mod: 91 | Performed by: NURSE PRACTITIONER

## 2023-09-09 PROCEDURE — 84484 ASSAY OF TROPONIN QUANT: CPT | Performed by: NURSE PRACTITIONER

## 2023-09-09 PROCEDURE — 99222 PR INITIAL HOSPITAL CARE,LEVL II: ICD-10-PCS | Mod: ,,, | Performed by: PHYSICIAN ASSISTANT

## 2023-09-09 PROCEDURE — 85025 COMPLETE CBC W/AUTO DIFF WBC: CPT | Performed by: STUDENT IN AN ORGANIZED HEALTH CARE EDUCATION/TRAINING PROGRAM

## 2023-09-09 PROCEDURE — 25000003 PHARM REV CODE 250

## 2023-09-09 PROCEDURE — 80053 COMPREHEN METABOLIC PANEL: CPT | Mod: 91 | Performed by: NURSE PRACTITIONER

## 2023-09-09 PROCEDURE — 93010 ELECTROCARDIOGRAM REPORT: CPT | Mod: ,,, | Performed by: INTERNAL MEDICINE

## 2023-09-09 PROCEDURE — 99233 PR SUBSEQUENT HOSPITAL CARE,LEVL III: ICD-10-PCS | Mod: 24,,, | Performed by: SURGERY

## 2023-09-09 PROCEDURE — 82330 ASSAY OF CALCIUM: CPT

## 2023-09-09 PROCEDURE — 84100 ASSAY OF PHOSPHORUS: CPT

## 2023-09-09 PROCEDURE — 25000003 PHARM REV CODE 250: Performed by: PHYSICIAN ASSISTANT

## 2023-09-09 PROCEDURE — 94761 N-INVAS EAR/PLS OXIMETRY MLT: CPT

## 2023-09-09 PROCEDURE — 83735 ASSAY OF MAGNESIUM: CPT

## 2023-09-09 PROCEDURE — 85025 COMPLETE CBC W/AUTO DIFF WBC: CPT | Mod: 91 | Performed by: NURSE PRACTITIONER

## 2023-09-09 PROCEDURE — 25000003 PHARM REV CODE 250: Performed by: NURSE PRACTITIONER

## 2023-09-09 PROCEDURE — 99233 SBSQ HOSP IP/OBS HIGH 50: CPT | Mod: 24,,, | Performed by: SURGERY

## 2023-09-09 PROCEDURE — 85730 THROMBOPLASTIN TIME PARTIAL: CPT | Performed by: STUDENT IN AN ORGANIZED HEALTH CARE EDUCATION/TRAINING PROGRAM

## 2023-09-09 PROCEDURE — 97535 SELF CARE MNGMENT TRAINING: CPT

## 2023-09-09 PROCEDURE — 97116 GAIT TRAINING THERAPY: CPT

## 2023-09-09 RX ORDER — LORAZEPAM 0.5 MG/1
0.5 TABLET ORAL EVERY 8 HOURS PRN
Status: DISCONTINUED | OUTPATIENT
Start: 2023-09-10 | End: 2023-09-13

## 2023-09-09 RX ORDER — LIDOCAINE HYDROCHLORIDE 10 MG/ML
1 INJECTION INFILTRATION; PERINEURAL ONCE
Status: COMPLETED | OUTPATIENT
Start: 2023-09-09 | End: 2023-09-09

## 2023-09-09 RX ORDER — TACROLIMUS 1 MG/1
3 CAPSULE ORAL 2 TIMES DAILY
Status: DISCONTINUED | OUTPATIENT
Start: 2023-09-09 | End: 2023-09-10

## 2023-09-09 RX ORDER — LORAZEPAM 0.5 MG/1
0.5 TABLET ORAL EVERY 6 HOURS PRN
Status: DISCONTINUED | OUTPATIENT
Start: 2023-09-09 | End: 2023-09-09

## 2023-09-09 RX ORDER — ALBUTEROL SULFATE 90 UG/1
2 AEROSOL, METERED RESPIRATORY (INHALATION) EVERY 6 HOURS PRN
Status: DISCONTINUED | OUTPATIENT
Start: 2023-09-09 | End: 2023-09-14 | Stop reason: HOSPADM

## 2023-09-09 RX ORDER — SODIUM BICARBONATE 650 MG/1
1300 TABLET ORAL 2 TIMES DAILY
Status: DISCONTINUED | OUTPATIENT
Start: 2023-09-09 | End: 2023-09-14 | Stop reason: HOSPADM

## 2023-09-09 RX ORDER — HYDROXYZINE HYDROCHLORIDE 25 MG/1
25 TABLET, FILM COATED ORAL NIGHTLY PRN
Status: DISCONTINUED | OUTPATIENT
Start: 2023-09-09 | End: 2023-09-14 | Stop reason: HOSPADM

## 2023-09-09 RX ORDER — TACROLIMUS 1 MG/1
2 CAPSULE ORAL ONCE
Status: COMPLETED | OUTPATIENT
Start: 2023-09-09 | End: 2023-09-09

## 2023-09-09 RX ORDER — POLYETHYLENE GLYCOL 3350 17 G/17G
17 POWDER, FOR SOLUTION ORAL DAILY
Status: DISCONTINUED | OUTPATIENT
Start: 2023-09-09 | End: 2023-09-14 | Stop reason: HOSPADM

## 2023-09-09 RX ORDER — HYDRALAZINE HYDROCHLORIDE 20 MG/ML
10 INJECTION INTRAMUSCULAR; INTRAVENOUS EVERY 6 HOURS PRN
Status: DISCONTINUED | OUTPATIENT
Start: 2023-09-09 | End: 2023-09-14 | Stop reason: HOSPADM

## 2023-09-09 RX ADMIN — METHYLPREDNISOLONE SODIUM SUCCINATE 160 MG: 125 INJECTION, POWDER, FOR SOLUTION INTRAMUSCULAR; INTRAVENOUS at 08:09

## 2023-09-09 RX ADMIN — HEPARIN SODIUM 5000 UNITS: 5000 INJECTION INTRAVENOUS; SUBCUTANEOUS at 02:09

## 2023-09-09 RX ADMIN — LORAZEPAM 0.5 MG: 0.5 TABLET ORAL at 09:09

## 2023-09-09 RX ADMIN — OXYCODONE HYDROCHLORIDE 10 MG: 10 TABLET ORAL at 03:09

## 2023-09-09 RX ADMIN — DOCUSATE SODIUM 100 MG: 100 CAPSULE, LIQUID FILLED ORAL at 02:09

## 2023-09-09 RX ADMIN — MYCOPHENOLATE MOFETIL 1000 MG: 250 CAPSULE ORAL at 08:09

## 2023-09-09 RX ADMIN — TACROLIMUS 3 MG: 1 CAPSULE ORAL at 05:09

## 2023-09-09 RX ADMIN — SODIUM BICARBONATE 1300 MG: 650 TABLET ORAL at 11:09

## 2023-09-09 RX ADMIN — HYDROMORPHONE HYDROCHLORIDE 0.5 MG: 0.5 INJECTION, SOLUTION INTRAMUSCULAR; INTRAVENOUS; SUBCUTANEOUS at 03:09

## 2023-09-09 RX ADMIN — ALBUTEROL SULFATE 2 PUFF: 108 INHALANT RESPIRATORY (INHALATION) at 04:09

## 2023-09-09 RX ADMIN — LIDOCAINE HYDROCHLORIDE 1 ML: 10 INJECTION, SOLUTION INFILTRATION; PERINEURAL at 11:09

## 2023-09-09 RX ADMIN — FAMOTIDINE 20 MG: 20 TABLET ORAL at 09:09

## 2023-09-09 RX ADMIN — HEPARIN SODIUM 5000 UNITS: 5000 INJECTION INTRAVENOUS; SUBCUTANEOUS at 05:09

## 2023-09-09 RX ADMIN — HEPARIN SODIUM 5000 UNITS: 5000 INJECTION INTRAVENOUS; SUBCUTANEOUS at 09:09

## 2023-09-09 RX ADMIN — MUPIROCIN 1 G: 20 OINTMENT TOPICAL at 09:09

## 2023-09-09 RX ADMIN — TACROLIMUS 1 MG: 1 CAPSULE ORAL at 08:09

## 2023-09-09 RX ADMIN — OXYCODONE HYDROCHLORIDE 10 MG: 10 TABLET ORAL at 12:09

## 2023-09-09 RX ADMIN — INSULIN HUMAN 0.9 UNITS/HR: 1 INJECTION, SOLUTION INTRAVENOUS at 06:09

## 2023-09-09 RX ADMIN — DOCUSATE SODIUM 100 MG: 100 CAPSULE, LIQUID FILLED ORAL at 08:09

## 2023-09-09 RX ADMIN — MYCOPHENOLATE MOFETIL 1000 MG: 250 CAPSULE ORAL at 09:09

## 2023-09-09 RX ADMIN — MUPIROCIN 1 G: 20 OINTMENT TOPICAL at 08:09

## 2023-09-09 RX ADMIN — LORAZEPAM 0.5 MG: 0.5 TABLET ORAL at 10:09

## 2023-09-09 RX ADMIN — POLYETHYLENE GLYCOL 3350 17 G: 17 POWDER, FOR SOLUTION ORAL at 08:09

## 2023-09-09 RX ADMIN — FLUCONAZOLE 400 MG: 200 TABLET ORAL at 08:09

## 2023-09-09 RX ADMIN — HYDROXYZINE PAMOATE 25 MG: 25 CAPSULE ORAL at 04:09

## 2023-09-09 RX ADMIN — TACROLIMUS 2 MG: 1 CAPSULE ORAL at 11:09

## 2023-09-09 RX ADMIN — OXYCODONE HYDROCHLORIDE 10 MG: 10 TABLET ORAL at 08:09

## 2023-09-09 RX ADMIN — DOCUSATE SODIUM 100 MG: 100 CAPSULE, LIQUID FILLED ORAL at 09:09

## 2023-09-09 NOTE — PROGRESS NOTES
Patient arrived on unit from SICU. Assisted to chair per ICU RN. Insulin gtt infusing at 0.9 units/hr. Patient noted to be constantly fidgeting and itching. Call bell and belongings placed within reach. Patient instructed not to get out of chair without assistance. Stated understanding.

## 2023-09-09 NOTE — PT/OT/SLP EVAL
"Occupational Therapy  Co RE- Evaluation    Name: Sukhdeep Grimes  MRN: [id number]  Admitting Diagnosis: Alcoholic cirrhosis of liver with ascites  Recent Surgery: Procedure(s) (LRB):  TRANSPLANT, LIVER (N/A) 2 Days Post-Op    Recommendations:     Discharge Recommendations: other (see comments)  D/C to local apartments with mother     Assessment:     Sukhdeep Grimes is a 38 y.o. male with a medical diagnosis of Alcoholic cirrhosis of liver with ascites.   Performance deficits affecting function: weakness, impaired endurance, impaired self care skills, impaired functional mobility, gait instability, impaired balance, decreased safety awareness.  Pt tolerated session fairly well with good effort and stable vital signs. Anticipate pt will progress well and will be ready for d/c to local apartment with family support when medically ready   Goals updated and POC updated.     Rehab Prognosis: Good; patient would benefit from acute skilled OT services to address these deficits and reach maximum level of function.       Plan:     Patient to be seen 4 x/week to address the above listed problems via self-care/home management, therapeutic activities, therapeutic exercises  Plan of Care Expires:    Plan of Care Reviewed with: patient    Subjective     Pt agreeable to therapy.   "Sometimes I make noise because of the pain" pt states     Occupational Profile:  Pt resides with mother in [place] but will stay locally in apartment on d/c.   Refer to eval for details of PLOF.     Pain/Comfort:   Pt reports no pain during session     Patients cultural, spiritual, Congregation conflicts given the current situation: no    Objective:     Communicated with: nsg  prior to session.  Patient found in chair with tele, pulse ox, BP cuff and 2 HOOD drains.   Co-reassessment completed this date to optimize functional performance and safety given impaired tolerance for activity in setting of ICU.     General Precautions: Standard, " "fall    Occupational Performance:    Functional Mobility/Transfers:  Sit>stand with CGA     Activities of Daily Living:  Feeding: set-up  G/H standing with CGA for washing face and hands.   LE dressing: MAX A       Cognitive/Visual Perceptual  Pt awake, alert and following commands. Pt fidgeting with various items throughout session. Pt talkative but able to be redirected. Limited attention noted at times. Pt cooperative and motivated     Physical Exam  Pt demo 4/5 UE strength. Slightly impaired GM coordination.     AMPAC 6 Click ADL:  AMPAC Total Score: 16    Treatment & Education:  Pt completed functional mobility in room with CGA without AE, but with HHA. Pt reaching for external support at times. Unsteadiness noted.   Education provided re: OT POC and safety with functional mobility/ADl skills.     Patient left up in chair with call button in reach and nsg notified    GOALS:   Multidisciplinary Problems       Occupational Therapy Goals          Problem: Occupational Therapy    Goal Priority Disciplines Outcome Interventions   Occupational Therapy Goal     OT, PT/OT Ongoing, Progressing    Description: Goals to be met by: 9/16/2023     Patient will increase functional independence with ADLs by performing:    LE Dressing with set-up  Toileting from toilet with supervision  for hygiene and clothing management.   Pt to complete standing g/h skills with supervision.   Toilet transfer to toilet with supervision.                            History:     Past Medical History:   Diagnosis Date    Alcoholic cirrhosis of liver with ascites     Esophageal varices without bleeding     Hepatic encephalopathy     Hypokalemia     Hyponatremia     Thrombocytopenia     Transfusion history 08/2022         Past Surgical History:   Procedure Laterality Date    EGD - EXTERNAL RESULT  08/21/2023    "1 column of large varices with no bleeding and no stigmata of recent bleeding in the lower 3rd of the esophagus.  No red Rigoberto sign " "present.  Banding was not performed.  Moderate portal hypertensive gastropathy in the entire examined stomach.  Examined duodenum was normal." EGD done in [facility])    LIVER TRANSPLANT N/A 9/3/2023    Procedure: TRANSPLANT, LIVER;  Surgeon: Phil Ariza MD;  Location: [facility] OR [address] [place] [address];  Service: Transplant;  Laterality: N/A;    LIVER TRANSPLANT N/A 9/7/2023    Procedure: TRANSPLANT, LIVER;  Surgeon: Leonardo Manuel MD;  Location: [facility] OR [address] [place] [address];  Service: Transplant;  Laterality: N/A;    PARACENTESIS  08/24/2023       Time Tracking:     OT Date of Treatment: 09/09/23  OT Start Time: 0918  OT Stop Time: 0938  OT Total Time (min): 20 min    Billable Minutes:Evaluation 12  Self Care/Home Management 8    9/9/2023  "

## 2023-09-09 NOTE — SUBJECTIVE & OBJECTIVE
Interval History/Significant Events: NAEON. HDS. Had difficulties with sleep. UOP has been adequate. Tolerating diet without nausea/vomiting. Denies belching, flatus or BM.     Follow-up For: Procedure(s) (LRB):  TRANSPLANT, LIVER (N/A)    Post-Operative Day: 2 Days Post-Op    Objective:     Vital Signs (Most Recent):  Temp: 98.1 °F (36.7 °C) (09/09/23 0400)  Pulse: (!) 50 (09/09/23 0615)  Resp: 18 (09/09/23 0615)  BP: (!) 156/96 (09/09/23 0600)  SpO2: 100 % (09/09/23 0615) Vital Signs (24h Range):  Temp:  [97.9 °F (36.6 °C)-98.5 °F (36.9 °C)] 98.1 °F (36.7 °C)  Pulse:  [47-80] 50  Resp:  [12-27] 18  SpO2:  [90 %-100 %] 100 %  BP: (126-173)/(78-99) 156/96  Arterial Line BP: (124-162)/(76-95) 127/85     Weight: 67.2 kg (148 lb 2.4 oz)  Body mass index is 21.26 kg/m².      Intake/Output Summary (Last 24 hours) at 9/9/2023 0701  Last data filed at 9/9/2023 0600  Gross per 24 hour   Intake 3571.21 ml   Output 1522 ml   Net 2049.21 ml          Constitutional: Alert, oriented  CV: Normal rate  Abdominal: flat and soft, incision c/d/I, HOOD drains X 2 on right side of abdomen with biliary fluid  Skin: warm and dry, jaundice         Lines/Drains/Airways       Central Venous Catheter Line  Duration             Trialysis (Dialysis) Catheter 09/07/23 right internal jugular 2 days              Drain  Duration                  Closed/Suction Drain 09/07/23 2034 Tube - 1 Right Abdomen Bulb 19 Fr. 1 day         Closed/Suction Drain 09/07/23 2034 Tube - 2 Right Abdomen Bulb 19 Fr. 1 day              Peripheral Intravenous Line  Duration                  Peripheral IV - Single Lumen 09/05/23 2210 20 G Anterior;Left Forearm 3 days         Peripheral IV - Single Lumen 09/07/23 1705 16 G Left Forearm 1 day                    Significant Labs:    CBC/Anemia Profile:  Recent Labs   Lab 09/08/23  1506 09/08/23  1959 09/09/23  0302   WBC 9.75 12.07 12.69   HGB 8.0* 8.2* 8.3*   HCT 23.4* 23.7* 24.8*   PLT 54* 59* 56*   MCV 93 92 93   RDW  18.1* 18.8* 18.7*        Chemistries:  Recent Labs   Lab 09/07/23 2129 09/08/23  0009 09/08/23  0308 09/08/23  0812 09/08/23  1506 09/08/23 1959 09/09/23  0302   *  --  137  --  137 137 133*   K 3.4*  --  3.9  --  3.5 4.2 4.4   CL 98  --  105  --  108 108 106   CO2 22*  --  22*  --  18* 20* 19*   BUN 14  --  16  --  16 18 21*   CREATININE 1.0  --  1.0  --  0.8 1.0 1.1   CALCIUM 7.9*  --  7.7*  --  7.6* 7.7* 7.5*   ALBUMIN 2.4*  --  2.7*  --   --   --  2.5*   PROT 4.6*  --  4.5*  --   --   --  4.6*   BILITOT 12.9*  --  11.4*  --   --   --  7.8*   ALKPHOS 75  --  54*  --   --   --  57   *  --  425*  --   --   --  319*   *   < > 596*  596*   < > 268* 244* 204*   MG 1.8  --  2.7*  --  2.1 2.2 2.2   PHOS 5.6*  --  3.9  --  5.2* 5.4* 5.6*    < > = values in this interval not displayed.       Significant Imaging:  I have reviewed all pertinent imaging results/findings within the past 24 hours.

## 2023-09-09 NOTE — PLAN OF CARE
Problem: Physical Therapy  Goal: Physical Therapy Goal  Description: Goals to be met by: 23     Patient will increase functional independence with mobility by performin. Sit to stand transfer with [place]  2. Gait  x 250 feet with [place] using No Assistive Device.   3. Supine to sit with  SBA  4. Lower extremity exercise program x15 reps per handout, with independence    Outcome: Ongoing, Progressing   Re-evaluation completed and goals appropriate. 2023

## 2023-09-09 NOTE — NURSING TRANSFER
Nursing Transfer Note      9/9/2023   4:04 PM    Nurse giving handoff:Richy PEDERSEN   Nurse receiving handoff:Yun HENRIQUEZ    Reason patient is being transferred: Stepdown    Transfer To: TSU    Transfer via wheelchair    Transfer with cardiac monitoring    Transported by RN    Transfer Vital Signs:  Blood Pressure:149/85  Heart Rate:68  O2:99  Temperature:98.4  Respirations:20    Telemetry: Rate 68 and Rhythm NSR  Order for Tele Monitor? Yes    Additional Lines: N/A    4eyes on Skin: yes    Medicines sent: Yes    Any special needs or follow-up needed: N/A    Patient belongings transferred with patient: Yes    Chart send with patient: Yes    Notified: Brother    Patient reassessed at 1600 by TSU nurse  1  Upon arrival to floor: cardiac monitor applied, patient oriented to room, call bell in reach, and bed in lowest position

## 2023-09-09 NOTE — PROGRESS NOTES
Patient asking repeatedly for albuterol inhaler. No active orders noted. SAMANTHA Castro NP notified of above - NP to order. Also notified that patient has been constantly fidgeting and itching since arriving from SICU. Vistaril already administered prn. Will administer prn Ativan and continue to monitor.

## 2023-09-09 NOTE — PLAN OF CARE
Problem: Occupational Therapy  Goal: Occupational Therapy Goal  Description: Goals to be met by: 9/16/2023     Patient will increase functional independence with ADLs by performing:    LE Dressing with set-up  Toileting from toilet with [place] for hygiene and clothing management.   Toilet transfer to toilet with supervisoin   Standing g/h skills with supervision.       Outcome: Ongoing, Progressing

## 2023-09-09 NOTE — PROGRESS NOTES
[facility]  Critical Care - Surgery  Progress Note    Patient Name: Sukhdeep Grimes  MRN: [id number]  Admission Date: 8/24/2023  Hospital Length of Stay: 16 days  Code Status: Full Code  Attending Provider: Leonardo Manuel MD  Primary Care Provider: Martine, Primary Doctor   Principal Problem: Alcoholic cirrhosis of liver with ascites    Subjective:     Hospital/ICU Course:  No notes on file    Interval History/Significant Events: NAEON. HDS. Had difficulties with sleep. UOP has been adequate. Tolerating diet without nausea/vomiting. Denies belching, flatus or BM.     Follow-up For: Procedure(s) (LRB):  TRANSPLANT, LIVER (N/A)    Post-Operative Day: 2 Days Post-Op    Objective:     Vital Signs (Most Recent):  Temp: 98.1 °F (36.7 °C) (09/09/23 0400)  Pulse: (!) 50 (09/09/23 0615)  Resp: 18 (09/09/23 0615)  BP: (!) 156/96 (09/09/23 0600)  SpO2: 100 % (09/09/23 0615) Vital Signs (24h Range):  Temp:  [97.9 °F (36.6 °C)-98.5 °F (36.9 °C)] 98.1 °F (36.7 °C)  Pulse:  [47-80] 50  Resp:  [12-27] 18  SpO2:  [90 %-100 %] 100 %  BP: (126-173)/(78-99) 156/96  Arterial Line BP: (124-162)/(76-95) 127/85     Weight: 67.2 kg (148 lb 2.4 oz)  Body mass index is 21.26 kg/m².      Intake/Output Summary (Last 24 hours) at 9/9/2023 0701  Last data filed at 9/9/2023 0600  Gross per 24 hour   Intake 3571.21 ml   Output 1522 ml   Net 2049.21 ml          Constitutional: Alert, oriented  CV: Normal rate  Abdominal: flat and soft, incision c/d/I, HOOD drains X 2 on right side of abdomen with biliary fluid  Skin: warm and dry, jaundice         Lines/Drains/Airways       Central Venous Catheter Line  Duration             Trialysis (Dialysis) Catheter 09/07/23 right internal jugular 2 days              Drain  Duration                  Closed/Suction Drain 09/07/23 2034 Tube - 1 Right Abdomen Bulb 19 Fr. 1 day         Closed/Suction Drain 09/07/23 2034 Tube - 2 Right Abdomen Bulb 19 Fr. 1 day              Peripheral Intravenous  Line  Duration                  Peripheral IV - Single Lumen 09/05/23 2210 20 G Anterior;Left Forearm 3 days         Peripheral IV - Single Lumen 09/07/23 1705 16 G Left Forearm 1 day                    Significant Labs:    CBC/Anemia Profile:  Recent Labs   Lab 09/08/23  1506 09/08/23 1959 09/09/23 0302   WBC 9.75 12.07 12.69   HGB 8.0* 8.2* 8.3*   HCT 23.4* 23.7* 24.8*   PLT 54* 59* 56*   MCV 93 92 93   RDW 18.1* 18.8* 18.7*        Chemistries:  Recent Labs   Lab 09/07/23 2129 09/08/23  0009 09/08/23 0308 09/08/23 0812 09/08/23 1506 09/08/23 1959 09/09/23  0302   *  --  137  --  137 137 133*   K 3.4*  --  3.9  --  3.5 4.2 4.4   CL 98  --  105  --  108 108 106   CO2 22*  --  22*  --  18* 20* 19*   BUN 14  --  16  --  16 18 21*   CREATININE 1.0  --  1.0  --  0.8 1.0 1.1   CALCIUM 7.9*  --  7.7*  --  7.6* 7.7* 7.5*   ALBUMIN 2.4*  --  2.7*  --   --   --  2.5*   PROT 4.6*  --  4.5*  --   --   --  4.6*   BILITOT 12.9*  --  11.4*  --   --   --  7.8*   ALKPHOS 75  --  54*  --   --   --  57   *  --  425*  --   --   --  319*   *   < > 596*  596*   < > 268* 244* 204*   MG 1.8  --  2.7*  --  2.1 2.2 2.2   PHOS 5.6*  --  3.9  --  5.2* 5.4* 5.6*    < > = values in this interval not displayed.       Significant Imaging:  I have reviewed all pertinent imaging results/findings within the past 24 hours.    Assessment/Plan:     GI  * Alcoholic cirrhosis of liver with ascites  Significant Imaging:  I have reviewed all pertinent imaging results/findings within the past 24 hours.  Pending liver ultrasound this morning      Assessment:     Neuro/Psych:   -- Sedation: None  -- Pain: Oxy and dilaudid PRN             Cards:   -- HDS  -- BP Goal: SBP>100  -- Off pressors      Pulm:   -- Goal O2 sat > 90%  -- Sating well.      Renal:  -- Voiding appropriately. 400cc measured and a couple unmeasured.   -- Trend BUN/Cr: 21/1.1 (18/1)      FEN / GI:   -- Net +2.2L/24hour  -- D/C mIVF  -- Daily CMP  -- Replace  lytes as needed  -- Nutrition: Diet consistent carbs  -- HOOD #1 drain with 390cc and #2 362cc  -- Bowel regimen       ID:   -- Tm: afebrile; WBC 12.7(12.07)  -- Abx: fluconazole, bactrim, valganciclovir   -- Immunosuppressants: methylprednisolone, mycophenolate mofetil, tacrolimus      Heme/Onc:   -- H/H stable 8.3 (8.2)  -- PT/INR 10.6/1  -- heparin 5000 u SQ q8hr for DVT ppx      Endo:   -- Gluc goal 140-180  -- Glucose 157 this AM  -- On insulin drip, endocrine consulted for insulin management       PPx:   Feeding: Diet  Analgesia/Sedation: as above  Thromboembolic prevention: heparin  HOB >30: yes  Stress Ulcer ppx: famotidine  Glucose control: Critical care goal 140-180 g/dl, ISS     Lines/Drains/Airway: PIV, CVC, JPX2      Dispo/Code Status/Palliative:   -- SICU / Full Code         Marcella Allen MD  Critical Care - Surgery  [facility]

## 2023-09-09 NOTE — PROGRESS NOTES
"[facility]  Endocrinology  Progress Note    Admit Date: 2023     Reason for Consult: Management of Hyperglycemia     Surgical Procedure and Date: liver transplant 23      HPI:   Patient is a 38 y.o. male with a diagnosis of Alcoholic decompensated cirrhosis. Pt was admitted from OSH  for hepatology evaluation  D/T ascites, hyponatremia, hypokalemia, thrombocytopenia, and AYAH and weakness. On  patient was made active on transplant list MELD 30. Patient s/p liver transplant. No personal history of DM. Endocrinology consulted for BG management post-operatively.       Lab Results   Component Value Date    HGBA1C <4.0 2023           Interval HPI:   No acute events overnight. Patient in room 94931/16756 A. Blood glucose stable. BG at goal on current insulin regimen (Transition Insulin Drip). Steroid use- Methylprednisolone  .   2 Days Post-Op  Renal function- Normal   Vasopressors-  None     Diet consistent carbohydrate Standard Tray     Eatin%  Nausea: No  Hypoglycemia and intervention: No  Fever: No  TPN and/or TF: No    BP (!) 156/96 (BP Location: Left arm, Patient Position: Lying)   Pulse (!) 50   Temp 98.1 °F (36.7 °C) (Oral)   Resp 18   Ht 5' 10" (1.778 m)   Wt 67.2 kg (148 lb 2.4 oz)   SpO2 100%   BMI 21.26 kg/m²     Labs Reviewed and Include    Recent Labs   Lab 23  0302   *   CALCIUM 7.5*   ALBUMIN 2.5*   PROT 4.6*   *   K 4.4   CO2 19*      BUN 21*   CREATININE 1.1   ALKPHOS 57   *   *   BILITOT 7.8*     Lab Results   Component Value Date    WBC 12.69 2023    HGB 8.3 (L) 2023    HCT 24.8 (L) 2023    MCV 93 2023    PLT 56 (L) 2023     No results for input(s): "TSH", "FREET4" in the last 168 hours.  Lab Results   Component Value Date    HGBA1C <4.0 2023       Nutritional status:   Body mass index is 21.26 kg/m².  Lab Results   Component Value Date    ALBUMIN 2.5 (L) 2023    " "ALBUMIN 2.7 (L) 09/08/2023    ALBUMIN 2.4 (L) 09/07/2023     No results found for: "PREALBUMIN"    Estimated Creatinine Clearance: 87.4 mL/min (based on SCr of 1.1 mg/dL).    Accu-Checks  Recent Labs     09/08/23  0715 09/08/23  0808 09/08/23  0909 09/08/23  1007 09/08/23  1106 09/08/23  1207 09/08/23  1504 09/08/23  2000 09/08/23  2313 09/09/23  0300   POCTGLUCOSE 181* 187* 149* 124* 119* 164* 201* 124* 139* 161*       Current Medications and/or Treatments Impacting Glycemic Control  Immunotherapy:    Immunosuppressants           Stop Route Frequency     tacrolimus capsule 1 mg         -- Oral 2 times daily     mycophenolate capsule 1,000 mg         -- Oral 2 times daily          Steroids:   Hormones (From admission, onward)      Start     Stop Route Frequency Ordered    09/13/23 0900  predniSONE tablet 20 mg  (methylprednisolone taper panel)        See Hyperspace for full Linked Orders Report.    -- Oral Daily 09/07/23 2117 09/12/23 0900  methylPREDNISolone sodium succinate injection 40 mg  (methylprednisolone taper panel)        See Hyperspace for full Linked Orders Report.    09/13/23 0859 IV Daily 09/07/23 2117 09/11/23 0900  methylPREDNISolone sodium succinate injection 80 mg  (methylprednisolone taper panel)        See Hyperspace for full Linked Orders Report.    09/12/23 0859 IV Daily 09/07/23 2117    09/10/23 0900  methylPREDNISolone sodium succinate injection 120 mg  (methylprednisolone taper panel)        See Hyperspace for full Linked Orders Report.    09/11/23 0859 IV Daily 09/07/23 2117 09/09/23 0900  methylPREDNISolone sodium succinate injection 160 mg  (methylprednisolone taper panel)        See Hyperspace for full Linked Orders Report.    09/10/23 0859 IV Daily 09/07/23 2117          Pressors:    Autonomic Drugs (From admission, onward)      None          Hyperglycemia/Diabetes Medications:   Antihyperglycemics (From admission, onward)      Start     Stop Route Frequency Ordered    09/08/23 " 1345  insulin regular in 0.9 % NaCl 100 unit/100 mL (1 unit/mL) infusion        Question:  Enter initial dose (Units/hr):  Answer:  1.4    -- IV Continuous 09/08/23 1342    09/08/23 1047  insulin aspart U-100 pen 0-10 Units         -- SubQ As needed (PRN) 09/08/23 0948            ASSESSMENT and PLAN    Immunology/Multi System  Prophylactic immunotherapy  May increase insulin resistance.         Endocrine  Steroid-induced hyperglycemia  BG goal: 140-180   T2DM. S/p Liver txp.  BG stable on transition drip. Will adjust rate and continue to monitor on current regimen    -Adjust Transition insulin drip mary 0.9  u/hr w/ stepdown parameters  - Continue Novolog Moderate dose correction with ISF 25 starting at 180    - POCT Glucose every 4 hours  - Hypoglycemia protocol in place      ** Please notify Endocrine for any change and/or advance in diet**  ** Please call Endocrine for any BG related issues **     Discharge Planning:   TBD. Please notify endocrinology prior to discharge.        GI  * Alcoholic cirrhosis of liver with ascites  S/p liver transplant  Optimize BG for surgical wound healing.           Martin Granado PA-C  Endocrinology  [facility]

## 2023-09-09 NOTE — SUBJECTIVE & OBJECTIVE
"Interval HPI:   No acute events overnight. Patient in room 56251/76436 A. Blood glucose stable. BG at goal on current insulin regimen (Transition Insulin Drip). Steroid use- Methylprednisolone  .   2 Days Post-Op  Renal function- Normal   Vasopressors-  None     Diet consistent carbohydrate Standard Tray     Eatin%  Nausea: No  Hypoglycemia and intervention: No  Fever: No  TPN and/or TF: No    BP (!) 156/96 (BP Location: Left arm, Patient Position: Lying)   Pulse (!) 50   Temp 98.1 °F (36.7 °C) (Oral)   Resp 18   Ht 5' 10" (1.778 m)   Wt 67.2 kg (148 lb 2.4 oz)   SpO2 100%   BMI 21.26 kg/m²     Labs Reviewed and Include    Recent Labs   Lab 23  0302   *   CALCIUM 7.5*   ALBUMIN 2.5*   PROT 4.6*   *   K 4.4   CO2 19*      BUN 21*   CREATININE 1.1   ALKPHOS 57   *   *   BILITOT 7.8*     Lab Results   Component Value Date    WBC 12.69 2023    HGB 8.3 (L) 2023    HCT 24.8 (L) 2023    MCV 93 2023    PLT 56 (L) 2023     No results for input(s): "TSH", "FREET4" in the last 168 hours.  Lab Results   Component Value Date    HGBA1C <4.0 2023       Nutritional status:   Body mass index is 21.26 kg/m².  Lab Results   Component Value Date    ALBUMIN 2.5 (L) 2023    ALBUMIN 2.7 (L) 2023    ALBUMIN 2.4 (L) 2023     No results found for: "PREALBUMIN"    Estimated Creatinine Clearance: 87.4 mL/min (based on SCr of 1.1 mg/dL).    Accu-Checks  Recent Labs     23  0715 23  0808 23  0909 23  1007 23  1106 23  1207 23  1504 23  2000 23  2313 23  0300   POCTGLUCOSE 181* 187* 149* 124* 119* 164* 201* 124* 139* 161*       Current Medications and/or Treatments Impacting Glycemic Control  Immunotherapy:    Immunosuppressants           Stop Route Frequency     tacrolimus capsule 1 mg         -- Oral 2 times daily     mycophenolate capsule 1,000 mg         -- Oral 2 times daily "          Steroids:   Hormones (From admission, onward)      Start     Stop Route Frequency Ordered    09/13/23 0900  predniSONE tablet 20 mg  (methylprednisolone taper panel)        See Hyperspace for full Linked Orders Report.    -- Oral Daily 09/07/23 2117 09/12/23 0900  methylPREDNISolone sodium succinate injection 40 mg  (methylprednisolone taper panel)        See Hyperspace for full Linked Orders Report.    09/13/23 0859 IV Daily 09/07/23 2117 09/11/23 0900  methylPREDNISolone sodium succinate injection 80 mg  (methylprednisolone taper panel)        See Hyperspace for full Linked Orders Report.    09/12/23 0859 IV Daily 09/07/23 2117    09/10/23 0900  methylPREDNISolone sodium succinate injection 120 mg  (methylprednisolone taper panel)        See Hyperspace for full Linked Orders Report.    09/11/23 0859 IV Daily 09/07/23 2117 09/09/23 0900  methylPREDNISolone sodium succinate injection 160 mg  (methylprednisolone taper panel)        See Hyperspace for full Linked Orders Report.    09/10/23 0859 IV Daily 09/07/23 2117          Pressors:    Autonomic Drugs (From admission, onward)      None          Hyperglycemia/Diabetes Medications:   Antihyperglycemics (From admission, onward)      Start     Stop Route Frequency Ordered    09/08/23 1345  insulin regular in 0.9 % NaCl 100 unit/100 mL (1 unit/mL) infusion        Question:  Enter initial dose (Units/hr):  Answer:  1.4    -- IV Continuous 09/08/23 1342    09/08/23 1047  insulin aspart U-100 pen 0-10 Units         -- SubQ As needed (PRN) 09/08/23 0948

## 2023-09-09 NOTE — PLAN OF CARE
Problem: Occupational Therapy  Goal: Occupational Therapy Goal  Description: Goals to be met by: 9/16/2023     Patient will increase functional independence with ADLs by performing:    LE Dressing with set-up  Toileting from toilet with supervision  for hygiene and clothing management.   Pt to complete standing g/h skills with supervision.   Toilet transfer to toilet with supervision.       9/9/2023 1231 by Drea Hurtado LOTR  Outcome: Ongoing, Progressing  9/9/2023 1229 by Drea Hurtado LOTR  Outcome: Ongoing, Progressing  9/9/2023 1226 by Drea Hurtado LOTR  Outcome: Ongoing, Progressing

## 2023-09-09 NOTE — ASSESSMENT & PLAN NOTE
Significant Imaging:  I have reviewed all pertinent imaging results/findings within the past 24 hours.  Pending liver ultrasound this morning      Assessment:     Neuro/Psych:   -- Sedation: None  -- Pain: Oxy and dilaudid PRN             Cards:   -- HDS  -- BP Goal: SBP>100  -- Off pressors      Pulm:   -- Goal O2 sat > 90%  -- Sating well.      Renal:  -- Voiding appropriately. 400cc measured and a couple unmeasured.   -- Trend BUN/Cr: 21/1.1 (18/1)      FEN / GI:   -- Net +2.2L/24hour  -- D/C mIVF  -- Daily CMP  -- Replace lytes as needed  -- Nutrition: Diet consistent carbs  -- HOOD #1 drain with 390cc and #2 362cc  -- Bowel regimen       ID:   -- Tm: afebrile; WBC 12.7(12.07)  -- Abx: fluconazole, bactrim, valganciclovir   -- Immunosuppressants: methylprednisolone, mycophenolate mofetil, tacrolimus      Heme/Onc:   -- H/H stable 8.3 (8.2)  -- PT/INR 10.6/1  -- heparin 5000 u SQ q8hr for DVT ppx      Endo:   -- Gluc goal 140-180  -- Glucose 157 this AM  -- On insulin drip, endocrine consulted for insulin management       PPx:   Feeding: Diet  Analgesia/Sedation: as above  Thromboembolic prevention: heparin  HOB >30: yes  Stress Ulcer ppx: famotidine  Glucose control: Critical care goal 140-180 g/dl, ISS     Lines/Drains/Airway: PIV, CVC, JPX2      Dispo/Code Status/Palliative:   -- SICU / Full Code

## 2023-09-09 NOTE — PT/OT/SLP RE-EVAL
Physical Therapy  up-Cu-lxsxsodusv and treatment with OT    Patient Name:  Sukhdeep Grimes   MRN:  [id number]    Recommendations:     Discharge Recommendations: other (see comments)  Discharge Equipment Recommendations:  (will determine DME needs closer to discharge)   Barriers to discharge: Decreased caregiver support    Assessment:     Sukhdeep Grimes is a 38 y.o. male admitted with a medical diagnosis of Alcoholic cirrhosis of liver with ascites.  He presents with the following impairments/functional limitations: weakness, impaired balance, decreased safety awareness, impaired endurance, impaired functional mobility, gait instability pt tolerated treatment well and will benefit from cont skilled PT 4x/wk to progress physically. Pt was evaluated 8/27 with dx of alcoholic cirrhosis of liver with ascites. Pt is s/p liver transplant 9/7. Pt should be able to discharge home with further PT needs when medically stable.       SOCIAL:pt will live with his mother in 1 story slab. Pt is medically disabled and does not work. Pt mother will be caregiver. They have a walk in shower with built in bench and rails.     Rehab Prognosis:  good ; patient would benefit from acute skilled PT services to address these deficits and reach maximum level of function.      Recent Surgery: Procedure(s) (LRB):  TRANSPLANT, LIVER (N/A) 2 Days Post-Op    Plan:     During this hospitalization, patient to be seen 4 x/week to address the above listed problems via gait training, therapeutic activities, therapeutic exercises  Plan of Care Expires:  10/06/23  Plan of Care Reviewed with: patient    Subjective     Communicated with nurse  prior to session.  Patient found up in chair with telemetry, blood pressure cuff, pulse ox (continuous), peripheral IV (R IJ dialysis catheter) upon PT entry to room, agreeable to evaluation.      Chief Complaint: pt had no complaints.   Patient comments/goals: to get better and go home.   Pain/Comfort:  Pain Rating 1:  0/10  Pain Rating Post-Intervention 1: 0/10    Patients cultural, spiritual, [employer]ian conflicts given the current situation: no      Objective:     Patient found with: telemetry, blood pressure cuff, pulse ox (continuous), peripheral IV (R IJ dialysis catheter)     General Precautions: Standard, fall  Orthopedic Precautions: N/A  Braces: N/A  Respiratory Status: Room air    Exams:  Cognitive Exam:  Patient is oriented to Person, Place, Time, and Situation  RLE ROM: WFL  RLE Strength: WFL  LLE ROM: WFL  LLE Strength: WFL    Functional Mobility:    Transfers:   pt needed verbal cues for hand placement and sequencing for functional mobility.   Sit to Stand:  contact guard assistance with hand-held assist    Gait: pt received gait training ~ 40 ft with CGA. Pt had wide base of support and steppage gait with gait training.     Balance: pt stood at sink and performed ADLS with OT with CGA.       Due to pt complex medical condition, the skill of 2 licensed therapists is needed to maximize treatment session and progression towards goals  Pt white board updated with current therapists name and level of mobility assistance needed.       AM-PAC 6 CLICK MOBILITY  Total Score:17       Treatment and Education:   Pt received verbal instructions in role of PT and POC. Pt verbally expressed understanding of such.     Patient left up in chair with all lines intact, call button in reach, and RN  notified.    GOALS:   Multidisciplinary Problems       Physical Therapy Goals          Problem: Physical Therapy    Goal Priority Disciplines Outcome Goal Variances Interventions   Physical Therapy Goal     PT, PT/OT Ongoing, Progressing     Description: Goals to be met by: 23     Patient will increase functional independence with mobility by performin. Sit to stand transfer with Pheba  2. Gait  x 250 feet with P[place] using No Assistive Device.   3. Supine to sit with  SBA  4. Lower extremity exercise program x15  "reps per handout, with independence                         History:     Past Medical History:   Diagnosis Date    Alcoholic cirrhosis of liver with ascites     Esophageal varices without bleeding     Hepatic encephalopathy     Hypokalemia     Hyponatremia     Thrombocytopenia     Transfusion history 08/2022       Past Surgical History:   Procedure Laterality Date    EGD - EXTERNAL RESULT  08/21/2023    "1 column of large varices with no bleeding and no stigmata of recent bleeding in the lower 3rd of the esophagus.  No red Rigoberto sign present.  Banding was not performed.  Moderate portal hypertensive gastropathy in the entire examined stomach.  Examined duodenum was normal." EGD done in [facility])    LIVER TRANSPLANT N/A 9/3/2023    Procedure: TRANSPLANT, LIVER;  Surgeon: Phil Ariza MD;  Location: [facility] OR [address] [place] [address];  Service: Transplant;  Laterality: N/A;    LIVER TRANSPLANT N/A 9/7/2023    Procedure: TRANSPLANT, LIVER;  Surgeon: Leonardo Manuel MD;  Location: [facility] OR [address] [place] [address];  Service: Transplant;  Laterality: N/A;    PARACENTESIS  08/24/2023       Time Tracking:     PT Received On: 09/09/23  PT Start Time: 0918     PT Stop Time: 0934  PT Total Time (min): 16 min     Billable Minutes: Re-eval 8 min and Gait Training 8 min       09/09/2023            "

## 2023-09-09 NOTE — ASSESSMENT & PLAN NOTE
BG goal: 140-180   T2DM. S/p Liver txp.  BG stable on transition drip. Will adjust rate and continue to monitor on current regimen    -Adjust Transition insulin drip mary 0.9  u/hr w/ stepdown parameters  - Continue Novolog Moderate dose correction with ISF 25 starting at 180    - POCT Glucose every 4 hours  - Hypoglycemia protocol in place      ** Please notify Endocrine for any change and/or advance in diet**  ** Please call Endocrine for any BG related issues **     Discharge Planning:   TBD. Please notify endocrinology prior to discharge.

## 2023-09-09 NOTE — NURSING
SICU PLAN OF CARE NOTE    Dx: Alcoholic cirrhosis of liver with ascites    Goals of Care: Stepdown    Vital Signs (last 12 hours):   Temp:  [98.1 °F (36.7 °C)-98.5 °F (36.9 °C)]   Pulse:  [47-62]   Resp:  [13-24]   BP: (130-173)/(82-96)   SpO2:  [94 %-100 %]      Neuro: AAO x4    Cardiac: SB 48-60    Respiratory: Room Air    Gtts: Insulin and MIVF    Urine Output: Voids Spontaneously 430 cc/shift      Drains: HOOD Drain, total output 440 cc / shift     HOOD Drain 2 Total output 222 cc / shift    Diet: Cardiac Diet        Labs/Accuchecks: q4 Accu CBC AST.    Skin: No new skin breakdown noted. Pt with protective foams intact on specialty bed. Pt repositions independently with frequent weight shift encouraged and weight shift assistance provided.     Shift Events: NAEON

## 2023-09-09 NOTE — PROGRESS NOTES
Called into patient's room by PCT. Patient found standing up next to chair attempting to urinate into belongings bag; gown also noted to be wet with urine. Patient assisted back to bed - bed alarm on. Gown changed. Call bell within reach, curtains and door opened for visualization. NP notified of above.

## 2023-09-10 PROBLEM — R00.1 SINUS BRADYCARDIA: Status: ACTIVE | Noted: 2023-09-10

## 2023-09-10 PROBLEM — Z79.60 LONG-TERM USE OF IMMUNOSUPPRESSANT MEDICATION: Status: ACTIVE | Noted: 2023-09-10

## 2023-09-10 PROBLEM — Z94.4 LIVER TRANSPLANTED: Status: ACTIVE | Noted: 2023-09-10

## 2023-09-10 PROBLEM — K70.31 ALCOHOLIC CIRRHOSIS OF LIVER WITH ASCITES: Status: RESOLVED | Noted: 2023-08-01 | Resolved: 2023-09-10

## 2023-09-10 LAB
ALBUMIN SERPL BCP-MCNC: 2.5 G/DL (ref 3.5–5.2)
ALP SERPL-CCNC: 79 U/L (ref 55–135)
ALT SERPL W/O P-5'-P-CCNC: 249 U/L (ref 10–44)
ANION GAP SERPL CALC-SCNC: 11 MMOL/L (ref 8–16)
ANISOCYTOSIS BLD QL SMEAR: SLIGHT
AST SERPL-CCNC: 126 U/L (ref 10–40)
BACTERIA BLD CULT: NORMAL
BACTERIA BLD CULT: NORMAL
BACTERIA SPEC AEROBE CULT: NO GROWTH
BASOPHILS # BLD AUTO: 0.01 K/UL (ref 0–0.2)
BASOPHILS NFR BLD: 0.1 % (ref 0–1.9)
BILIRUB SERPL-MCNC: 7.9 MG/DL (ref 0.1–1)
BLD PROD TYP BPU: NORMAL
BLOOD UNIT EXPIRATION DATE: NORMAL
BLOOD UNIT TYPE CODE: 5100
BLOOD UNIT TYPE CODE: 6200
BLOOD UNIT TYPE: NORMAL
BUN SERPL-MCNC: 32 MG/DL (ref 6–20)
CA-I BLDV-SCNC: 1.17 MMOL/L (ref 1.06–1.42)
CALCIUM SERPL-MCNC: 7.9 MG/DL (ref 8.7–10.5)
CHLORIDE SERPL-SCNC: 105 MMOL/L (ref 95–110)
CO2 SERPL-SCNC: 18 MMOL/L (ref 23–29)
CODING SYSTEM: NORMAL
CREAT SERPL-MCNC: 1.2 MG/DL (ref 0.5–1.4)
CROSSMATCH INTERPRETATION: NORMAL
DIFFERENTIAL METHOD: ABNORMAL
DISPENSE STATUS: NORMAL
EOSINOPHIL # BLD AUTO: 0 K/UL (ref 0–0.5)
EOSINOPHIL NFR BLD: 0 % (ref 0–8)
ERYTHROCYTE [DISTWIDTH] IN BLOOD BY AUTOMATED COUNT: 18 % (ref 11.5–14.5)
EST. GFR  (NO RACE VARIABLE): >60 ML/MIN/1.73 M^2
GLUCOSE SERPL-MCNC: 123 MG/DL (ref 70–110)
HCT VFR BLD AUTO: 25.9 % (ref 40–54)
HGB BLD-MCNC: 8.7 G/DL (ref 14–18)
IMM GRANULOCYTES # BLD AUTO: 0.09 K/UL (ref 0–0.04)
IMM GRANULOCYTES NFR BLD AUTO: 0.7 % (ref 0–0.5)
INR PPP: 1 (ref 0.8–1.2)
LYMPHOCYTES # BLD AUTO: 0.4 K/UL (ref 1–4.8)
LYMPHOCYTES NFR BLD: 2.7 % (ref 18–48)
MAGNESIUM SERPL-MCNC: 2.2 MG/DL (ref 1.6–2.6)
MCH RBC QN AUTO: 31.9 PG (ref 27–31)
MCHC RBC AUTO-ENTMCNC: 33.6 G/DL (ref 32–36)
MCV RBC AUTO: 95 FL (ref 82–98)
MONOCYTES # BLD AUTO: 0.9 K/UL (ref 0.3–1)
MONOCYTES NFR BLD: 7.4 % (ref 4–15)
NEUTROPHILS # BLD AUTO: 11.4 K/UL (ref 1.8–7.7)
NEUTROPHILS NFR BLD: 89.1 % (ref 38–73)
NRBC BLD-RTO: 0 /100 WBC
NUM UNITS TRANS PACKED RBC: NORMAL
PHOSPHATE SERPL-MCNC: 5.8 MG/DL (ref 2.7–4.5)
PLATELET # BLD AUTO: 38 K/UL (ref 150–450)
PMV BLD AUTO: 10.9 FL (ref 9.2–12.9)
POCT GLUCOSE: 121 MG/DL (ref 70–110)
POCT GLUCOSE: 132 MG/DL (ref 70–110)
POCT GLUCOSE: 135 MG/DL (ref 70–110)
POCT GLUCOSE: 156 MG/DL (ref 70–110)
POIKILOCYTOSIS BLD QL SMEAR: SLIGHT
POLYCHROMASIA BLD QL SMEAR: ABNORMAL
POTASSIUM SERPL-SCNC: 4.6 MMOL/L (ref 3.5–5.1)
PROT SERPL-MCNC: 4.9 G/DL (ref 6–8.4)
PROTHROMBIN TIME: 10.5 SEC (ref 9–12.5)
RBC # BLD AUTO: 2.73 M/UL (ref 4.6–6.2)
SODIUM SERPL-SCNC: 134 MMOL/L (ref 136–145)
SPHEROCYTES BLD QL SMEAR: ABNORMAL
TACROLIMUS BLD-MCNC: 7.3 NG/ML (ref 5–15)
TRANS ERYTHROCYTES VOL PATIENT: NORMAL ML
TROPONIN I SERPL DL<=0.01 NG/ML-MCNC: 0.04 NG/ML (ref 0–0.03)
UNIT NUMBER: NORMAL
WBC # BLD AUTO: 12.77 K/UL (ref 3.9–12.7)

## 2023-09-10 PROCEDURE — 82330 ASSAY OF CALCIUM: CPT

## 2023-09-10 PROCEDURE — 84100 ASSAY OF PHOSPHORUS: CPT

## 2023-09-10 PROCEDURE — 85025 COMPLETE CBC W/AUTO DIFF WBC: CPT | Performed by: STUDENT IN AN ORGANIZED HEALTH CARE EDUCATION/TRAINING PROGRAM

## 2023-09-10 PROCEDURE — 20600001 HC STEP DOWN PRIVATE ROOM

## 2023-09-10 PROCEDURE — 99233 PR SUBSEQUENT HOSPITAL CARE,LEVL III: ICD-10-PCS | Mod: 24,,, | Performed by: NURSE PRACTITIONER

## 2023-09-10 PROCEDURE — 25000003 PHARM REV CODE 250: Performed by: NURSE PRACTITIONER

## 2023-09-10 PROCEDURE — 25000003 PHARM REV CODE 250

## 2023-09-10 PROCEDURE — 83735 ASSAY OF MAGNESIUM: CPT

## 2023-09-10 PROCEDURE — 63600175 PHARM REV CODE 636 W HCPCS: Performed by: STUDENT IN AN ORGANIZED HEALTH CARE EDUCATION/TRAINING PROGRAM

## 2023-09-10 PROCEDURE — 93005 ELECTROCARDIOGRAM TRACING: CPT

## 2023-09-10 PROCEDURE — 93010 EKG 12-LEAD: ICD-10-PCS | Mod: ,,, | Performed by: INTERNAL MEDICINE

## 2023-09-10 PROCEDURE — P9035 PLATELET PHERES LEUKOREDUCED: HCPCS | Performed by: NURSE PRACTITIONER

## 2023-09-10 PROCEDURE — 25000003 PHARM REV CODE 250: Performed by: STUDENT IN AN ORGANIZED HEALTH CARE EDUCATION/TRAINING PROGRAM

## 2023-09-10 PROCEDURE — 36430 TRANSFUSION BLD/BLD COMPNT: CPT

## 2023-09-10 PROCEDURE — 63600175 PHARM REV CODE 636 W HCPCS: Performed by: NURSE PRACTITIONER

## 2023-09-10 PROCEDURE — 80053 COMPREHEN METABOLIC PANEL: CPT | Performed by: STUDENT IN AN ORGANIZED HEALTH CARE EDUCATION/TRAINING PROGRAM

## 2023-09-10 PROCEDURE — 85610 PROTHROMBIN TIME: CPT | Performed by: SURGERY

## 2023-09-10 PROCEDURE — 63600175 PHARM REV CODE 636 W HCPCS: Performed by: TRANSPLANT SURGERY

## 2023-09-10 PROCEDURE — 99233 SBSQ HOSP IP/OBS HIGH 50: CPT | Mod: 24,,, | Performed by: NURSE PRACTITIONER

## 2023-09-10 PROCEDURE — 84484 ASSAY OF TROPONIN QUANT: CPT | Performed by: NURSE PRACTITIONER

## 2023-09-10 PROCEDURE — P9047 ALBUMIN (HUMAN), 25%, 50ML: HCPCS | Mod: JZ,JG | Performed by: NURSE PRACTITIONER

## 2023-09-10 PROCEDURE — 25000003 PHARM REV CODE 250: Performed by: PHYSICIAN ASSISTANT

## 2023-09-10 PROCEDURE — 93010 ELECTROCARDIOGRAM REPORT: CPT | Mod: ,,, | Performed by: INTERNAL MEDICINE

## 2023-09-10 PROCEDURE — 80197 ASSAY OF TACROLIMUS: CPT | Performed by: STUDENT IN AN ORGANIZED HEALTH CARE EDUCATION/TRAINING PROGRAM

## 2023-09-10 RX ORDER — ALBUMIN HUMAN 250 G/1000ML
25 SOLUTION INTRAVENOUS ONCE
Status: COMPLETED | OUTPATIENT
Start: 2023-09-10 | End: 2023-09-10

## 2023-09-10 RX ORDER — IBUPROFEN 200 MG
24 TABLET ORAL
Status: DISCONTINUED | OUTPATIENT
Start: 2023-09-10 | End: 2023-09-13

## 2023-09-10 RX ORDER — FLUCONAZOLE 200 MG/1
400 TABLET ORAL DAILY
Status: DISCONTINUED | OUTPATIENT
Start: 2023-09-10 | End: 2023-09-14 | Stop reason: HOSPADM

## 2023-09-10 RX ORDER — IBUPROFEN 200 MG
16 TABLET ORAL
Status: DISCONTINUED | OUTPATIENT
Start: 2023-09-10 | End: 2023-09-13

## 2023-09-10 RX ORDER — GLUCAGON 1 MG
1 KIT INJECTION
Status: DISCONTINUED | OUTPATIENT
Start: 2023-09-10 | End: 2023-09-13

## 2023-09-10 RX ORDER — INSULIN ASPART 100 [IU]/ML
0-5 INJECTION, SOLUTION INTRAVENOUS; SUBCUTANEOUS
Status: DISCONTINUED | OUTPATIENT
Start: 2023-09-10 | End: 2023-09-13

## 2023-09-10 RX ORDER — HYDROCODONE BITARTRATE AND ACETAMINOPHEN 500; 5 MG/1; MG/1
TABLET ORAL
Status: DISCONTINUED | OUTPATIENT
Start: 2023-09-10 | End: 2023-09-14 | Stop reason: HOSPADM

## 2023-09-10 RX ORDER — TACROLIMUS 1 MG/1
2 CAPSULE ORAL 2 TIMES DAILY
Status: DISCONTINUED | OUTPATIENT
Start: 2023-09-10 | End: 2023-09-10

## 2023-09-10 RX ORDER — FUROSEMIDE 10 MG/ML
40 INJECTION INTRAMUSCULAR; INTRAVENOUS ONCE
Status: COMPLETED | OUTPATIENT
Start: 2023-09-10 | End: 2023-09-10

## 2023-09-10 RX ADMIN — FUROSEMIDE 40 MG: 10 INJECTION, SOLUTION INTRAMUSCULAR; INTRAVENOUS at 10:09

## 2023-09-10 RX ADMIN — SODIUM BICARBONATE 1300 MG: 650 TABLET ORAL at 08:09

## 2023-09-10 RX ADMIN — METHYLPREDNISOLONE SODIUM SUCCINATE 120 MG: 125 INJECTION, POWDER, FOR SOLUTION INTRAMUSCULAR; INTRAVENOUS at 08:09

## 2023-09-10 RX ADMIN — FAMOTIDINE 20 MG: 20 TABLET ORAL at 08:09

## 2023-09-10 RX ADMIN — FLUCONAZOLE 400 MG: 200 TABLET ORAL at 08:09

## 2023-09-10 RX ADMIN — OXYCODONE HYDROCHLORIDE 10 MG: 10 TABLET ORAL at 07:09

## 2023-09-10 RX ADMIN — DOCUSATE SODIUM 100 MG: 100 CAPSULE, LIQUID FILLED ORAL at 08:09

## 2023-09-10 RX ADMIN — LORAZEPAM 0.5 MG: 0.5 TABLET ORAL at 11:09

## 2023-09-10 RX ADMIN — TACROLIMUS 3 MG: 1 CAPSULE ORAL at 08:09

## 2023-09-10 RX ADMIN — OXYCODONE HYDROCHLORIDE 10 MG: 10 TABLET ORAL at 08:09

## 2023-09-10 RX ADMIN — POLYETHYLENE GLYCOL 3350 17 G: 17 POWDER, FOR SOLUTION ORAL at 08:09

## 2023-09-10 RX ADMIN — MYCOPHENOLATE MOFETIL 1000 MG: 250 CAPSULE ORAL at 08:09

## 2023-09-10 RX ADMIN — MUPIROCIN 1 G: 20 OINTMENT TOPICAL at 08:09

## 2023-09-10 RX ADMIN — INSULIN HUMAN 0.8 UNITS/HR: 1 INJECTION, SOLUTION INTRAVENOUS at 03:09

## 2023-09-10 RX ADMIN — LORAZEPAM 0.5 MG: 0.5 TABLET ORAL at 02:09

## 2023-09-10 RX ADMIN — ALBUMIN (HUMAN) 25 G: 12.5 SOLUTION INTRAVENOUS at 10:09

## 2023-09-10 RX ADMIN — DOCUSATE SODIUM 100 MG: 100 CAPSULE, LIQUID FILLED ORAL at 02:09

## 2023-09-10 NOTE — ASSESSMENT & PLAN NOTE
- s/p DBD OLTxp 9/7/23, surgery notable for hematoma  - POD#1 Liver US satisfactory  - transferred to TSU POD#2 w medial drain and central line  - working on anxiety and orientation  - family at bedside, cont OOB and IS  - trend LFTs, keep medial drain until know pt is tolerating diet

## 2023-09-10 NOTE — CARE UPDATE
-Glucose Goal 140-180    -A1C:   Hemoglobin A1C   Date Value Ref Range Status   08/01/2023 <4.0 3.2 - 7.0 % Final         -HOME REGIMEN:     -GLUCOSE TREND FOR THE PAST 24HRS:   Recent Labs   Lab 09/09/23  0757 09/09/23  1127 09/09/23  1515 09/09/23  1951 09/09/23  2335 09/10/23  0334   POCTGLUCOSE 154* 130* 167* 142* 144* 132*         -NO HYPOGYCEMIAS NOTED     - Diet  Diet consistent carbohydrate Standard Tray    No hx of Dm. S/p Liver txp.  BG stable on transition drip. Will discontinue and monitor on current regimen     - Discontinue Transition insulin drip   - Continue Novolog Moderate dose correction with ISF 25 starting at 180    - POCT Glucose AC/HS  - Hypoglycemia protocol in place      ** Please notify Endocrine for any change and/or advance in diet**  ** Please call Endocrine for any BG related issues **     Discharge Planning:   TBD. Please notify endocrinology prior to discharge

## 2023-09-10 NOTE — ASSESSMENT & PLAN NOTE
BG goal: 140-180   No hx of DM. S/p Liver txp.  BG stable on transition drip. Will adjust rate and continue to monitor on current regimen    -Adjust Transition insulin drip mary 0.9  u/hr w/ stepdown parameters  - Continue Novolog Moderate dose correction with ISF 25 starting at 180    - POCT Glucose every 4 hours  - Hypoglycemia protocol in place      ** Please notify Endocrine for any change and/or advance in diet**  ** Please call Endocrine for any BG related issues **     Discharge Planning:   TBD. Please notify endocrinology prior to discharge.

## 2023-09-10 NOTE — SIGNIFICANT EVENT
RN notified by telemetry, pt was continued peak T waves. In review of the chart, EKG from 9/8 w similar peaked T waves. Electrolytes on am labs wnl, K 4.4, mag 2.2. EKG stat showed bradycardia w peaked t waves. Plan to repeat labs and trend troponin. Pt is AOx4, denies cp, sob or palpitations. He is 'fidgety'. Encourage PRNs. Plan to replace lytes as needed. Cont tele.

## 2023-09-10 NOTE — PROGRESS NOTES
[facility] - Transplant Stepdown  Liver Transplant  Progress Note    Patient Name: Sukhdeep Grimes  MRN: [id number]  Admission Date: 8/24/2023  Hospital Length of Stay: 17 days  Code Status: Full Code  Primary Care Provider: No, Primary Doctor  Post-Operative Day: 3    ORGAN:   LIVER  Disease Etiology: Alcohol-Associated Cirrhosis Without Acute Alcohol-Associated Hepatitis  Donor Type:   Donation after Brain Death  CDC High Risk:   No  Donor CMV Status:   Donor CMV Status: Positive  Donor HBcAB:   Negative  Donor HCV Status:   Negative  Donor HBV GOGO:   Donor HCV GOGO: Negative  Whole or Partial: Whole Liver  Biliary Anastomosis: End to End  Arterial Anatomy: Standard  Subjective:     History of Present Illness:  Sukhdeep Grimes is a 38 y.o. male  PMHx of Alcoholic decompensated cirrhosis. Pt was admitted from OSH  for hepatology evaluation  D/T ascites, hyponatremia, hypokalemia, thrombocytopenia, and AYAH and weakness. On 9/1 patient was made active on transplant list MELD 30       Hospital Course:  Patient made active on transplant list on 9/1. Had 1 liver transplant offers on 9/2 and 9/3; however, surgeries cancelled due to size of donor liver and poor organ quality.      Patient is now s/p DBD liver transplant on 9/7/2023 (CMV D+/R-).  Surgery notable for hematoma at the upper surface of the right lobe which was opened and managed with surgicel, surgiflo and fibrillar. CBC has remained stable. POD#1 Liver US was satisfactory. LFTs trending down  Pt transferred to TSU POD#2 with medial HOOD and central line.      Interval History: patient anxious and slightly disoriented yesterday evening. Mental status improved today, answering questions appropriately. He c/o increased tremors. He received his dose of Prograf this am, will hold pm dose. LFTs trending down. [employer]ron w staples draining serous fluid from right end and some serous drainage from drain site. Drain output serosang, and HOOD fills with emptying. Pt was ascites  maker prior to txp. Plan for lasix and albumin today. Plt 38 and pt having epistaxis. Plan for plt today and then remove central line. Appetite improving, denies n/v, passing flatus, no BM yet, cont bowel regimen. Of note, EKG showed peaked t waves 9/8. Telemetry called 9/9 to report peaked waves. Repeat EKG w similar findings. Pt denies CP, SOB or palpitations. Trended troponin- decreasing from 0.042 to 0.039. Electrolytes stable. VSS. Monitor.      Scheduled Meds:   albumin human 25%  25 g Intravenous Once    docusate sodium  100 mg Oral TID    famotidine  20 mg Oral Nightly    fluconazole  400 mg Oral Daily    heparin (porcine)  5,000 Units Subcutaneous Q8H    [START ON 9/11/2023] methylPREDNISolone sodium succinate injection  80 mg Intravenous Daily    Followed by    [START ON 9/12/2023] methylPREDNISolone sodium succinate injection  40 mg Intravenous Daily    Followed by    [START ON 9/13/2023] predniSONE  20 mg Oral Daily    mupirocin  1 g Nasal BID    mycophenolate  1,000 mg Oral BID    polyethylene glycol  17 g Oral Daily    sodium bicarbonate  1,300 mg Oral BID    [START ON 9/14/2023] sulfamethoxazole-trimethoprim 400-80mg  1 tablet Oral Daily AM    [START ON 9/17/2023] valGANciclovir  450 mg Oral Daily     Continuous Infusions:  PRN Meds:0.9%  NaCl infusion (for blood administration), 0.9%  NaCl infusion (for blood administration), albuterol, dextrose 10%, dextrose 10%, glucagon (human recombinant), glucose, glucose, hydrALAZINE, hydrOXYzine HCL, insulin aspart U-100, LORazepam, oxyCODONE, oxyCODONE, sodium chloride 0.9%    Review of Systems   Constitutional:  Positive for activity change (decreased), appetite change (improving) and fatigue. Negative for chills and fever.   HENT:  Negative for congestion and trouble swallowing.    Eyes:  Negative for visual disturbance.   Respiratory:  Positive for cough. Negative for shortness of breath and wheezing.    Cardiovascular:  Positive for leg  swelling. Negative for chest pain.   Gastrointestinal:  Positive for abdominal distention and abdominal pain. Negative for constipation, diarrhea, nausea and vomiting.   Endocrine: Negative.    Genitourinary:  Negative for decreased urine volume, difficulty urinating, dysuria, hematuria and urgency.   Musculoskeletal:  Negative for arthralgias.   Skin:  Positive for color change (jaundice) and wound. Negative for pallor and rash.   Allergic/Immunologic: Positive for immunocompromised state.   Neurological:  Positive for weakness. Negative for dizziness, tremors, seizures, syncope and headaches.   Hematological:  Bruises/bleeds easily.   Psychiatric/Behavioral:  Positive for sleep disturbance. Negative for agitation, confusion, decreased concentration and suicidal ideas. The patient is nervous/anxious and is hyperactive.      Objective:     Vital Signs (Most Recent):  Temp: 98.1 °F (36.7 °C) (09/10/23 1051)  Pulse: 78 (09/10/23 1051)  Resp: 20 (09/10/23 1051)  BP: (!) 155/93 (09/10/23 1051)  SpO2: 96 % (09/10/23 1051) Vital Signs (24h Range):  Temp:  [97.4 °F (36.3 °C)-98.3 °F (36.8 °C)] 98.1 °F (36.7 °C)  Pulse:  [48-84] 78  Resp:  [14-38] 20  SpO2:  [96 %-100 %] 96 %  BP: (128-169)/() 155/93     Weight: 67.2 kg (148 lb 2.4 oz)  Body mass index is 21.26 kg/m².    Intake/Output - Last 3 Shifts         09/08 0700 09/09 0659 09/09 0700  09/10 0659 09/10 0700 09/11 0659    P.O. 760 450     I.V. (mL/kg) 2416.8 (36) 135.7 (2)     Blood 202      NG/GT 50      IV Piggyback 398.8      Total Intake(mL/kg) 3827.7 (57) 585.7 (8.7)     Urine (mL/kg/hr) 770 (0.5) 1000 (0.6)     Drains 972 190 40    Blood       Total Output 1742 1190 40    Net +2085.7 -604.3 -40           Urine Occurrence 1 x 2 x              Physical Exam  Vitals and nursing note reviewed.   Constitutional:       General: He is not in acute distress.     Appearance: He is well-developed. He is not diaphoretic.      Comments: (+) hand and temporal muscle  wasting noted     HENT:      Head: Normocephalic and atraumatic.      Nose:      Comments: Epistaxis right nare     Mouth/Throat:      Pharynx: No oropharyngeal exudate.   Eyes:      General: Scleral icterus present.      Conjunctiva/sclera: Conjunctivae normal.      Pupils: Pupils are equal, round, and reactive to light.   Neck:      Thyroid: No thyromegaly.   Cardiovascular:      Rate and Rhythm: Normal rate and regular rhythm.      Heart sounds: Normal heart sounds. No murmur heard.  Pulmonary:      Effort: Pulmonary effort is normal. No respiratory distress.      Breath sounds: Rales (diminished) present. No wheezing.   Abdominal:      General: Bowel sounds are normal. There is distension.      Tenderness: There is abdominal tenderness. There is no guarding.      Comments: Chevron incision w staples w serosang drainage from right end  Medial HOOD in place w serosang drainage and some serous drainage from drain site  Lateral HOOD site w suture CDI   Genitourinary:     Penis: Normal.    Musculoskeletal:         General: Normal range of motion.      Cervical back: Normal range of motion and neck supple.      Right lower leg: Edema (trace) present.      Left lower leg: Edema (trace) present.   Skin:     General: Skin is warm and dry.      Capillary Refill: Capillary refill takes 2 to 3 seconds.      Findings: No erythema.   Neurological:      Mental Status: He is alert and oriented to person, place, and time.   Psychiatric:      Comments: Anxious, hyperactive but overall improved          Laboratory:  Immunosuppressants           Stop Route Frequency     mycophenolate capsule 1,000 mg         -- Oral 2 times daily          CBC:   Recent Labs   Lab 09/10/23  0517   WBC 12.77*   RBC 2.73*   HGB 8.7*   HCT 25.9*   PLT 38*   MCV 95   MCH 31.9*   MCHC 33.6     CMP:   Recent Labs   Lab 09/10/23  0517   *   CALCIUM 7.9*   ALBUMIN 2.5*   PROT 4.9*   *   K 4.6   CO2 18*      BUN 32*   CREATININE 1.2   ALKPHOS  "79   *   *   BILITOT 7.9*     Coagulation:   Recent Labs   Lab 09/09/23  0302 09/10/23  0556   INR 1.0 1.0   APTT 44.8*  --      Cardiac Markers: No results for input(s): "CKMB", "TROPONINT", "MYOGLOBIN" in the last 168 hours.  Labs within the past 24 hours have been reviewed.    Diagnostic Results:  US Liver Transplant Post:  Results for orders placed during the hospital encounter of 08/24/23    US Doppler Liver Transplant Post (xpd)    Narrative  EXAMINATION:  US DOPPLER LIVER TRANSPLANT POST (XPD)    CLINICAL HISTORY:  Assess perfusion following liver transplant;    TECHNIQUE:  Realtime sonographic imaging was performed with color Doppler interrogation.    COMPARISON:  MRI abdomen with and without contrast 08/28/2023, CT abdomen pelvis with contrast 08/25/2023.    FINDINGS:  Patient is status post orthotopic liver transplant day 1.    The liver transplant demonstrates no focal parenchymal abnormality. No intra or extrahepatic bile duct dilatation. The common duct measures 4 mm.    The middle, right, and left hepatic veins are patent and unremarkable. The main, right, and left branches of the portal vein demonstrate hepatopetal flow and are unremarkable.    Hepatic arterial resistive indices are as follows: Main 0.73, Left 0.73, anterior Right 0.67, posterior Right 0.67.  There is no evidence of a tardus parvus waveform. The maximum velocity in the main hepatic artery is 76 cm/sec.    The IVC is unremarkable. There is no peritransplant fluid.    Trace right pleural effusion.    Impression  Satisfactory Doppler evaluation of the liver transplant.    Trace right pleural effusion.    COMMUNICATION  This critical result was discovered/received at 08:20.  The critical information above was relayed directly by me by epic secure chat to Dr. Paulino On 09/08/2023 at 08:30.      Electronically signed by: Darrion Spring  Date:    09/08/2023  Time:    08:48    I have personally reviewed all pertinent imaging " studies.    Debility/Functional status: Patient debilitated by evidence of Muscle wasting and atrophy, Weakness, and Cachexia. Physical and occupational therapy ordered daily to evaluate and treat. Debility was: present on admission.    Medical decision making for this encounter includes review of pertinent labs and diagnostic studies, assessment and planning, discussions with consulting providers, discussion with patient/family, and participation in multidisciplinary rounds. Time spent caring for patient: 90 minutes        Assessment/Plan:     Long-term use of immunosuppressant medication  - see prophylactic immuno      Liver transplanted  - s/p DBD OLTxp 9/7/23, surgery notable for hematoma  - POD#1 Liver US satisfactory  - transferred to TSU POD#2 w medial drain and central line  - working on anxiety and orientation  - family at bedside, cont OOB and IS  - trend LFTs, keep medial drain until know pt is tolerating diet        Sinus bradycardia  - baseline, asymptomatic      Adrenal cortical steroids causing adverse effect in therapeutic use  - endo consulted and following, apprec recs      Prophylactic immunotherapy  - Chronic Prophylactic Immunosuppression- cont to check prograf level daily.  Assess for toxicity and adjust level as needed        Steroid-induced hyperglycemia  - endo consulted and following      Hyponatremia  - cont 1L fluid restriction  - monitor with daily CMP  - POST transplant, Na level is stable      Metabolic acidosis  - on PO sodium bicarb  - sodium bicarb restarted post transplant 9/9/23, Monitor    Acquired coagulation factor deficiency  - r/t ESLD   - likely to improve post transplant       Thrombocytopenia, unspecified  - r/t ESLD  - anticipate will improve post transplant   - plan for plt prior to removing central line      Moderate malnutrition  - encouraged oral intake   - Boost supplements TID  - dietician consulted    Decompensated hepatic cirrhosis  - s/p liver  transplant        VTE Risk Mitigation (From admission, onward)         Ordered     heparin (porcine) injection 5,000 Units  Every 8 hours         09/07/23 2117     Place sequential compression device  Until discontinued         09/07/23 2117     IP VTE HIGH RISK PATIENT  Once         09/07/23 2117     Send SCD stockings and [employer] hose with patient to OR  Continuous         09/06/23 2226                The patients clinical status was discussed at multidisplinary rounds, involving transplant surgery, transplant medicine, pharmacy, nursing, nutrition, and social work    Current UNOS Status: s/p transplant    Discharge Planning:  No Patient Care Coordination Note on file.      Medical decision making for this encounter includes review of pertinent labs and diagnostic studies, assessment and planning, discussions with consulting providers, discussion with patient/family, and participation in multidisciplinary rounds. Time spent caring for patient: 90 minutes    Berenice Castro NP  Liver Transplant  [facility] - Transplant Stepdown

## 2023-09-10 NOTE — ASSESSMENT & PLAN NOTE
- r/t ESLD  - anticipate will improve post transplant   - plan for plt prior to removing central line

## 2023-09-10 NOTE — SUBJECTIVE & OBJECTIVE
Scheduled Meds:   albumin human 25%  25 g Intravenous Once    docusate sodium  100 mg Oral TID    famotidine  20 mg Oral Nightly    fluconazole  400 mg Oral Daily    heparin (porcine)  5,000 Units Subcutaneous Q8H    [START ON 9/11/2023] methylPREDNISolone sodium succinate injection  80 mg Intravenous Daily    Followed by    [START ON 9/12/2023] methylPREDNISolone sodium succinate injection  40 mg Intravenous Daily    Followed by    [START ON 9/13/2023] predniSONE  20 mg Oral Daily    mupirocin  1 g Nasal BID    mycophenolate  1,000 mg Oral BID    polyethylene glycol  17 g Oral Daily    sodium bicarbonate  1,300 mg Oral BID    [START ON 9/14/2023] sulfamethoxazole-trimethoprim 400-80mg  1 tablet Oral Daily AM    [START ON 9/17/2023] valGANciclovir  450 mg Oral Daily     Continuous Infusions:  PRN Meds:0.9%  NaCl infusion (for blood administration), 0.9%  NaCl infusion (for blood administration), albuterol, dextrose 10%, dextrose 10%, glucagon (human recombinant), glucose, glucose, hydrALAZINE, hydrOXYzine HCL, insulin aspart U-100, LORazepam, oxyCODONE, oxyCODONE, sodium chloride 0.9%    Review of Systems   Constitutional:  Positive for activity change (decreased), appetite change (improving) and fatigue. Negative for chills and fever.   HENT:  Negative for congestion and trouble swallowing.    Eyes:  Negative for visual disturbance.   Respiratory:  Positive for cough. Negative for shortness of breath and wheezing.    Cardiovascular:  Positive for leg swelling. Negative for chest pain.   Gastrointestinal:  Positive for abdominal distention and abdominal pain. Negative for constipation, diarrhea, nausea and vomiting.   Endocrine: Negative.    Genitourinary:  Negative for decreased urine volume, difficulty urinating, dysuria, hematuria and urgency.   Musculoskeletal:  Negative for arthralgias.   Skin:  Positive for color change (jaundice) and wound. Negative for pallor and rash.   Allergic/Immunologic: Positive for  immunocompromised state.   Neurological:  Positive for weakness. Negative for dizziness, tremors, seizures, syncope and headaches.   Hematological:  Bruises/bleeds easily.   Psychiatric/Behavioral:  Positive for sleep disturbance. Negative for agitation, confusion, decreased concentration and suicidal ideas. The patient is nervous/anxious and is hyperactive.      Objective:     Vital Signs (Most Recent):  Temp: 98.1 °F (36.7 °C) (09/10/23 1051)  Pulse: 78 (09/10/23 1051)  Resp: 20 (09/10/23 1051)  BP: (!) 155/93 (09/10/23 1051)  SpO2: 96 % (09/10/23 1051) Vital Signs (24h Range):  Temp:  [97.4 °F (36.3 °C)-98.3 °F (36.8 °C)] 98.1 °F (36.7 °C)  Pulse:  [48-84] 78  Resp:  [14-38] 20  SpO2:  [96 %-100 %] 96 %  BP: (128-169)/() 155/93     Weight: 67.2 kg (148 lb 2.4 oz)  Body mass index is 21.26 kg/m².    Intake/Output - Last 3 Shifts         09/08 0700  09/09 0659 09/09 0700  09/10 0659 09/10 0700  09/11 0659    P.O. 760 450     I.V. (mL/kg) 2416.8 (36) 135.7 (2)     Blood 202      NG/GT 50      IV Piggyback 398.8      Total Intake(mL/kg) 3827.7 (57) 585.7 (8.7)     Urine (mL/kg/hr) 770 (0.5) 1000 (0.6)     Drains 972 190 40    Blood       Total Output 1742 1190 40    Net +2085.7 -604.3 -40           Urine Occurrence 1 x 2 x              Physical Exam  Vitals and nursing note reviewed.   Constitutional:       General: He is not in acute distress.     Appearance: He is well-developed. He is not diaphoretic.      Comments: (+) hand and temporal muscle wasting noted     HENT:      Head: Normocephalic and atraumatic.      Nose:      Comments: Epistaxis right nare     Mouth/Throat:      Pharynx: No oropharyngeal exudate.   Eyes:      General: Scleral icterus present.      Conjunctiva/sclera: Conjunctivae normal.      Pupils: Pupils are equal, round, and reactive to light.   Neck:      Thyroid: No thyromegaly.   Cardiovascular:      Rate and Rhythm: Normal rate and regular rhythm.      Heart sounds: Normal heart  "sounds. No murmur heard.  Pulmonary:      Effort: Pulmonary effort is normal. No respiratory distress.      Breath sounds: Rales (diminished) present. No wheezing.   Abdominal:      General: Bowel sounds are normal. There is distension.      Tenderness: There is abdominal tenderness. There is no guarding.      Comments: [employer] incision w staples w serosang drainage from right end  Medial HOOD in place w serosang drainage and some serous drainage from drain site  Lateral HOOD site w suture CDI   Genitourinary:     Penis: Normal.    Musculoskeletal:         General: Normal range of motion.      Cervical back: Normal range of motion and neck supple.      Right lower leg: Edema (trace) present.      Left lower leg: Edema (trace) present.   Skin:     General: Skin is warm and dry.      Capillary Refill: Capillary refill takes 2 to 3 seconds.      Findings: No erythema.   Neurological:      Mental Status: He is alert and oriented to person, place, and time.   Psychiatric:      Comments: Anxious, hyperactive but overall improved          Laboratory:  Immunosuppressants           Stop Route Frequency     mycophenolate capsule 1,000 mg         -- Oral 2 times daily          CBC:   Recent Labs   Lab 09/10/23  0517   WBC 12.77*   RBC 2.73*   HGB 8.7*   HCT 25.9*   PLT 38*   MCV 95   MCH 31.9*   MCHC 33.6     CMP:   Recent Labs   Lab 09/10/23  0517   *   CALCIUM 7.9*   ALBUMIN 2.5*   PROT 4.9*   *   K 4.6   CO2 18*      BUN 32*   CREATININE 1.2   ALKPHOS 79   *   *   BILITOT 7.9*     Coagulation:   Recent Labs   Lab 09/09/23  0302 09/10/23  0556   INR 1.0 1.0   APTT 44.8*  --      Cardiac Markers: No results for input(s): "CKMB", "TROPONINT", "MYOGLOBIN" in the last 168 hours.  Labs within the past 24 hours have been reviewed.    Diagnostic Results:  US Liver Transplant Post:  Results for orders placed during the hospital encounter of 08/24/23    US Doppler Liver Transplant Post " (xpd)    Narrative  EXAMINATION:  US DOPPLER LIVER TRANSPLANT POST (XPD)    CLINICAL HISTORY:  Assess perfusion following liver transplant;    TECHNIQUE:  Realtime sonographic imaging was performed with color Doppler interrogation.    COMPARISON:  MRI abdomen with and without contrast 08/28/2023, CT abdomen pelvis with contrast 08/25/2023.    FINDINGS:  Patient is status post orthotopic liver transplant day 1.    The liver transplant demonstrates no focal parenchymal abnormality. No intra or extrahepatic bile duct dilatation. The common duct measures 4 mm.    The middle, right, and left hepatic veins are patent and unremarkable. The main, right, and left branches of the portal vein demonstrate hepatopetal flow and are unremarkable.    Hepatic arterial resistive indices are as follows: Main 0.73, Left 0.73, anterior Right 0.67, posterior Right 0.67.  There is no evidence of a tardus parvus waveform. The maximum velocity in the main hepatic artery is 76 cm/sec.    The IVC is unremarkable. There is no peritransplant fluid.    Trace right pleural effusion.    Impression  Satisfactory Doppler evaluation of the liver transplant.    Trace right pleural effusion.    COMMUNICATION  This critical result was discovered/received at 08:20.  The critical information above was relayed directly by me by epic secure chat to Dr. Paulino On 09/08/2023 at 08:30.      Electronically signed by: Darrion Spring  Date:    09/08/2023  Time:    08:48    I have personally reviewed all pertinent imaging studies.    Debility/Functional status: Patient debilitated by evidence of Muscle wasting and atrophy, Weakness, and Cachexia. Physical and occupational therapy ordered daily to evaluate and treat. Debility was: present on admission.    Medical decision making for this encounter includes review of pertinent labs and diagnostic studies, assessment and planning, discussions with consulting providers, discussion with patient/family, and participation  in multidisciplinary rounds. Time spent caring for patient: 90 minutes

## 2023-09-10 NOTE — PLAN OF CARE
Patient alert and oriented but slightly confused to time. Pt is able to be redirected easily and knows he is confused. Pt has also been fidgety and restless. PRN ativan given. Accuchecks cont q4; no coverage needed. Insulin gtt continued at 0.9 u/hr. Pt ate most of dinner. No N/V noted. Chevron incision still has surgical dressing on with dried drainage. RLQ HOOD drain output 40 ml SS. EKG and labs ordered for peaked T waves. Electrolytes WNL. Trending troponin. Denies chest pain. Bed alarm and camera in use; pt trying to get out of bed and walk alone. Bed locked/lowest setting. Call bell in reach. All alarms on and audible.

## 2023-09-11 PROBLEM — R00.1 SINUS BRADYCARDIA: Status: RESOLVED | Noted: 2023-09-10 | Resolved: 2023-09-11

## 2023-09-11 PROBLEM — Z91.89 AT RISK FOR OPPORTUNISTIC INFECTIONS: Status: ACTIVE | Noted: 2023-09-11

## 2023-09-11 PROBLEM — E87.1 HYPONATREMIA: Status: RESOLVED | Noted: 2023-09-05 | Resolved: 2023-09-11

## 2023-09-11 PROBLEM — K74.60 DECOMPENSATED HEPATIC CIRRHOSIS: Status: RESOLVED | Noted: 2023-08-15 | Resolved: 2023-09-11

## 2023-09-11 PROBLEM — D68.4 ACQUIRED COAGULATION FACTOR DEFICIENCY: Status: RESOLVED | Noted: 2023-09-05 | Resolved: 2023-09-11

## 2023-09-11 PROBLEM — K72.90 DECOMPENSATED HEPATIC CIRRHOSIS: Status: RESOLVED | Noted: 2023-08-15 | Resolved: 2023-09-11

## 2023-09-11 LAB
1,3 BETA GLUCAN SER-MCNC: 150 PG/ML
ALBUMIN SERPL BCP-MCNC: 2.6 G/DL (ref 3.5–5.2)
ALP SERPL-CCNC: 92 U/L (ref 55–135)
ALT SERPL W/O P-5'-P-CCNC: 204 U/L (ref 10–44)
ANION GAP SERPL CALC-SCNC: 9 MMOL/L (ref 8–16)
AST SERPL-CCNC: 163 U/L (ref 10–40)
BASOPHILS # BLD AUTO: 0 K/UL (ref 0–0.2)
BASOPHILS NFR BLD: 0 % (ref 0–1.9)
BILIRUB SERPL-MCNC: 7.6 MG/DL (ref 0.1–1)
BUN SERPL-MCNC: 35 MG/DL (ref 6–20)
CALCIUM SERPL-MCNC: 7.6 MG/DL (ref 8.7–10.5)
CHLORIDE SERPL-SCNC: 104 MMOL/L (ref 95–110)
CO2 SERPL-SCNC: 24 MMOL/L (ref 23–29)
CREAT SERPL-MCNC: 1.1 MG/DL (ref 0.5–1.4)
DIFFERENTIAL METHOD: ABNORMAL
EOSINOPHIL # BLD AUTO: 0 K/UL (ref 0–0.5)
EOSINOPHIL NFR BLD: 0.4 % (ref 0–8)
ERYTHROCYTE [DISTWIDTH] IN BLOOD BY AUTOMATED COUNT: 17.6 % (ref 11.5–14.5)
EST. GFR  (NO RACE VARIABLE): >60 ML/MIN/1.73 M^2
FUNGITELL COMMENTS: POSITIVE
GLUCOSE SERPL-MCNC: 100 MG/DL (ref 70–110)
GLUCOSE SERPL-MCNC: 114 MG/DL (ref 70–110)
GLUCOSE SERPL-MCNC: 116 MG/DL (ref 70–110)
GLUCOSE SERPL-MCNC: 189 MG/DL (ref 70–110)
GLUCOSE SERPL-MCNC: 269 MG/DL (ref 70–110)
GLUCOSE SERPL-MCNC: 279 MG/DL (ref 70–110)
GLUCOSE SERPL-MCNC: 94 MG/DL (ref 70–110)
HCO3 UR-SCNC: 19.1 MMOL/L (ref 24–28)
HCO3 UR-SCNC: 20.7 MMOL/L (ref 24–28)
HCO3 UR-SCNC: 21.3 MMOL/L (ref 24–28)
HCO3 UR-SCNC: 22 MMOL/L (ref 24–28)
HCO3 UR-SCNC: 22 MMOL/L (ref 24–28)
HCO3 UR-SCNC: 25.8 MMOL/L (ref 24–28)
HCT VFR BLD AUTO: 22.5 % (ref 40–54)
HCT VFR BLD CALC: 17 %PCV (ref 36–54)
HCT VFR BLD CALC: 17 %PCV (ref 36–54)
HCT VFR BLD CALC: 20 %PCV (ref 36–54)
HCT VFR BLD CALC: 21 %PCV (ref 36–54)
HCT VFR BLD CALC: 21 %PCV (ref 36–54)
HCT VFR BLD CALC: 22 %PCV (ref 36–54)
HGB BLD-MCNC: 7.9 G/DL (ref 14–18)
IMM GRANULOCYTES # BLD AUTO: 0.06 K/UL (ref 0–0.04)
IMM GRANULOCYTES NFR BLD AUTO: 0.6 % (ref 0–0.5)
LYMPHOCYTES # BLD AUTO: 0.7 K/UL (ref 1–4.8)
LYMPHOCYTES NFR BLD: 6.7 % (ref 18–48)
MAGNESIUM SERPL-MCNC: 2.1 MG/DL (ref 1.6–2.6)
MCH RBC QN AUTO: 31.9 PG (ref 27–31)
MCHC RBC AUTO-ENTMCNC: 35.1 G/DL (ref 32–36)
MCV RBC AUTO: 91 FL (ref 82–98)
MONOCYTES # BLD AUTO: 1.1 K/UL (ref 0.3–1)
MONOCYTES NFR BLD: 10.7 % (ref 4–15)
NEUTROPHILS # BLD AUTO: 8.3 K/UL (ref 1.8–7.7)
NEUTROPHILS NFR BLD: 81.6 % (ref 38–73)
NRBC BLD-RTO: 0 /100 WBC
PCO2 BLDA: 34.3 MMHG (ref 35–45)
PCO2 BLDA: 37.9 MMHG (ref 35–45)
PCO2 BLDA: 38.9 MMHG (ref 35–45)
PCO2 BLDA: 41 MMHG (ref 35–45)
PCO2 BLDA: 42.2 MMHG (ref 35–45)
PCO2 BLDA: 50.8 MMHG (ref 35–45)
PH SMN: 7.23 [PH] (ref 7.35–7.45)
PH SMN: 7.3 [PH] (ref 7.35–7.45)
PH SMN: 7.32 [PH] (ref 7.35–7.45)
PH SMN: 7.37 [PH] (ref 7.35–7.45)
PH SMN: 7.39 [PH] (ref 7.35–7.45)
PH SMN: 7.41 [PH] (ref 7.35–7.45)
PHOSPHATE SERPL-MCNC: 2.9 MG/DL (ref 2.7–4.5)
PLATELET # BLD AUTO: 33 K/UL (ref 150–450)
PLATELET BLD QL SMEAR: ABNORMAL
PMV BLD AUTO: 11 FL (ref 9.2–12.9)
PO2 BLDA: 198 MMHG (ref 80–100)
PO2 BLDA: 412 MMHG (ref 80–100)
PO2 BLDA: 414 MMHG (ref 80–100)
PO2 BLDA: 415 MMHG (ref 80–100)
PO2 BLDA: 423 MMHG (ref 80–100)
PO2 BLDA: 429 MMHG (ref 80–100)
POC BE: -3 MMOL/L
POC BE: -4 MMOL/L
POC BE: -4 MMOL/L
POC BE: -6 MMOL/L
POC BE: -7 MMOL/L
POC BE: 1 MMOL/L
POC IONIZED CALCIUM: 0.9 MMOL/L (ref 1.06–1.42)
POC IONIZED CALCIUM: 0.93 MMOL/L (ref 1.06–1.42)
POC IONIZED CALCIUM: 1.01 MMOL/L (ref 1.06–1.42)
POC IONIZED CALCIUM: 1.05 MMOL/L (ref 1.06–1.42)
POC IONIZED CALCIUM: 1.12 MMOL/L (ref 1.06–1.42)
POC IONIZED CALCIUM: 1.13 MMOL/L (ref 1.06–1.42)
POC SATURATED O2: 100 % (ref 95–100)
POC SATURATED O2: 99 % (ref 95–100)
POC TCO2: 20 MMOL/L (ref 23–27)
POC TCO2: 22 MMOL/L (ref 23–27)
POC TCO2: 23 MMOL/L (ref 23–27)
POC TCO2: 27 MMOL/L (ref 23–27)
POCT GLUCOSE: 122 MG/DL (ref 70–110)
POCT GLUCOSE: 132 MG/DL (ref 70–110)
POCT GLUCOSE: 143 MG/DL (ref 70–110)
POCT GLUCOSE: 164 MG/DL (ref 70–110)
POTASSIUM BLD-SCNC: 3.7 MMOL/L (ref 3.5–5.1)
POTASSIUM BLD-SCNC: 3.8 MMOL/L (ref 3.5–5.1)
POTASSIUM BLD-SCNC: 3.8 MMOL/L (ref 3.5–5.1)
POTASSIUM BLD-SCNC: 3.9 MMOL/L (ref 3.5–5.1)
POTASSIUM SERPL-SCNC: 3.5 MMOL/L (ref 3.5–5.1)
PROT SERPL-MCNC: 4.8 G/DL (ref 6–8.4)
RBC # BLD AUTO: 2.48 M/UL (ref 4.6–6.2)
SAMPLE: ABNORMAL
SODIUM BLD-SCNC: 131 MMOL/L (ref 136–145)
SODIUM BLD-SCNC: 132 MMOL/L (ref 136–145)
SODIUM BLD-SCNC: 132 MMOL/L (ref 136–145)
SODIUM BLD-SCNC: 133 MMOL/L (ref 136–145)
SODIUM SERPL-SCNC: 137 MMOL/L (ref 136–145)
TACROLIMUS BLD-MCNC: 3.4 NG/ML (ref 5–15)
WBC # BLD AUTO: 10.12 K/UL (ref 3.9–12.7)

## 2023-09-11 PROCEDURE — 20600001 HC STEP DOWN PRIVATE ROOM

## 2023-09-11 PROCEDURE — 94761 N-INVAS EAR/PLS OXIMETRY MLT: CPT

## 2023-09-11 PROCEDURE — 93005 ELECTROCARDIOGRAM TRACING: CPT

## 2023-09-11 PROCEDURE — 25000003 PHARM REV CODE 250: Performed by: NURSE PRACTITIONER

## 2023-09-11 PROCEDURE — 97530 THERAPEUTIC ACTIVITIES: CPT

## 2023-09-11 PROCEDURE — 63600175 PHARM REV CODE 636 W HCPCS: Performed by: STUDENT IN AN ORGANIZED HEALTH CARE EDUCATION/TRAINING PROGRAM

## 2023-09-11 PROCEDURE — 93010 ELECTROCARDIOGRAM REPORT: CPT | Mod: ,,, | Performed by: INTERNAL MEDICINE

## 2023-09-11 PROCEDURE — 83735 ASSAY OF MAGNESIUM: CPT

## 2023-09-11 PROCEDURE — 99233 SBSQ HOSP IP/OBS HIGH 50: CPT | Mod: 24,,, | Performed by: NURSE PRACTITIONER

## 2023-09-11 PROCEDURE — 80053 COMPREHEN METABOLIC PANEL: CPT | Performed by: STUDENT IN AN ORGANIZED HEALTH CARE EDUCATION/TRAINING PROGRAM

## 2023-09-11 PROCEDURE — 93010 EKG 12-LEAD: ICD-10-PCS | Mod: ,,, | Performed by: INTERNAL MEDICINE

## 2023-09-11 PROCEDURE — 36415 COLL VENOUS BLD VENIPUNCTURE: CPT

## 2023-09-11 PROCEDURE — 25000003 PHARM REV CODE 250: Performed by: TRANSPLANT SURGERY

## 2023-09-11 PROCEDURE — 25000003 PHARM REV CODE 250: Performed by: STUDENT IN AN ORGANIZED HEALTH CARE EDUCATION/TRAINING PROGRAM

## 2023-09-11 PROCEDURE — 63600175 PHARM REV CODE 636 W HCPCS: Performed by: TRANSPLANT SURGERY

## 2023-09-11 PROCEDURE — 84100 ASSAY OF PHOSPHORUS: CPT

## 2023-09-11 PROCEDURE — 97535 SELF CARE MNGMENT TRAINING: CPT

## 2023-09-11 PROCEDURE — 99233 PR SUBSEQUENT HOSPITAL CARE,LEVL III: ICD-10-PCS | Mod: 24,,, | Performed by: NURSE PRACTITIONER

## 2023-09-11 PROCEDURE — 25000003 PHARM REV CODE 250

## 2023-09-11 PROCEDURE — 80197 ASSAY OF TACROLIMUS: CPT | Performed by: STUDENT IN AN ORGANIZED HEALTH CARE EDUCATION/TRAINING PROGRAM

## 2023-09-11 PROCEDURE — 85025 COMPLETE CBC W/AUTO DIFF WBC: CPT | Performed by: STUDENT IN AN ORGANIZED HEALTH CARE EDUCATION/TRAINING PROGRAM

## 2023-09-11 RX ORDER — TRAZODONE HYDROCHLORIDE 50 MG/1
50 TABLET ORAL NIGHTLY
Status: DISCONTINUED | OUTPATIENT
Start: 2023-09-11 | End: 2023-09-14 | Stop reason: HOSPADM

## 2023-09-11 RX ORDER — TACROLIMUS 1 MG/1
3 CAPSULE ORAL 2 TIMES DAILY
Status: DISCONTINUED | OUTPATIENT
Start: 2023-09-11 | End: 2023-09-13

## 2023-09-11 RX ORDER — FUROSEMIDE 10 MG/ML
40 INJECTION INTRAMUSCULAR; INTRAVENOUS ONCE
Status: COMPLETED | OUTPATIENT
Start: 2023-09-11 | End: 2023-09-11

## 2023-09-11 RX ORDER — LIDOCAINE HYDROCHLORIDE 10 MG/ML
10 INJECTION INFILTRATION; PERINEURAL ONCE
Status: DISCONTINUED | OUTPATIENT
Start: 2023-09-11 | End: 2023-09-14

## 2023-09-11 RX ADMIN — FAMOTIDINE 20 MG: 20 TABLET ORAL at 08:09

## 2023-09-11 RX ADMIN — OXYCODONE HYDROCHLORIDE 5 MG: 5 TABLET ORAL at 08:09

## 2023-09-11 RX ADMIN — MYCOPHENOLATE MOFETIL 1000 MG: 250 CAPSULE ORAL at 08:09

## 2023-09-11 RX ADMIN — HEPARIN SODIUM 5000 UNITS: 5000 INJECTION INTRAVENOUS; SUBCUTANEOUS at 05:09

## 2023-09-11 RX ADMIN — POLYETHYLENE GLYCOL 3350 17 G: 17 POWDER, FOR SOLUTION ORAL at 08:09

## 2023-09-11 RX ADMIN — DOCUSATE SODIUM 100 MG: 100 CAPSULE, LIQUID FILLED ORAL at 02:09

## 2023-09-11 RX ADMIN — TACROLIMUS 3 MG: 1 CAPSULE ORAL at 12:09

## 2023-09-11 RX ADMIN — SODIUM BICARBONATE 1300 MG: 650 TABLET ORAL at 08:09

## 2023-09-11 RX ADMIN — HEPARIN SODIUM 5000 UNITS: 5000 INJECTION INTRAVENOUS; SUBCUTANEOUS at 03:09

## 2023-09-11 RX ADMIN — HEPARIN SODIUM 5000 UNITS: 5000 INJECTION INTRAVENOUS; SUBCUTANEOUS at 08:09

## 2023-09-11 RX ADMIN — LORAZEPAM 0.5 MG: 0.5 TABLET ORAL at 10:09

## 2023-09-11 RX ADMIN — FLUCONAZOLE 400 MG: 200 TABLET ORAL at 08:09

## 2023-09-11 RX ADMIN — MUPIROCIN 1 G: 20 OINTMENT TOPICAL at 08:09

## 2023-09-11 RX ADMIN — METHYLPREDNISOLONE SODIUM SUCCINATE 80 MG: 40 INJECTION, POWDER, FOR SOLUTION INTRAMUSCULAR; INTRAVENOUS at 08:09

## 2023-09-11 RX ADMIN — DOCUSATE SODIUM 100 MG: 100 CAPSULE, LIQUID FILLED ORAL at 08:09

## 2023-09-11 RX ADMIN — MUPIROCIN 1 G: 20 OINTMENT TOPICAL at 09:09

## 2023-09-11 RX ADMIN — TACROLIMUS 3 MG: 1 CAPSULE ORAL at 05:09

## 2023-09-11 RX ADMIN — TRAZODONE HYDROCHLORIDE 50 MG: 50 TABLET ORAL at 10:09

## 2023-09-11 RX ADMIN — FUROSEMIDE 40 MG: 10 INJECTION, SOLUTION INTRAMUSCULAR; INTRAVENOUS at 11:09

## 2023-09-11 NOTE — ASSESSMENT & PLAN NOTE
- Maintenance IS with prograf, cellcept, and prednisone taper. cont to check tacrolimus level daily. Assess for toxicity and adjust level as needed

## 2023-09-11 NOTE — PROGRESS NOTES
[facility] - Transplant Stepdown  Liver Transplant  Progress Note    Patient Name: Sukhdeep Grimes  MRN: [id number]  Admission Date: 8/24/2023  Hospital Length of Stay: 18 days  Code Status: Full Code  Primary Care Provider: No, Primary Doctor  Post-Operative Day: 4    ORGAN:   LIVER  Disease Etiology: Alcohol-Associated Cirrhosis Without Acute Alcohol-Associated Hepatitis  Donor Type:   Donation after Brain Death  CDC High Risk:   No  Donor CMV Status:   Donor CMV Status: Positive  Donor HBcAB:   Negative  Donor HCV Status:   Negative  Donor HBV GOGO:   Donor HCV GOGO: Negative  Whole or Partial: Whole Liver  Biliary Anastomosis: End to End  Arterial Anatomy: Standard  Subjective:     History of Present Illness:  Sukhdeep Grimes is a 38 y.o. male  PMHx of Alcoholic decompensated cirrhosis. Pt was admitted from OSH  for hepatology evaluation  D/T ascites, hyponatremia, hypokalemia, thrombocytopenia, and AYAH and weakness. On 9/1 patient was made active on transplant list MELD 30       Hospital Course:  Patient is now s/p DBD liver transplant on 9/7/2023 (CMV D+/R-).  Surgery notable for hematoma at the upper surface of the right lobe which was opened and managed with surgicel, surgiflo and fibrillar. POD#1 Liver US stable. Stepped down to TSU POD#2. All drains out as of 9/11.     Interval History: no acute events overnight. Patient reports that he feels good. Pain improving. Patient appears hyperactive and impulsive per nursing. Mental status improving, suspect sxs likely related to insomnia and steroids. Start Trazodone 50 mg nightly and ativan PRN. LFTs are stagnant, continue to monitor transaminases. Consider liver US tomorrow if liver enzymes not improving. Last HOOD drain removed today. Lasix 40 ivp x 1 for hypervolemia. Patient tolerating diet well. Denies N/V. + BM. PT/OT recommending outpt PT/OT.       Scheduled Meds:   docusate sodium  100 mg Oral TID    famotidine  20 mg Oral Nightly    fluconazole  400 mg Oral  Daily    heparin (porcine)  5,000 Units Subcutaneous Q8H    LIDOcaine HCL 10 mg/ml (1%)  10 mL Other Once    [START ON 9/12/2023] methylPREDNISolone sodium succinate injection  40 mg Intravenous Daily    Followed by    [START ON 9/13/2023] predniSONE  20 mg Oral Daily    mupirocin  1 g Nasal BID    mycophenolate  1,000 mg Oral BID    polyethylene glycol  17 g Oral Daily    sodium bicarbonate  1,300 mg Oral BID    [START ON 9/14/2023] sulfamethoxazole-trimethoprim 400-80mg  1 tablet Oral Daily AM    tacrolimus  3 mg Oral BID    traZODone  50 mg Oral QHS    [START ON 9/17/2023] valGANciclovir  450 mg Oral Daily     Continuous Infusions:  PRN Meds:0.9%  NaCl infusion (for blood administration), 0.9%  NaCl infusion (for blood administration), albuterol, dextrose 10%, dextrose 10%, glucagon (human recombinant), glucose, glucose, hydrALAZINE, hydrOXYzine HCL, insulin aspart U-100, LORazepam, oxyCODONE, oxyCODONE, sodium chloride, sodium chloride 0.9%    Review of Systems   Constitutional:  Positive for activity change (decreased), appetite change (improving) and fatigue. Negative for chills and fever.   HENT:  Negative for congestion and trouble swallowing.    Eyes:  Negative for visual disturbance.   Respiratory:  Positive for cough. Negative for shortness of breath and wheezing.    Cardiovascular:  Positive for leg swelling. Negative for chest pain.   Gastrointestinal:  Positive for abdominal distention and abdominal pain. Negative for constipation, diarrhea, nausea and vomiting.   Endocrine: Negative.    Genitourinary:  Negative for decreased urine volume, difficulty urinating, dysuria, hematuria and urgency.   Musculoskeletal:  Negative for arthralgias.   Skin:  Positive for color change (jaundice) and wound. Negative for pallor and rash.   Allergic/Immunologic: Positive for immunocompromised state.   Neurological:  Positive for weakness. Negative for dizziness, tremors, seizures, syncope and headaches.    Hematological:  Bruises/bleeds easily.   Psychiatric/Behavioral:  Positive for sleep disturbance. Negative for agitation, confusion, decreased concentration and suicidal ideas. The patient is nervous/anxious and is hyperactive.      Objective:     Vital Signs (Most Recent):  Temp: 97.5 °F (36.4 °C) (09/11/23 1250)  Pulse: 81 (09/11/23 1250)  Resp: 18 (09/11/23 1250)  BP: (!) 155/100 (09/11/23 1250)  SpO2: 95 % (09/11/23 1250) Vital Signs (24h Range):  Temp:  [97.5 °F (36.4 °C)-99.1 °F (37.3 °C)] 97.5 °F (36.4 °C)  Pulse:  [] 81  Resp:  [12-20] 18  SpO2:  [95 %-100 %] 95 %  BP: (132-164)/(9-100) 155/100     Weight: 67.2 kg (148 lb 2.4 oz)  Body mass index is 21.26 kg/m².    Intake/Output - Last 3 Shifts         09/09 0700  09/10 0659 09/10 0700  09/11 0659 09/11 0700  09/12 0659    P.O. 450  480    I.V. (mL/kg) 135.7 (2)      Blood       NG/GT       IV Piggyback       Total Intake(mL/kg) 585.7 (8.7)  480 (7.1)    Urine (mL/kg/hr) 1000 (0.6) 2300 (1.4) 550 (1.3)    Drains 190 580 50    Stool   0    Total Output 1190 2880 600    Net -604.3 -2880 -120           Urine Occurrence 2 x      Stool Occurrence   2 x             Physical Exam  Vitals and nursing note reviewed.   Constitutional:       General: He is not in acute distress.     Appearance: He is well-developed. He is not diaphoretic.      Comments: (+) hand and temporal muscle wasting noted     HENT:      Head: Normocephalic and atraumatic.      Mouth/Throat:      Pharynx: No oropharyngeal exudate.   Eyes:      General: Scleral icterus present.      Conjunctiva/sclera: Conjunctivae normal.      Pupils: Pupils are equal, round, and reactive to light.   Neck:      Thyroid: No thyromegaly.   Cardiovascular:      Rate and Rhythm: Normal rate and regular rhythm.      Heart sounds: Normal heart sounds. No murmur heard.  Pulmonary:      Effort: Pulmonary effort is normal. No respiratory distress.      Breath sounds: Examination of the right-lower field reveals  decreased breath sounds. Examination of the left-lower field reveals decreased breath sounds. Decreased breath sounds present. No wheezing or rales (diminished).   Abdominal:      General: Bowel sounds are normal. There is distension.      Tenderness: There is abdominal tenderness. There is no guarding.      Comments: Chevron incision w staples w serosang drainage from right end  Old HOOD sites CDI w/ sutures, SS drg.   Genitourinary:     Penis: Normal.    Musculoskeletal:         General: Normal range of motion.      Cervical back: Normal range of motion and neck supple.      Right lower leg: Edema (trace) present.      Left lower leg: Edema (trace) present.   Skin:     General: Skin is warm and dry.      Capillary Refill: Capillary refill takes 2 to 3 seconds.      Findings: No erythema.   Neurological:      Mental Status: He is alert and oriented to person, place, and time.   Psychiatric:      Comments: Anxious, hyperactive but overall improved          Laboratory:  Immunosuppressants           Stop Route Frequency     tacrolimus capsule 3 mg         -- Oral 2 times daily     mycophenolate capsule 1,000 mg         -- Oral 2 times daily          CBC:   Recent Labs   Lab 09/11/23  0645   WBC 10.12   RBC 2.48*   HGB 7.9*   HCT 22.5*   PLT 33*   MCV 91   MCH 31.9*   MCHC 35.1     CMP:   Recent Labs   Lab 09/11/23  0645      CALCIUM 7.6*   ALBUMIN 2.6*   PROT 4.8*      K 3.5   CO2 24      BUN 35*   CREATININE 1.1   ALKPHOS 92   *   *   BILITOT 7.6*     Labs within the past 24 hours have been reviewed.    Diagnostic Results:  US Liver Transplant Post:  Results for orders placed during the hospital encounter of 08/24/23    US Doppler Liver Transplant Post (xpd)    Narrative  EXAMINATION:  US DOPPLER LIVER TRANSPLANT POST (XPD)    CLINICAL HISTORY:  Assess perfusion following liver transplant;    TECHNIQUE:  Realtime sonographic imaging was performed with color Doppler  interrogation.    COMPARISON:  MRI abdomen with and without contrast 08/28/2023, CT abdomen pelvis with contrast 08/25/2023.    FINDINGS:  Patient is status post orthotopic liver transplant day 1.    The liver transplant demonstrates no focal parenchymal abnormality. No intra or extrahepatic bile duct dilatation. The common duct measures 4 mm.    The middle, right, and left hepatic veins are patent and unremarkable. The main, right, and left branches of the portal vein demonstrate hepatopetal flow and are unremarkable.    Hepatic arterial resistive indices are as follows: Main 0.73, Left 0.73, anterior Right 0.67, posterior Right 0.67.  There is no evidence of a tardus parvus waveform. The maximum velocity in the main hepatic artery is 76 cm/sec.    The IVC is unremarkable. There is no peritransplant fluid.    Trace right pleural effusion.    Impression  Satisfactory Doppler evaluation of the liver transplant.    Trace right pleural effusion.    COMMUNICATION  This critical result was discovered/received at 08:20.  The critical information above was relayed directly by me by epic secure chat to Dr. Paulino On 09/08/2023 at 08:30.      Electronically signed by: Darrion Spring  Date:    09/08/2023  Time:    08:48    Debility/Functional status: Patient debilitated by evidence of Weakness, Chronic fatigue, unspecified, and Other malaise. Physical and occupational therapy ordered daily to evaluate and treat. Debility was: present on admission.            Assessment/Plan:     * Liver transplanted  - s/p DBD OLTxp 9/7/23, surgery notable for hematoma  - POD#1 Liver US satisfactory  - transferred to TSU POD#2 w medial drain and central line  - working on anxiety and orientation, start Trazodone nightly, PRN ativan  - family at bedside, cont OOB and IS  - lasix 40 mg x 1, DC drain  - tolerating diet, no N/V, + BM, encourage ambulation        At risk for opportunistic infections  - continue valcyte for CMV prophylaxis for 6  months (CMV D+/R-)  - continue bactrim for PJP prophylaxis for 6 months  - Bactrim and valcyte to start on POD#10 or at discharge  - Cont. fluconazole 400 mg qD, Fungitell 9/8 pending, if neg, plan to decrease fluconazole 200 mg    Long-term use of immunosuppressant medication  - Maintenance IS with prograf, cellcept, and prednisone taper. cont to check tacrolimus level daily. Assess for toxicity and adjust level as needed      Adrenal cortical steroids causing adverse effect in therapeutic use  - endo consulted and following, apprec recs      Prophylactic immunotherapy  - See long-term use of immunosuppressant medication        Steroid-induced hyperglycemia  - endo consulted and following      Metabolic acidosis  - on PO sodium bicarb  - sodium bicarb restarted post transplant 9/9/23, Monitor  - improving     Thrombocytopenia, unspecified  - r/t ESLD  - anticipate will improve post transplant   - cont. Monitor plt      Moderate malnutrition  - encouraged oral intake   - Boost supplements TID  - dietician consulted        VTE Risk Mitigation (From admission, onward)         Ordered     heparin (porcine) injection 5,000 Units  Every 8 hours         09/07/23 2117     Place sequential compression device  Until discontinued         09/07/23 2117     IP VTE HIGH RISK PATIENT  Once         09/07/23 2117     Send SCD stockings and AUR[employer]A hose with patient to OR  Continuous         09/06/23 2226                The patients clinical status was discussed at multidisplinary rounds, involving transplant surgery, transplant medicine, pharmacy, nursing, nutrition, and social work      Discharge Planning: not stable for d/c at this time.    Medical decision making for this encounter includes review of pertinent labs and diagnostic studies, assessment and planning, discussions with consulting providers, discussion with patient/family, and participation in multidisciplinary rounds. Time spent caring for patient: 90 minutes    Rosa LOVE  Clementine, JOEY  Liver Transplant  [facility] - Transplant Stepdown

## 2023-09-11 NOTE — CARE UPDATE
-Glucose Goal 140-180    -A1C:   Hemoglobin A1C   Date Value Ref Range Status   08/01/2023 <4.0 3.2 - 7.0 % Final         -HOME REGIMEN:     -GLUCOSE TREND FOR THE PAST 24HRS:   Recent Labs   Lab 09/09/23  1951 09/09/23  2335 09/10/23  0334 09/10/23  1256 09/10/23  1722 09/10/23  2104   POCTGLUCOSE 142* 144* 132* 121* 135* 156*           -NO HYPOGYCEMIAS NOTED     - Diet  Diet consistent carbohydrate Standard Tray    No hx of Dm. S/p Liver txp.  On steroids,  BG stable and has not required correction insulin  If remains stable, enodcrine will sign off.     - Continue Novolog Moderate dose correction with ISF 25 starting at 180    - POCT Glucose AC/HS  - Hypoglycemia protocol in place      ** Please notify Endocrine for any change and/or advance in diet**  ** Please call Endocrine for any BG related issues **     Discharge Planning:   TBD. Please notify endocrinology prior to discharge

## 2023-09-11 NOTE — CARE UPDATE
HOOD drain removal: Site cleaned with alcohol, 1% lidocaine used for local anesthestic. 3-0 prolene used for suture. Drain removed without difficulty. Drain tip intact. Patient tolerated procedure well.  Will continue to monitor for any complications.
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
## 2023-09-11 NOTE — PLAN OF CARE
Recommendations    1.) Continue Regular diet     2.) Continue Boost glucose control TID     3.) RD to monitor wt, PO intake, skin, labs.    Goals: Meet % EEN, EPN by RD f/u date  Nutrition Goal Status: progressing towards goal  Communication of RD Recs:  (POC)

## 2023-09-11 NOTE — SUBJECTIVE & OBJECTIVE
Scheduled Meds:   docusate sodium  100 mg Oral TID    famotidine  20 mg Oral Nightly    fluconazole  400 mg Oral Daily    heparin (porcine)  5,000 Units Subcutaneous Q8H    LIDOcaine HCL 10 mg/ml (1%)  10 mL Other Once    [START ON 9/12/2023] methylPREDNISolone sodium succinate injection  40 mg Intravenous Daily    Followed by    [START ON 9/13/2023] predniSONE  20 mg Oral Daily    mupirocin  1 g Nasal BID    mycophenolate  1,000 mg Oral BID    polyethylene glycol  17 g Oral Daily    sodium bicarbonate  1,300 mg Oral BID    [START ON 9/14/2023] sulfamethoxazole-trimethoprim 400-80mg  1 tablet Oral Daily AM    tacrolimus  3 mg Oral BID    traZODone  50 mg Oral QHS    [START ON 9/17/2023] valGANciclovir  450 mg Oral Daily     Continuous Infusions:  PRN Meds:0.9%  NaCl infusion (for blood administration), 0.9%  NaCl infusion (for blood administration), albuterol, dextrose 10%, dextrose 10%, glucagon (human recombinant), glucose, glucose, hydrALAZINE, hydrOXYzine HCL, insulin aspart U-100, LORazepam, oxyCODONE, oxyCODONE, sodium chloride, sodium chloride 0.9%    Review of Systems   Constitutional:  Positive for activity change (decreased), appetite change (improving) and fatigue. Negative for chills and fever.   HENT:  Negative for congestion and trouble swallowing.    Eyes:  Negative for visual disturbance.   Respiratory:  Positive for cough. Negative for shortness of breath and wheezing.    Cardiovascular:  Positive for leg swelling. Negative for chest pain.   Gastrointestinal:  Positive for abdominal distention and abdominal pain. Negative for constipation, diarrhea, nausea and vomiting.   Endocrine: Negative.    Genitourinary:  Negative for decreased urine volume, difficulty urinating, dysuria, hematuria and urgency.   Musculoskeletal:  Negative for arthralgias.   Skin:  Positive for color change (jaundice) and wound. Negative for pallor and rash.   Allergic/Immunologic: Positive for immunocompromised state.    Neurological:  Positive for weakness. Negative for dizziness, tremors, seizures, syncope and headaches.   Hematological:  Bruises/bleeds easily.   Psychiatric/Behavioral:  Positive for sleep disturbance. Negative for agitation, confusion, decreased concentration and suicidal ideas. The patient is nervous/anxious and is hyperactive.      Objective:     Vital Signs (Most Recent):  Temp: 97.5 °F (36.4 °C) (09/11/23 1250)  Pulse: 81 (09/11/23 1250)  Resp: 18 (09/11/23 1250)  BP: (!) 155/100 (09/11/23 1250)  SpO2: 95 % (09/11/23 1250) Vital Signs (24h Range):  Temp:  [97.5 °F (36.4 °C)-99.1 °F (37.3 °C)] 97.5 °F (36.4 °C)  Pulse:  [] 81  Resp:  [12-20] 18  SpO2:  [95 %-100 %] 95 %  BP: (132-164)/(9-100) 155/100     Weight: 67.2 kg (148 lb 2.4 oz)  Body mass index is 21.26 kg/m².    Intake/Output - Last 3 Shifts         09/09 0700  09/10 0659 09/10 0700 09/11 0659 09/11 0700  09/12 0659    P.O. 450  480    I.V. (mL/kg) 135.7 (2)      Blood       NG/GT       IV Piggyback       Total Intake(mL/kg) 585.7 (8.7)  480 (7.1)    Urine (mL/kg/hr) 1000 (0.6) 2300 (1.4) 550 (1.3)    Drains 190 580 50    Stool   0    Total Output 1190 2880 600    Net -604.3 -2880 -120           Urine Occurrence 2 x      Stool Occurrence   2 x             Physical Exam  Vitals and nursing note reviewed.   Constitutional:       General: He is not in acute distress.     Appearance: He is well-developed. He is not diaphoretic.      Comments: (+) hand and temporal muscle wasting noted     HENT:      Head: Normocephalic and atraumatic.      Mouth/Throat:      Pharynx: No oropharyngeal exudate.   Eyes:      General: Scleral icterus present.      Conjunctiva/sclera: Conjunctivae normal.      Pupils: Pupils are equal, round, and reactive to light.   Neck:      Thyroid: No thyromegaly.   Cardiovascular:      Rate and Rhythm: Normal rate and regular rhythm.      Heart sounds: Normal heart sounds. No murmur heard.  Pulmonary:      Effort: Pulmonary  effort is normal. No respiratory distress.      Breath sounds: Examination of the right-lower field reveals decreased breath sounds. Examination of the left-lower field reveals decreased breath sounds. Decreased breath sounds present. No wheezing or rales (diminished).   Abdominal:      General: Bowel sounds are normal. There is distension.      Tenderness: There is abdominal tenderness. There is no guarding.      Comments: Chevron incision w staples w serosang drainage from right end  Old HOOD sites CDI w/ sutures, SS drg.   Genitourinary:     Penis: Normal.    Musculoskeletal:         General: Normal range of motion.      Cervical back: Normal range of motion and neck supple.      Right lower leg: Edema (trace) present.      Left lower leg: Edema (trace) present.   Skin:     General: Skin is warm and dry.      Capillary Refill: Capillary refill takes 2 to 3 seconds.      Findings: No erythema.   Neurological:      Mental Status: He is alert and oriented to person, place, and time.   Psychiatric:      Comments: Anxious, hyperactive but overall improved          Laboratory:  Immunosuppressants           Stop Route Frequency     tacrolimus capsule 3 mg         -- Oral 2 times daily     mycophenolate capsule 1,000 mg         -- Oral 2 times daily          CBC:   Recent Labs   Lab 09/11/23  0645   WBC 10.12   RBC 2.48*   HGB 7.9*   HCT 22.5*   PLT 33*   MCV 91   MCH 31.9*   MCHC 35.1     CMP:   Recent Labs   Lab 09/11/23  0645      CALCIUM 7.6*   ALBUMIN 2.6*   PROT 4.8*      K 3.5   CO2 24      BUN 35*   CREATININE 1.1   ALKPHOS 92   *   *   BILITOT 7.6*     Labs within the past 24 hours have been reviewed.    Diagnostic Results:  US Liver Transplant Post:  Results for orders placed during the hospital encounter of 08/24/23    US Doppler Liver Transplant Post (xpd)    Narrative  EXAMINATION:  US DOPPLER LIVER TRANSPLANT POST (XPD)    CLINICAL HISTORY:  Assess perfusion following liver  transplant;    TECHNIQUE:  Realtime sonographic imaging was performed with color Doppler interrogation.    COMPARISON:  MRI abdomen with and without contrast 08/28/2023, CT abdomen pelvis with contrast 08/25/2023.    FINDINGS:  Patient is status post orthotopic liver transplant day 1.    The liver transplant demonstrates no focal parenchymal abnormality. No intra or extrahepatic bile duct dilatation. The common duct measures 4 mm.    The middle, right, and left hepatic veins are patent and unremarkable. The main, right, and left branches of the portal vein demonstrate hepatopetal flow and are unremarkable.    Hepatic arterial resistive indices are as follows: Main 0.73, Left 0.73, anterior Right 0.67, posterior Right 0.67.  There is no evidence of a tardus parvus waveform. The maximum velocity in the main hepatic artery is 76 cm/sec.    The IVC is unremarkable. There is no peritransplant fluid.    Trace right pleural effusion.    Impression  Satisfactory Doppler evaluation of the liver transplant.    Trace right pleural effusion.    COMMUNICATION  This critical result was discovered/received at 08:20.  The critical information above was relayed directly by me by epic secure chat to Dr. Paulino On 09/08/2023 at 08:30.      Electronically signed by: Darrion Spring  Date:    09/08/2023  Time:    08:48    Debility/Functional status: Patient debilitated by evidence of Weakness, Chronic fatigue, unspecified, and Other malaise. Physical and occupational therapy ordered daily to evaluate and treat. Debility was: present on admission.

## 2023-09-11 NOTE — PLAN OF CARE
Patient noted to be impulsive and hyperactive this shift. Restless and constantly moving/fidgeting. Steroids tapering down - BG WNL, no SSI needed. Ativan given prn; oxycodone given x1 for pain. Patient unsteady on his feet - up with standby assistance. Worked with OT this AM. Bed alarm on while patient in bed; chair alarm in use when OOB. [employer] camera at bedside. Per night shift RN, patient did not sleep well overnight - trazadone ordered, to start tonight. Lasix 40mg IVP given x1 with good response - 2100cc output following administration. RLQ HOOD drain d/c'd per NP.

## 2023-09-11 NOTE — PLAN OF CARE
Patient alert and oriented but slightly confused to time. Pt continues to be fidgety and restless. PRN ativan given. Accuchecks AC/HS- no coverage needed per MAR. No N/V noted. Chevron incision CDI with small amount of serous drainage. RLQ HOOD drain output 140 ml SS. HOOD site leaking serous fluid; dressing changed PRN. Bed alarm and camera in use; pt can be impulsive and still a fall risk. Bed locked/lowest setting. Call bell in reach. All alarms on and audible.

## 2023-09-11 NOTE — ASSESSMENT & PLAN NOTE
- continue valcyte for CMV prophylaxis for 6 months (CMV D+/R-)  - continue bactrim for PJP prophylaxis for 6 months  - Bactrim and valcyte to start on POD#10 or at discharge  - Cont. fluconazole 400 mg qD, Fungitell 9/8 pending, if neg, plan to decrease fluconazole 200 mg

## 2023-09-11 NOTE — ASSESSMENT & PLAN NOTE
- s/p DBD OLTxp 9/7/23, surgery notable for hematoma  - POD#1 Liver US satisfactory  - transferred to TSU POD#2 w medial drain and central line  - working on anxiety and orientation, start Trazodone nightly, PRN ativan  - family at bedside, cont OOB and IS  - lasix 40 mg x 1, DC drain  - tolerating diet, no N/V, + BM, encourage ambulation

## 2023-09-11 NOTE — PROGRESS NOTES
Please see previous social work notes for continuity.    YADIRA met with patient and his brother Nitesh at bedside per request.  Nitesh shared that he will be going back home on Thursday and that their brother will be coming on Friday to be with the patient.  Nitesh will check into [facility] at 10 am on Wednesday and set it up in anticipation of patients discharge.  YADIRA has also made a referral to SSDI specialist so that patient's Disability application can be processed.  They will reach out to patient's brother Nitesh as requested by family.  SW answered other questions that family had and will assist as needed.

## 2023-09-11 NOTE — PT/OT/SLP PROGRESS
Occupational Therapy Treatment    Patient Name:  Sukhdeep Grimes   MRN:  [id number]  Admit Date: 8/24/2023  Admitting Diagnosis:  Liver transplanted   Length of Stay: 18 days  Recent Surgery: Procedure(s) (LRB):  TRANSPLANT, LIVER (N/A) 4 Days Post-Op    Recommendations:     Discharge Recommendations: other (see comments)  Discharge Equipment Recommendations:  walker, rolling  Barriers to discharge:  None    Plan:     Patient to be seen 4 x/week to address the above listed problems via self-care/home management, therapeutic activities, therapeutic exercises  Plan of Care Expires:    Plan of Care Reviewed with: patient    Assessment:   Sukhdeep Grimes is a 38 y.o. male with a medical diagnosis of Liver transplanted.  He presents with the following performance deficits affecting function: weakness, impaired endurance, impaired self care skills, impaired functional mobility, gait instability, impaired balance, decreased safety awareness, impaired coordination, decreased coordination.      Pt tolerated session fairly this date and was willing to participate. Demonstrating continued impaired coordination, impaired balance, and decreased safety awareness, requiring increased assistance to complete functional tasks. Patient with unsafe attempts and poor safety awareness of obstacle avoidance and mobility. Patient with increased anxiety over heart rate and asthma inhaler. Patient is progressing towards established goals, and continues to benefit from acute skilled OT services to increase functional performance and improve quality of life. OT to continue to recommend other post acute skilled therapy services at discharge to improve pt functional independence and increase patient safety before returning home.      Rehab Prognosis: Good; patient would benefit from acute skilled OT services to address these deficits and reach maximum level of function.        Subjective   Communicated with: Nurse prior to session.  Patient found  "up in chair with Other (comments), telemetry, chair check (Telesitter) upon OT entry to room. Pt agreeable to participate at this time.    Patient: " It may help me feel better just to use it real quick " -re asthma inhaler when asked if patient felt SOB at present time    Pain/Comfort:  Pain Rating 1: other (see comments) (unrated)  Location - Orientation 1: generalized  Location 1: abdomen (incision site)  Pain Addressed 1: Cessation of Activity, Distraction  Pain Rating Post-Intervention 1: other (see comments) (unrated)    Objective:   Patient found with: Other (comments), telemetry, chair check (Telesitter)   General Precautions: Standard, Cardiac fall   Orthopedic Precautions:N/A   Braces: N/A   Oxygen Device: Room Air  Vitals: BP (!) 155/100   Pulse 81   Temp 97.5 °F (36.4 °C)   Resp 18   Ht 5' 10" (1.778 m)   Wt 67.2 kg (148 lb 2.4 oz)   SpO2 95%   BMI 21.26 kg/m²     Outcome Measures:  AMPAC 6 Click ADL: 20    Cognition:   Alert and Cooperative  Command following: follows one-step commands  Communication: clear/fluent    Occupational Performance:  Bed Mobility:    Not assessed    Functional Mobility/Transfers:  Patient completed Sit <> Stand Transfer from chair with stand by assistance  with  no assistive device   Static Standing Balance: SBA  Dynamic Standing Balance: SBA  Patient completed functional mobility of household distance ~40ft with close SBA-CGA using no AD; patient with gait instability observed.  Patient completed functional mobility of community distance ~250ft with close SBA-CGA using RW; impaired gait noted      Activities of Daily Living:  Grooming: stand by assistance to perform oral care and wash face in stance at sink  Upper Body Dressing: moderate assistance to don gown as robe    AMPAC 6 Click ADL:  AMPAC Total Score: 20    Treatment & Education:  -Pt education on OT role and POC.  -Importance of E/OOB activity with staff assistance, emphasis on daily participation  -Safety " during functional transfer and mobility ensured  -Patient provided with education on importance of Bilateral UB/LB integration during functional tasks for improvement in functional performance.   -Education provided/reviewed, questions answered within OT scope of practice.   -Patient demonstrates understanding and learning this date.         Patient left up in chair with all lines intact, call button in reach, chair alarm on, and nurse notified    GOALS:   Multidisciplinary Problems       Occupational Therapy Goals          Problem: Occupational Therapy    Goal Priority Disciplines Outcome Interventions   Occupational Therapy Goal     OT, PT/OT Ongoing, Progressing    Description: Goals to be met by: 9/16/2023     Patient will increase functional independence with ADLs by performing:    LE Dressing with set-up  Toileting from toilet with supervision  for hygiene and clothing management.   Pt to complete standing g/h skills with supervision.   Toilet transfer to toilet with supervision.                            Time Tracking:     OT Date of Treatment: 09/11/23  OT Start Time: 1010  OT Stop Time: 1034  OT Total Time (min): 24 min    Billable Minutes:Self Care/Home Management 12  Therapeutic Activity 12      9/11/2023

## 2023-09-12 PROBLEM — Z99.11 ENCOUNTER FOR WEANING FROM VENTILATOR: Status: ACTIVE | Noted: 2023-09-12

## 2023-09-12 LAB
ALBUMIN SERPL BCP-MCNC: 2.5 G/DL (ref 3.5–5.2)
ALP SERPL-CCNC: 93 U/L (ref 55–135)
ALT SERPL W/O P-5'-P-CCNC: 192 U/L (ref 10–44)
ANION GAP SERPL CALC-SCNC: 12 MMOL/L (ref 8–16)
ANISOCYTOSIS BLD QL SMEAR: SLIGHT
AST SERPL-CCNC: 121 U/L (ref 10–40)
BASOPHILS # BLD AUTO: 0.02 K/UL (ref 0–0.2)
BASOPHILS NFR BLD: 0.2 % (ref 0–1.9)
BILIRUB SERPL-MCNC: 6.7 MG/DL (ref 0.1–1)
BUN SERPL-MCNC: 33 MG/DL (ref 6–20)
CALCIUM SERPL-MCNC: 7.6 MG/DL (ref 8.7–10.5)
CHLORIDE SERPL-SCNC: 103 MMOL/L (ref 95–110)
CO2 SERPL-SCNC: 21 MMOL/L (ref 23–29)
CREAT SERPL-MCNC: 0.9 MG/DL (ref 0.5–1.4)
DIFFERENTIAL METHOD: ABNORMAL
EOSINOPHIL # BLD AUTO: 0.7 K/UL (ref 0–0.5)
EOSINOPHIL NFR BLD: 6.5 % (ref 0–8)
ERYTHROCYTE [DISTWIDTH] IN BLOOD BY AUTOMATED COUNT: 17.2 % (ref 11.5–14.5)
EST. GFR  (NO RACE VARIABLE): >60 ML/MIN/1.73 M^2
GLUCOSE SERPL-MCNC: 82 MG/DL (ref 70–110)
HCT VFR BLD AUTO: 24.2 % (ref 40–54)
HGB BLD-MCNC: 8.2 G/DL (ref 14–18)
IMM GRANULOCYTES # BLD AUTO: 0.09 K/UL (ref 0–0.04)
IMM GRANULOCYTES NFR BLD AUTO: 0.9 % (ref 0–0.5)
LYMPHOCYTES # BLD AUTO: 0.6 K/UL (ref 1–4.8)
LYMPHOCYTES NFR BLD: 6.4 % (ref 18–48)
MAGNESIUM SERPL-MCNC: 1.9 MG/DL (ref 1.6–2.6)
MCH RBC QN AUTO: 31.8 PG (ref 27–31)
MCHC RBC AUTO-ENTMCNC: 33.9 G/DL (ref 32–36)
MCV RBC AUTO: 94 FL (ref 82–98)
MONOCYTES # BLD AUTO: 1.1 K/UL (ref 0.3–1)
MONOCYTES NFR BLD: 11.3 % (ref 4–15)
NEUTROPHILS # BLD AUTO: 7.5 K/UL (ref 1.8–7.7)
NEUTROPHILS NFR BLD: 74.7 % (ref 38–73)
NRBC BLD-RTO: 0 /100 WBC
PHOSPHATE SERPL-MCNC: 1.7 MG/DL (ref 2.7–4.5)
PLATELET # BLD AUTO: 21 K/UL (ref 150–450)
PLATELET BLD QL SMEAR: ABNORMAL
PMV BLD AUTO: ABNORMAL FL (ref 9.2–12.9)
POCT GLUCOSE: 126 MG/DL (ref 70–110)
POCT GLUCOSE: 164 MG/DL (ref 70–110)
POCT GLUCOSE: 172 MG/DL (ref 70–110)
POCT GLUCOSE: 78 MG/DL (ref 70–110)
POIKILOCYTOSIS BLD QL SMEAR: SLIGHT
POTASSIUM SERPL-SCNC: 3 MMOL/L (ref 3.5–5.1)
PROT SERPL-MCNC: 4.8 G/DL (ref 6–8.4)
RBC # BLD AUTO: 2.58 M/UL (ref 4.6–6.2)
SCHISTOCYTES BLD QL SMEAR: ABNORMAL
SCHISTOCYTES BLD QL SMEAR: PRESENT
SODIUM SERPL-SCNC: 136 MMOL/L (ref 136–145)
SPHEROCYTES BLD QL SMEAR: ABNORMAL
TACROLIMUS BLD-MCNC: 4.1 NG/ML (ref 5–15)
TOXIC GRANULES BLD QL SMEAR: PRESENT
WBC # BLD AUTO: 10.05 K/UL (ref 3.9–12.7)

## 2023-09-12 PROCEDURE — 97116 GAIT TRAINING THERAPY: CPT | Mod: CQ

## 2023-09-12 PROCEDURE — 63600175 PHARM REV CODE 636 W HCPCS: Performed by: STUDENT IN AN ORGANIZED HEALTH CARE EDUCATION/TRAINING PROGRAM

## 2023-09-12 PROCEDURE — 99223 1ST HOSP IP/OBS HIGH 75: CPT | Mod: ,,, | Performed by: INTERNAL MEDICINE

## 2023-09-12 PROCEDURE — 80053 COMPREHEN METABOLIC PANEL: CPT | Performed by: STUDENT IN AN ORGANIZED HEALTH CARE EDUCATION/TRAINING PROGRAM

## 2023-09-12 PROCEDURE — 99233 SBSQ HOSP IP/OBS HIGH 50: CPT | Mod: 24,,, | Performed by: NURSE PRACTITIONER

## 2023-09-12 PROCEDURE — 25000003 PHARM REV CODE 250: Performed by: TRANSPLANT SURGERY

## 2023-09-12 PROCEDURE — 25000003 PHARM REV CODE 250: Performed by: NURSE PRACTITIONER

## 2023-09-12 PROCEDURE — 25000003 PHARM REV CODE 250: Performed by: STUDENT IN AN ORGANIZED HEALTH CARE EDUCATION/TRAINING PROGRAM

## 2023-09-12 PROCEDURE — 94761 N-INVAS EAR/PLS OXIMETRY MLT: CPT

## 2023-09-12 PROCEDURE — 99223 PR INITIAL HOSPITAL CARE,LEVL III: ICD-10-PCS | Mod: ,,, | Performed by: INTERNAL MEDICINE

## 2023-09-12 PROCEDURE — 25000003 PHARM REV CODE 250

## 2023-09-12 PROCEDURE — 99233 PR SUBSEQUENT HOSPITAL CARE,LEVL III: ICD-10-PCS | Mod: 24,,, | Performed by: NURSE PRACTITIONER

## 2023-09-12 PROCEDURE — 20600001 HC STEP DOWN PRIVATE ROOM

## 2023-09-12 PROCEDURE — 84100 ASSAY OF PHOSPHORUS: CPT

## 2023-09-12 PROCEDURE — 80197 ASSAY OF TACROLIMUS: CPT | Performed by: STUDENT IN AN ORGANIZED HEALTH CARE EDUCATION/TRAINING PROGRAM

## 2023-09-12 PROCEDURE — 83735 ASSAY OF MAGNESIUM: CPT

## 2023-09-12 PROCEDURE — 63600175 PHARM REV CODE 636 W HCPCS: Performed by: TRANSPLANT SURGERY

## 2023-09-12 PROCEDURE — 85025 COMPLETE CBC W/AUTO DIFF WBC: CPT | Performed by: STUDENT IN AN ORGANIZED HEALTH CARE EDUCATION/TRAINING PROGRAM

## 2023-09-12 RX ORDER — POTASSIUM CHLORIDE 20 MEQ/1
40 TABLET, EXTENDED RELEASE ORAL ONCE
Status: COMPLETED | OUTPATIENT
Start: 2023-09-12 | End: 2023-09-12

## 2023-09-12 RX ADMIN — DOCUSATE SODIUM 100 MG: 100 CAPSULE, LIQUID FILLED ORAL at 08:09

## 2023-09-12 RX ADMIN — POTASSIUM CHLORIDE 40 MEQ: 1500 TABLET, EXTENDED RELEASE ORAL at 11:09

## 2023-09-12 RX ADMIN — DIBASIC SODIUM PHOSPHATE, MONOBASIC POTASSIUM PHOSPHATE AND MONOBASIC SODIUM PHOSPHATE 2 TABLET: 852; 155; 130 TABLET ORAL at 11:09

## 2023-09-12 RX ADMIN — HEPARIN SODIUM 5000 UNITS: 5000 INJECTION INTRAVENOUS; SUBCUTANEOUS at 08:09

## 2023-09-12 RX ADMIN — MYCOPHENOLATE MOFETIL 1000 MG: 250 CAPSULE ORAL at 08:09

## 2023-09-12 RX ADMIN — DIBASIC SODIUM PHOSPHATE, MONOBASIC POTASSIUM PHOSPHATE AND MONOBASIC SODIUM PHOSPHATE 2 TABLET: 852; 155; 130 TABLET ORAL at 04:09

## 2023-09-12 RX ADMIN — FLUCONAZOLE 400 MG: 200 TABLET ORAL at 08:09

## 2023-09-12 RX ADMIN — DOCUSATE SODIUM 100 MG: 100 CAPSULE, LIQUID FILLED ORAL at 02:09

## 2023-09-12 RX ADMIN — SODIUM BICARBONATE 1300 MG: 650 TABLET ORAL at 08:09

## 2023-09-12 RX ADMIN — FAMOTIDINE 20 MG: 20 TABLET ORAL at 08:09

## 2023-09-12 RX ADMIN — TACROLIMUS 3 MG: 1 CAPSULE ORAL at 08:09

## 2023-09-12 RX ADMIN — OXYCODONE HYDROCHLORIDE 5 MG: 5 TABLET ORAL at 10:09

## 2023-09-12 RX ADMIN — TRAZODONE HYDROCHLORIDE 50 MG: 50 TABLET ORAL at 08:09

## 2023-09-12 RX ADMIN — HEPARIN SODIUM 5000 UNITS: 5000 INJECTION INTRAVENOUS; SUBCUTANEOUS at 02:09

## 2023-09-12 RX ADMIN — METHYLPREDNISOLONE SODIUM SUCCINATE 40 MG: 40 INJECTION, POWDER, FOR SOLUTION INTRAMUSCULAR; INTRAVENOUS at 08:09

## 2023-09-12 RX ADMIN — LORAZEPAM 0.5 MG: 0.5 TABLET ORAL at 10:09

## 2023-09-12 RX ADMIN — MUPIROCIN 1 G: 20 OINTMENT TOPICAL at 08:09

## 2023-09-12 RX ADMIN — TACROLIMUS 3 MG: 1 CAPSULE ORAL at 05:09

## 2023-09-12 RX ADMIN — DIBASIC SODIUM PHOSPHATE, MONOBASIC POTASSIUM PHOSPHATE AND MONOBASIC SODIUM PHOSPHATE 2 TABLET: 852; 155; 130 TABLET ORAL at 08:09

## 2023-09-12 RX ADMIN — OXYCODONE HYDROCHLORIDE 5 MG: 5 TABLET ORAL at 07:09

## 2023-09-12 RX ADMIN — POLYETHYLENE GLYCOL 3350 17 G: 17 POWDER, FOR SOLUTION ORAL at 08:09

## 2023-09-12 NOTE — SUBJECTIVE & OBJECTIVE
Interval History: s/p transplant on 9/7, subcapsular hematoma. Otherwise uncomplicated re consulted for persistently elevated fungitel . No current complaints. May DC from hosp    Review of Systems   Skin:  Positive for color change.   Neurological:  Positive for tremors.   Hematological:  Bruises/bleeds easily.   All other systems reviewed and are negative.    Objective:     Vital Signs (Most Recent):  Temp: 98.5 °F (36.9 °C) (09/12/23 1200)  Pulse: 101 (09/12/23 1514)  Resp: 18 (09/12/23 1200)  BP: (!) 140/96 (09/12/23 1200)  SpO2: 100 % (09/12/23 1200) Vital Signs (24h Range):  Temp:  [98 °F (36.7 °C)-98.8 °F (37.1 °C)] 98.5 °F (36.9 °C)  Pulse:  [] 101  Resp:  [18] 18  SpO2:  [98 %-100 %] 100 %  BP: (139-149)/(73-96) 140/96     Weight: 67.2 kg (148 lb 2.4 oz)  Body mass index is 21.26 kg/m².    Estimated Creatinine Clearance: 106.8 mL/min (based on SCr of 0.9 mg/dL).     Physical Exam  Vitals and nursing note reviewed.   Constitutional:       General: He is in acute distress.      Comments: Chronically ill appearing    HENT:      Head: Normocephalic and atraumatic.      Mouth/Throat:      Mouth: Mucous membranes are moist.   Eyes:      General: Scleral icterus present.      Extraocular Movements: Extraocular movements intact.      Pupils: Pupils are equal, round, and reactive to light.   Pulmonary:      Effort: Pulmonary effort is normal. No respiratory distress.   Abdominal:      General: There is no distension.   Musculoskeletal:         General: No swelling or deformity.      Cervical back: Normal range of motion and neck supple.   Skin:     General: Skin is warm and dry.      Coloration: Skin is jaundiced.      Findings: No rash.   Neurological:      General: No focal deficit present.      Mental Status: He is alert and oriented to person, place, and time. Mental status is at baseline.   Psychiatric:         Mood and Affect: Mood normal.         Behavior: Behavior normal.         Thought Content:  Thought content normal.         Judgment: Judgment normal.          Significant Labs: All pertinent labs within the past 24 hours have been reviewed.    Significant Imaging: I have reviewed all pertinent imaging results/findings within the past 24 hours.

## 2023-09-12 NOTE — PLAN OF CARE
Pt. AAOx4, VSS, spo2> 94% on room air. NSR on telemetry monitor. Pt. Ambulating to restroom with standby assistance. No complaints of pain/N/V this shift. Pt slept most of shift. Accucheck AC/HS--no SSI administered. Bed in low locked position, call light within reach, pt instructed to call for assistance needed, pt verbalized understanding, remains free from falls this shift. WCTM.

## 2023-09-12 NOTE — ASSESSMENT & PLAN NOTE
38 y.o. male  PMHx of Alcoholic decompensated cirrhosis. Pt was admitted from OSH  for hepatology evaluation  D/T ascites, hyponatremia, hypokalemia, thrombocytopenia, and AYAH and weakness. On 9/1 patient was made active on transplant list MELD 30. Patient is now s/p DBD liver transplant on 9/7/2023 (CMV D+/R-).  Surgery notable for hematoma at the upper surface of the right lobe which was opened and managed with surgicel, surgiflo and fibrillar. POD#1 Liver US stable. Stepped down to TSU POD#2. All drains out as of 9/11. Plans to DC in next few days. ID re-consulted for elevated fungitell. This may be reflective of invasive candidiasis in  Setting of cirrhosis. Fungitell 261 on 8/21->150 on 9/8. He is asymptomatic.    --will trend fungitell weekly until negative   --continue fluconazole 400mg qd until negative funtigell  --will follow arrange follow up in 2 weeks in clinic with Dr. Solitario

## 2023-09-12 NOTE — SUBJECTIVE & OBJECTIVE
Scheduled Meds:   docusate sodium  100 mg Oral TID    famotidine  20 mg Oral Nightly    fluconazole  400 mg Oral Daily    heparin (porcine)  5,000 Units Subcutaneous Q8H    k phos di & mono-sod phos mono  2 tablet Oral TID    LIDOcaine HCL 10 mg/ml (1%)  10 mL Other Once    mycophenolate  1,000 mg Oral BID    polyethylene glycol  17 g Oral Daily    potassium chloride  40 mEq Oral Once    [START ON 9/13/2023] predniSONE  20 mg Oral Daily    sodium bicarbonate  1,300 mg Oral BID    [START ON 9/14/2023] sulfamethoxazole-trimethoprim 400-80mg  1 tablet Oral Daily AM    tacrolimus  3 mg Oral BID    traZODone  50 mg Oral QHS    [START ON 9/17/2023] valGANciclovir  450 mg Oral Daily     Continuous Infusions:  PRN Meds:0.9%  NaCl infusion (for blood administration), 0.9%  NaCl infusion (for blood administration), albuterol, dextrose 10%, dextrose 10%, glucagon (human recombinant), glucose, glucose, hydrALAZINE, hydrOXYzine HCL, insulin aspart U-100, LORazepam, oxyCODONE, oxyCODONE, sodium chloride, sodium chloride 0.9%    Review of Systems   Constitutional:  Positive for activity change (decreased), appetite change (improving) and fatigue. Negative for chills and fever.   HENT:  Negative for congestion and trouble swallowing.    Eyes:  Negative for visual disturbance.   Respiratory:  Positive for cough. Negative for shortness of breath and wheezing.    Cardiovascular:  Positive for leg swelling. Negative for chest pain.   Gastrointestinal:  Positive for abdominal distention. Negative for abdominal pain, constipation, diarrhea, nausea and vomiting.   Endocrine: Negative.    Genitourinary:  Negative for decreased urine volume, difficulty urinating, dysuria, hematuria and urgency.   Musculoskeletal:  Negative for arthralgias.   Skin:  Positive for color change (jaundice) and wound. Negative for pallor and rash.   Allergic/Immunologic: Positive for immunocompromised state.   Neurological:  Positive for weakness. Negative for  dizziness, tremors, seizures, syncope and headaches.   Hematological:  Bruises/bleeds easily.   Psychiatric/Behavioral:  Negative for agitation, confusion, decreased concentration, sleep disturbance and suicidal ideas. The patient is nervous/anxious and is hyperactive.      Objective:     Vital Signs (Most Recent):  Temp: 98.8 °F (37.1 °C) (09/12/23 0744)  Pulse: 108 (09/12/23 0744)  Resp: 18 (09/12/23 0744)  BP: (!) 142/90 (09/12/23 0744)  SpO2: 100 % (09/12/23 0744) Vital Signs (24h Range):  Temp:  [97.5 °F (36.4 °C)-98.8 °F (37.1 °C)] 98.8 °F (37.1 °C)  Pulse:  [] 108  Resp:  [18] 18  SpO2:  [95 %-100 %] 100 %  BP: (139-155)/() 142/90     Weight: 67.2 kg (148 lb 2.4 oz)  Body mass index is 21.26 kg/m².    Intake/Output - Last 3 Shifts         09/10 0700  09/11 0659 09/11 0700  09/12 0659 09/12 0700  09/13 0659    P.O.  840     I.V. (mL/kg)       Total Intake(mL/kg)  840 (12.5)     Urine (mL/kg/hr) 2300 (1.4) 3350 (2.1) 300 (1.2)    Emesis/NG output  0     Drains 580 50     Other  0     Stool  0 0    Blood  0     Total Output 2880 3400 300    Net -2880 -2560 -300           Urine Occurrence  0 x     Stool Occurrence  2 x 1 x    Emesis Occurrence  0 x              Physical Exam  Vitals and nursing note reviewed.   Constitutional:       General: He is not in acute distress.     Appearance: He is well-developed. He is not diaphoretic.      Comments: (+) hand and temporal muscle wasting noted     HENT:      Head: Normocephalic and atraumatic.      Mouth/Throat:      Pharynx: No oropharyngeal exudate.   Eyes:      General: Scleral icterus present.      Conjunctiva/sclera: Conjunctivae normal.      Pupils: Pupils are equal, round, and reactive to light.   Neck:      Thyroid: No thyromegaly.   Cardiovascular:      Rate and Rhythm: Normal rate and regular rhythm.      Heart sounds: Normal heart sounds. No murmur heard.  Pulmonary:      Effort: Pulmonary effort is normal. No respiratory distress.      Breath  sounds: Examination of the right-lower field reveals decreased breath sounds. Examination of the left-lower field reveals decreased breath sounds. Decreased breath sounds present. No wheezing or rales (diminished).   Abdominal:      General: Bowel sounds are normal. There is distension.      Tenderness: There is no abdominal tenderness. There is no guarding.      Comments: Chevron incision w staples w serosang drainage from right end  Old HOOD sites CDI w/ sutures, SS drg.   Genitourinary:     Penis: Normal.    Musculoskeletal:         General: Normal range of motion.      Cervical back: Normal range of motion and neck supple.      Right lower leg: Edema (trace) present.      Left lower leg: Edema (trace) present.   Skin:     General: Skin is warm and dry.      Capillary Refill: Capillary refill takes 2 to 3 seconds.      Findings: No erythema.   Neurological:      Mental Status: He is alert and oriented to person, place, and time.   Psychiatric:      Comments: Anxious, hyperactive but overall improved        Laboratory:  Immunosuppressants           Stop Route Frequency     tacrolimus capsule 3 mg         -- Oral 2 times daily     mycophenolate capsule 1,000 mg         -- Oral 2 times daily          CBC:   Recent Labs   Lab 09/11/23  0645 09/12/23  0730   WBC 10.12 10.05   RBC 2.48* 2.58*   HGB 7.9* 8.2*   HCT 22.5* 24.2*   PLT 33*  --    MCV 91 94   MCH 31.9* 31.8*   MCHC 35.1 33.9     CMP:   Recent Labs   Lab 09/12/23  0730   GLU 82   CALCIUM 7.6*   ALBUMIN 2.5*   PROT 4.8*      K 3.0*   CO2 21*      BUN 33*   CREATININE 0.9   ALKPHOS 93   *   *   BILITOT 6.7*     Coagulation:   Recent Labs   Lab 09/09/23  0302 09/10/23  0556   INR 1.0 1.0   APTT 44.8*  --      Labs within the past 24 hours have been reviewed.    Diagnostic Results:  US Liver Transplant Post:  Results for orders placed during the hospital encounter of 08/24/23    US Doppler Liver Transplant Post  (xpd)    Narrative  EXAMINATION:  US DOPPLER LIVER TRANSPLANT POST (XPD)    CLINICAL HISTORY:  Assess perfusion following liver transplant;    TECHNIQUE:  Realtime sonographic imaging was performed with color Doppler interrogation.    COMPARISON:  MRI abdomen with and without contrast 08/28/2023, CT abdomen pelvis with contrast 08/25/2023.    FINDINGS:  Patient is status post orthotopic liver transplant day 1.    The liver transplant demonstrates no focal parenchymal abnormality. No intra or extrahepatic bile duct dilatation. The common duct measures 4 mm.    The middle, right, and left hepatic veins are patent and unremarkable. The main, right, and left branches of the portal vein demonstrate hepatopetal flow and are unremarkable.    Hepatic arterial resistive indices are as follows: Main 0.73, Left 0.73, anterior Right 0.67, posterior Right 0.67.  There is no evidence of a tardus parvus waveform. The maximum velocity in the main hepatic artery is 76 cm/sec.    The IVC is unremarkable. There is no peritransplant fluid.    Trace right pleural effusion.    Impression  Satisfactory Doppler evaluation of the liver transplant.    Trace right pleural effusion.    COMMUNICATION  This critical result was discovered/received at 08:20.  The critical information above was relayed directly by me by epic secure chat to Dr. Paulino On 09/08/2023 at 08:30.      Electronically signed by: Darrion Spring  Date:    09/08/2023  Time:    08:48    Debility/Functional status: Patient debilitated by evidence of Muscle wasting and atrophy, Weakness, and Limitation of activities due to disability. Physical and occupational therapy ordered daily to evaluate and treat. Debility was: present on admission.

## 2023-09-12 NOTE — PROGRESS NOTES
[facility] - Transplant Stepdown  Infectious Disease  Progress Note    Patient Name: Sukhdeep Grimes  MRN: [id number]  Admission Date: 8/24/2023  Length of Stay: 19 days  Attending Physician: Leonardo Manuel MD  Primary Care Provider: No, Primary Doctor    Isolation Status: No active isolations  Assessment/Plan:      ID  At risk for opportunistic infections  38 y.o. male  PMHx of Alcoholic decompensated cirrhosis. Pt was admitted from OSH  for hepatology evaluation  D/T ascites, hyponatremia, hypokalemia, thrombocytopenia, and AYAH and weakness. On 9/1 patient was made active on transplant list MELD 30. Patient is now s/p DBD liver transplant on 9/7/2023 (CMV D+/R-).  Surgery notable for hematoma at the upper surface of the right lobe which was opened and managed with surgicel, surgiflo and fibrillar. POD#1 Liver US stable. Stepped down to TSU POD#2. All drains out as of 9/11. Plans to DC in next few days. ID re-consulted for elevated fungitell. This may be reflective of invasive candidiasis in  Setting of cirrhosis. Fungitell 261 on 8/21->150 on 9/8. He is asymptomatic.    --will trend fungitell weekly until negative   --continue fluconazole 400mg qd until negative funtigell  --will follow arrange follow up in 2 weeks in clinic with Dr. Solitario              Anticipated Disposition: TBD    Thank you for your consult. I will sign off. Please contact us if you have any additional questions.    Sophia Workman, DO  Infectious Disease  [facility] - Transplant Stepdown    Subjective:     Principal Problem:Liver transplanted    HPI:   PRE-TRANSPLANT INFECTIOUS DISEASE CONSULT    Reason for Visit:  Pre-transplant evaluation  Referring Provider: Provider [facility]     History of Present Illness:    38 y.o. male with a history of alcoholic cirrhosis admitted to OSH in [place] 8/14/23 with decompensated cirrhosis with intermittent fevers.  Evaluated by infectious diseases team there found to have elevated BDG other cultures,  "RIP, HIV, RPR, imaging negative he is here for OLT evaluation    Infectious History:   Recent hospital admissions: No   Recent infections: No   Recent or current antibiotic use: No   History of recurrent infections *(sinus / pneumonia / UTI / SBP)*: No   History of diabetic foot wound, bone/joint infection: No   Recent dental infections, issues or procedures: No   History of chicken pox: No   History of shingles: No   History of STI: No   History of COVID infection: No    History of Immunosuppression:   Prior chemotherapy / immunosuppression: No   Prior transplant: No   History of splenectomy: No    Tuberculosis:   Prior screening for latent TB: Yes   Prior diagnosis of latent TB: No   Risk factors for TB *known exposure, incarceration, homelessness*: No    Geographical exposures:   Currently lives in [place] with mom   Lived in the following states: [place], [place], [place], [place]   Lived in or travelled to [facility] [place]: No   International travel: No   Travel-associated illness: No    Social/Environmental:   Occupation:  service industry   Pets: Yes 2 dogs   Livestock: No   Fishing / hunting: No   Hobbies: golf being outdoors   Water: [place] water   Consumption of raw/undercooked meat or seafood?  No   Tobacco: No   Alcohol: No   Recreational drug use:  No   Sexual partners: no sex in past few years, female partners      Past Histories:   Past Medical History:   Diagnosis Date    Alcoholic cirrhosis of liver with ascites     Esophageal varices without bleeding     Hepatic encephalopathy     Hypokalemia     Hyponatremia     Thrombocytopenia     Transfusion history 08/2022     Past Surgical History:   Procedure Laterality Date    EGD - EXTERNAL RESULT  08/21/2023    "1 column of large varices with no bleeding and no stigmata of recent bleeding in the lower 3rd of the esophagus.  No red Rigoberto sign present.  Banding was not performed.  Moderate portal hypertensive gastropathy in the entire " "examined stomach.  Examined duodenum was normal." [facility])    PARACENTESIS  08/24/2023     No family history on file.     Review of patient's allergies indicates:  No Active Allergies      Immunization History:  Received all childhood vaccines: Yes  All household members receive annual flu vaccine: unknown  All household members are up to date on COVID vaccine: Yes      Labs:    CBC:   Lab Results   Component Value Date    WBC 9.85 08/27/2023    HGB 8.8 (L) 08/27/2023    HCT 25.6 (L) 08/27/2023     (H) 08/27/2023    PLT 63 (L) 08/27/2023    GRAN 6.4 08/27/2023    GRAN 64.6 08/27/2023    LYMPH 1.3 08/27/2023    LYMPH 13.4 (L) 08/27/2023    MONO 1.6 (H) 08/27/2023    MONO 16.0 (H) 08/27/2023    EOSINOPHIL 3.5 08/27/2023       Syphilis screening:   Lab Results   Component Value Date    RPR Non-reactive 08/25/2023        TB screening: No results found for: "TBGOLDPLUS", "TSPOTSCREN"    HIV screening:   Lab Results   Component Value Date    [id number] Non-reactive 08/25/2023       Strongyloides IgG: No results found for: "STRONGANTIGG"    Hepatitis Serologies:   Lab Results   Component Value Date    HEPAIGG Non-reactive 08/25/2023    HEPBCAB Non-reactive 08/25/2023    HEPBSAB 5.62 08/25/2023    HEPBSAB Non-reactive 08/25/2023    HEPCAB Non-reactive 08/25/2023        Varicella IgG: No results found for: "VARICELLAINT"        There is no immunization history on file for this patient.      Interval History: s/p transplant on 9/7, subcapsular hematoma. Otherwise uncomplicated re consulted for persistently elevated fungitel . No current complaints. May DC from hosp    Review of Systems   Skin:  Positive for color change.   Neurological:  Positive for tremors.   Hematological:  Bruises/bleeds easily.   All other systems reviewed and are negative.    Objective:     Vital Signs (Most Recent):  Temp: 98.5 °F (36.9 °C) (09/12/23 1200)  Pulse: 101 (09/12/23 1514)  Resp: 18 (09/12/23 1200)  BP: (!) " 140/96 (09/12/23 1200)  SpO2: 100 % (09/12/23 1200) Vital Signs (24h Range):  Temp:  [98 °F (36.7 °C)-98.8 °F (37.1 °C)] 98.5 °F (36.9 °C)  Pulse:  [] 101  Resp:  [18] 18  SpO2:  [98 %-100 %] 100 %  BP: (139-149)/(73-96) 140/96     Weight: 67.2 kg (148 lb 2.4 oz)  Body mass index is 21.26 kg/m².    Estimated Creatinine Clearance: 106.8 mL/min (based on SCr of 0.9 mg/dL).     Physical Exam  Vitals and nursing note reviewed.   Constitutional:       General: He is in acute distress.      Comments: Chronically ill appearing    HENT:      Head: Normocephalic and atraumatic.      Mouth/Throat:      Mouth: Mucous membranes are moist.   Eyes:      General: Scleral icterus present.      Extraocular Movements: Extraocular movements intact.      Pupils: Pupils are equal, round, and reactive to light.   Pulmonary:      Effort: Pulmonary effort is normal. No respiratory distress.   Abdominal:      General: There is no distension.   Musculoskeletal:         General: No swelling or deformity.      Cervical back: Normal range of motion and neck supple.   Skin:     General: Skin is warm and dry.      Coloration: Skin is jaundiced.      Findings: No rash.   Neurological:      General: No focal deficit present.      Mental Status: He is alert and oriented to person, place, and time. Mental status is at baseline.   Psychiatric:         Mood and Affect: Mood normal.         Behavior: Behavior normal.         Thought Content: Thought content normal.         Judgment: Judgment normal.          Significant Labs: All pertinent labs within the past 24 hours have been reviewed.    Significant Imaging: I have reviewed all pertinent imaging results/findings within the past 24 hours.

## 2023-09-12 NOTE — PROGRESS NOTES
Met with patient and his brother, Nitesh ,  for  discharge teaching. Reviewed My New Journey: Living Smart After My Liver Transplant.  Sections reviewed were: First Steps, Appointments and Prevention.  Medications  reviewed by Pharm D.  Allowed time for questions and answers.  Asked patient and Nitesh  to complete the review questions in the discharge book.

## 2023-09-12 NOTE — ASSESSMENT & PLAN NOTE
- s/p DBD OLTxp 9/7/23, surgery notable for hematoma  - POD#1 Liver US satisfactory --> Liver US tomorrow  - transferred to TSU POD#2 w medial drain and central line  - working on anxiety and orientation, start Trazodone nightly, PRN ativan  - family at bedside, cont OOB and IS  - lasix 40 mg x 1 on 9/11, all drains removed   - tolerating regular diet, no N/V, + BM, passing flatus, ambulating, PT/OT following

## 2023-09-12 NOTE — PT/OT/SLP PROGRESS
"Physical Therapy Treatment    Patient Name:  Sukhdeep Grimes   MRN:  [id number]    Recommendations:     Discharge Recommendations: other (see comments)  Discharge Equipment Recommendations: walker, rolling  Barriers to discharge: Decreased caregiver support    Assessment:     Sukhdeep Grimes is a 38 y.o. male admitted with a medical diagnosis of Liver transplanted.  He presents with the following impairments/functional limitations: weakness, impaired endurance, impaired self care skills, impaired functional mobility, gait instability, impaired balance, decreased safety awareness, impaired coordination, decreased coordination.    Rehab Prognosis: Good; patient would benefit from acute skilled PT services to address these deficits and reach maximum level of function.    Recent Surgery: Procedure(s) (LRB):  TRANSPLANT, LIVER (N/A) 5 Days Post-Op    Plan:     During this hospitalization, patient to be seen 4 x/week to address the identified rehab impairments via gait training, therapeutic activities, therapeutic exercises and progress toward the following goals:    Plan of Care Expires:  10/06/23    Subjective     Chief Complaint: no c/o  Patient/Family Comments/goals: "I'm feeling great. Getting some of my color back!"  Pain/Comfort:  Pain Rating 1: 0/10  Pain Rating Post-Intervention 1: 0/10      Objective:     Communicated with RN prior to session.  Patient found HOB elevated with telemetry ([employer] camera) upon PTA entry to room.     General Precautions: Standard, fall  Orthopedic Precautions: N/A  Braces: N/A  Respiratory Status: Room air     Functional Mobility:  Transfers:     Sit to Stand:  independence with no AD  Gait: Pt ambulates greater than 500 ft with no AD and CGA. Cued for decreased head turns and focus on safety and forward ahead environment. Pt laterally unsteady and is educated on benefits and need to use RW for gait at this time. Pt agreeable.       AM-PAC 6 CLICK MOBILITY  Turning over in bed " (including adjusting bedclothes, sheets and blankets)?: 3  Sitting down on and standing up from a chair with arms (e.g., wheelchair, bedside commode, etc.): 3  Moving from lying on back to sitting on the side of the bed?: 3  Moving to and from a bed to a chair (including a wheelchair)?: 3  Need to walk in hospital room?: 3  Climbing 3-5 steps with a railing?: 2  Basic Mobility Total Score: 17       Patient left HOB elevated with all lines intact, call button in reach, and avasys camera present.    GOALS:   Multidisciplinary Problems       Physical Therapy Goals          Problem: Physical Therapy    Goal Priority Disciplines Outcome Goal Variances Interventions   Physical Therapy Goal     PT, PT/OT Ongoing, Progressing     Description: Goals to be met by: 23     Patient will increase functional independence with mobility by performin. Sit to stand transfer with [place]  2. Gait  x 250 feet with [place] using No Assistive Device.   3. Supine to sit with  SBA  4. Lower extremity exercise program x15 reps per handout, with independence                         Time Tracking:     PT Received On: 23  PT Start Time: 08     PT Stop Time: 0850  PT Total Time (min): 13 min     Billable Minutes: Gait Training 13    Treatment Type: Treatment  PT/PTA: PTA     Number of PTA visits since last PT visit: 2023

## 2023-09-12 NOTE — CARE UPDATE
-Glucose Goal 140-180    -A1C:   Hemoglobin A1C   Date Value Ref Range Status   08/01/2023 <4.0 3.2 - 7.0 % Final         -HOME REGIMEN:     -GLUCOSE TREND FOR THE PAST 24HRS:   Recent Labs   Lab 09/10/23  1722 09/10/23  2104 09/11/23  0850 09/11/23  1211 09/11/23  1734 09/11/23  2236   POCTGLUCOSE 135* 156* 122* 143* 164* 132*           -NO HYPOGYCEMIAS NOTED     - Diet  Diet consistent carbohydrate Standard Tray    No hx of Dm. S/p Liver txp.  On steroids,  BG stable and has not required correction insulin  If remains stable, enodcrine will sign off.     - Continue Novolog Moderate dose correction with ISF 25 starting at 180    - POCT Glucose AC/HS  - Hypoglycemia protocol in place      ** Please notify Endocrine for any change and/or advance in diet**  ** Please call Endocrine for any BG related issues **

## 2023-09-12 NOTE — PLAN OF CARE
38 year-old male admitted 8/24/23 for liver workup. Pt has hx of ETOH cirrhosis  -AAOx4, ambulates with standby assist  -20 G Rt FA  -Chevron incision with staples CHANEL  -prev HOOD drains to RLQ with sutures  -Tele NSR/sinus tach   -Accuchecks AC/HS, slides @ 201  -K 3.0/Pot 40 mEq PO  -Phos 1.7/Kphos added to regimen  -pharmacy& coordinator teaching this shift  -Liver US scheduled 9/13  -Mark camera in place  -brother at bedside, pt sitting up in chair, wheels locked, non-skid socks in place, call light within reach

## 2023-09-12 NOTE — ASSESSMENT & PLAN NOTE
- continue valcyte for CMV prophylaxis for 6 months (CMV D+/R-)  - continue bactrim for PJP prophylaxis for 6 months  - Bactrim and valcyte to start on POD#10 or at discharge  - Cont. fluconazole 400 mg qD  - Fungitell + at 150 on 9/8, re-consulted ID, with recommendations on further tx

## 2023-09-12 NOTE — PROGRESS NOTES
[facility] - Transplant Stepdown  Liver Transplant  Progress Note    Patient Name: Sukhdeep Grimes  MRN: [id number]  Admission Date: 8/24/2023  Hospital Length of Stay: 19 days  Code Status: Full Code  Primary Care Provider: Martine, Primary Doctor  Post-Operative Day: 5    ORGAN:   LIVER  Disease Etiology: Alcohol-Associated Cirrhosis Without Acute Alcohol-Associated Hepatitis  Donor Type:   Donation after Brain Death  CDC High Risk:   No  Donor CMV Status:   Donor CMV Status: Positive  Donor HBcAB:   Negative  Donor HCV Status:   Negative  Donor HBV GOGO:   Donor HCV GOGO: Negative  Whole or Partial: Whole Liver  Biliary Anastomosis: End to End  Arterial Anatomy: Standard  Subjective:     History of Present Illness:  Sukhdeep Grimes is a 38 y.o. male  PMHx of Alcoholic decompensated cirrhosis. Pt was admitted from OSH  for hepatology evaluation  D/T ascites, hyponatremia, hypokalemia, thrombocytopenia, and AYAH and weakness. On 9/1 patient was made active on transplant list MELD 30       Hospital Course:  Patient is now s/p DBD liver transplant on 9/7/2023 (CMV D+/R-).  Surgery notable for hematoma at the upper surface of the right lobe which was opened and managed with surgicel, surgiflo and fibrillar. POD#1 Liver US stable. Stepped down to TSU POD#2. All drains out as of 9/11.     Interval History: No adverse events overnight. Pt reports that he feels good and well rested. Appears mildly anxious and fidgety this morning. Mental Status is improving. AAO x 4, sat >94%. Taking Lorazepam PRN and Trazodone nightly. Reports improvement with the Lorazepam. Liver enzymes are trending down. Liver US in AM. Hold lasix today, continue to monitor for fluid overload. Fungitell +, re-consulted ID with recommendations on treatment. All drains d/c.Tolerating regular diet. Denies N/V/D. Input and output good. + BM, passing flatus. Patient is ambulating. PT/OT following, recommended outpt PT/OT. Pain well controlled.           Scheduled  Meds:   docusate sodium  100 mg Oral TID    famotidine  20 mg Oral Nightly    fluconazole  400 mg Oral Daily    heparin (porcine)  5,000 Units Subcutaneous Q8H    k phos di & mono-sod phos mono  2 tablet Oral TID    LIDOcaine HCL 10 mg/ml (1%)  10 mL Other Once    mycophenolate  1,000 mg Oral BID    polyethylene glycol  17 g Oral Daily    potassium chloride  40 mEq Oral Once    [START ON 9/13/2023] predniSONE  20 mg Oral Daily    sodium bicarbonate  1,300 mg Oral BID    [START ON 9/14/2023] sulfamethoxazole-trimethoprim 400-80mg  1 tablet Oral Daily AM    tacrolimus  3 mg Oral BID    traZODone  50 mg Oral QHS    [START ON 9/17/2023] valGANciclovir  450 mg Oral Daily     Continuous Infusions:  PRN Meds:0.9%  NaCl infusion (for blood administration), 0.9%  NaCl infusion (for blood administration), albuterol, dextrose 10%, dextrose 10%, glucagon (human recombinant), glucose, glucose, hydrALAZINE, hydrOXYzine HCL, insulin aspart U-100, LORazepam, oxyCODONE, oxyCODONE, sodium chloride, sodium chloride 0.9%    Review of Systems   Constitutional:  Positive for activity change (decreased), appetite change (improving) and fatigue. Negative for chills and fever.   HENT:  Negative for congestion and trouble swallowing.    Eyes:  Negative for visual disturbance.   Respiratory:  Positive for cough. Negative for shortness of breath and wheezing.    Cardiovascular:  Positive for leg swelling. Negative for chest pain.   Gastrointestinal:  Positive for abdominal distention. Negative for abdominal pain, constipation, diarrhea, nausea and vomiting.   Endocrine: Negative.    Genitourinary:  Negative for decreased urine volume, difficulty urinating, dysuria, hematuria and urgency.   Musculoskeletal:  Negative for arthralgias.   Skin:  Positive for color change (jaundice) and wound. Negative for pallor and rash.   Allergic/Immunologic: Positive for immunocompromised state.   Neurological:  Positive for weakness. Negative  for dizziness, tremors, seizures, syncope and headaches.   Hematological:  Bruises/bleeds easily.   Psychiatric/Behavioral:  Negative for agitation, confusion, decreased concentration, sleep disturbance and suicidal ideas. The patient is nervous/anxious and is hyperactive.      Objective:     Vital Signs (Most Recent):  Temp: 98.8 °F (37.1 °C) (09/12/23 0744)  Pulse: 108 (09/12/23 0744)  Resp: 18 (09/12/23 0744)  BP: (!) 142/90 (09/12/23 0744)  SpO2: 100 % (09/12/23 0744) Vital Signs (24h Range):  Temp:  [97.5 °F (36.4 °C)-98.8 °F (37.1 °C)] 98.8 °F (37.1 °C)  Pulse:  [] 108  Resp:  [18] 18  SpO2:  [95 %-100 %] 100 %  BP: (139-155)/() 142/90     Weight: 67.2 kg (148 lb 2.4 oz)  Body mass index is 21.26 kg/m².    Intake/Output - Last 3 Shifts         09/10 0700  09/11 0659 09/11 0700  09/12 0659 09/12 0700  09/13 0659    P.O.  840     I.V. (mL/kg)       Total Intake(mL/kg)  840 (12.5)     Urine (mL/kg/hr) 2300 (1.4) 3350 (2.1) 300 (1.2)    Emesis/NG output  0     Drains 580 50     Other  0     Stool  0 0    Blood  0     Total Output 2880 3400 300    Net -2880 -2560 -300           Urine Occurrence  0 x     Stool Occurrence  2 x 1 x    Emesis Occurrence  0 x              Physical Exam  Vitals and nursing note reviewed.   Constitutional:       General: He is not in acute distress.     Appearance: He is well-developed. He is not diaphoretic.      Comments: (+) hand and temporal muscle wasting noted     HENT:      Head: Normocephalic and atraumatic.      Mouth/Throat:      Pharynx: No oropharyngeal exudate.   Eyes:      General: Scleral icterus present.      Conjunctiva/sclera: Conjunctivae normal.      Pupils: Pupils are equal, round, and reactive to light.   Neck:      Thyroid: No thyromegaly.   Cardiovascular:      Rate and Rhythm: Normal rate and regular rhythm.      Heart sounds: Normal heart sounds. No murmur heard.  Pulmonary:      Effort: Pulmonary effort is normal. No respiratory distress.      Breath  sounds: Examination of the right-lower field reveals decreased breath sounds. Examination of the left-lower field reveals decreased breath sounds. Decreased breath sounds present. No wheezing or rales (diminished).   Abdominal:      General: Bowel sounds are normal. There is distension.      Tenderness: There is no abdominal tenderness. There is no guarding.      Comments: Chevron incision w staples w serosang drainage from right end  Old HOOD sites CDI w/ sutures, SS drg.   Genitourinary:     Penis: Normal.    Musculoskeletal:         General: Normal range of motion.      Cervical back: Normal range of motion and neck supple.      Right lower leg: Edema (trace) present.      Left lower leg: Edema (trace) present.   Skin:     General: Skin is warm and dry.      Capillary Refill: Capillary refill takes 2 to 3 seconds.      Findings: No erythema.   Neurological:      Mental Status: He is alert and oriented to person, place, and time.   Psychiatric:      Comments: Anxious, hyperactive but overall improved        Laboratory:  Immunosuppressants           Stop Route Frequency     tacrolimus capsule 3 mg         -- Oral 2 times daily     mycophenolate capsule 1,000 mg         -- Oral 2 times daily          CBC:   Recent Labs   Lab 09/11/23  0645 09/12/23  0730   WBC 10.12 10.05   RBC 2.48* 2.58*   HGB 7.9* 8.2*   HCT 22.5* 24.2*   PLT 33*  --    MCV 91 94   MCH 31.9* 31.8*   MCHC 35.1 33.9     CMP:   Recent Labs   Lab 09/12/23  0730   GLU 82   CALCIUM 7.6*   ALBUMIN 2.5*   PROT 4.8*      K 3.0*   CO2 21*      BUN 33*   CREATININE 0.9   ALKPHOS 93   *   *   BILITOT 6.7*     Coagulation:   Recent Labs   Lab 09/09/23  0302 09/10/23  0556   INR 1.0 1.0   APTT 44.8*  --      Labs within the past 24 hours have been reviewed.    Diagnostic Results:  US Liver Transplant Post:  Results for orders placed during the hospital encounter of 08/24/23    US Doppler Liver Transplant Post  (xpd)    Narrative  EXAMINATION:  US DOPPLER LIVER TRANSPLANT POST (XPD)    CLINICAL HISTORY:  Assess perfusion following liver transplant;    TECHNIQUE:  Realtime sonographic imaging was performed with color Doppler interrogation.    COMPARISON:  MRI abdomen with and without contrast 08/28/2023, CT abdomen pelvis with contrast 08/25/2023.    FINDINGS:  Patient is status post orthotopic liver transplant day 1.    The liver transplant demonstrates no focal parenchymal abnormality. No intra or extrahepatic bile duct dilatation. The common duct measures 4 mm.    The middle, right, and left hepatic veins are patent and unremarkable. The main, right, and left branches of the portal vein demonstrate hepatopetal flow and are unremarkable.    Hepatic arterial resistive indices are as follows: Main 0.73, Left 0.73, anterior Right 0.67, posterior Right 0.67.  There is no evidence of a tardus parvus waveform. The maximum velocity in the main hepatic artery is 76 cm/sec.    The IVC is unremarkable. There is no peritransplant fluid.    Trace right pleural effusion.    Impression  Satisfactory Doppler evaluation of the liver transplant.    Trace right pleural effusion.    COMMUNICATION  This critical result was discovered/received at 08:20.  The critical information above was relayed directly by me by epic secure chat to Dr. Paulino On 09/08/2023 at 08:30.      Electronically signed by: Darrion Spring  Date:    09/08/2023  Time:    08:48    Debility/Functional status: Patient debilitated by evidence of Muscle wasting and atrophy, Weakness, and Limitation of activities due to disability. Physical and occupational therapy ordered daily to evaluate and treat. Debility was: present on admission.          Assessment/Plan:     * Liver transplanted  - s/p DBD OLTxp 9/7/23, surgery notable for hematoma  - POD#1 Liver US satisfactory --> Liver US tomorrow  - transferred to TSU POD#2 w medial drain and central line  - working on anxiety and  orientation, start Trazodone nightly, PRN ativan  - family at bedside, cont OOB and IS  - lasix 40 mg x 1 on 9/11, all drains removed   - tolerating regular diet, no N/V, + BM, passing flatus, ambulating, PT/OT following         At risk for opportunistic infections  - continue valcyte for CMV prophylaxis for 6 months (CMV D+/R-)  - continue bactrim for PJP prophylaxis for 6 months  - Bactrim and valcyte to start on POD#10 or at discharge  - Cont. fluconazole 400 mg qD  - Fungitell + at 150 on 9/8, re-consulted ID, with recommendations on further tx    Long-term use of immunosuppressant medication  - Maintenance IS with prograf, cellcept, and prednisone taper. cont to check tacrolimus level daily. Assess for toxicity and adjust level as needed      Adrenal cortical steroids causing adverse effect in therapeutic use  - endo consulted and following, apprec recs      Prophylactic immunotherapy  - See long-term use of immunosuppressant medication        Steroid-induced hyperglycemia  - endo consulted and following        Metabolic acidosis  - on PO sodium bicarb  - sodium bicarb restarted post transplant 9/9/23, Monitor  - improving     Thrombocytopenia, unspecified  - r/t ESLD  - anticipate will improve post transplant   - plt down trending. Cont to monitor.      Moderate malnutrition  - encouraged oral intake   - Boost supplements TID  - cont. Regular diet  - dietician consulted        VTE Risk Mitigation (From admission, onward)         Ordered     heparin (porcine) injection 5,000 Units  Every 8 hours         09/07/23 2117     Place sequential compression device  Until discontinued         09/07/23 2117     IP VTE HIGH RISK PATIENT  Once         09/07/23 2117     Send SCD stockings and [employer] hose with patient to OR  Continuous         09/06/23 2226                The patients clinical status was discussed at multidisplinary rounds, involving transplant surgery, transplant medicine, pharmacy, nursing, nutrition, and  social work    Discharge Planning: not stable for dc at this time. Possible dc tomorrow.     Medical decision making for this encounter includes review of pertinent labs and diagnostic studies, assessment and planning, discussions with consulting providers, discussion with patient/family, and participation in multidisciplinary rounds. Time spent caring for patient: 60 minutes    Rosa Spring DNP  Liver Transplant  [facility] - Transplant Stepdown

## 2023-09-12 NOTE — PROGRESS NOTES
EDUCATION NOTE:    Met with Sukhdeep Grimes and his caregivers to provide teaching re: immunosuppressant medications.  Reviewed medication section of the Liver Transplant Education book that was provided.  Emphasized the importance of compliance, role of the blue medication card, concerns for drug interactions, and process of obtaining refills.  Counseled regarding Prograf, Cellcept , prednisone, including directions for use, monitoring, how to handle missed doses, and side effects.  Patient and brother, Nitesh, verbalized understanding and had the opportunity to ask questions.

## 2023-09-13 DIAGNOSIS — Z94.4 LIVER REPLACED BY TRANSPLANT: Primary | ICD-10-CM

## 2023-09-13 PROBLEM — B37.9 CANDIDIASIS: Status: ACTIVE | Noted: 2023-09-13

## 2023-09-13 PROBLEM — Z99.11 ENCOUNTER FOR WEANING FROM VENTILATOR: Status: RESOLVED | Noted: 2023-09-12 | Resolved: 2023-09-13

## 2023-09-13 LAB
ALBUMIN SERPL BCP-MCNC: 2.4 G/DL (ref 3.5–5.2)
ALP SERPL-CCNC: 84 U/L (ref 55–135)
ALT SERPL W/O P-5'-P-CCNC: 154 U/L (ref 10–44)
ANION GAP SERPL CALC-SCNC: 13 MMOL/L (ref 8–16)
AST SERPL-CCNC: 67 U/L (ref 10–40)
BACTERIA #/AREA URNS AUTO: ABNORMAL /HPF
BASOPHILS # BLD AUTO: 0.04 K/UL (ref 0–0.2)
BASOPHILS NFR BLD: 0.4 % (ref 0–1.9)
BILIRUB SERPL-MCNC: 7.7 MG/DL (ref 0.1–1)
BILIRUB UR QL STRIP: ABNORMAL
BUN SERPL-MCNC: 22 MG/DL (ref 6–20)
CALCIUM SERPL-MCNC: 7.1 MG/DL (ref 8.7–10.5)
CHLORIDE SERPL-SCNC: 103 MMOL/L (ref 95–110)
CLARITY UR REFRACT.AUTO: CLEAR
CO2 SERPL-SCNC: 19 MMOL/L (ref 23–29)
COLOR UR AUTO: YELLOW
CREAT SERPL-MCNC: 0.8 MG/DL (ref 0.5–1.4)
DIFFERENTIAL METHOD: ABNORMAL
EOSINOPHIL # BLD AUTO: 1 K/UL (ref 0–0.5)
EOSINOPHIL NFR BLD: 9.8 % (ref 0–8)
ERYTHROCYTE [DISTWIDTH] IN BLOOD BY AUTOMATED COUNT: 17.5 % (ref 11.5–14.5)
EST. GFR  (NO RACE VARIABLE): >60 ML/MIN/1.73 M^2
GLUCOSE SERPL-MCNC: 82 MG/DL (ref 70–110)
GLUCOSE UR QL STRIP: ABNORMAL
HCT VFR BLD AUTO: 23.7 % (ref 40–54)
HGB BLD-MCNC: 7.9 G/DL (ref 14–18)
HGB UR QL STRIP: NEGATIVE
HYALINE CASTS UR QL AUTO: 1 /LPF
IMM GRANULOCYTES # BLD AUTO: 0.2 K/UL (ref 0–0.04)
IMM GRANULOCYTES NFR BLD AUTO: 1.9 % (ref 0–0.5)
KETONES UR QL STRIP: NEGATIVE
LEUKOCYTE ESTERASE UR QL STRIP: NEGATIVE
LYMPHOCYTES # BLD AUTO: 0.7 K/UL (ref 1–4.8)
LYMPHOCYTES NFR BLD: 6.8 % (ref 18–48)
MAGNESIUM SERPL-MCNC: 1.8 MG/DL (ref 1.6–2.6)
MCH RBC QN AUTO: 32.1 PG (ref 27–31)
MCHC RBC AUTO-ENTMCNC: 33.3 G/DL (ref 32–36)
MCV RBC AUTO: 96 FL (ref 82–98)
MICROSCOPIC COMMENT: ABNORMAL
MONOCYTES # BLD AUTO: 1.3 K/UL (ref 0.3–1)
MONOCYTES NFR BLD: 12.1 % (ref 4–15)
NEUTROPHILS # BLD AUTO: 7.3 K/UL (ref 1.8–7.7)
NEUTROPHILS NFR BLD: 69 % (ref 38–73)
NITRITE UR QL STRIP: NEGATIVE
NRBC BLD-RTO: 0 /100 WBC
PH UR STRIP: 8 [PH] (ref 5–8)
PHOSPHATE SERPL-MCNC: 2.6 MG/DL (ref 2.7–4.5)
PLATELET # BLD AUTO: 32 K/UL (ref 150–450)
PLATELET BLD QL SMEAR: ABNORMAL
PMV BLD AUTO: 12.4 FL (ref 9.2–12.9)
POTASSIUM SERPL-SCNC: 3.2 MMOL/L (ref 3.5–5.1)
PROT SERPL-MCNC: 4.6 G/DL (ref 6–8.4)
PROT UR QL STRIP: ABNORMAL
RBC # BLD AUTO: 2.46 M/UL (ref 4.6–6.2)
RBC #/AREA URNS AUTO: 5 /HPF (ref 0–4)
SODIUM SERPL-SCNC: 135 MMOL/L (ref 136–145)
SP GR UR STRIP: 1.01 (ref 1–1.03)
SQUAMOUS #/AREA URNS AUTO: 0 /HPF
TACROLIMUS BLD-MCNC: 8.9 NG/ML (ref 5–15)
URN SPEC COLLECT METH UR: ABNORMAL
WBC # BLD AUTO: 10.51 K/UL (ref 3.9–12.7)
WBC #/AREA URNS AUTO: 1 /HPF (ref 0–5)
YEAST UR QL AUTO: ABNORMAL

## 2023-09-13 PROCEDURE — 99233 PR SUBSEQUENT HOSPITAL CARE,LEVL III: ICD-10-PCS | Mod: 24,,, | Performed by: NURSE PRACTITIONER

## 2023-09-13 PROCEDURE — 25000003 PHARM REV CODE 250: Performed by: STUDENT IN AN ORGANIZED HEALTH CARE EDUCATION/TRAINING PROGRAM

## 2023-09-13 PROCEDURE — 97530 THERAPEUTIC ACTIVITIES: CPT

## 2023-09-13 PROCEDURE — 25000003 PHARM REV CODE 250: Performed by: NURSE PRACTITIONER

## 2023-09-13 PROCEDURE — 81003 URINALYSIS AUTO W/O SCOPE: CPT | Performed by: NURSE PRACTITIONER

## 2023-09-13 PROCEDURE — 83735 ASSAY OF MAGNESIUM: CPT

## 2023-09-13 PROCEDURE — 25000003 PHARM REV CODE 250: Performed by: TRANSPLANT SURGERY

## 2023-09-13 PROCEDURE — 94799 UNLISTED PULMONARY SVC/PX: CPT

## 2023-09-13 PROCEDURE — 63600175 PHARM REV CODE 636 W HCPCS: Performed by: STUDENT IN AN ORGANIZED HEALTH CARE EDUCATION/TRAINING PROGRAM

## 2023-09-13 PROCEDURE — 80053 COMPREHEN METABOLIC PANEL: CPT | Performed by: STUDENT IN AN ORGANIZED HEALTH CARE EDUCATION/TRAINING PROGRAM

## 2023-09-13 PROCEDURE — 80197 ASSAY OF TACROLIMUS: CPT | Performed by: STUDENT IN AN ORGANIZED HEALTH CARE EDUCATION/TRAINING PROGRAM

## 2023-09-13 PROCEDURE — 99233 SBSQ HOSP IP/OBS HIGH 50: CPT | Mod: 24,,, | Performed by: NURSE PRACTITIONER

## 2023-09-13 PROCEDURE — 63600175 PHARM REV CODE 636 W HCPCS: Performed by: SURGERY

## 2023-09-13 PROCEDURE — 63600175 PHARM REV CODE 636 W HCPCS: Performed by: TRANSPLANT SURGERY

## 2023-09-13 PROCEDURE — 87040 BLOOD CULTURE FOR BACTERIA: CPT | Mod: 59 | Performed by: NURSE PRACTITIONER

## 2023-09-13 PROCEDURE — 81001 URINALYSIS AUTO W/SCOPE: CPT | Performed by: NURSE PRACTITIONER

## 2023-09-13 PROCEDURE — 25000003 PHARM REV CODE 250

## 2023-09-13 PROCEDURE — 85025 COMPLETE CBC W/AUTO DIFF WBC: CPT | Performed by: STUDENT IN AN ORGANIZED HEALTH CARE EDUCATION/TRAINING PROGRAM

## 2023-09-13 PROCEDURE — 84100 ASSAY OF PHOSPHORUS: CPT

## 2023-09-13 PROCEDURE — 97535 SELF CARE MNGMENT TRAINING: CPT

## 2023-09-13 PROCEDURE — 20600001 HC STEP DOWN PRIVATE ROOM

## 2023-09-13 RX ORDER — HYDROXYZINE HYDROCHLORIDE 25 MG/1
25 TABLET, FILM COATED ORAL 3 TIMES DAILY PRN
Qty: 50 TABLET | Refills: 0 | Status: SHIPPED | OUTPATIENT
Start: 2023-09-13

## 2023-09-13 RX ORDER — FLUCONAZOLE 200 MG/1
400 TABLET ORAL DAILY
Qty: 60 TABLET | Refills: 1 | Status: SHIPPED | OUTPATIENT
Start: 2023-09-14 | End: 2023-09-28 | Stop reason: DRUGHIGH

## 2023-09-13 RX ORDER — TRAZODONE HYDROCHLORIDE 50 MG/1
50 TABLET ORAL NIGHTLY
Qty: 30 TABLET | Refills: 11 | Status: SHIPPED | OUTPATIENT
Start: 2023-09-13 | End: 2024-09-12

## 2023-09-13 RX ORDER — POLYETHYLENE GLYCOL 3350 17 G/17G
17 POWDER, FOR SOLUTION ORAL DAILY
Refills: 0 | COMMUNITY
Start: 2023-09-14

## 2023-09-13 RX ORDER — TACROLIMUS 1 MG/1
2 CAPSULE ORAL 2 TIMES DAILY
Status: DISCONTINUED | OUTPATIENT
Start: 2023-09-13 | End: 2023-09-14

## 2023-09-13 RX ORDER — ACETAMINOPHEN 325 MG/1
650 TABLET ORAL EVERY 6 HOURS PRN
Status: DISCONTINUED | OUTPATIENT
Start: 2023-09-13 | End: 2023-09-14 | Stop reason: HOSPADM

## 2023-09-13 RX ORDER — SODIUM BICARBONATE 650 MG/1
1300 TABLET ORAL 2 TIMES DAILY
Qty: 120 TABLET | Refills: 11 | Status: SHIPPED | OUTPATIENT
Start: 2023-09-13 | End: 2023-12-20

## 2023-09-13 RX ORDER — FAMOTIDINE 20 MG/1
20 TABLET, FILM COATED ORAL NIGHTLY
Qty: 30 TABLET | Refills: 0 | Status: SHIPPED | OUTPATIENT
Start: 2023-09-13 | End: 2023-10-14

## 2023-09-13 RX ORDER — TACROLIMUS 1 MG/1
CAPSULE ORAL
Qty: 150 CAPSULE | Refills: 11 | Status: SHIPPED | OUTPATIENT
Start: 2023-09-13 | End: 2023-09-14 | Stop reason: SDUPTHER

## 2023-09-13 RX ORDER — LORAZEPAM 1 MG/1
1 TABLET ORAL 2 TIMES DAILY PRN
Qty: 60 TABLET | Refills: 0 | Status: SHIPPED | OUTPATIENT
Start: 2023-09-13 | End: 2023-09-14 | Stop reason: SDUPTHER

## 2023-09-13 RX ORDER — OXYCODONE HYDROCHLORIDE 5 MG/1
5 TABLET ORAL EVERY 4 HOURS PRN
Qty: 40 TABLET | Refills: 0 | Status: SHIPPED | OUTPATIENT
Start: 2023-09-13

## 2023-09-13 RX ORDER — LORAZEPAM 0.5 MG/1
1 TABLET ORAL EVERY 8 HOURS PRN
Status: DISCONTINUED | OUTPATIENT
Start: 2023-09-13 | End: 2023-09-14 | Stop reason: HOSPADM

## 2023-09-13 RX ADMIN — TRAZODONE HYDROCHLORIDE 50 MG: 50 TABLET ORAL at 08:09

## 2023-09-13 RX ADMIN — FLUCONAZOLE 400 MG: 200 TABLET ORAL at 08:09

## 2023-09-13 RX ADMIN — POTASSIUM BICARBONATE 40 MEQ: 391 TABLET, EFFERVESCENT ORAL at 11:09

## 2023-09-13 RX ADMIN — HEPARIN SODIUM 5000 UNITS: 5000 INJECTION INTRAVENOUS; SUBCUTANEOUS at 08:09

## 2023-09-13 RX ADMIN — LORAZEPAM 1 MG: 0.5 TABLET ORAL at 11:09

## 2023-09-13 RX ADMIN — DOCUSATE SODIUM 100 MG: 100 CAPSULE, LIQUID FILLED ORAL at 08:09

## 2023-09-13 RX ADMIN — TACROLIMUS 2 MG: 1 CAPSULE ORAL at 06:09

## 2023-09-13 RX ADMIN — MYCOPHENOLATE MOFETIL 1000 MG: 250 CAPSULE ORAL at 08:09

## 2023-09-13 RX ADMIN — FAMOTIDINE 20 MG: 20 TABLET ORAL at 08:09

## 2023-09-13 RX ADMIN — TACROLIMUS 3 MG: 1 CAPSULE ORAL at 08:09

## 2023-09-13 RX ADMIN — SODIUM BICARBONATE 1300 MG: 650 TABLET ORAL at 08:09

## 2023-09-13 RX ADMIN — DIBASIC SODIUM PHOSPHATE, MONOBASIC POTASSIUM PHOSPHATE AND MONOBASIC SODIUM PHOSPHATE 2 TABLET: 852; 155; 130 TABLET ORAL at 08:09

## 2023-09-13 RX ADMIN — DIBASIC SODIUM PHOSPHATE, MONOBASIC POTASSIUM PHOSPHATE AND MONOBASIC SODIUM PHOSPHATE 2 TABLET: 852; 155; 130 TABLET ORAL at 03:09

## 2023-09-13 RX ADMIN — PREDNISONE 20 MG: 20 TABLET ORAL at 08:09

## 2023-09-13 RX ADMIN — HEPARIN SODIUM 5000 UNITS: 5000 INJECTION INTRAVENOUS; SUBCUTANEOUS at 03:09

## 2023-09-13 RX ADMIN — OXYCODONE HYDROCHLORIDE 10 MG: 10 TABLET ORAL at 06:09

## 2023-09-13 RX ADMIN — HYDROXYZINE HYDROCHLORIDE 25 MG: 25 TABLET, FILM COATED ORAL at 10:09

## 2023-09-13 RX ADMIN — HEPARIN SODIUM 5000 UNITS: 5000 INJECTION INTRAVENOUS; SUBCUTANEOUS at 05:09

## 2023-09-13 RX ADMIN — HYDROXYZINE HYDROCHLORIDE 25 MG: 25 TABLET, FILM COATED ORAL at 08:09

## 2023-09-13 NOTE — CARE UPDATE
-Glucose Goal 140-180    -A1C:   Hemoglobin A1C   Date Value Ref Range Status   08/01/2023 <4.0 3.2 - 7.0 % Final         -HOME REGIMEN:     -GLUCOSE TREND FOR THE PAST 24HRS:   Recent Labs   Lab 09/11/23  1734 09/11/23  2236 09/12/23  0752 09/12/23  1236 09/12/23  1642 09/12/23 2053   POCTGLUCOSE 164* 132* 78 172* 164* 126*           -NO HYPOGYCEMIAS NOTED     - Diet  Diet consistent carbohydrate Standard Tray    No hx of Dm. S/p Liver txp.  On steroid taper. BG remains stable without insulin. Steroids decrease again today.      Discontinue BG monitoring.   No history of DM. No futher insulin/endocrine needs, will sign off.    Thank you for the consult. Please call/ re-consult if needed.

## 2023-09-13 NOTE — PLAN OF CARE
Goals remain appropriate. Continue POC.     Goals to be met by: 9/16/2023     Patient will increase functional independence with ADLs by performing:    LE Dressing with set-up Met 9/13  Toileting from toilet with supervision  for hygiene and clothing management. Met 9/13  Pt to complete standing g/h skills with supervision.   Toilet transfer to toilet with supervision.

## 2023-09-13 NOTE — PT/OT/SLP PROGRESS
"Occupational Therapy   Treatment    Name: Sukhdeep Grimes  MRN: [id number]  Admitting Diagnosis:  Liver transplanted  6 Days Post-Op    Recommendations:     Discharge Recommendations: home with home health  Discharge Equipment Recommendations:  walker, rolling  Barriers to discharge:  None    Assessment:     Sukhdeep Grimes is a 38 y.o. male with a medical diagnosis of Liver transplanted.  Pt tolerated OT session well with good participation and motivation. Pt is progressing well overall but does remain limited mainly due to pain and deficits in safety awareness. Pt would continue to benefit from skilled OT services to maximize functional (I) & facilitate safe discharge. Performance deficits affecting function are weakness, impaired endurance, impaired self care skills, impaired functional mobility, gait instability, impaired balance, decreased safety awareness, pain.     Rehab Prognosis:  Good; patient would benefit from acute skilled OT services to address these deficits and reach maximum level of function.       Plan:     Patient to be seen 4 x/week to address the above listed problems via self-care/home management, therapeutic activities, therapeutic exercises, neuromuscular re-education  Plan of Care Expires:    Plan of Care Reviewed with: patient    Subjective     Chief Complaint: "I'm getting my color back. My brother is here today, I think I'm moving to the apartments here soon"  Patient/Family Comments/goals: Get better and return home  Pain/Comfort:  Pain Rating 1: 6/10  Location 1: abdomen  Pain Rating Post-Intervention 1: 6/10    Objective:     Communicated with: RN prior to session.  Patient found supine with  (telesitter) upon OT entry to room.    General Precautions: Standard, fall    Orthopedic Precautions:N/A  Braces: N/A  Respiratory Status: Room air     Occupational Performance:     Bed Mobility:    Patient completed Rolling/Turning to Left with  independence  Patient completed Rolling/Turning to " Right with independence  Patient completed Scooting/Bridging with independence  Patient completed Supine to Sit with independence  Patient completed Sit to Supine with independence     Functional Mobility/Transfers:  Patient completed Sit <> Stand Transfer with stand by assistance  with  no assistive device   Patient completed Toilet Transfer Step Transfer technique with contact guard assistance with  no AD  Functional Mobility: Patient with transfers as stated above. Patient very impulsive with quick movements compromising safety. Patient completed toilet transfer, toileting, grooming standing at sink, and then completed functional mobility outside of room ~500ft CGA using HHA with no LOB noted or increased pain or fatigue.     Activities of Daily Living:  Grooming: supervision standing at sink  Toileting: supervision ; BM patient able to complete toileting with sup for increased safety      [facility] 6 Click ADL: 22    Treatment & Education:  Patient completed treatment as stated above.   -Education on energy conservation and task modification to maximize safety and (I) during ADLs and mobility  -Education on importance of OOB activity to improve overall activity tolerance and promote recovery  -Pt educated to call for assistance and to transfer with hospital staff only  -Provided education regarding role of OT, POC, & discharge recommendations with pt verbalizing understanding.  Pt had no further questions & when asked whether there were any concerns pt reported none.      Patient left supine with all lines intact, call button in reach, nursing notified, and nursing present    GOALS:   Multidisciplinary Problems       Occupational Therapy Goals          Problem: Occupational Therapy    Goal Priority Disciplines Outcome Interventions   Occupational Therapy Goal     OT, PT/OT Ongoing, Progressing    Description: Goals to be met by: 9/16/2023     Patient will increase functional independence with ADLs by  performing:    LE Dressing with set-up  Toileting from toilet with supervision  for hygiene and clothing management.   Pt to complete standing g/h skills with supervision.   Toilet transfer to toilet with supervision.                            Time Tracking:     OT Date of Treatment: 09/13/23  OT Start Time: 1022  OT Stop Time: 1103  OT Total Time (min): 41 min    Billable Minutes:Self Care/Home Management 15  Therapeutic Activity 26    OT/CHANEL: OT          9/13/2023

## 2023-09-13 NOTE — PROGRESS NOTES
Update:       met with caregiver in room, patient going to doUdeal at this time.  reviewed [facility]). Provided resources for local therapists, [facility], and local IOP including [facility].  stressed the importance of patient engaging with services to assist in his commitment to life long sobriety. Patients caregiver voiced understanding, and agreement with this. Patients caregiver agrees to contact transplant team with needs, questions, or concerns as they arise.         Raeann Alejandre LMSW

## 2023-09-13 NOTE — PLAN OF CARE
Patient aao today, independent with ambulation. Patients brother 100% on self med process, patient is aware of how to paint the incision with betadine. UO good, enzymes down. K replaced. Patient had temp at 1200, possible error, when rechecked a few min later with zero intervention, afebrile. Chest xray, BC, UC sent. Possible dc tomorrow.

## 2023-09-13 NOTE — SUBJECTIVE & OBJECTIVE
Scheduled Meds:   docusate sodium  100 mg Oral TID    famotidine  20 mg Oral Nightly    fluconazole  400 mg Oral Daily    heparin (porcine)  5,000 Units Subcutaneous Q8H    k phos di & mono-sod phos mono  2 tablet Oral TID    LIDOcaine HCL 10 mg/ml (1%)  10 mL Other Once    mycophenolate  1,000 mg Oral BID    polyethylene glycol  17 g Oral Daily    predniSONE  20 mg Oral Daily    sodium bicarbonate  1,300 mg Oral BID    [START ON 9/14/2023] sulfamethoxazole-trimethoprim 400-80mg  1 tablet Oral Daily AM    tacrolimus  2 mg Oral BID    traZODone  50 mg Oral QHS    [START ON 9/17/2023] valGANciclovir  450 mg Oral Daily     Continuous Infusions:  PRN Meds:0.9%  NaCl infusion (for blood administration), 0.9%  NaCl infusion (for blood administration), acetaminophen, albuterol, dextrose 10%, dextrose 10%, hydrALAZINE, hydrOXYzine HCL, LORazepam, oxyCODONE, oxyCODONE, sodium chloride, sodium chloride 0.9%    Review of Systems   Constitutional:  Positive for activity change (decreased), appetite change (improving) and fatigue. Negative for chills and fever.   HENT:  Negative for congestion and trouble swallowing.    Eyes:  Negative for visual disturbance.   Respiratory:  Positive for cough. Negative for shortness of breath and wheezing.    Cardiovascular:  Positive for leg swelling. Negative for chest pain.   Gastrointestinal:  Positive for abdominal distention. Negative for abdominal pain, constipation, diarrhea, nausea and vomiting.   Endocrine: Negative.    Genitourinary:  Negative for decreased urine volume, difficulty urinating, dysuria, hematuria and urgency.   Musculoskeletal:  Negative for arthralgias.   Skin:  Positive for color change (jaundice) and wound. Negative for pallor and rash.   Allergic/Immunologic: Positive for immunocompromised state.   Neurological:  Negative for dizziness, tremors, seizures, syncope, weakness and headaches.   Hematological:  Does not bruise/bleed easily.    Psychiatric/Behavioral:  Negative for agitation, confusion, decreased concentration, sleep disturbance and suicidal ideas. The patient is nervous/anxious and is hyperactive.      Objective:     Vital Signs (Most Recent):  Temp: 99.1 °F (37.3 °C) (09/13/23 1115)  Pulse: 98 (09/13/23 1110)  Resp: 18 (09/13/23 1110)  BP: 133/85 (09/13/23 1110)  SpO2: 98 % (09/13/23 1110) Vital Signs (24h Range):  Temp:  [98.5 °F (36.9 °C)-101.5 °F (38.6 °C)] 99.1 °F (37.3 °C)  Pulse:  [] 98  Resp:  [16-20] 18  SpO2:  [97 %-100 %] 98 %  BP: (131-147)/(85-96) 133/85     Weight: 62.4 kg (137 lb 9.1 oz)  Body mass index is 19.74 kg/m².    Intake/Output - Last 3 Shifts         09/11 0700  09/12 0659 09/12 0700  09/13 0659 09/13 0700  09/14 0659    P.O. 840 1644     I.V. (mL/kg)  0 (0)     Other  0     Total Intake(mL/kg) 840 (12.5) 1644 (26.3)     Urine (mL/kg/hr) 3350 (2.1) 1150 (0.8) 300 (1)    Emesis/NG output 0 0     Drains 50      Other 0 0     Stool 0 0     Blood 0 0     Total Output 3400 1150 300    Net -2560 +494 -300           Urine Occurrence 0 x 8 x     Stool Occurrence 2 x 2 x     Emesis Occurrence 0 x 0 x              Physical Exam  Vitals and nursing note reviewed.   Constitutional:       General: He is not in acute distress.     Appearance: He is well-developed. He is not diaphoretic.      Comments: (+) hand and temporal muscle wasting noted     HENT:      Head: Normocephalic and atraumatic.      Mouth/Throat:      Pharynx: No oropharyngeal exudate.   Eyes:      General: Scleral icterus present.      Conjunctiva/sclera: Conjunctivae normal.      Pupils: Pupils are equal, round, and reactive to light.   Neck:      Thyroid: No thyromegaly.   Cardiovascular:      Rate and Rhythm: Normal rate and regular rhythm.      Heart sounds: Normal heart sounds. No murmur heard.  Pulmonary:      Effort: Pulmonary effort is normal. No respiratory distress.      Breath sounds: Examination of the right-lower field reveals decreased  breath sounds. Examination of the left-lower field reveals decreased breath sounds. Decreased breath sounds present. No wheezing or rales (diminished).   Abdominal:      General: Bowel sounds are normal. There is distension.      Tenderness: There is no abdominal tenderness. There is no guarding.      Comments: Chevron incision w staples w serosang drainage from right end  Old HOOD sites CDI w/ sutures, SS drg.   Genitourinary:     Penis: Normal.    Musculoskeletal:         General: Normal range of motion.      Cervical back: Normal range of motion and neck supple.      Right lower le+ Edema (trace) present.      Left lower le+ Edema (trace) present.   Skin:     General: Skin is warm and dry.      Capillary Refill: Capillary refill takes 2 to 3 seconds.      Findings: No erythema.   Neurological:      Mental Status: He is alert and oriented to person, place, and time.   Psychiatric:      Comments: Anxious, hyperactive but overall improved        Laboratory:  Immunosuppressants           Stop Route Frequency     tacrolimus capsule 2 mg         -- Oral 2 times daily     mycophenolate capsule 1,000 mg         -- Oral 2 times daily          CBC:   Recent Labs   Lab 23  0547   WBC 10.51   RBC 2.46*   HGB 7.9*   HCT 23.7*   PLT 32*   MCV 96   MCH 32.1*   MCHC 33.3     BMP:   Recent Labs   Lab 23  0548   GLU 82   *   K 3.2*      CO2 19*   BUN 22*   CREATININE 0.8   CALCIUM 7.1*     CMP:   Recent Labs   Lab 23  0548   GLU 82   CALCIUM 7.1*   ALBUMIN 2.4*   PROT 4.6*   *   K 3.2*   CO2 19*      BUN 22*   CREATININE 0.8   ALKPHOS 84   *   AST 67*   BILITOT 7.7*     Labs within the past 24 hours have been reviewed.    Diagnostic Results:  XR Chest: Results for orders placed during the hospital encounter of 23    X-Ray Chest 1 View    Narrative  EXAMINATION:  XR CHEST 1 VIEW    CLINICAL HISTORY:  Fever;    TECHNIQUE:  Single frontal view of the chest was  performed.    COMPARISON:  09/07/2023    FINDINGS:  Cardiac size normal lungs are clear and no infiltrate is identified.    Impression  See above      Electronically signed by: Onel Meneses MD  Date:    09/13/2023  Time:    12:06    US Liver Transplant Post:  Results for orders placed during the hospital encounter of 08/24/23    US Doppler Liver Transplant Post (xpd)    Narrative  EXAMINATION:  US DOPPLER LIVER TRANSPLANT POST (XPD)    CLINICAL HISTORY:  Assess perfusion following liver transplant;    TECHNIQUE:  Realtime sonographic imaging was performed with color Doppler interrogation.    COMPARISON:  MRI abdomen with and without contrast 08/28/2023, CT abdomen pelvis with contrast 08/25/2023.    FINDINGS:  Patient is status post orthotopic liver transplant day 1.    The liver transplant demonstrates no focal parenchymal abnormality. No intra or extrahepatic bile duct dilatation. The common duct measures 4 mm.    The middle, right, and left hepatic veins are patent and unremarkable. The main, right, and left branches of the portal vein demonstrate hepatopetal flow and are unremarkable.    Hepatic arterial resistive indices are as follows: Main 0.73, Left 0.73, anterior Right 0.67, posterior Right 0.67.  There is no evidence of a tardus parvus waveform. The maximum velocity in the main hepatic artery is 76 cm/sec.    The IVC is unremarkable. There is no peritransplant fluid.    Trace right pleural effusion.    Impression  Satisfactory Doppler evaluation of the liver transplant.    Trace right pleural effusion.    COMMUNICATION  This critical result was discovered/received at 08:20.  The critical information above was relayed directly by me by epic secure chat to Dr. Paulino On 09/08/2023 at 08:30.      Electronically signed by: Darrion Spring  Date:    09/08/2023  Time:    08:48    Debility/Functional status: Patient debilitated by evidence of Muscle wasting and atrophy, Weakness, and Other reduced mobility.  Physical and occupational therapy ordered daily to evaluate and treat. Debility was: present on admission.

## 2023-09-13 NOTE — ASSESSMENT & PLAN NOTE
- s/p DBD OLTxp 9/7/23, surgery notable for hematoma  - POD#1 Liver US satisfactory --> Liver US today  - transferred to TSU POD#2 w medial drain and central line  - liver enzymes trending down  - working on anxiety and orientation, start Trazodone nightly, Encourage Ativan during the day.  - family at bedside, cont OOB and IS  - lasix 40 mg x 1 on 9/11, all drains removed   - tolerating regular diet, no N/V, + BM, passing flatus, ambulating, PT/OT following   - Fever 101.5, ~3 min later was 99.1, suspect not true fever. Infectious work up pending.

## 2023-09-13 NOTE — DISCHARGE SUMMARY
[facility] - Transplant Stepdown  Liver Transplant  Discharge Summary      Patient Name: Sukhdeep Grimes  MRN: [id number]  Admission Date: 8/24/2023  Hospital Length of Stay: 21 days  Discharge Date and Time:  09/14/2023 12:27 PM  Attending Physician: Khai Santiago MD  Discharging Provider: Isabelle Haley PA-C  Primary Care Provider: Martine Primary Doctor  Post-Operative Day: 7     ORGAN:   LIVER  Disease Etiology: Alcohol-Associated Cirrhosis Without Acute Alcohol-Associated Hepatitis  Donor Type:   Donation after Brain Death  CDC High Risk:   No  Donor CMV Status:   Donor CMV Status: Positive  Donor HBcAB:   Negative  Donor HCV Status:   Negative  Whole or Partial: Whole Liver  Biliary Anastomosis: End to End  Arterial Anatomy: Standard    HPI:   Sukhdeep Grimes is a 38 y.o. male  PMHx of Alcoholic decompensated cirrhosis. Pt was admitted from OSH  for hepatology evaluation  D/T ascites, hyponatremia, hypokalemia, thrombocytopenia, and AYAH and weakness. On 9/1 patient was made active on transplant list MELD 30       Procedure(s) (LRB):  TRANSPLANT, LIVER (N/A)     Hospital Course:    Patient is now s/p DBD liver transplant on 9/7/2023 (CMV D+/R-).  Surgery notable for hematoma at the upper surface of the right lobe which was opened and managed with surgicel, surgiflo and fibrillar. POD#1 Liver US stable. Stepped down to TSU POD#2. All drains out as of 9/11. LFTs overall trending down, but total bilirubin with slight increase 9/12-9/14. Likely will take time for TB to decrease as bili was very high pre-op. 1 week Liver US 9/13 showed increased velocity of the main hepatic artery and delayed upstroke of the right hepatic artery, possibly due to edema, small amount of ascites ,and right pleural effusion. Reviewed by surgeon. Plan for f/u US on Tuesday, 9/19.  Candidiasis: fungitell at OSH elevated pre-op. ID consulted and started on fluconazole. Repeat fungitell 9/8 was 150. Per ID, continue to monitor fungitell weekly and  continue fluconazole until not detected. Should follow up with ID outpatient.   Anxiety: Patient with significant anxiety post-op, reports h/o MICHAEL well controlled with lorazepam. Patient declined inpatient psych consult and stated he wanted to wait and establish with a therapist outpatient. Continue lorazepam for anxiety and trazodone for sleep.     Of note, patient had temperature reading of 101.5 on 9/13. Per nursing, at that time, he had a sweatshirt on and was walking in the halls with PT. Repeat temperature taken ~3 minutes later was 99.1. Patient felt well. Infectious work up sent. UA neg for infection. CXR clear. Blood cx NGTD. No further fevers prior to discharge.      Patient is stable and ready for discharge at this time. He and his caregiver received post-txp education. Follow up to include labs and transplant clinic appointment on Friday, 9/15/23 and liver US + surgery clinic appointment on Tues, 9/19. Patient is in agreement with discharge from the hospital today and follow up plan.    Goals of Care Treatment Preferences:  Code Status: Full Code      Final Active Diagnoses:    Diagnosis Date Noted POA    PRINCIPAL PROBLEM:  Liver transplanted [Z94.4] 09/10/2023 Not Applicable    Candidiasis [B37.9] 09/13/2023 Yes    At risk for opportunistic infections [Z91.89] 09/11/2023 No    Long-term use of immunosuppressant medication [Z79.60] 09/10/2023 Not Applicable    Steroid-induced hyperglycemia [R73.9, T38.0X5A] 09/08/2023 No    Prophylactic immunotherapy [Z29.8] 09/08/2023 Not Applicable    Adrenal cortical steroids causing adverse effect in therapeutic use [T38.0X5A] 09/08/2023 No    Thrombocytopenia, unspecified [D69.6] 09/05/2023 Yes    Metabolic acidosis [E87.20] 09/05/2023 Yes    Moderate malnutrition [E44.0] 08/26/2023 Yes      Problems Resolved During this Admission:    Diagnosis Date Noted Date Resolved POA    Encounter for weaning from ventilator [Z99.11] 09/12/2023 09/13/2023 Not Applicable     Sinus bradycardia [R00.1] 09/10/2023 09/11/2023 No    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] 09/05/2023 09/11/2023 Yes    Hyponatremia [E87.1] 09/05/2023 09/11/2023 Yes    Decompensated hepatic cirrhosis [K72.90, K74.60] 08/15/2023 09/11/2023 Yes    Alcoholic cirrhosis of liver with ascites [K70.31] 08/01/2023 09/10/2023 Yes       Consults (From admission, onward)          Status Ordering Provider     Inpatient consult to Endocrinology  Once        Provider:  (Not yet assigned)    Completed RICH TAFOYA     Inpatient consult to Registered Dietitian/Nutritionist  Once        Provider:  (Not yet assigned)    Completed RICH TAFOYA     Inpatient consult to Infectious Diseases  Once        Provider:  (Not yet assigned)    Completed MAXX AMEZCUA     Inpatient consult to Registered Dietitian/Nutritionist  Once        Provider:  (Not yet assigned)    Completed MARI WICK     Inpatient consult to Psychiatry  Once        Provider:  (Not yet assigned)    Completed MAXX AMEZCUA     Inpatient consult to Hepatology  Once        Provider:  (Not yet assigned)    Completed MAXX AMEZCUA            Pending Diagnostic Studies:       Procedure Component Value Units Date/Time    Freeze and Hold -BB HEP [[id number]] Collected: 09/07/23 2129    Order Status: Sent Lab Status: In process Updated: 09/07/23 2131    Specimen: Blood     Specimen to Pathology, Surgery Liver (liver transplant) [[id number]] Collected: 09/07/23 2038    Order Status: Sent Lab Status: In process Updated: 09/08/23 1024    Specimen: Tissue           Significant Diagnostic Studies: Labs: BMP:   Recent Labs   Lab 09/13/23  0548 09/14/23  0548   GLU 82 92   * 135*   K 3.2* 3.1*    103   CO2 19* 22*   BUN 22* 14   CREATININE 0.8 0.7   CALCIUM 7.1* 7.3*   MG 1.8 1.7   , CMP   Recent Labs   Lab 09/13/23  0548 09/14/23  0548   * 135*   K 3.2* 3.1*    103   CO2 19* 22*   GLU 82 92   BUN 22* 14   CREATININE 0.8 0.7  "  CALCIUM 7.1* 7.3*   PROT 4.6* 4.6*   ALBUMIN 2.4* 2.2*   BILITOT 7.7* 8.0*   ALKPHOS 84 86   AST 67* 42*   * 117*   ANIONGAP 13 10   , and CBC   Recent Labs   Lab 09/13/23  0547 09/14/23  0548   WBC 10.51 8.40   HGB 7.9* 7.3*   HCT 23.7* 21.9*   PLT 32* 47*       The patients clinical status was discussed at multidisplinary rounds, involving transplant surgery, transplant medicine, pharmacy, nursing, nutrition, and social work    Discharged Condition: stable    Disposition: Home or Self CareDC locally    Follow Up: See above    Patient Instructions:      WALKER FOR HOME USE     Order Specific Question Answer Comments   Type of Walker: Adult (5'4"-6'6")    With wheels? Yes    Height: 5' 10" (1.778 m)    Weight: 62.4 kg (137 lb 9.1 oz)    Length of need (1-99 months): 6    Does patient have medical equipment at home? none    Please check all that apply: Patient's condition impairs ambulation.    Please check all that apply: Walker will be used for gait training.    Please check all that apply: Patient is unable to safely ambulate without equipment.      Ambulatory referral/consult to Home Health   Standing Status: Future   Referral Priority: Routine Referral Type: Home Health Care   Referral Reason: Specialty Services Required   Requested Specialty: Home Health Services   Number of Visits Requested: 1     Diet Adult Regular     Notify your health care provider if you experience any of the following:  increased confusion or weakness     Notify your health care provider if you experience any of the following:  persistent dizziness, light-headedness, or visual disturbances     Notify your health care provider if you experience any of the following:  worsening rash     Notify your health care provider if you experience any of the following:  severe persistent headache     Notify your health care provider if you experience any of the following:  difficulty breathing or increased cough     Notify your health care " provider if you experience any of the following:  redness, tenderness, or signs of infection (pain, swelling, redness, odor or green/yellow discharge around incision site)     Notify your health care provider if you experience any of the following:  severe uncontrolled pain     Notify your health care provider if you experience any of the following:  persistent nausea and vomiting or diarrhea     Notify your health care provider if you experience any of the following:  temperature >100.4     Activity as tolerated     Medications:  Transfer Medications (for Discharge Readmit only):   Current Facility-Administered Medications   Medication Dose Route Frequency Provider Last Rate Last Admin    0.9%  NaCl infusion (for blood administration)   Intravenous Q24H PRN Berenice Castro NP        acetaminophen tablet 650 mg  650 mg Oral Q6H PRN Rosa Spring DNP        albuterol inhaler 2 puff  2 puff Inhalation Q6H PRN Berenice Castro NP   2 puff at 09/09/23 1631    dextrose 10% bolus 125 mL 125 mL  12.5 g Intravenous PRN Martin Granado PA-C        dextrose 10% bolus 250 mL 250 mL  25 g Intravenous PRN Martin Granado, SHAE        docusate sodium capsule 100 mg  100 mg Oral TID Kip Denis MD   100 mg at 09/14/23 0909    famotidine tablet 20 mg  20 mg Oral Nightly Kip Denis MD   20 mg at 09/13/23 2016    fluconazole tablet 400 mg  400 mg Oral Daily Berenice Castro NP   400 mg at 09/14/23 0909    heparin (porcine) injection 5,000 Units  5,000 Units Subcutaneous Q8H Tai Prieto MD   5,000 Units at 09/14/23 0525    hydrALAZINE injection 10 mg  10 mg Intravenous Q6H PRN Berenice Castro NP        hydrOXYzine HCL tablet 25 mg  25 mg Oral Nightly PRN Marcella Allen MD   25 mg at 09/13/23 2242    LORazepam tablet 1 mg  1 mg Oral Q8H PRN Rosa Spring DNP   1 mg at 09/14/23 0909    mycophenolate capsule 1,000 mg  1,000 mg Oral BID Kip Denis MD   1,000 mg at  09/14/23 0912    oxyCODONE immediate release tablet 5 mg  5 mg Oral Q4H PRN Kip Denis MD   5 mg at 09/14/23 0925    oxyCODONE immediate release tablet Tab 10 mg  10 mg Oral Q4H PRN Luh Gonzalez MD   10 mg at 09/13/23 1817    polyethylene glycol packet 17 g  17 g Oral Daily Marcella Allen MD   17 g at 09/12/23 0800    potassium chloride SA CR tablet 40 mEq  40 mEq Oral Q4H Isabelle Haley PA-C   40 mEq at 09/14/23 0912    predniSONE tablet 20 mg  20 mg Oral Daily Tai Prieto MD   20 mg at 09/14/23 0909    sodium bicarbonate tablet 1,300 mg  1,300 mg Oral BID Berenice Castro NP   1,300 mg at 09/14/23 0909    sodium chloride 0.65 % nasal spray 1 spray  1 spray Each Nostril PRN Berenice Castro NP        sodium chloride 0.9% flush 10 mL  10 mL Intravenous PRN Tai Prieto MD        sulfamethoxazole-trimethoprim 400-80mg per tablet 1 tablet  1 tablet Oral Daily AM Tai Prieto MD   1 tablet at 09/14/23 0909    tacrolimus capsule 3 mg  3 mg Oral BID Khai Santiago MD   3 mg at 09/14/23 0909    traZODone tablet 50 mg  50 mg Oral QHS Khai Santiago MD   50 mg at 09/13/23 2016    [START ON 9/17/2023] valGANciclovir tablet 450 mg  450 mg Oral Daily Tai Prieto MD         Time spent caring for patient (Greater than 1/2 spent in direct face-to-face contact): < 30 minutes    Isabelle Haley PA-C  Liver Transplant  [facility]

## 2023-09-13 NOTE — ASSESSMENT & PLAN NOTE
- encouraged oral intake   - Boost supplements TID  - cont. Regular ADA diet  - dietician consulted    Moderate malnutrition  Malnutrition Type:  Context: acute illness or injury  Level: moderate     Related to (etiology):   Alcoholic cirrhosis of liver with ascites      Signs and Symptoms (as evidenced by):   Mild to moderate fat/muscle wasting      Malnutrition Characteristic Summary:  Subcutaneous Fat (Malnutrition): moderate depletion  Muscle Mass (Malnutrition): moderate depletion  Fluid Accumulation (Malnutrition):  (ascites)        Interventions/Recommendations (treatment strategy):  1.) Recommend continuing with Low na diet as tolerated, fluid per MD. 2.) Recommend continuing Boost Plus daily to help optimize PRO/Kcal intake. 3.) RD to monitor wt, PO intake, skin, labs.     Nutrition Diagnosis Status:   Continue

## 2023-09-13 NOTE — PLAN OF CARE
Pt remains AAO x 4 with VSS, afebrile, sats upper 90s on RA, HR 80-90s, -140/90s throughout shift  Chief complaint of incisional pain and anxiety relieved with PRN oxy and Ativan.   [place] CHANEL with staples - cleaned with Betadine this shift. 2 old RLQ HOOD sites with gauze/tape covering - CDI  R FA 20g - CDI, saline locked  Plan for liver US today 9/13  LFT trending down, Tbili 6.7. K 3.0, Phos 1.7 - PO replaced and scheduled K Phos started  Coordinator and pharmacy teaching done - plan to d/c to apartments later this week. SM pulled 100% by patient and brother - box updated and stocked this shift  NS/ST on tele monitor - HR 80-100s  CBG ACHS - last  - no SSI indicated  Avasys and Bed alarm in use - Pt up standby assist- PT/OT working with patient, voids in urinal, LBM 9/12  ADA diet   Pt remains free from falls and injuries, call light in reach, bed in lowest position, nonskid socks on when OOB, side rails up x2  Will continue to monitor

## 2023-09-13 NOTE — PROGRESS NOTES
[facility] - Transplant Stepdown  Liver Transplant  Progress Note    Patient Name: Sukhdeep Grimes  MRN: [id number]  Admission Date: 8/24/2023  Hospital Length of Stay: 20 days  Code Status: Full Code  Primary Care Provider: No, Primary Doctor  Post-Operative Day: 6    ORGAN:   LIVER  Disease Etiology: Alcohol-Associated Cirrhosis Without Acute Alcohol-Associated Hepatitis  Donor Type:   Donation after Brain Death  CDC High Risk:   No  Donor CMV Status:   Donor CMV Status: Positive  Donor HBcAB:   Negative  Donor HCV Status:   Negative  Donor HBV GOGO:   Donor HCV GOGO: Negative  Whole or Partial: Whole Liver  Biliary Anastomosis: End to End  Arterial Anatomy: Standard  Subjective:     History of Present Illness:  Sukhdeep Grimes is a 38 y.o. male  PMHx of Alcoholic decompensated cirrhosis. Pt was admitted from OSH  for hepatology evaluation  D/T ascites, hyponatremia, hypokalemia, thrombocytopenia, and AYAH and weakness. On 9/1 patient was made active on transplant list MELD 30       Hospital Course:  Patient is now s/p DBD liver transplant on 9/7/2023 (CMV D+/R-).  Surgery notable for hematoma at the upper surface of the right lobe which was opened and managed with surgicel, surgiflo and fibrillar. POD#1 Liver US stable. Stepped down to TSU POD#2. All drains out as of 9/11.     Interval History: No adverse events overnight. Incisional pain and anxiety relieved with PRN med. Patient reports that he feels good today. Endorses moderate anxiety. Mental Status is improving. AAO x 4, sat > 97%. Encouraged him to speak with Psychiatry/Therapy and rely on family support. Patient agrees. Encouraged to take Lorazepam PRN during the day. Taking Lorazepam and Trazodone nightly. Liver Enzymes trending down. Liver US today. Endorses Pruritis on incision sites, no visible rash, skin changes. Given Hydroxyzine with relief. Cont. Hold Lasix, monitor hypervolemia. ID following Fungitell. 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 Tolerating regular diet. Denies N/V/D. Input and output adequate. + BM, passing flatus. Patient is ambulating. PT/OT following, recommended outpt PT/OT. Pain well controlled. Discharge postponed today for possible fever 101.5. RN reports at that time, patient was working with PT and had a sweatshirt on. Temp rechecked ~3 minutes later and was 99.1. Infectious work up sent and decision made to monitor patient inpatient for another day.             Scheduled Meds:   docusate sodium  100 mg Oral TID    famotidine  20 mg Oral Nightly    fluconazole  400 mg Oral Daily    heparin (porcine)  5,000 Units Subcutaneous Q8H    k phos di & mono-sod phos mono  2 tablet Oral TID    LIDOcaine HCL 10 mg/ml (1%)  10 mL Other Once    mycophenolate  1,000 mg Oral BID    polyethylene glycol  17 g Oral Daily    predniSONE  20 mg Oral Daily    sodium bicarbonate  1,300 mg Oral BID    [START ON 9/14/2023] sulfamethoxazole-trimethoprim 400-80mg  1 tablet Oral Daily AM    tacrolimus  2 mg Oral BID    traZODone  50 mg Oral QHS    [START ON 9/17/2023] valGANciclovir  450 mg Oral Daily     Continuous Infusions:  PRN Meds:0.9%  NaCl infusion (for blood administration), 0.9%  NaCl infusion (for blood administration), acetaminophen, albuterol, dextrose 10%, dextrose 10%, hydrALAZINE, hydrOXYzine HCL, LORazepam, oxyCODONE, oxyCODONE, sodium chloride, sodium chloride 0.9%    Review of Systems   Constitutional:  Positive for activity change (decreased), appetite change (improving) and fatigue. Negative for chills and fever.   HENT:  Negative for congestion and trouble swallowing.    Eyes:  Negative for visual disturbance.   Respiratory:  Positive for cough. Negative for shortness of breath and wheezing.    Cardiovascular:  Positive for leg swelling. Negative for chest pain.   Gastrointestinal:  Positive for abdominal distention. Negative for abdominal pain, constipation, diarrhea, nausea and vomiting.   Endocrine:  Negative.    Genitourinary:  Negative for decreased urine volume, difficulty urinating, dysuria, hematuria and urgency.   Musculoskeletal:  Negative for arthralgias.   Skin:  Positive for color change (jaundice) and wound. Negative for pallor and rash.   Allergic/Immunologic: Positive for immunocompromised state.   Neurological:  Negative for dizziness, tremors, seizures, syncope, weakness and headaches.   Hematological:  Does not bruise/bleed easily.   Psychiatric/Behavioral:  Negative for agitation, confusion, decreased concentration, sleep disturbance and suicidal ideas. The patient is nervous/anxious and is hyperactive.      Objective:     Vital Signs (Most Recent):  Temp: 99.1 °F (37.3 °C) (09/13/23 1115)  Pulse: 98 (09/13/23 1110)  Resp: 18 (09/13/23 1110)  BP: 133/85 (09/13/23 1110)  SpO2: 98 % (09/13/23 1110) Vital Signs (24h Range):  Temp:  [98.5 °F (36.9 °C)-101.5 °F (38.6 °C)] 99.1 °F (37.3 °C)  Pulse:  [] 98  Resp:  [16-20] 18  SpO2:  [97 %-100 %] 98 %  BP: (131-147)/(85-96) 133/85     Weight: 62.4 kg (137 lb 9.1 oz)  Body mass index is 19.74 kg/m².    Intake/Output - Last 3 Shifts         09/11 0700  09/12 0659 09/12 0700  09/13 0659 09/13 0700  09/14 0659    P.O. 840 1644     I.V. (mL/kg)  0 (0)     Other  0     Total Intake(mL/kg) 840 (12.5) 1644 (26.3)     Urine (mL/kg/hr) 3350 (2.1) 1150 (0.8) 300 (1)    Emesis/NG output 0 0     Drains 50      Other 0 0     Stool 0 0     Blood 0 0     Total Output 3400 1150 300    Net -2560 +494 -300           Urine Occurrence 0 x 8 x     Stool Occurrence 2 x 2 x     Emesis Occurrence 0 x 0 x              Physical Exam  Vitals and nursing note reviewed.   Constitutional:       General: He is not in acute distress.     Appearance: He is well-developed. He is not diaphoretic.      Comments: (+) hand and temporal muscle wasting noted     HENT:      Head: Normocephalic and atraumatic.      Mouth/Throat:      Pharynx: No oropharyngeal exudate.   Eyes:      General:  Scleral icterus present.      Conjunctiva/sclera: Conjunctivae normal.      Pupils: Pupils are equal, round, and reactive to light.   Neck:      Thyroid: No thyromegaly.   Cardiovascular:      Rate and Rhythm: Normal rate and regular rhythm.      Heart sounds: Normal heart sounds. No murmur heard.  Pulmonary:      Effort: Pulmonary effort is normal. No respiratory distress.      Breath sounds: Examination of the right-lower field reveals decreased breath sounds. Examination of the left-lower field reveals decreased breath sounds. Decreased breath sounds present. No wheezing or rales (diminished).   Abdominal:      General: Bowel sounds are normal. There is distension.      Tenderness: There is no abdominal tenderness. There is no guarding.      Comments: Chevron incision w staples w serosang drainage from right end  Old HOOD sites CDI w/ sutures, SS drg.   Genitourinary:     Penis: Normal.    Musculoskeletal:         General: Normal range of motion.      Cervical back: Normal range of motion and neck supple.      Right lower le+ Edema (trace) present.      Left lower le+ Edema (trace) present.   Skin:     General: Skin is warm and dry.      Capillary Refill: Capillary refill takes 2 to 3 seconds.      Findings: No erythema.   Neurological:      Mental Status: He is alert and oriented to person, place, and time.   Psychiatric:      Comments: Anxious, hyperactive but overall improved        Laboratory:  Immunosuppressants           Stop Route Frequency     tacrolimus capsule 2 mg         -- Oral 2 times daily     mycophenolate capsule 1,000 mg         -- Oral 2 times daily          CBC:   Recent Labs   Lab 23  0547   WBC 10.51   RBC 2.46*   HGB 7.9*   HCT 23.7*   PLT 32*   MCV 96   MCH 32.1*   MCHC 33.3     BMP:   Recent Labs   Lab 23  0548   GLU 82   *   K 3.2*      CO2 19*   BUN 22*   CREATININE 0.8   CALCIUM 7.1*     CMP:   Recent Labs   Lab 23  0548   GLU 82   CALCIUM 7.1*    ALBUMIN 2.4*   PROT 4.6*   *   K 3.2*   CO2 19*      BUN 22*   CREATININE 0.8   ALKPHOS 84   *   AST 67*   BILITOT 7.7*     Labs within the past 24 hours have been reviewed.    Diagnostic Results:  XR Chest: Results for orders placed during the hospital encounter of 08/24/23    X-Ray Chest 1 View    Narrative  EXAMINATION:  XR CHEST 1 VIEW    CLINICAL HISTORY:  Fever;    TECHNIQUE:  Single frontal view of the chest was performed.    COMPARISON:  09/07/2023    FINDINGS:  Cardiac size normal lungs are clear and no infiltrate is identified.    Impression  See above      Electronically signed by: Onel Meneses MD  Date:    09/13/2023  Time:    12:06    US Liver Transplant Post:  Results for orders placed during the hospital encounter of 08/24/23    US Doppler Liver Transplant Post (xpd)    Narrative  EXAMINATION:  US DOPPLER LIVER TRANSPLANT POST (XPD)    CLINICAL HISTORY:  Assess perfusion following liver transplant;    TECHNIQUE:  Realtime sonographic imaging was performed with color Doppler interrogation.    COMPARISON:  MRI abdomen with and without contrast 08/28/2023, CT abdomen pelvis with contrast 08/25/2023.    FINDINGS:  Patient is status post orthotopic liver transplant day 1.    The liver transplant demonstrates no focal parenchymal abnormality. No intra or extrahepatic bile duct dilatation. The common duct measures 4 mm.    The middle, right, and left hepatic veins are patent and unremarkable. The main, right, and left branches of the portal vein demonstrate hepatopetal flow and are unremarkable.    Hepatic arterial resistive indices are as follows: Main 0.73, Left 0.73, anterior Right 0.67, posterior Right 0.67.  There is no evidence of a tardus parvus waveform. The maximum velocity in the main hepatic artery is 76 cm/sec.    The IVC is unremarkable. There is no peritransplant fluid.    Trace right pleural effusion.    Impression  Satisfactory Doppler evaluation of the liver  transplant.    Trace right pleural effusion.    COMMUNICATION  This critical result was discovered/received at 08:20.  The critical information above was relayed directly by me by epic secure chat to Dr. Paulino On 09/08/2023 at 08:30.      Electronically signed by: Darrion Spring  Date:    09/08/2023  Time:    08:48    Debility/Functional status: Patient debilitated by evidence of Muscle wasting and atrophy, Weakness, and Other reduced mobility. Physical and occupational therapy ordered daily to evaluate and treat. Debility was: present on admission.          Assessment/Plan:     * Liver transplanted  - s/p DBD OLTxp 9/7/23, surgery notable for hematoma  - POD#1 Liver US satisfactory --> Liver US today  - transferred to TSU POD#2 w medial drain and central line  - liver enzymes trending down  - working on anxiety and orientation, start Trazodone nightly, Encourage Ativan during the day.  - family at bedside, cont OOB and IS  - lasix 40 mg x 1 on 9/11, all drains removed   - tolerating regular diet, no N/V, + BM, passing flatus, ambulating, PT/OT following   - Fever 101.5, ~3 min later was 99.1, suspect not true fever. Infectious work up pending.         Candidiasis  - Elevated fungitell at OSH prior to admission  - ID consulted  - Fungitell 261 on 8/21->150 on 9/8.    - continue fluc and monitor fungitell weekly until not detectable      At risk for opportunistic infections  - continue valcyte for CMV prophylaxis for 6 months (CMV D+/R-)  - continue bactrim for PJP prophylaxis for 6 months  - Bactrim and valcyte to start on POD#10 or at discharge  - Fungitell + at 150 on 9/8, re-consulted ID, possible Invasive Candidiasis, cont. fluconazole 400 mg qD until (-) Fungitell.    Long-term use of immunosuppressant medication  - Maintenance IS with prograf, cellcept, and prednisone taper. cont to check tacrolimus level daily. Assess for toxicity and adjust level as needed      Adrenal cortical steroids causing adverse  effect in therapeutic use  - endo consulted and following, apprec recs      Prophylactic immunotherapy  - See long-term use of immunosuppressant medication        Steroid-induced hyperglycemia  - endo consulted and following  - d/c BG monitoring        Metabolic acidosis  - on PO sodium bicarb  - sodium bicarb restarted post transplant 9/9/23, Monitor  - improving     Thrombocytopenia, unspecified  - r/t ESLD  - anticipate will improve post transplant   - plt down trending. Cont to monitor.      Moderate malnutrition  - encouraged oral intake   - Boost supplements TID  - cont. Regular ADA diet  - dietician consulted    Moderate malnutrition  Malnutrition Type:  Context: acute illness or injury  Level: moderate     Related to (etiology):   Alcoholic cirrhosis of liver with ascites      Signs and Symptoms (as evidenced by):   Mild to moderate fat/muscle wasting      Malnutrition Characteristic Summary:  Subcutaneous Fat (Malnutrition): moderate depletion  Muscle Mass (Malnutrition): moderate depletion  Fluid Accumulation (Malnutrition):  (ascites)        Interventions/Recommendations (treatment strategy):  1.) Recommend continuing with Low na diet as tolerated, fluid per MD. 2.) Recommend continuing Boost Plus daily to help optimize PRO/Kcal intake. 3.) RD to monitor wt, PO intake, skin, labs.     Nutrition Diagnosis Status:   Continue        VTE Risk Mitigation (From admission, onward)         Ordered     heparin (porcine) injection 5,000 Units  Every 8 hours         09/07/23 2117     IP VTE HIGH RISK PATIENT  Once         09/07/23 2117                The patients clinical status was discussed at multidisplinary rounds, involving transplant surgery, transplant medicine, pharmacy, nursing, nutrition, and social work    Discharge Planning: not stable for dc at this time. Possible dc tomorrow.     Medical decision making for this encounter includes review of pertinent labs and diagnostic studies, assessment and  planning, discussions with consulting providers, discussion with patient/family, and participation in multidisciplinary rounds. Time spent caring for patient: 60 minutes    Rosa Spring DNP  Liver Transplant  [facility] - Transplant Stepdown

## 2023-09-13 NOTE — ASSESSMENT & PLAN NOTE
- continue valcyte for CMV prophylaxis for 6 months (CMV D+/R-)  - continue bactrim for PJP prophylaxis for 6 months  - Bactrim and valcyte to start on POD#10 or at discharge  - Fungitell + at 150 on 9/8, re-consulted ID, possible Invasive Candidiasis, cont. fluconazole 400 mg qD until (-) Fungitell.

## 2023-09-13 NOTE — ASSESSMENT & PLAN NOTE
- Elevated fungitell at OSH prior to admission  - ID consulted  - Fungitell 261 on 8/21->150 on 9/8.    - continue fluc and monitor fungitell weekly until not detectable

## 2023-09-14 VITALS
HEART RATE: 102 BPM | WEIGHT: 138.44 LBS | DIASTOLIC BLOOD PRESSURE: 72 MMHG | SYSTOLIC BLOOD PRESSURE: 135 MMHG | OXYGEN SATURATION: 98 % | BODY MASS INDEX: 19.82 KG/M2 | RESPIRATION RATE: 18 BRPM | HEIGHT: 70 IN | TEMPERATURE: 99 F

## 2023-09-14 DIAGNOSIS — Z94.4 LIVER REPLACED BY TRANSPLANT: Primary | ICD-10-CM

## 2023-09-14 LAB
ALBUMIN SERPL BCP-MCNC: 2.2 G/DL (ref 3.5–5.2)
ALP SERPL-CCNC: 86 U/L (ref 55–135)
ALT SERPL W/O P-5'-P-CCNC: 117 U/L (ref 10–44)
ANION GAP SERPL CALC-SCNC: 10 MMOL/L (ref 8–16)
AST SERPL-CCNC: 42 U/L (ref 10–40)
BACTERIA SPEC ANAEROBE CULT: NORMAL
BASOPHILS # BLD AUTO: 0.02 K/UL (ref 0–0.2)
BASOPHILS NFR BLD: 0.2 % (ref 0–1.9)
BILIRUB SERPL-MCNC: 8 MG/DL (ref 0.1–1)
BUN SERPL-MCNC: 14 MG/DL (ref 6–20)
CALCIUM SERPL-MCNC: 7.3 MG/DL (ref 8.7–10.5)
CHLORIDE SERPL-SCNC: 103 MMOL/L (ref 95–110)
CO2 SERPL-SCNC: 22 MMOL/L (ref 23–29)
CREAT SERPL-MCNC: 0.7 MG/DL (ref 0.5–1.4)
DIFFERENTIAL METHOD: ABNORMAL
EOSINOPHIL # BLD AUTO: 0.8 K/UL (ref 0–0.5)
EOSINOPHIL NFR BLD: 9.5 % (ref 0–8)
ERYTHROCYTE [DISTWIDTH] IN BLOOD BY AUTOMATED COUNT: 17.9 % (ref 11.5–14.5)
EST. GFR  (NO RACE VARIABLE): >60 ML/MIN/1.73 M^2
GLUCOSE SERPL-MCNC: 92 MG/DL (ref 70–110)
HCT VFR BLD AUTO: 21.9 % (ref 40–54)
HGB BLD-MCNC: 7.3 G/DL (ref 14–18)
IMM GRANULOCYTES # BLD AUTO: 0.23 K/UL (ref 0–0.04)
IMM GRANULOCYTES NFR BLD AUTO: 2.7 % (ref 0–0.5)
LYMPHOCYTES # BLD AUTO: 0.9 K/UL (ref 1–4.8)
LYMPHOCYTES NFR BLD: 10.2 % (ref 18–48)
MAGNESIUM SERPL-MCNC: 1.7 MG/DL (ref 1.6–2.6)
MCH RBC QN AUTO: 32 PG (ref 27–31)
MCHC RBC AUTO-ENTMCNC: 33.3 G/DL (ref 32–36)
MCV RBC AUTO: 96 FL (ref 82–98)
MONOCYTES # BLD AUTO: 1.3 K/UL (ref 0.3–1)
MONOCYTES NFR BLD: 15 % (ref 4–15)
NEUTROPHILS # BLD AUTO: 5.2 K/UL (ref 1.8–7.7)
NEUTROPHILS NFR BLD: 62.4 % (ref 38–73)
NRBC BLD-RTO: 0 /100 WBC
PHOSPHATE SERPL-MCNC: 3.5 MG/DL (ref 2.7–4.5)
PLATELET # BLD AUTO: 47 K/UL (ref 150–450)
PMV BLD AUTO: 13 FL (ref 9.2–12.9)
POTASSIUM SERPL-SCNC: 3.1 MMOL/L (ref 3.5–5.1)
PROT SERPL-MCNC: 4.6 G/DL (ref 6–8.4)
RBC # BLD AUTO: 2.28 M/UL (ref 4.6–6.2)
SODIUM SERPL-SCNC: 135 MMOL/L (ref 136–145)
TACROLIMUS BLD-MCNC: 5.1 NG/ML (ref 5–15)
WBC # BLD AUTO: 8.4 K/UL (ref 3.9–12.7)

## 2023-09-14 PROCEDURE — 25000003 PHARM REV CODE 250: Performed by: NURSE PRACTITIONER

## 2023-09-14 PROCEDURE — 85025 COMPLETE CBC W/AUTO DIFF WBC: CPT | Performed by: STUDENT IN AN ORGANIZED HEALTH CARE EDUCATION/TRAINING PROGRAM

## 2023-09-14 PROCEDURE — 84100 ASSAY OF PHOSPHORUS: CPT

## 2023-09-14 PROCEDURE — 83735 ASSAY OF MAGNESIUM: CPT

## 2023-09-14 PROCEDURE — 80053 COMPREHEN METABOLIC PANEL: CPT | Performed by: STUDENT IN AN ORGANIZED HEALTH CARE EDUCATION/TRAINING PROGRAM

## 2023-09-14 PROCEDURE — 25000003 PHARM REV CODE 250: Performed by: PHYSICIAN ASSISTANT

## 2023-09-14 PROCEDURE — 25000003 PHARM REV CODE 250: Performed by: STUDENT IN AN ORGANIZED HEALTH CARE EDUCATION/TRAINING PROGRAM

## 2023-09-14 PROCEDURE — 63600175 PHARM REV CODE 636 W HCPCS: Performed by: STUDENT IN AN ORGANIZED HEALTH CARE EDUCATION/TRAINING PROGRAM

## 2023-09-14 PROCEDURE — 36415 COLL VENOUS BLD VENIPUNCTURE: CPT

## 2023-09-14 PROCEDURE — 63600175 PHARM REV CODE 636 W HCPCS: Performed by: TRANSPLANT SURGERY

## 2023-09-14 PROCEDURE — 99239 PR HOSPITAL DISCHARGE DAY,>30 MIN: ICD-10-PCS | Mod: 24,,, | Performed by: PHYSICIAN ASSISTANT

## 2023-09-14 PROCEDURE — 99239 HOSP IP/OBS DSCHRG MGMT >30: CPT | Mod: 24,,, | Performed by: PHYSICIAN ASSISTANT

## 2023-09-14 PROCEDURE — 80197 ASSAY OF TACROLIMUS: CPT | Performed by: STUDENT IN AN ORGANIZED HEALTH CARE EDUCATION/TRAINING PROGRAM

## 2023-09-14 RX ORDER — TACROLIMUS 1 MG/1
3 CAPSULE ORAL 2 TIMES DAILY
Status: DISCONTINUED | OUTPATIENT
Start: 2023-09-14 | End: 2023-09-14 | Stop reason: HOSPADM

## 2023-09-14 RX ORDER — LORAZEPAM 0.5 MG/1
1 TABLET ORAL 2 TIMES DAILY PRN
Qty: 60 TABLET | Refills: 0 | Status: CANCELLED | OUTPATIENT
Start: 2023-09-14 | End: 2023-10-14

## 2023-09-14 RX ORDER — LORAZEPAM 0.5 MG/1
1 TABLET ORAL 2 TIMES DAILY PRN
Qty: 60 TABLET | Refills: 0 | Status: SHIPPED | OUTPATIENT
Start: 2023-09-14 | End: 2023-10-16 | Stop reason: SDUPTHER

## 2023-09-14 RX ORDER — POTASSIUM CHLORIDE 20 MEQ/1
40 TABLET, EXTENDED RELEASE ORAL EVERY 4 HOURS
Status: DISCONTINUED | OUTPATIENT
Start: 2023-09-14 | End: 2023-09-14 | Stop reason: HOSPADM

## 2023-09-14 RX ORDER — TACROLIMUS 1 MG/1
CAPSULE ORAL
Qty: 180 CAPSULE | Refills: 11 | Status: SHIPPED | OUTPATIENT
Start: 2023-09-14 | End: 2023-09-21

## 2023-09-14 RX ADMIN — FLUCONAZOLE 400 MG: 200 TABLET ORAL at 09:09

## 2023-09-14 RX ADMIN — OXYCODONE HYDROCHLORIDE 5 MG: 5 TABLET ORAL at 09:09

## 2023-09-14 RX ADMIN — POTASSIUM CHLORIDE 40 MEQ: 1500 TABLET, EXTENDED RELEASE ORAL at 02:09

## 2023-09-14 RX ADMIN — MYCOPHENOLATE MOFETIL 1000 MG: 250 CAPSULE ORAL at 09:09

## 2023-09-14 RX ADMIN — DOCUSATE SODIUM 100 MG: 100 CAPSULE, LIQUID FILLED ORAL at 09:09

## 2023-09-14 RX ADMIN — LORAZEPAM 1 MG: 0.5 TABLET ORAL at 03:09

## 2023-09-14 RX ADMIN — OXYCODONE HYDROCHLORIDE 10 MG: 10 TABLET ORAL at 03:09

## 2023-09-14 RX ADMIN — PREDNISONE 20 MG: 20 TABLET ORAL at 09:09

## 2023-09-14 RX ADMIN — TACROLIMUS 3 MG: 1 CAPSULE ORAL at 09:09

## 2023-09-14 RX ADMIN — SULFAMETHOXAZOLE AND TRIMETHOPRIM 1 TABLET: 400; 80 TABLET ORAL at 09:09

## 2023-09-14 RX ADMIN — SODIUM BICARBONATE 1300 MG: 650 TABLET ORAL at 09:09

## 2023-09-14 RX ADMIN — HEPARIN SODIUM 5000 UNITS: 5000 INJECTION INTRAVENOUS; SUBCUTANEOUS at 05:09

## 2023-09-14 RX ADMIN — DIBASIC SODIUM PHOSPHATE, MONOBASIC POTASSIUM PHOSPHATE AND MONOBASIC SODIUM PHOSPHATE 2 TABLET: 852; 155; 130 TABLET ORAL at 09:09

## 2023-09-14 RX ADMIN — LORAZEPAM 1 MG: 0.5 TABLET ORAL at 09:09

## 2023-09-14 RX ADMIN — POTASSIUM CHLORIDE 40 MEQ: 1500 TABLET, EXTENDED RELEASE ORAL at 09:09

## 2023-09-14 NOTE — PROGRESS NOTES
Patient dcd per md order. Vitals stable. No acute distress noted. Patient complained of pain and anxiety prior to dc, prns given. Patient given medications and updated blue card and notified of appts. Patient aware of care of incision and given gauze if incision becomes leaky. Patient gave full verbal understanding on all written and verbal dc instructions. Patient wheeled down to the [email or web address] car.

## 2023-09-14 NOTE — PROGRESS NOTES
Discharge Note:    SW met with pt and pt's brother, Nitesh, in room to discuss discharge plan. [facility] & PT are scheduled to begin on 9/15/2023. Pt's brother, Nitesh, plans to fly back to [place] tomorrow and will  pt's other brother, Yung, from the airport tonsukh and Yung will remain with pt at [place] apartments for recovery period with other brothers and mother flying in periodically.     Pt's brother, Nitesh, has checked into [place] apartments already and prepared apartment for pt. Currently, Nitesh has rental car; however, Nitesh plans to return rental car when flying home tomorrow. Transportation plan during recovery is for pt and brother, Yung, to use Uber/Lyft to get to appointments. SW advised Nitesh assist pt with practicing calling Uber/Lyft, pt and brother agreed.     SW confirmed [facility] will deliver walker to pt's room before discharge. Pt and brother repeatedly asked if pt could have wheelchair. SW informed pt, per [facility], a wheelchair is available for purchase only for $480, pt reports cost is too expensive.     SW and pt discussed ways of managing pt's anxiety and reaching out to team with any questions. SW, pt, and brother reviewed local mental health providers. Pt plans to begin calling local therapists and would like to establish care ASAP. Pt reports pharmacy discussed delivering pt's anxiety medication to bedside before discharge.     SW and pt discussed pt's sobriety plan. Pt has not engaged in AA meetings since transplant and plans to begin again after discharge. Pt and SW discussed pt engaging in IOP as soon as able. Pt highly motivated to maintain sobriety and has good support with family.     Pt aware of ways to contact team and agrees to reach out if needed. No other needs at this time.    Debbie Lee LMSW

## 2023-09-14 NOTE — PLAN OF CARE
Pt remains AAO x 4 with VSS, afebrile, sats upper 90s on RA, HR 80-90s, -140/90s throughout shift  Chief complaint of itching and anxiety relieved with PRN Atarax and Ativan.   Chevron CHANEL with staples - cleaned with Betadine this shift. 2 old RLQ HOOD sites with gauze/tape covering - CDI  R FA 20g - CDI, saline locked  Blood cultures NGTD from 9/13, UA WNL, Liver US done 9/13  LFT trending down, Tbili 7.7. K 3.2 and Phos 2.6- PO replaced and scheduled K Phos given  Coordinator and pharmacy teaching done - plan to d/c to apartments. SM pulled 100% by patient and brother - box updated and stocked this shift  NS/ST on tele monitor - HR 80-100s  Pt up standby assist, independent- PT/OT working with patient, voids in urinal, LBM 9/13  Regular diet   Pt remains free from falls and injuries, call light in reach, bed in lowest position, nonskid socks on when OOB, side rails up x2  Will continue to monitor

## 2023-09-14 NOTE — PROGRESS NOTES
DISCHARGE NOTE:    Sukhdeep Grimes is a 38 y.o. male s/p   LIVER   Donation after Brain Death transplant on 9/7/2023 (Liver) for ESLD secondary to Alcohol-Associated Cirrhosis Without Acute Alcohol-Associated Hepatitis.      Past Medical History:   Diagnosis Date    Alcoholic cirrhosis of liver with ascites     Alcoholic cirrhosis of liver with ascites 8/1/2023    Last Assessment & Plan:  Formatting of this note might be different from the original. Looking better today compared to last week in regards to his energy. I also reviewed his visit with Dr. Stanford from last week. Sent in [facility], appears Dr. Stanford meant to do this. He would like to change to [facility] team, so will place that referral today. Send in referral to [facility] access hospital. 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 Patient is very anxious-- being treated with lorazepam.  Had a fever the day before dc, however was a one-time read, extended stay x 1 day, attributed to PT session and wearing heavy clothing-- Cx negative and remained afebrile.  Did have a positive fungitell prior to transplant-- fluconazole 400 mg QD was initiated; Repeat fungitell post-transplant remained positive,  Plan to continue fluconazole per ID until Fungitell is negative;     Allergies: Review of patient's allergies indicates:  No Known Allergies    Patient Pharmacy: [facility]-- will need to transfer to a local pharmacy when retuning to [place]    Discharge Medications:     Medication List        START taking these medications      famotidine 20 MG tablet  Commonly known as: PEPCID  Take 1 tablet (20 mg total) by mouth nightly.     fluconazole 200 MG Tab  Commonly known as: DIFLUCAN  Take 2 tablets (400 mg total) by mouth once daily.      hydrOXYzine HCL 25 MG tablet  Commonly known as: ATARAX  Take 1 tablet (25 mg total) by mouth 3 (three) times daily as needed for Anxiety (or Sleep).     LORazepam 0.5 MG tablet  Commonly known as: ATIVAN  Take 2 tablets (1 mg total) by mouth 2 (two) times daily as needed for Anxiety.     mycophenolate 250 mg Cap  Commonly known as: CELLCEPT  Take 4 capsules (1,000 mg total) by mouth 2 (two) times daily.     oxyCODONE 5 MG immediate release tablet  Commonly known as: ROXICODONE  Take 1 tablet (5 mg total) by mouth every 4 (four) hours as needed for Pain.     polyethylene glycol 17 gram Pwpk  Commonly known as: GLYCOLAX  Take 17 g by mouth once daily.     predniSONE 5 MG tablet  Commonly known as: DELTASONE  Take by mouth daily:  20mg 9/13-9/19, 15mg 9/20-9/26, 10mg 9/27-10/3, 5mg 10/4-10/6. STOP 10/7/23     sodium bicarbonate 650 MG tablet  Take 2 tablets (1,300 mg total) by mouth 2 (two) times daily.     sulfamethoxazole-trimethoprim 400-80mg 400-80 mg per tablet  Commonly known as: BACTRIM,SEPTRA  Take 1 tablet by mouth every morning. STOP 3/8/24     tacrolimus 1 MG Cap  Commonly known as: PROGRAF  Take 3 capsules (3 mg total) by mouth once daily AND 3 capsules (3 mg total) every evening.     traZODone 50 MG tablet  Commonly known as: DESYREL  Take 1 tablet (50 mg total) by mouth every evening.     valGANciclovir 450 mg Tab  Commonly known as: VALCYTE  Take 1 tablet (450 mg total) by mouth once daily. STOP 3/8/24  Start taking on: September 17, 2023            CONTINUE taking these medications      VENTOLIN HFA 90 mcg/actuation inhaler  Generic drug: albuterol            STOP taking these medications      potassium chloride 10 MEQ Tbsr  Commonly known as: KLOR-CON               Where to Get Your Medications        These medications were sent to [facility] [address] [address]      Hours: Mon-Fri 7a-7p, Sat-Sun 10a-4p Phone: [phone]   famotidine 20 MG  tablet  fluconazole 200 MG Tab  hydrOXYzine HCL 25 MG tablet  LORazepam 0.5 MG tablet  mycophenolate 250 mg Cap  oxyCODONE 5 MG immediate release tablet  predniSONE 5 MG tablet  sodium bicarbonate 650 MG tablet  sulfamethoxazole-trimethoprim 400-80mg 400-80 mg per tablet  tacrolimus 1 MG Cap  traZODone 50 MG tablet  valGANciclovir 450 mg Tab       You can get these medications from any pharmacy    You don't need a prescription for these medications  polyethylene glycol 17 gram Pwpk          Pharmacy Interventions/Recommendations:  1) Transplant Immunosuppression: Induction methylprednisolone, and maintenance tac/MMF/prednisone    2) Opportunistic Infection prophylaxis: Valcyte x 6 months (mismatch), Bactrim x 6 months, fluconazole 400 mg daily per ID-- until fungitell is negative    3) Osteoporosis Prevention measures (liver txp): no osteopenia/porosis, needs to start Oscal D    4) Patient Counseling/Education: Demonstrated the use of the BP cuff, thermometer.    5) Follow-Up/Discharge Needs:  ID follow-up for positive Fungitell, initiate Oscal D, MONITOR K-- required a large amount of replacement inpatient; start aspirin when platelets are >70    6) Patient Assistance Information: n/a    7) The following medications have been placed on HOLD and should be restarted in the outpatient setting (when appropriate): aspirin, Oscal Sukhdeep ALBERT and his caregiver verbalized their understanding and had the opportunity to ask questions.

## 2023-09-14 NOTE — PHYSICIAN QUERY
PT Name: Sukhdeep Grimes  MR #: [id number]    DOCUMENTATION CLARIFICATION     CDS: Brandy Capley, RN  Email: [email or web address]       This form is a permanent document in the medical record.     Query Date: September 14, 2023    Dear Provider,  By submitting this query, we are merely seeking further clarification of documentation. Please utilize your independent clinical judgment when addressing the question(s) below.    The medical record contains the following:  Supporting Clinical Findings Location in Medical Record     Principal Procedure Performed: Orthotopic Liver Transplant  (whole liver, DBD donor, biliary reconstruction end to end donor cbd to recipient common bile duct)    Arterial inflow was provided to the graft by anastomosing the recipient right-left hepatic branch patch to the donor  common hepatic artery using 6-0 polypropylene. The liver was assessed for adequacy of perfusion, which was satisfactory. The gallbladder was then removed from the allograft liver, and a temporary packing period was carried out to help assure hemostatis.      There was a hematoma at the upper surface of the right lobe which was opened and managed with surgicel, surgiflo and fibrillar     Op note filed 9/7 9825   Procedure(s) Performed:  Back Table Preparation of Liver with needle biopsy        The remnant of the diaphragm was dissected away.  The phrenic veins and adrenal vein were identified and ligated or sutured as appropriate.  The portal vein and hepatic artery were mobilized toward the hilum of the liver, and extrahepatic branches were ligated.  No vascular reconstruction was required.  The vessels were tested for leaks.  A needle biopsy was obtained for permanent section histology using a spring-loaded biopsy device.    Op note filed 9/7 2325       Please clarify if __hematoma__ (as it relates to_liver transplant) is:      [  ] Complication of the procedure   [  x] Present, but not a complication of the procedure      [  ] Other (please specify): __________________     [  ] Clinically Undetermined         Please document in your progress notes daily for the duration of treatment until resolved and include in your discharge summary.

## 2023-09-14 NOTE — PROGRESS NOTES
Met with patient and his brother, Nitesh,  in preparation for discharge today.  Reviewed their answers to the questions in My New Journey.  All questions were answered correctly.  These questions cover: post op care, medication management, infection prevention and emergency contacts.  Outpatient coordinator assigned. Outpatient appointments reviewed.  Allowed time for questions and answers.

## 2023-09-15 ENCOUNTER — CLINICAL SUPPORT (OUTPATIENT)
Dept: TRANSPLANT | Facility: CLINIC | Age: 38
End: 2023-09-15
Payer: COMMERCIAL

## 2023-09-15 ENCOUNTER — LAB VISIT (OUTPATIENT)
Dept: LAB | Facility: HOSPITAL | Age: 38
End: 2023-09-15
Attending: SURGERY
Payer: COMMERCIAL

## 2023-09-15 ENCOUNTER — SOCIAL WORK (OUTPATIENT)
Dept: TRANSPLANT | Facility: CLINIC | Age: 38
End: 2023-09-15
Payer: COMMERCIAL

## 2023-09-15 VITALS
WEIGHT: 131.63 LBS | BODY MASS INDEX: 18.85 KG/M2 | WEIGHT: 131.63 LBS | DIASTOLIC BLOOD PRESSURE: 93 MMHG | HEIGHT: 70 IN | SYSTOLIC BLOOD PRESSURE: 138 MMHG | HEART RATE: 117 BPM | DIASTOLIC BLOOD PRESSURE: 93 MMHG | BODY MASS INDEX: 18.85 KG/M2 | HEIGHT: 70 IN | OXYGEN SATURATION: 100 % | TEMPERATURE: 98 F | TEMPERATURE: 98 F | RESPIRATION RATE: 18 BRPM | HEART RATE: 117 BPM | OXYGEN SATURATION: 100 % | RESPIRATION RATE: 18 BRPM | SYSTOLIC BLOOD PRESSURE: 138 MMHG

## 2023-09-15 DIAGNOSIS — Z94.4 LIVER REPLACED BY TRANSPLANT: Primary | ICD-10-CM

## 2023-09-15 DIAGNOSIS — Z94.4 LIVER TRANSPLANTED: ICD-10-CM

## 2023-09-15 DIAGNOSIS — Z94.4 LIVER REPLACED BY TRANSPLANT: ICD-10-CM

## 2023-09-15 LAB
ALBUMIN SERPL BCP-MCNC: 2.5 G/DL (ref 3.5–5.2)
ALP SERPL-CCNC: 94 U/L (ref 55–135)
ALT SERPL W/O P-5'-P-CCNC: 103 U/L (ref 10–44)
ANION GAP SERPL CALC-SCNC: 10 MMOL/L (ref 8–16)
AST SERPL-CCNC: 37 U/L (ref 10–40)
BASOPHILS # BLD AUTO: 0.04 K/UL (ref 0–0.2)
BASOPHILS NFR BLD: 0.3 % (ref 0–1.9)
BILIRUB DIRECT SERPL-MCNC: 5.3 MG/DL (ref 0.1–0.3)
BILIRUB SERPL-MCNC: 6.7 MG/DL (ref 0.1–1)
BUN SERPL-MCNC: 12 MG/DL (ref 6–20)
CALCIUM SERPL-MCNC: 8.1 MG/DL (ref 8.7–10.5)
CHLORIDE SERPL-SCNC: 107 MMOL/L (ref 95–110)
CO2 SERPL-SCNC: 19 MMOL/L (ref 23–29)
CREAT SERPL-MCNC: 0.8 MG/DL (ref 0.5–1.4)
DIFFERENTIAL METHOD: ABNORMAL
EOSINOPHIL # BLD AUTO: 0.9 K/UL (ref 0–0.5)
EOSINOPHIL NFR BLD: 7.5 % (ref 0–8)
ERYTHROCYTE [DISTWIDTH] IN BLOOD BY AUTOMATED COUNT: 18.2 % (ref 11.5–14.5)
EST. GFR  (NO RACE VARIABLE): >60 ML/MIN/1.73 M^2
GLUCOSE SERPL-MCNC: 104 MG/DL (ref 70–110)
HCT VFR BLD AUTO: 24.1 % (ref 40–54)
HGB BLD-MCNC: 8.2 G/DL (ref 14–18)
IMM GRANULOCYTES # BLD AUTO: 0.37 K/UL (ref 0–0.04)
IMM GRANULOCYTES NFR BLD AUTO: 3.2 % (ref 0–0.5)
LYMPHOCYTES # BLD AUTO: 1.2 K/UL (ref 1–4.8)
LYMPHOCYTES NFR BLD: 10.4 % (ref 18–48)
MCH RBC QN AUTO: 31.7 PG (ref 27–31)
MCHC RBC AUTO-ENTMCNC: 34 G/DL (ref 32–36)
MCV RBC AUTO: 93 FL (ref 82–98)
MONOCYTES # BLD AUTO: 1.4 K/UL (ref 0.3–1)
MONOCYTES NFR BLD: 12.3 % (ref 4–15)
NEUTROPHILS # BLD AUTO: 7.8 K/UL (ref 1.8–7.7)
NEUTROPHILS NFR BLD: 66.3 % (ref 38–73)
NRBC BLD-RTO: 0 /100 WBC
PLATELET # BLD AUTO: 79 K/UL (ref 150–450)
PMV BLD AUTO: 11.9 FL (ref 9.2–12.9)
POTASSIUM SERPL-SCNC: 3.8 MMOL/L (ref 3.5–5.1)
PROT SERPL-MCNC: 5.2 G/DL (ref 6–8.4)
RBC # BLD AUTO: 2.59 M/UL (ref 4.6–6.2)
SODIUM SERPL-SCNC: 136 MMOL/L (ref 136–145)
TACROLIMUS BLD-MCNC: 6.5 NG/ML (ref 5–15)
WBC # BLD AUTO: 11.67 K/UL (ref 3.9–12.7)

## 2023-09-15 PROCEDURE — 99999 PR PBB SHADOW E&M-EST. PATIENT-LVL III: CPT | Mod: PBBFAC,,,

## 2023-09-15 PROCEDURE — 99999 PR PBB SHADOW E&M-EST. PATIENT-LVL III: ICD-10-PCS | Mod: PBBFAC,,,

## 2023-09-15 PROCEDURE — 85025 COMPLETE CBC W/AUTO DIFF WBC: CPT | Performed by: SURGERY

## 2023-09-15 PROCEDURE — 36415 COLL VENOUS BLD VENIPUNCTURE: CPT | Performed by: SURGERY

## 2023-09-15 PROCEDURE — 80053 COMPREHEN METABOLIC PANEL: CPT | Performed by: SURGERY

## 2023-09-15 PROCEDURE — 80197 ASSAY OF TACROLIMUS: CPT | Performed by: SURGERY

## 2023-09-15 PROCEDURE — 82248 BILIRUBIN DIRECT: CPT | Performed by: SURGERY

## 2023-09-15 RX ORDER — TACROLIMUS 1 MG/1
CAPSULE ORAL
Qty: 240 CAPSULE | Refills: 11 | Status: CANCELLED | OUTPATIENT
Start: 2023-09-15

## 2023-09-15 NOTE — PROGRESS NOTES
1ST NURSING VISIT POST DISCHARGE NOTE    1st RN appointment with Sukhdeep Grimes post discharge 9/14/23 s/p liver transplant 9/7/23.  Patient's brother Nitesh and brother Yung accompanied him.  Upon assessment, patient complains of  intermittent leaking at drain site Rt.side below abdominal incision, with some bruising /redness noted, no current drainage Patient and brother Nitesh report that area less red than yesterday, they said.  Instructions given to monitor the area and call back if redness increases or develops any symptoms such as fever , or change in drainage (blood / pus)  .  Incision intact with staples.  Patient said that he is able to explain daily incision care and showering instructions.  Medication list and rationale were reviewed.  Patient states  did bring blue medication card and medication bottles for review.  Self-assessment reviewed with patient and brothers; time allowed for questions.  Patient expressed understanding of daily care including BID VS and medications. Brother Yung reported he has read transplant guide book My New Journey and feels he understands contents ; asked and answered questions appropriately.  Patient aware that nurse will review today's labs with a transplant physician and call with any does changes indicated.  Next labs and first post-operative transplant team appointment with labs scheduled for 9/19/23.

## 2023-09-15 NOTE — TELEPHONE ENCOUNTER
Labs reviewed by ; results ok.  Per VORB : increase Prograf dose to 4mg bid. Repeat labs on Monday.     Spoke to patient with above instructions, he was able to repeat instructions and reported he changed it on his blue medicine card.

## 2023-09-15 NOTE — TELEPHONE ENCOUNTER
----- Message from Celi Mckeon sent at 9/15/2023  3:41 PM CDT -----  Regarding: report readings/need BP monitor for home  Contact: Mendez BANKS   Home health Nurse called to report for patient    Initial readings for patient  BP - 156/86   HR -  110   Temp - 100.6     Second reading (2 hours later)    BP - 140/90  HR - 108  Temp - 100     Provided blood pressure monitor that didn't work they returned it but were not given a replacement      Mendez ([facility] JOCELYN)  @# [phone]

## 2023-09-15 NOTE — PROGRESS NOTES
Clinic Note: First Return to Clinic Post-  Liver Transplant    Sukhdeep Grimes  is a 38 y.o. 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      Discharge Course (Issues/Concerns):     39 yo  male OLT 9/7/23 secondary to EtOH; CMV +/-  Post-op course uncomplicated. Bilirubin and LFTs continue down trending appropriately. Discharge delayed by 1 day given c/f infection. Believed to be an erroneous low grade fever. On fluconazole per ID recs for elevated fungitell. Patient was anxious while inpatient but appears to be much calmer now that he is discharged and in the clinic setting. Continue lorazepam prn.     During clinic visit, we went over the blue card, medications, and side effects with patient's brother, Jd, who was unable to attend the inpatient education. Reinforced medication education conducted in the hospital, including medication indications, dosing, administration, side effects, monitoring-- including timing of immunosuppressant levels.  Patient and caretakers, Nitesh and Jd, expressed understanding and confidence with medication adherence.       Pharmacy Interventions/Recommendations:     Immunosuppression   Tacrolimus   Discharged on 3 mg BID. Tac level 6.5 ng/mL on day of clinic visit. Dose increased to 4 mg BID. Repeat labs on Monday 9/18/23. Goal level: 8-10 ng/mL   Mycophenolate 1000 mg BID   Prednisone per taper. Stop on 10/7/23   Can change pepcid to PRN once prednisone taper is complete.     Opportunistic Infection Prophylaxis   PJP Prophylaxis   Sulfamethoxazole/Trimethoprim SS daily for 6 months until 3/8/24   CMV Prophylaxis (+/- high risk)  Valganciclovir 450 mg daily for 6 months until 3/8/24  Fungal Prophylaxis   Patient had positive fungitell prior to transplant. Continue fluconazole 400 mg daily until fungitell negative or per ID recommendations. Will check fungitell weekly. ID follow up  appointment on 9/28/23   Please ensure appropriate tac level follow up when fluconazole is discontinued as fluconazole may increase tac. Once stopped, may need higher tac dose.   Anxiety   Continued home lorazepam. Patient discharged with lorazepam 1 mg BID prn anxiety.   Hydroxyzine 25 mg TID prn anxiety or itching. May cause drowsiness.    Encourage patient to follow up with psychiatry outpatient. May benefit from a daily SSRI for anxiety management.      Pain Management and Bowel Regimen   Pain well controlled, encouraged cutting oxycodone in half if desired and supplementing with acetaminophen (tylenol). Ensure less than 3000 mg daily of tylenol if needed   No ibuprofen or NSAID use   Pt having normal bowel movements. Recommend only using miralax as needed as patient had 2 stools prior to clinic visit.     Blood Pressure Control   Well controlled.138/93 mmHg in clinic. Not on any antihypertensive therapy at this time. Continue to monitor   Of note, patient and caretakers noticed that the blood pressure cuff given at discharge was reading much higher than his actual BP (off by 20+ mmHg per pt). New cuff available at pharmacy for patient to . Additionally thermometer reading elevated. Cross checked with hospital thermometer. Home thermometer read 100.1 and hospital thermometer read 98.3, recommend a margin of error if thermometer continues to be elevated, call for temperature > 101.5 Will attempt to get another thermometer for patient as well given the discrepancies.     Blood Sugar Control  Well controlled     Bone Health Maintenance   No vitamin D level or DEXA scan on file. Recommend obtaining as he is now outpatient.   Recommend starting calcium carbonate 600 mg/vitamin D 800 mg (Oscal D) combo pill daily. Will order and send to outpatient pharmacy. Patient can  and initiate on Monday (9/18/23) when he comes for labs.     Electrolyte management   Bicarb still low at 19 mmol/L. Continue sodium  bicarbonate 1300 mg BID. If continues to be low, may need to increase to TID. Continue to eat a well rounded diet with ample fruits and vegetables.   Hypokalemia   Post-transplant, potassium was repeatedly in the low 3's. On day of discharge, K was 3.1 mmol/L. Patient given PO KCL 40 mEq x 3 doses. At clinic, K had improved to 3.8 mmol/L, likely secondary to oral replacements. To limit polypharmacy, no supplementation was ordered at discharged. Given improvement to 3.8 mmol/L, no oral supplementation needed at this time; however, recommend continuing to eat a potassium rich diet (bananas, avocados, tomatoes, potatoes)   Low threshold to start K supplementation   Hypomagnesemia and hypophosphatemia   No supplementation required at this time, but recommend continue to encourage mag rich foods (spinach, cashews, almonds, whole grains) and phos rich foods (fish, yogurt/dairy, chicken, beans)     Hepatic Artery Thrombosis Prevention   Platelets continue to trend up and now > 70  Recommend starting aspirin 81 mg daily. [facility] Monday ([facility]/18/23)     Vaccines   No live vaccines ever   Patient received the following vaccines on 8/27/23: Covid vaccine, Tdap, Hep A (1/2), HepB (1/2)   No vaccines for the first month post transplant   Recommend the following schedule   Hep A vaccine (2/2): around or shortly after 2/27/24   Hep B vaccine (2/2): after 1 month post-transplant (10/7/23 or shortly after)   Shingrix (1/2): after 1 month post-transplant (10/7/23 or after) with second dose 2-6 months after   Annual flu vaccine: after 1 month post-transplant (10/7/23 or shortly after)     Follow up   aspirin, krishna/vitD, BP cuff +/- thermometer  K rich foods    ID   Psych follow up/new patient estabishment   DEXA/vit D levels   Vaccines         Patient received their FIRST fill of medications from [facility].  When patient transitions back home [place], will need to set up  a local pharmacy there or national mail order pharmacy for medications. Discussed the process for obtaining refills of medications, including verifying the dose and mailing address to have medications delivered.       Objective:   Vitals:    09/15/23 1027   BP: (!) 138/93   Pulse: (!) 117   Resp: 18   Temp: 97.7 °F (36.5 °C)       Met with patient and his caregiver in the clinic to review current medication list.     Current Outpatient Medications   Medication Sig Dispense Refill    albuterol (VENTOLIN HFA) 90 mcg/actuation inhaler Inhale 2 puffs into the lungs every 6 (six) hours as needed for Wheezing. Rescue      famotidine (PEPCID) 20 MG tablet Take 1 tablet (20 mg total) by mouth nightly. 30 tablet 0    fluconazole (DIFLUCAN) 200 MG Tab Take 2 tablets (400 mg total) by mouth once daily. 60 tablet 1    hydrOXYzine HCL (ATARAX) 25 MG tablet Take 1 tablet (25 mg total) by mouth 3 (three) times daily as needed for Anxiety (or Sleep). 50 tablet 0    LORazepam (ATIVAN) 0.5 MG tablet Take 2 tablets (1 mg total) by mouth 2 (two) times daily as needed for Anxiety. 60 tablet 0    mycophenolate (CELLCEPT) 250 mg Cap Take 4 capsules (1,000 mg total) by mouth 2 (two) times daily. 240 capsule 2    oxyCODONE (ROXICODONE) 5 MG immediate release tablet Take 1 tablet (5 mg total) by mouth every 4 (four) hours as needed for Pain. 40 tablet 0    polyethylene glycol (GLYCOLAX) 17 gram PwPk Take 17 g by mouth once daily.  0    predniSONE (DELTASONE) 5 MG tablet Take by mouth daily:  20mg 9/13-9/19, 15mg 9/20-9/26, 10mg 9/27-10/3, 5mg 10/4-10/6. STOP 10/7/23 70 tablet 0    sodium bicarbonate 650 MG tablet Take 2 tablets (1,300 mg total) by mouth 2 (two) times daily. 120 tablet 11    sulfamethoxazole-trimethoprim 400-80mg (BACTRIM,SEPTRA) 400-80 mg per tablet Take 1 tablet by mouth every morning. STOP 3/8/24 30 tablet 5    tacrolimus (PROGRAF) 1 MG Cap Take 3 capsules (3 mg total) by mouth once daily AND 3 capsules (3 mg total) every  evening. 180 capsule 11    traZODone (DESYREL) 50 MG tablet Take 1 tablet (50 mg total) by mouth every evening. 30 tablet 11    [START ON 9/17/2023] valGANciclovir (VALCYTE) 450 mg Tab Take 1 tablet (450 mg total) by mouth once daily. STOP 3/8/24 30 tablet 5     No current facility-administered medications for this visit.         Sukhdeep and his caregivers verbalized understanding and had the opportunity to ask questions.

## 2023-09-16 RX ORDER — ACETAMINOPHEN 500 MG
1 TABLET ORAL DAILY
Qty: 30 TABLET | Refills: 11 | Status: SHIPPED | OUTPATIENT
Start: 2023-09-16

## 2023-09-16 RX ORDER — ASPIRIN 81 MG/1
81 TABLET ORAL DAILY
Qty: 30 TABLET | Refills: 11 | Status: SHIPPED | OUTPATIENT
Start: 2023-09-16 | End: 2023-10-23 | Stop reason: SDUPTHER

## 2023-09-18 ENCOUNTER — LAB VISIT (OUTPATIENT)
Dept: LAB | Facility: HOSPITAL | Age: 38
End: 2023-09-18
Attending: SURGERY
Payer: COMMERCIAL

## 2023-09-18 ENCOUNTER — TELEPHONE (OUTPATIENT)
Dept: TRANSPLANT | Facility: CLINIC | Age: 38
End: 2023-09-18
Payer: COMMERCIAL

## 2023-09-18 DIAGNOSIS — Z94.4 LIVER TRANSPLANTED: ICD-10-CM

## 2023-09-18 DIAGNOSIS — Z94.4 LIVER REPLACED BY TRANSPLANT: Primary | ICD-10-CM

## 2023-09-18 DIAGNOSIS — Z94.4 LIVER REPLACED BY TRANSPLANT: ICD-10-CM

## 2023-09-18 LAB
ALBUMIN SERPL BCP-MCNC: 2.8 G/DL (ref 3.5–5.2)
ALP SERPL-CCNC: 99 U/L (ref 55–135)
ALT SERPL W/O P-5'-P-CCNC: 57 U/L (ref 10–44)
ANION GAP SERPL CALC-SCNC: 7 MMOL/L (ref 8–16)
AST SERPL-CCNC: 19 U/L (ref 10–40)
BACTERIA BLD CULT: NORMAL
BACTERIA BLD CULT: NORMAL
BASOPHILS # BLD AUTO: 0.13 K/UL (ref 0–0.2)
BASOPHILS NFR BLD: 1.1 % (ref 0–1.9)
BILIRUB DIRECT SERPL-MCNC: 3.2 MG/DL (ref 0.1–0.3)
BILIRUB SERPL-MCNC: 4.2 MG/DL (ref 0.1–1)
BUN SERPL-MCNC: 14 MG/DL (ref 6–20)
CALCIUM SERPL-MCNC: 8.7 MG/DL (ref 8.7–10.5)
CHLORIDE SERPL-SCNC: 109 MMOL/L (ref 95–110)
CO2 SERPL-SCNC: 21 MMOL/L (ref 23–29)
CREAT SERPL-MCNC: 0.7 MG/DL (ref 0.5–1.4)
DIFFERENTIAL METHOD: ABNORMAL
EOSINOPHIL # BLD AUTO: 0.4 K/UL (ref 0–0.5)
EOSINOPHIL NFR BLD: 3.4 % (ref 0–8)
ERYTHROCYTE [DISTWIDTH] IN BLOOD BY AUTOMATED COUNT: 17.9 % (ref 11.5–14.5)
EST. GFR  (NO RACE VARIABLE): >60 ML/MIN/1.73 M^2
GLUCOSE SERPL-MCNC: 100 MG/DL (ref 70–110)
HCT VFR BLD AUTO: 24.2 % (ref 40–54)
HGB BLD-MCNC: 8 G/DL (ref 14–18)
IMM GRANULOCYTES # BLD AUTO: 0.33 K/UL (ref 0–0.04)
IMM GRANULOCYTES NFR BLD AUTO: 2.7 % (ref 0–0.5)
LYMPHOCYTES # BLD AUTO: 1.4 K/UL (ref 1–4.8)
LYMPHOCYTES NFR BLD: 11.1 % (ref 18–48)
MCH RBC QN AUTO: 32 PG (ref 27–31)
MCHC RBC AUTO-ENTMCNC: 33.1 G/DL (ref 32–36)
MCV RBC AUTO: 97 FL (ref 82–98)
MONOCYTES # BLD AUTO: 1.3 K/UL (ref 0.3–1)
MONOCYTES NFR BLD: 10.5 % (ref 4–15)
NEUTROPHILS # BLD AUTO: 8.7 K/UL (ref 1.8–7.7)
NEUTROPHILS NFR BLD: 71.2 % (ref 38–73)
NRBC BLD-RTO: 0 /100 WBC
PLATELET # BLD AUTO: 158 K/UL (ref 150–450)
PMV BLD AUTO: 10.8 FL (ref 9.2–12.9)
POTASSIUM SERPL-SCNC: 4 MMOL/L (ref 3.5–5.1)
PROT SERPL-MCNC: 5.7 G/DL (ref 6–8.4)
RBC # BLD AUTO: 2.5 M/UL (ref 4.6–6.2)
SODIUM SERPL-SCNC: 137 MMOL/L (ref 136–145)
TACROLIMUS BLD-MCNC: 5.5 NG/ML (ref 5–15)
WBC # BLD AUTO: 12.18 K/UL (ref 3.9–12.7)

## 2023-09-18 PROCEDURE — 80053 COMPREHEN METABOLIC PANEL: CPT | Performed by: SURGERY

## 2023-09-18 PROCEDURE — 87449 NOS EACH ORGANISM AG IA: CPT | Performed by: SURGERY

## 2023-09-18 PROCEDURE — 80197 ASSAY OF TACROLIMUS: CPT | Performed by: SURGERY

## 2023-09-18 PROCEDURE — 82248 BILIRUBIN DIRECT: CPT | Performed by: SURGERY

## 2023-09-18 PROCEDURE — 85025 COMPLETE CBC W/AUTO DIFF WBC: CPT | Performed by: SURGERY

## 2023-09-18 RX ORDER — TACROLIMUS 1 MG/1
CAPSULE ORAL
Qty: 300 CAPSULE | Refills: 11 | Status: CANCELLED | OUTPATIENT
Start: 2023-09-18

## 2023-09-18 NOTE — TELEPHONE ENCOUNTER
Patient notified and instructed to increase Prograf dose to 5mg twice a day, repeat labs due on 9/21/23. Patient able to repeat instructions and voiced understanding.

## 2023-09-19 ENCOUNTER — OFFICE VISIT (OUTPATIENT)
Dept: TRANSPLANT | Facility: CLINIC | Age: 38
End: 2023-09-19
Payer: COMMERCIAL

## 2023-09-19 ENCOUNTER — HOSPITAL ENCOUNTER (OUTPATIENT)
Dept: RADIOLOGY | Facility: HOSPITAL | Age: 38
Discharge: HOME | End: 2023-09-19
Attending: SURGERY
Payer: COMMERCIAL

## 2023-09-19 VITALS
WEIGHT: 131.38 LBS | OXYGEN SATURATION: 100 % | HEIGHT: 70 IN | DIASTOLIC BLOOD PRESSURE: 94 MMHG | HEART RATE: 96 BPM | TEMPERATURE: 97 F | SYSTOLIC BLOOD PRESSURE: 157 MMHG | RESPIRATION RATE: 18 BRPM | BODY MASS INDEX: 18.81 KG/M2

## 2023-09-19 DIAGNOSIS — Z94.4 S/P LIVER TRANSPLANT: Primary | ICD-10-CM

## 2023-09-19 DIAGNOSIS — Z51.81 ENCOUNTER FOR THERAPEUTIC DRUG MONITORING: ICD-10-CM

## 2023-09-19 DIAGNOSIS — Z79.60 LONG-TERM USE OF IMMUNOSUPPRESSANT MEDICATION: ICD-10-CM

## 2023-09-19 DIAGNOSIS — Z94.4 LIVER REPLACED BY TRANSPLANT: ICD-10-CM

## 2023-09-19 LAB
1,3 BETA GLUCAN SER-MCNC: 32 PG/ML
FINAL PATHOLOGIC DIAGNOSIS: NORMAL
FUNGITELL COMMENTS: NEGATIVE
GROSS: NORMAL
Lab: NORMAL

## 2023-09-19 PROCEDURE — 93976 VASCULAR STUDY: CPT | Mod: 26,,, | Performed by: RADIOLOGY

## 2023-09-19 PROCEDURE — 93976 VASCULAR STUDY: CPT | Mod: TC

## 2023-09-19 PROCEDURE — 99999 PR PBB SHADOW E&M-EST. PATIENT-LVL IV: ICD-10-PCS | Mod: PBBFAC,,,

## 2023-09-19 PROCEDURE — 76705 ECHO EXAM OF ABDOMEN: CPT | Mod: 59,TC

## 2023-09-19 PROCEDURE — 3008F BODY MASS INDEX DOCD: CPT | Mod: CPTII,S$GLB,, | Performed by: TRANSPLANT SURGERY

## 2023-09-19 PROCEDURE — 76705 US DOPPLER LIVER TRANSPLANT POST (XPD): ICD-10-PCS | Mod: 26,59,, | Performed by: RADIOLOGY

## 2023-09-19 PROCEDURE — 3080F PR MOST RECENT DIASTOLIC BLOOD PRESSURE >= 90 MM HG: ICD-10-PCS | Mod: CPTII,S$GLB,, | Performed by: TRANSPLANT SURGERY

## 2023-09-19 PROCEDURE — 3077F SYST BP >= 140 MM HG: CPT | Mod: CPTII,S$GLB,, | Performed by: TRANSPLANT SURGERY

## 2023-09-19 PROCEDURE — 3080F DIAST BP >= 90 MM HG: CPT | Mod: CPTII,S$GLB,, | Performed by: TRANSPLANT SURGERY

## 2023-09-19 PROCEDURE — 99215 OFFICE O/P EST HI 40 MIN: CPT | Mod: 24,S$GLB,, | Performed by: TRANSPLANT SURGERY

## 2023-09-19 PROCEDURE — 3077F PR MOST RECENT SYSTOLIC BLOOD PRESSURE >= 140 MM HG: ICD-10-PCS | Mod: CPTII,S$GLB,, | Performed by: TRANSPLANT SURGERY

## 2023-09-19 PROCEDURE — 99215 PR OFFICE/OUTPT VISIT, EST, LEVL V, 40-54 MIN: ICD-10-PCS | Mod: 24,S$GLB,, | Performed by: TRANSPLANT SURGERY

## 2023-09-19 PROCEDURE — 76705 ECHO EXAM OF ABDOMEN: CPT | Mod: 26,59,, | Performed by: RADIOLOGY

## 2023-09-19 PROCEDURE — 99999 PR PBB SHADOW E&M-EST. PATIENT-LVL IV: CPT | Mod: PBBFAC,,,

## 2023-09-19 PROCEDURE — 1111F DSCHRG MED/CURRENT MED MERGE: CPT | Mod: CPTII,S$GLB,, | Performed by: TRANSPLANT SURGERY

## 2023-09-19 PROCEDURE — 1159F MED LIST DOCD IN RCRD: CPT | Mod: CPTII,S$GLB,, | Performed by: TRANSPLANT SURGERY

## 2023-09-19 PROCEDURE — 3008F PR BODY MASS INDEX (BMI) DOCUMENTED: ICD-10-PCS | Mod: CPTII,S$GLB,, | Performed by: TRANSPLANT SURGERY

## 2023-09-19 PROCEDURE — 1159F PR MEDICATION LIST DOCUMENTED IN MEDICAL RECORD: ICD-10-PCS | Mod: CPTII,S$GLB,, | Performed by: TRANSPLANT SURGERY

## 2023-09-19 PROCEDURE — 1111F PR DISCHARGE MEDS RECONCILED W/ CURRENT OUTPATIENT MED LIST: ICD-10-PCS | Mod: CPTII,S$GLB,, | Performed by: TRANSPLANT SURGERY

## 2023-09-19 PROCEDURE — 93976 US DOPPLER LIVER TRANSPLANT POST (XPD): ICD-10-PCS | Mod: 26,,, | Performed by: RADIOLOGY

## 2023-09-19 NOTE — PROGRESS NOTES
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  The noted complications are well controlled.    Interval History: Currently, he is doing well.  Current complaints include edema and sleep disturbance.  Sukhdeep is here for management of his immunosuppression medication.    External provider notes reviewed: Yes    Review of Systems  Objective:     Physical Exam  Constitutional:       Appearance: He is well-developed.   HENT:      Head: Normocephalic and atraumatic.   Eyes:      Pupils: Pupils are equal, round, and reactive to light.   Neck:      Vascular: No JVD.   Cardiovascular:      Rate and Rhythm: Normal rate and regular rhythm.      Heart sounds: Normal heart sounds.   Pulmonary:      Effort: Pulmonary effort is normal.      Breath sounds: Normal breath sounds. No stridor.   Abdominal:      General: There is no distension.      Palpations: Abdomen is soft.      Tenderness: There is no abdominal tenderness.       Musculoskeletal:         General: Normal range of motion.      Right lower leg: Edema (mild) present.      Left lower leg: Edema (mild) present.   Skin:     General: Skin is warm and dry.   Neurological:      Mental Status: He is alert and  oriented to person, place, and time.   Psychiatric:         Behavior: Behavior normal.       Lab Results   Component Value Date    BILITOT 4.2 (H) 09/18/2023    AST 19 09/18/2023    ALT 57 (H) 09/18/2023    ALKPHOS 99 09/18/2023    CREATININE 0.7 09/18/2023    ALBUMIN 2.8 (L) 09/18/2023     Lab Results   Component Value Date    WBC 12.18 09/18/2023    HGB 8.0 (L) 09/18/2023    HCT 24.2 (L) 09/18/2023    HCT 21 (L) 09/08/2023     09/18/2023     Lab Results   Component Value Date    TACROLIMUS 5.5 09/18/2023       Diagnostics:  The following labs have been reviewed: CBC  CMP  INR  TACROLIMUS LEVEL  The following radiology images have been independently reviewed and interpreted: Liver US    Assessment/Plan:          S/P liver transplant.  Elevated HA velocity, repeating US today  Chronic immunosuppressive medications for rejection prophylaxis at target.  Plan: no adjustment needed.  Continue monitoring symptoms, labs and drug levels for drug-related toxicity and side effects.  Incision: staples in place; wound clean, dry, and intact  Femoral arterial line site: no complications evident    Additional testing to be completed according to Written Order Guidelines for Post-Liver and Combined Liver/Kidney Transplant Follow-up (LI-09)    Interpretation of tests and discussion of patient management involves all members of the multidisciplinary transplant team  Patient advised that it is recommended that all transplanted patients, and their close contacts and household members receive Covid vaccination.  Tanner Maciel Jr, MD       [facility] Patient Status  Functional Status: 70% - Cares for self: unable to carry on normal activity or active work  Physical Capacity: No Limitations

## 2023-09-20 ENCOUNTER — TELEPHONE (OUTPATIENT)
Dept: TRANSPLANT | Facility: CLINIC | Age: 38
End: 2023-09-20
Payer: COMMERCIAL

## 2023-09-20 ENCOUNTER — PATIENT MESSAGE (OUTPATIENT)
Dept: TRANSPLANT | Facility: CLINIC | Age: 38
End: 2023-09-20
Payer: COMMERCIAL

## 2023-09-20 DIAGNOSIS — Z94.4 LIVER TRANSPLANTED: ICD-10-CM

## 2023-09-20 DIAGNOSIS — R93.5 ABNORMAL FINDINGS ON DIAGNOSTIC IMAGING OF ABDOMEN: Primary | ICD-10-CM

## 2023-09-20 DIAGNOSIS — Z94.4 LIVER REPLACED BY TRANSPLANT: Primary | ICD-10-CM

## 2023-09-20 RX ORDER — TACROLIMUS 1 MG/1
5 CAPSULE ORAL EVERY 12 HOURS
Qty: 300 CAPSULE | Refills: 11 | Status: CANCELLED | OUTPATIENT
Start: 2023-09-20

## 2023-09-20 NOTE — TELEPHONE ENCOUNTER
Message rec'd. From  via Secure Chat: : u/s better, repeat in one week.     Patient notified and instructed via [facility]:    Per : Ultrasound is better, will repeat in one week. Thanks.

## 2023-09-21 ENCOUNTER — TELEPHONE (OUTPATIENT)
Dept: TRANSPLANT | Facility: CLINIC | Age: 38
End: 2023-09-21
Payer: COMMERCIAL

## 2023-09-21 ENCOUNTER — LAB VISIT (OUTPATIENT)
Dept: LAB | Facility: HOSPITAL | Age: 38
End: 2023-09-21
Attending: SURGERY
Payer: COMMERCIAL

## 2023-09-21 DIAGNOSIS — Z94.4 LIVER REPLACED BY TRANSPLANT: ICD-10-CM

## 2023-09-21 DIAGNOSIS — Z94.4 LIVER TRANSPLANTED: ICD-10-CM

## 2023-09-21 LAB
ALBUMIN SERPL BCP-MCNC: 2.9 G/DL (ref 3.5–5.2)
ALP SERPL-CCNC: 83 U/L (ref 55–135)
ALT SERPL W/O P-5'-P-CCNC: 31 U/L (ref 10–44)
ANION GAP SERPL CALC-SCNC: 6 MMOL/L (ref 8–16)
AST SERPL-CCNC: 15 U/L (ref 10–40)
BASOPHILS # BLD AUTO: 0.16 K/UL (ref 0–0.2)
BASOPHILS NFR BLD: 1.6 % (ref 0–1.9)
BILIRUB DIRECT SERPL-MCNC: 2.7 MG/DL (ref 0.1–0.3)
BILIRUB SERPL-MCNC: 3.6 MG/DL (ref 0.1–1)
BUN SERPL-MCNC: 14 MG/DL (ref 6–20)
CALCIUM SERPL-MCNC: 8.8 MG/DL (ref 8.7–10.5)
CHLORIDE SERPL-SCNC: 110 MMOL/L (ref 95–110)
CO2 SERPL-SCNC: 21 MMOL/L (ref 23–29)
CREAT SERPL-MCNC: 0.8 MG/DL (ref 0.5–1.4)
DIFFERENTIAL METHOD: ABNORMAL
EOSINOPHIL # BLD AUTO: 0.5 K/UL (ref 0–0.5)
EOSINOPHIL NFR BLD: 4.6 % (ref 0–8)
ERYTHROCYTE [DISTWIDTH] IN BLOOD BY AUTOMATED COUNT: 16.9 % (ref 11.5–14.5)
EST. GFR  (NO RACE VARIABLE): >60 ML/MIN/1.73 M^2
GLUCOSE SERPL-MCNC: 93 MG/DL (ref 70–110)
HCT VFR BLD AUTO: 25.9 % (ref 40–54)
HGB BLD-MCNC: 8.1 G/DL (ref 14–18)
IMM GRANULOCYTES # BLD AUTO: 0.14 K/UL (ref 0–0.04)
IMM GRANULOCYTES NFR BLD AUTO: 1.4 % (ref 0–0.5)
LYMPHOCYTES # BLD AUTO: 1.1 K/UL (ref 1–4.8)
LYMPHOCYTES NFR BLD: 11 % (ref 18–48)
MCH RBC QN AUTO: 31.4 PG (ref 27–31)
MCHC RBC AUTO-ENTMCNC: 31.3 G/DL (ref 32–36)
MCV RBC AUTO: 100 FL (ref 82–98)
MONOCYTES # BLD AUTO: 0.9 K/UL (ref 0.3–1)
MONOCYTES NFR BLD: 8.8 % (ref 4–15)
NEUTROPHILS # BLD AUTO: 7.3 K/UL (ref 1.8–7.7)
NEUTROPHILS NFR BLD: 72.6 % (ref 38–73)
NRBC BLD-RTO: 0 /100 WBC
PLATELET # BLD AUTO: 226 K/UL (ref 150–450)
PMV BLD AUTO: 10 FL (ref 9.2–12.9)
POTASSIUM SERPL-SCNC: 4.5 MMOL/L (ref 3.5–5.1)
PROT SERPL-MCNC: 5.7 G/DL (ref 6–8.4)
RBC # BLD AUTO: 2.58 M/UL (ref 4.6–6.2)
SODIUM SERPL-SCNC: 137 MMOL/L (ref 136–145)
TACROLIMUS BLD-MCNC: 6.7 NG/ML (ref 5–15)
WBC # BLD AUTO: 10.09 K/UL (ref 3.9–12.7)

## 2023-09-21 PROCEDURE — 80053 COMPREHEN METABOLIC PANEL: CPT | Performed by: SURGERY

## 2023-09-21 PROCEDURE — 80197 ASSAY OF TACROLIMUS: CPT | Performed by: SURGERY

## 2023-09-21 PROCEDURE — 85025 COMPLETE CBC W/AUTO DIFF WBC: CPT | Performed by: SURGERY

## 2023-09-21 PROCEDURE — 82248 BILIRUBIN DIRECT: CPT | Performed by: SURGERY

## 2023-09-21 PROCEDURE — 36415 COLL VENOUS BLD VENIPUNCTURE: CPT | Performed by: SURGERY

## 2023-09-21 RX ORDER — NIFEDIPINE 30 MG/1
30 TABLET, EXTENDED RELEASE ORAL DAILY
Qty: 30 TABLET | Refills: 11 | Status: CANCELLED | OUTPATIENT
Start: 2023-09-21 | End: 2024-09-20

## 2023-09-21 RX ORDER — NIFEDIPINE 30 MG/1
30 TABLET, EXTENDED RELEASE ORAL 2 TIMES DAILY
Qty: 60 TABLET | Refills: 11 | Status: CANCELLED | OUTPATIENT
Start: 2023-09-21 | End: 2024-09-20

## 2023-09-21 NOTE — TELEPHONE ENCOUNTER
----- Message from Andrey Solitario MD sent at 9/21/2023  3:34 PM CDT -----  Ok to wait until he sees me next week thank you!  ----- Message -----  From: Diya Walker  Sent: 9/21/2023  10:23 AM CDT  To: Tim Gonzalez MD; Andrey Solitario MD    Patient had repeat fungitell this week : came back at 35(reported as negative). Discharge plan was to stop the Diflucan once it became negative.  Should he stop it now or wait until he sees you next week; or repeat another one with his Monday labs?  Thanks.

## 2023-09-21 NOTE — TELEPHONE ENCOUNTER
Message received from ; can wait until he sees patient next week to evaluate for stopping Diflucan.

## 2023-09-21 NOTE — TELEPHONE ENCOUNTER
Labs reviewed by  with B/P readings received from patient and [facility] PT:  running 100's diastolic per patient report, PT reported B/P today of 148/108.   LFTs ok, no medicine changes made to immunos.   Will start Nifedipine 45mg bid . Repat labs due on Monday 9/25/23.    Patient notified and above instructions given. He was able to repeat instructions and voiced understanding.

## 2023-09-21 NOTE — TELEPHONE ENCOUNTER
----- Message from Haley Rogelio sent at 9/21/2023  2:52 PM CDT -----  Regarding: Refill / Patient advice  Contact: Pt [phone]            Name of Caller:  Sukhdeep     Contact Preference:  [phone]    Nature of Call:   Called to check on the status of medication being called in for high blood pressure          [facility] [address] [place] [address]  Phone: [phone] Fax: [phone]

## 2023-09-21 NOTE — TELEPHONE ENCOUNTER
Reviewed with : nifedipine not noted in formulary to come as 45mg ,  order changed to 30 mg per [facility]OR[facility] .

## 2023-09-22 ENCOUNTER — TELEPHONE (OUTPATIENT)
Dept: TRANSPLANT | Facility: CLINIC | Age: 38
End: 2023-09-22
Payer: COMMERCIAL

## 2023-09-22 NOTE — TELEPHONE ENCOUNTER
Update received from Yuniel with [facility]:      Called to give Pt updated status Started Nifedipine 30 mg last night said since taking it he urinated a lot no swelling noted to lower extremities no complaint of dizziness /82   Heart rate 120  Pt states he is feeling stronger and feels like he is doing better incision has no visible signs of infection

## 2023-09-22 NOTE — TELEPHONE ENCOUNTER
----- Message from Haley Mercado sent at 9/22/2023 10:59 AM CDT -----  Regarding: Patient advice  Contact: Yuniel [phone]          Name of Caller:  Yuniel with [facility]     Contact Preference:  [phone]     Nature of Call:   Called to give Pt updated status Started Nifedipine 30 mg last night said since taking it he urinated a lot no swelling noted to lower extremities no complaint of dizziness /82   Heart rate 120  Pt states he is feeling stronger and feels like he is doing better incision has no visible signs of infection Pt asking should he wait until tonight to take BP medication again Please contact Pt with medication question

## 2023-09-25 ENCOUNTER — HOSPITAL ENCOUNTER (OUTPATIENT)
Dept: RADIOLOGY | Facility: HOSPITAL | Age: 38
Discharge: HOME | End: 2023-09-25
Attending: SURGERY
Payer: COMMERCIAL

## 2023-09-25 ENCOUNTER — SOCIAL WORK (OUTPATIENT)
Dept: TRANSPLANT | Facility: CLINIC | Age: 38
End: 2023-09-25
Payer: COMMERCIAL

## 2023-09-25 ENCOUNTER — DOCUMENT SCAN (OUTPATIENT)
Dept: HOME HEALTH SERVICES | Facility: HOSPITAL | Age: 38
End: 2023-09-25
Payer: COMMERCIAL

## 2023-09-25 DIAGNOSIS — R93.5 ABNORMAL FINDINGS ON DIAGNOSTIC IMAGING OF ABDOMEN: ICD-10-CM

## 2023-09-25 DIAGNOSIS — Z94.4 LIVER TRANSPLANTED: ICD-10-CM

## 2023-09-25 DIAGNOSIS — Z94.4 LIVER REPLACED BY TRANSPLANT: Primary | ICD-10-CM

## 2023-09-25 PROCEDURE — 93976 US DOPPLER LIVER TRANSPLANT POST (XPD): ICD-10-PCS | Mod: 26,,, | Performed by: STUDENT IN AN ORGANIZED HEALTH CARE EDUCATION/TRAINING PROGRAM

## 2023-09-25 PROCEDURE — 76705 US DOPPLER LIVER TRANSPLANT POST (XPD): ICD-10-PCS | Mod: 26,59,, | Performed by: STUDENT IN AN ORGANIZED HEALTH CARE EDUCATION/TRAINING PROGRAM

## 2023-09-25 PROCEDURE — 76705 ECHO EXAM OF ABDOMEN: CPT | Mod: TC

## 2023-09-25 PROCEDURE — 76705 ECHO EXAM OF ABDOMEN: CPT | Mod: 26,59,, | Performed by: STUDENT IN AN ORGANIZED HEALTH CARE EDUCATION/TRAINING PROGRAM

## 2023-09-25 PROCEDURE — 93976 VASCULAR STUDY: CPT | Mod: 26,,, | Performed by: STUDENT IN AN ORGANIZED HEALTH CARE EDUCATION/TRAINING PROGRAM

## 2023-09-25 PROCEDURE — 93976 VASCULAR STUDY: CPT | Mod: TC

## 2023-09-25 RX ORDER — TACROLIMUS 1 MG/1
5 CAPSULE ORAL EVERY 12 HOURS
Qty: 300 CAPSULE | Refills: 11 | Status: SHIPPED | OUTPATIENT
Start: 2023-09-25 | End: 2023-09-25 | Stop reason: SDUPTHER

## 2023-09-25 NOTE — TELEPHONE ENCOUNTER
----- Message from Tim Gonzalez MD sent at 9/25/2023  2:14 PM CDT -----  Results ok. No action needed

## 2023-09-25 NOTE — PROGRESS NOTES
"Transplant SW met with patient in clinic for post transplant follow up. Patient presented alone and notes his brother, Jd, accompanied him to the hospital and is staying with him at [facility] as his caregiver. Patient was A&Ox4, pleasant, polite and communicative. Patient is coping adequately with support from family and reports his recovery is going well. Patient reported being placed on new blood pressure medication that has improved recent symptoms. Patient noted his staples may be removed tomorrow and he is hopeful to be able to get clearance to return home soon after. Patient plans to return to [place] to live with his mom, Maribel, for a period of time and has a doctor who will follow him there. Patient reported he likes the area and it is very calm and quiet. SW recommended it may be beneficial to stay in a calm and supportive environment so patient can take his time to heal both physically and mentally. Patient agreed and SW to assist in transferring any needed services when patient is cleared to return home. Patient currently has home health services for PT/SN with [facility] and may benefit from outpatient PT in the future.     SW also inquired about patient's plans for sobriety. Patient has been sober for about 3 months, and reported last alcohol use was around June/July 2023 when he was diagnosed with cirrhosis. Patient has history of heavy daily alcohol use, and history of rehab with relapse after. Patient was recommended to engage in AA meetings as well as IOP at time of transplant evaluation to mitigate risk factors for suitability. SW team has followed post transplant with continuing recommendations throughout hospital course and at discharge. Today, patient reported having dome some AA meetings at the beginning of evaluation but did not prefer it due to group members talking about negative experiences. Patient prefers to attend a program that is focused on "positive " "things" and moving forward. SW verbalized understanding, however, encouraged patient to try different groups or platforms such as [facility] and find a group that he may enjoy. SW also recommended patient join Transplant specific support groups. SW advised patient regarding importance of ongoing support to succeed with transplant, maintain sobriety, and prevent relapse. Patient is agreeable and reported motivation to abstain from alcohol lifelong. Patient is planning to attend a substance abuse program when he returns to [place] that his mom has found (patient was unsure of the name of the agency). SW encouraged patient to contact the program as soon as possible to anticipate start date. Patient is agreeable and thankful for assistance and support. SW to also provide patient with other IOP's in [facility] town and meet with patient again prior to him leaving for [place] to go over final plans.     Patient denies any other concerns or questions at this time. SW remains available and will continue to follow, providing psychosocial support, education and assistance as needed.                 "

## 2023-09-25 NOTE — TELEPHONE ENCOUNTER
Patient notified and instructed : labs reviewed by ; results ok, no changes made. Repeat labs due on Monday 10/2/23.  Also reviewed that u/s from today was reviewed by  : will repeat in one week. Patient voiced understanding.   RTC tomorrow afternoon as planned.

## 2023-09-26 ENCOUNTER — OFFICE VISIT (OUTPATIENT)
Dept: TRANSPLANT | Facility: CLINIC | Age: 38
End: 2023-09-26
Payer: COMMERCIAL

## 2023-09-26 ENCOUNTER — TELEPHONE (OUTPATIENT)
Dept: TRANSPLANT | Facility: CLINIC | Age: 38
End: 2023-09-26

## 2023-09-26 VITALS
HEIGHT: 70 IN | SYSTOLIC BLOOD PRESSURE: 147 MMHG | TEMPERATURE: 97 F | DIASTOLIC BLOOD PRESSURE: 87 MMHG | RESPIRATION RATE: 18 BRPM | HEART RATE: 114 BPM | BODY MASS INDEX: 17.46 KG/M2 | WEIGHT: 121.94 LBS | OXYGEN SATURATION: 100 %

## 2023-09-26 DIAGNOSIS — Z94.4 LIVER REPLACED BY TRANSPLANT: Primary | ICD-10-CM

## 2023-09-26 DIAGNOSIS — Z51.81 ENCOUNTER FOR THERAPEUTIC DRUG MONITORING: ICD-10-CM

## 2023-09-26 DIAGNOSIS — Z79.899 ENCOUNTER FOR LONG-TERM (CURRENT) USE OF OTHER MEDICATIONS: ICD-10-CM

## 2023-09-26 PROCEDURE — 3077F SYST BP >= 140 MM HG: CPT | Mod: CPTII,S$GLB,, | Performed by: TRANSPLANT SURGERY

## 2023-09-26 PROCEDURE — 3077F PR MOST RECENT SYSTOLIC BLOOD PRESSURE >= 140 MM HG: ICD-10-PCS | Mod: CPTII,S$GLB,, | Performed by: TRANSPLANT SURGERY

## 2023-09-26 PROCEDURE — 3079F DIAST BP 80-89 MM HG: CPT | Mod: CPTII,S$GLB,, | Performed by: TRANSPLANT SURGERY

## 2023-09-26 PROCEDURE — 99999 PR PBB SHADOW E&M-EST. PATIENT-LVL IV: ICD-10-PCS | Mod: PBBFAC,,,

## 2023-09-26 PROCEDURE — 3008F PR BODY MASS INDEX (BMI) DOCUMENTED: ICD-10-PCS | Mod: CPTII,S$GLB,, | Performed by: TRANSPLANT SURGERY

## 2023-09-26 PROCEDURE — 3079F PR MOST RECENT DIASTOLIC BLOOD PRESSURE 80-89 MM HG: ICD-10-PCS | Mod: CPTII,S$GLB,, | Performed by: TRANSPLANT SURGERY

## 2023-09-26 PROCEDURE — 99999 PR PBB SHADOW E&M-EST. PATIENT-LVL IV: CPT | Mod: PBBFAC,,,

## 2023-09-26 PROCEDURE — 99215 PR OFFICE/OUTPT VISIT, EST, LEVL V, 40-54 MIN: ICD-10-PCS | Mod: 24,S$GLB,, | Performed by: TRANSPLANT SURGERY

## 2023-09-26 PROCEDURE — 1111F PR DISCHARGE MEDS RECONCILED W/ CURRENT OUTPATIENT MED LIST: ICD-10-PCS | Mod: CPTII,S$GLB,, | Performed by: TRANSPLANT SURGERY

## 2023-09-26 PROCEDURE — 1159F PR MEDICATION LIST DOCUMENTED IN MEDICAL RECORD: ICD-10-PCS | Mod: CPTII,S$GLB,, | Performed by: TRANSPLANT SURGERY

## 2023-09-26 PROCEDURE — 99215 OFFICE O/P EST HI 40 MIN: CPT | Mod: 24,S$GLB,, | Performed by: TRANSPLANT SURGERY

## 2023-09-26 PROCEDURE — 3008F BODY MASS INDEX DOCD: CPT | Mod: CPTII,S$GLB,, | Performed by: TRANSPLANT SURGERY

## 2023-09-26 PROCEDURE — 1159F MED LIST DOCD IN RCRD: CPT | Mod: CPTII,S$GLB,, | Performed by: TRANSPLANT SURGERY

## 2023-09-26 PROCEDURE — 1111F DSCHRG MED/CURRENT MED MERGE: CPT | Mod: CPTII,S$GLB,, | Performed by: TRANSPLANT SURGERY

## 2023-09-26 RX ORDER — TACROLIMUS 1 MG/1
5 CAPSULE ORAL EVERY 12 HOURS
Qty: 300 CAPSULE | Refills: 11 | Status: SHIPPED | OUTPATIENT
Start: 2023-09-26 | End: 2023-10-02 | Stop reason: SINTOL

## 2023-09-26 RX ORDER — NIFEDIPINE 30 MG/1
30 TABLET, EXTENDED RELEASE ORAL DAILY
Qty: 30 TABLET | Refills: 11 | Status: SHIPPED | OUTPATIENT
Start: 2023-09-26 | End: 2023-09-26 | Stop reason: SINTOL

## 2023-09-26 NOTE — PROGRESS NOTES
STAPLE REMOVAL NOTE    Staples removed from liver transplant incision, steri-strips applied with Benzoin per Dr. Santiago's order.  Patient tolerated procedure well.  Skin dry and intact.  Patient instructed to shower with back to water spray and to pat dry incision and to let the steri-strips wear off on their own.  Patient instructed to report any redness, warmth, or drainage from incision to transplant coordinators.  All questions answered.

## 2023-09-26 NOTE — PROGRESS NOTES
Transplant Surgery  Liver Transplant Recipient Follow-up    Original Referring Physician: Tito Canales  Current Corresponding Physician: Tito Canales    Chief Complaint: Sukhdeep is here for follow up of his liver transplant performed 9/7/2023 for the primary diagnosis (UNOS) of Alcohol-Associated Cirrhosis Without Acute Alcohol-Associated Hepatitis    ORGAN: LIVER  Whole or Partial: whole liver  Donor Type: donation after brain death  PHS Increased Risk: no  Donor CMV Status: Positive  Donor HCV Status: Negative  Donor HBcAb: Negative  Donor HBV GOGO:   Donor HCV GOGO: Negative    Biliary Anastomosis: end to end  Arterial Anatomy: standard  IVC reconstruction: end to end ivc  Portal vein status: patent    Subjective:     History of Present Illness: He has had the following complications since transplant: none.  The noted complications are well controlled.    Interval History: Currently, he is doing well.  Current complaints include none.  Sukhdeep is here for management of his immunosuppression medication.    External provider notes reviewed: Yes    Review of Systems   Constitutional:  Negative for activity change and appetite change.   HENT:  Negative for congestion and tinnitus.    Eyes:  Negative for redness and visual disturbance.   Respiratory:  Negative for cough and shortness of breath.    Cardiovascular:  Negative for chest pain and palpitations.   Gastrointestinal:  Negative for abdominal distention and abdominal pain.   Endocrine: Negative for polydipsia and polyuria.   Genitourinary:  Negative for decreased urine volume and dysuria.   Musculoskeletal:  Negative for arthralgias and back pain.   Skin:  Negative for pallor and rash.   Allergic/Immunologic: Positive for immunocompromised state. Negative for environmental allergies.   Neurological:  Negative for tremors and headaches.   Hematological:  Negative for adenopathy. Does not bruise/bleed easily.   Psychiatric/Behavioral:  Negative for behavioral  problems and confusion.      Objective:     Physical Exam  Constitutional:       General: He is not in acute distress.     Appearance: He is well-developed. He is not diaphoretic.   HENT:      Head: Normocephalic.   Eyes:      General: No scleral icterus.  Neck:      Vascular: No JVD.   Cardiovascular:      Rate and Rhythm: Normal rate and regular rhythm.   Pulmonary:      Effort: Pulmonary effort is normal. No respiratory distress.   Abdominal:      Palpations: Abdomen is soft. There is no mass.      Tenderness: There is no guarding or rebound.   Musculoskeletal:         General: Normal range of motion.      Cervical back: Normal range of motion and neck supple.   Skin:     General: Skin is warm and dry.   Neurological:      Mental Status: He is alert and oriented to person, place, and time.   Psychiatric:         Behavior: Behavior normal.         Thought Content: Thought content normal.         Judgment: Judgment normal.       Lab Results   Component Value Date    BILITOT 3.2 (H) 09/25/2023    AST 12 09/25/2023    ALT 21 09/25/2023    ALKPHOS 93 09/25/2023    CREATININE 0.8 09/25/2023    ALBUMIN 3.4 (L) 09/25/2023     Lab Results   Component Value Date    WBC 8.47 09/25/2023    HGB 9.0 (L) 09/25/2023    HCT 28.0 (L) 09/25/2023    HCT 21 (L) 09/08/2023     09/25/2023     Lab Results   Component Value Date    TACROLIMUS 9.6 09/25/2023       Diagnostics:  The following labs have been reviewed: CBC  CMP  The following radiology images have been independently reviewed and interpreted: Liver US    Assessment/Plan:          S/P liver transplant.  Chronic immunosuppressive medications for rejection prophylaxis at target.  Plan: no adjustment needed.  Continue monitoring symptoms, labs and drug levels for drug-related toxicity and side effects.  Incision: staples in place; wound clean, dry, and intact. Remove staples today in clinic.   Femoral arterial line site: no complications evident  Borderline tardus parvus  on US, repeat in 1 week  Discontinue nifedipine - borderline low blood pressures    Additional testing to be completed according to Written Order Guidelines for Post-Liver and Combined Liver/Kidney Transplant Follow-up (LI-09)    Interpretation of tests and discussion of patient management involves all members of the multidisciplinary transplant team  Patient advised that it is recommended that all transplanted patients, and their close contacts and household members receive Covid vaccination.  Khai Santiago MD       [facility] Patient Status  Functional Status: 70% - Cares for self: unable to carry on normal activity or active work  Physical Capacity: No Limitations

## 2023-09-28 ENCOUNTER — OFFICE VISIT (OUTPATIENT)
Dept: INFECTIOUS DISEASES | Facility: CLINIC | Age: 38
End: 2023-09-28
Payer: COMMERCIAL

## 2023-09-28 VITALS
SYSTOLIC BLOOD PRESSURE: 136 MMHG | HEART RATE: 124 BPM | WEIGHT: 120.38 LBS | BODY MASS INDEX: 17.23 KG/M2 | TEMPERATURE: 99 F | DIASTOLIC BLOOD PRESSURE: 94 MMHG | HEIGHT: 70 IN

## 2023-09-28 DIAGNOSIS — B37.9 CANDIDIASIS: Primary | ICD-10-CM

## 2023-09-28 DIAGNOSIS — Z94.4 LIVER TRANSPLANTED: ICD-10-CM

## 2023-09-28 PROCEDURE — 99214 PR OFFICE/OUTPT VISIT, EST, LEVL IV, 30-39 MIN: ICD-10-PCS | Mod: S$GLB,,, | Performed by: INTERNAL MEDICINE

## 2023-09-28 PROCEDURE — 3080F DIAST BP >= 90 MM HG: CPT | Mod: CPTII,S$GLB,, | Performed by: INTERNAL MEDICINE

## 2023-09-28 PROCEDURE — 3075F SYST BP GE 130 - 139MM HG: CPT | Mod: CPTII,S$GLB,, | Performed by: INTERNAL MEDICINE

## 2023-09-28 PROCEDURE — 1159F PR MEDICATION LIST DOCUMENTED IN MEDICAL RECORD: ICD-10-PCS | Mod: CPTII,S$GLB,, | Performed by: INTERNAL MEDICINE

## 2023-09-28 PROCEDURE — 3075F PR MOST RECENT SYSTOLIC BLOOD PRESS GE 130-139MM HG: ICD-10-PCS | Mod: CPTII,S$GLB,, | Performed by: INTERNAL MEDICINE

## 2023-09-28 PROCEDURE — 99999 PR PBB SHADOW E&M-EST. PATIENT-LVL IV: ICD-10-PCS | Mod: PBBFAC,,, | Performed by: INTERNAL MEDICINE

## 2023-09-28 PROCEDURE — 3008F BODY MASS INDEX DOCD: CPT | Mod: CPTII,S$GLB,, | Performed by: INTERNAL MEDICINE

## 2023-09-28 PROCEDURE — 1159F MED LIST DOCD IN RCRD: CPT | Mod: CPTII,S$GLB,, | Performed by: INTERNAL MEDICINE

## 2023-09-28 PROCEDURE — 3080F PR MOST RECENT DIASTOLIC BLOOD PRESSURE >= 90 MM HG: ICD-10-PCS | Mod: CPTII,S$GLB,, | Performed by: INTERNAL MEDICINE

## 2023-09-28 PROCEDURE — 1111F DSCHRG MED/CURRENT MED MERGE: CPT | Mod: CPTII,S$GLB,, | Performed by: INTERNAL MEDICINE

## 2023-09-28 PROCEDURE — 1111F PR DISCHARGE MEDS RECONCILED W/ CURRENT OUTPATIENT MED LIST: ICD-10-PCS | Mod: CPTII,S$GLB,, | Performed by: INTERNAL MEDICINE

## 2023-09-28 PROCEDURE — 99999 PR PBB SHADOW E&M-EST. PATIENT-LVL IV: CPT | Mod: PBBFAC,,, | Performed by: INTERNAL MEDICINE

## 2023-09-28 PROCEDURE — 99214 OFFICE O/P EST MOD 30 MIN: CPT | Mod: S$GLB,,, | Performed by: INTERNAL MEDICINE

## 2023-09-28 PROCEDURE — 3008F PR BODY MASS INDEX (BMI) DOCUMENTED: ICD-10-PCS | Mod: CPTII,S$GLB,, | Performed by: INTERNAL MEDICINE

## 2023-09-28 NOTE — PROGRESS NOTES
Infectious Diseases Clinic Note    Subjective:       Patient ID: Sukhdeep Grimes is a 38 y.o. male.    Chief Complaint: Follow-up    HPI    Here for f/u for elevated BDG  Has had no issues other than elevated HR  Still on fluc 400 mg daily      Past Medical History:   Diagnosis Date    Alcoholic cirrhosis of liver with ascites     Alcoholic cirrhosis of liver with ascites 8/1/2023    Last Assessment & Plan:  Formatting of this note might be different from the original. Looking better today compared to last week in regards to his energy. I also reviewed his visit with Dr. Stanford from last week. Sent in [facility], appears Dr. Stanford meant to do this. He would like to change to [facility] team, so will place that referral today. Send in referral to [facility]. Continue diuresis at c    Esophageal varices without bleeding     Hepatic encephalopathy     Hypokalemia     Hyponatremia     Sinus bradycardia 9/10/2023    Thrombocytopenia     Transfusion history 08/2022       Social History     Socioeconomic History    Marital status: Single     Social Determinants of Health     Financial Resource Strain: Low Risk  (8/25/2023)    Overall Financial Resource Strain (CARDIA)     Difficulty of Paying Living Expenses: Not hard at all   Food Insecurity: No Food Insecurity (8/25/2023)    Hunger Vital Sign     Worried About Running Out of Food in the Last Year: Never true     Ran Out of Food in the Last Year: Never true   Transportation Needs: No Transportation Needs (8/25/2023)    PRAPARE - Transportation     Lack of Transportation (Medical): No     Lack of Transportation (Non-Medical): No   Physical Activity: Sufficiently Active (8/25/2023)    Exercise Vital Sign     Days of Exercise per Week: 7 days     Minutes of Exercise per Session: 40 min   Stress: No Stress Concern Present (8/25/2023)    [employer] - Occupational Stress Questionnaire     Feeling of Stress : Not at all   Social Connections: Moderately  Isolated (8/25/2023)    Social Connection and Isolation Panel [NHANES]     Frequency of Communication with Friends and Family: More than three times a week     Frequency of Social Gatherings with Friends and Family: Once a week     Attends Hoahaoism Services: 1 to 4 times per year     Active Member of Clubs or Organizations: No     Attends Club or Organization Meetings: Never     Marital Status: Never    Housing Stability: Unknown (8/25/2023)    Housing Stability Vital Sign     Unable to Pay for Housing in the Last Year: No     Unstable Housing in the Last Year: No         Current Outpatient Medications:     albuterol (VENTOLIN HFA) 90 mcg/actuation inhaler, Inhale 2 puffs into the lungs every 6 (six) hours as needed for Wheezing. Rescue, Disp: , Rfl:     aspirin (ECOTRIN) 81 MG EC tablet, Take 1 tablet (81 mg total) by mouth once daily., Disp: 30 tablet, Rfl: 11    calcium carbonate-vitamin D3 600 mg-20 mcg (800 unit) Tab, Take 1 tablet by mouth once daily., Disp: 30 tablet, Rfl: 11    famotidine (PEPCID) 20 MG tablet, Take 1 tablet (20 mg total) by mouth nightly., Disp: 30 tablet, Rfl: 0    fluconazole (DIFLUCAN) 200 MG Tab, Take 2 tablets (400 mg total) by mouth once daily., Disp: 60 tablet, Rfl: 1    hydrOXYzine HCL (ATARAX) 25 MG tablet, Take 1 tablet (25 mg total) by mouth 3 (three) times daily as needed for Anxiety (or Sleep)., Disp: 50 tablet, Rfl: 0    LORazepam (ATIVAN) 0.5 MG tablet, Take 2 tablets (1 mg total) by mouth 2 (two) times daily as needed for Anxiety., Disp: 60 tablet, Rfl: 0    mycophenolate (CELLCEPT) 250 mg Cap, Take 4 capsules (1,000 mg total) by mouth 2 (two) times daily., Disp: 240 capsule, Rfl: 2    oxyCODONE (ROXICODONE) 5 MG immediate release tablet, Take 1 tablet (5 mg total) by mouth every 4 (four) hours as needed for Pain., Disp: 40 tablet, Rfl: 0    polyethylene glycol (GLYCOLAX) 17 gram PwPk, Take 17 g by mouth once daily., Disp: , Rfl: 0    predniSONE (DELTASONE) 5 MG  "tablet, Take by mouth daily:  20mg 9/13-9/19, 15mg 9/20-9/26, 10mg 9/27-10/3, 5mg 10/4-10/6. STOP 10/7/23, Disp: 70 tablet, Rfl: 0    sodium bicarbonate 650 MG tablet, Take 2 tablets (1,300 mg total) by mouth 2 (two) times daily., Disp: 120 tablet, Rfl: 11    sulfamethoxazole-trimethoprim 400-80mg (BACTRIM,SEPTRA) 400-80 mg per tablet, Take 1 tablet by mouth every morning. STOP 3/8/24, Disp: 30 tablet, Rfl: 5    tacrolimus (PROGRAF) 1 MG Cap, Take 5 capsules (5 mg total) by mouth every 12 (twelve) hours., Disp: 300 capsule, Rfl: 11    traZODone (DESYREL) 50 MG tablet, Take 1 tablet (50 mg total) by mouth every evening., Disp: 30 tablet, Rfl: 11    valGANciclovir (VALCYTE) 450 mg Tab, Take 1 tablet (450 mg total) by mouth once daily. STOP 3/8/24, Disp: 30 tablet, Rfl: 5    Review of Systems   All other systems reviewed and are negative.        Past Surgical History:   Procedure Laterality Date    EGD - EXTERNAL RESULT  08/21/2023    "1 column of large varices with no bleeding and no stigmata of recent bleeding in the lower 3rd of the esophagus.  No red Rigoberto sign present.  Banding was not performed.  Moderate portal hypertensive gastropathy in the entire examined stomach.  Examined duodenum was normal." EGD done in [facility])    LIVER TRANSPLANT N/A 9/3/2023    Procedure: TRANSPLANT, LIVER;  Surgeon: Phil Ariza MD;  Location: [facility] OR [address] [place] [address];  Service: Transplant;  Laterality: N/A;    LIVER TRANSPLANT N/A 9/7/2023    Procedure: TRANSPLANT, LIVER;  Surgeon: Leonardo Manuel MD;  Location: [facility] OR [address] [place] [address];  Service: Transplant;  Laterality: N/A;    PARACENTESIS  08/24/2023        Reviewed records today as well as relevant labs, cultures, and imaging  No toxicities from antimicrobials fluc  Extremely medically complex  Patient is high risk for infectious complications given immunosuppression      Objective:      Vitals:    09/28/23 1444   BP: (!) 136/94   Pulse: (!) 124   Temp: 98.6 °F (37 °C) "     Physical Exam  Constitutional:       Appearance: He is well-developed.   HENT:      Head: Normocephalic and atraumatic.   Eyes:      Conjunctiva/sclera: Conjunctivae normal.   Cardiovascular:      Rate and Rhythm: Normal rate and regular rhythm.      Heart sounds: Normal heart sounds. No murmur heard.  Pulmonary:      Effort: Pulmonary effort is normal. No respiratory distress.      Breath sounds: Normal breath sounds. No wheezing.   Abdominal:      General: Bowel sounds are normal. There is no distension.      Palpations: Abdomen is soft.      Tenderness: There is no abdominal tenderness.   Musculoskeletal:         General: No tenderness. Normal range of motion.      Cervical back: Neck supple.   Lymphadenopathy:      Cervical: No cervical adenopathy.   Skin:     General: Skin is warm and dry.      Findings: No rash.   Neurological:      Mental Status: He is alert and oriented to person, place, and time.      Coordination: Coordination normal.   Psychiatric:         Behavior: Behavior normal.             Assessment/Plan:           38  M h/o Alcoholic decompensated cirrhosis s/p DBD liver transplant on 9/7/2023 (CMV D+/R-).  Surgery notable for hematoma at the upper surface of the right lobe which was opened   ID consulted for elevated fungitell. This may be reflective of invasive intraabdominal candidiasis in  Setting of cirrhosis and with reop. Fungitell 261 on 8/21->150 on 9/8. Now negative He is asymptomatic doing very well     he can go down to fluc 200 mg daily per protocol to finish prophylaxis for 10 more days which will be total 1 month of fluc since transplant  Discussed care with transplant team/provider

## 2023-09-28 NOTE — TELEPHONE ENCOUNTER
Patient notified and instructed as per :  may reduce Fluconazole dose to 200mg once daily for 10 more days, then can stop (stop date due 10/8/23).  Patient was able to repeat instructions and voiced understanding.

## 2023-09-29 NOTE — TELEPHONE ENCOUNTER
Returned patient call; questions answered re: clearence to return home : will be based on upcoming lab/ U/S results and clinic visit with the doctor. Patient voiced understanding.

## 2023-10-02 ENCOUNTER — TELEPHONE (OUTPATIENT)
Dept: TRANSPLANT | Facility: CLINIC | Age: 38
End: 2023-10-02
Payer: COMMERCIAL

## 2023-10-02 ENCOUNTER — HOSPITAL ENCOUNTER (OUTPATIENT)
Dept: RADIOLOGY | Facility: HOSPITAL | Age: 38
Discharge: HOME | End: 2023-10-02
Attending: SURGERY
Payer: COMMERCIAL

## 2023-10-02 DIAGNOSIS — R93.5 ABNORMAL FINDINGS ON DIAGNOSTIC IMAGING OF ABDOMEN: ICD-10-CM

## 2023-10-02 DIAGNOSIS — Z94.4 LIVER REPLACED BY TRANSPLANT: Primary | ICD-10-CM

## 2023-10-02 PROCEDURE — 76705 ECHO EXAM OF ABDOMEN: CPT | Mod: 26,59,, | Performed by: RADIOLOGY

## 2023-10-02 PROCEDURE — 93976 US DOPPLER LIVER TRANSPLANT POST (XPD): ICD-10-PCS | Mod: 26,,, | Performed by: RADIOLOGY

## 2023-10-02 PROCEDURE — 93976 VASCULAR STUDY: CPT | Mod: TC

## 2023-10-02 PROCEDURE — 76705 US DOPPLER LIVER TRANSPLANT POST (XPD): ICD-10-PCS | Mod: 26,59,, | Performed by: RADIOLOGY

## 2023-10-02 PROCEDURE — 93976 VASCULAR STUDY: CPT | Mod: 26,,, | Performed by: RADIOLOGY

## 2023-10-02 RX ORDER — FLUCONAZOLE 200 MG/1
200 TABLET ORAL DAILY
Qty: 10 TABLET | Refills: 0 | Status: SHIPPED | OUTPATIENT
Start: 2023-10-02 | End: 2023-10-08 | Stop reason: ALTCHOICE

## 2023-10-02 NOTE — TELEPHONE ENCOUNTER
Patient notified and instructed : stop the Prograf , repeat labs tomorrow morning at 8am.  Return to clinic tomorrow afternoon at 2:20 PM as planned. Patient was able to repeat instructions and voiced understanding.

## 2023-10-02 NOTE — TELEPHONE ENCOUNTER
Returned patient call; confirmed with him that he is to hold his Prograf tonight and tomorrow morning, the doctor will decide next dose of Prograf in clinic tomorrow afternoon, rev'd . B/P readings with patient , he reported running 140's over 90's; will review with the doctor tomorrow for further instructions re: B/P. Patient voiced understanding.

## 2023-10-02 NOTE — TELEPHONE ENCOUNTER
"Called and spoke to patient re: noted labs of today with elevated createnine level of 2.0 and elevated Prograf level of 19.  Patient denied taking morning dose before labs were drawn: reported last dose of 5mg (dose confirmed as 5mg twice a day ) was taken "last night around 8:20 PM", per patient.  Patient also reported that he "just had my ultrasound so I haven't eaten breakfast or taken my morning medicine yet". Patient will eat now and take all morning meds except the Prograf until labs are reviewed with the doctor for a plan.     Patient confirmed he did reduce Fluconazole dose last Thursday as directed (now taking 200mg daily, he said); denied use of any fluid pills,  and does state he did not drink water after midnight last night "except for a few sips this morning", he said.  Will review with MD .  "

## 2023-10-02 NOTE — TELEPHONE ENCOUNTER
"----- Message from Vik Silva sent at 10/2/2023  3:38 PM CDT -----  Consult/Advisory:          Name Of Caller: Self      Contact Preference?: [phone] (home)       What is the nature of the call?: Calling to speak w/ Diya. Home health nurse reporting that pt's dialystolic blood pressure is 107 and systolic is 148. Pt also requesting clarification about his prograf        Additional Notes:  "Thank you for all that you do for our patients"      "

## 2023-10-03 ENCOUNTER — LAB VISIT (OUTPATIENT)
Dept: LAB | Facility: HOSPITAL | Age: 38
End: 2023-10-03
Attending: SURGERY
Payer: COMMERCIAL

## 2023-10-03 ENCOUNTER — OFFICE VISIT (OUTPATIENT)
Dept: TRANSPLANT | Facility: CLINIC | Age: 38
End: 2023-10-03
Payer: COMMERCIAL

## 2023-10-03 VITALS
WEIGHT: 118.81 LBS | SYSTOLIC BLOOD PRESSURE: 139 MMHG | RESPIRATION RATE: 16 BRPM | OXYGEN SATURATION: 100 % | TEMPERATURE: 97 F | BODY MASS INDEX: 17.01 KG/M2 | DIASTOLIC BLOOD PRESSURE: 101 MMHG | HEIGHT: 70 IN | HEART RATE: 125 BPM

## 2023-10-03 DIAGNOSIS — Z79.60 LONG-TERM USE OF IMMUNOSUPPRESSANT MEDICATION: ICD-10-CM

## 2023-10-03 DIAGNOSIS — E44.0 MODERATE MALNUTRITION: ICD-10-CM

## 2023-10-03 DIAGNOSIS — Z51.81 ENCOUNTER FOR THERAPEUTIC DRUG MONITORING: ICD-10-CM

## 2023-10-03 DIAGNOSIS — Z79.899 ENCOUNTER FOR LONG-TERM (CURRENT) USE OF OTHER MEDICATIONS: ICD-10-CM

## 2023-10-03 DIAGNOSIS — R93.5 ABNORMAL FINDINGS ON DIAGNOSTIC IMAGING OF ABDOMEN: ICD-10-CM

## 2023-10-03 DIAGNOSIS — Z94.4 LIVER TRANSPLANTED: ICD-10-CM

## 2023-10-03 DIAGNOSIS — Z91.89 AT RISK FOR OPPORTUNISTIC INFECTIONS: Primary | ICD-10-CM

## 2023-10-03 DIAGNOSIS — Z94.4 LIVER REPLACED BY TRANSPLANT: ICD-10-CM

## 2023-10-03 DIAGNOSIS — Z29.89 PROPHYLACTIC IMMUNOTHERAPY: ICD-10-CM

## 2023-10-03 DIAGNOSIS — Z94.4 LIVER REPLACED BY TRANSPLANT: Primary | ICD-10-CM

## 2023-10-03 DIAGNOSIS — R63.4 WEIGHT LOSS, UNINTENTIONAL: ICD-10-CM

## 2023-10-03 LAB
ALBUMIN SERPL BCP-MCNC: 3.8 G/DL (ref 3.5–5.2)
ALP SERPL-CCNC: 82 U/L (ref 55–135)
ALT SERPL W/O P-5'-P-CCNC: 10 U/L (ref 10–44)
ANION GAP SERPL CALC-SCNC: 12 MMOL/L (ref 8–16)
AST SERPL-CCNC: 10 U/L (ref 10–40)
BASOPHILS # BLD AUTO: 0.16 K/UL (ref 0–0.2)
BASOPHILS NFR BLD: 1.8 % (ref 0–1.9)
BILIRUB DIRECT SERPL-MCNC: 1.8 MG/DL (ref 0.1–0.3)
BILIRUB SERPL-MCNC: 2.5 MG/DL (ref 0.1–1)
BUN SERPL-MCNC: 29 MG/DL (ref 6–20)
CALCIUM SERPL-MCNC: 9.9 MG/DL (ref 8.7–10.5)
CHLORIDE SERPL-SCNC: 107 MMOL/L (ref 95–110)
CO2 SERPL-SCNC: 20 MMOL/L (ref 23–29)
CREAT SERPL-MCNC: 2 MG/DL (ref 0.5–1.4)
DIFFERENTIAL METHOD: ABNORMAL
EOSINOPHIL # BLD AUTO: 0.2 K/UL (ref 0–0.5)
EOSINOPHIL NFR BLD: 2 % (ref 0–8)
ERYTHROCYTE [DISTWIDTH] IN BLOOD BY AUTOMATED COUNT: 15.1 % (ref 11.5–14.5)
EST. GFR  (NO RACE VARIABLE): 43.3 ML/MIN/1.73 M^2
GLUCOSE SERPL-MCNC: 100 MG/DL (ref 70–110)
HCT VFR BLD AUTO: 27.9 % (ref 40–54)
HGB BLD-MCNC: 9 G/DL (ref 14–18)
IMM GRANULOCYTES # BLD AUTO: 0.03 K/UL (ref 0–0.04)
IMM GRANULOCYTES NFR BLD AUTO: 0.3 % (ref 0–0.5)
LYMPHOCYTES # BLD AUTO: 2.6 K/UL (ref 1–4.8)
LYMPHOCYTES NFR BLD: 29.3 % (ref 18–48)
MCH RBC QN AUTO: 29.1 PG (ref 27–31)
MCHC RBC AUTO-ENTMCNC: 32.3 G/DL (ref 32–36)
MCV RBC AUTO: 90 FL (ref 82–98)
MONOCYTES # BLD AUTO: 0.7 K/UL (ref 0.3–1)
MONOCYTES NFR BLD: 7.5 % (ref 4–15)
NEUTROPHILS # BLD AUTO: 5.3 K/UL (ref 1.8–7.7)
NEUTROPHILS NFR BLD: 59.1 % (ref 38–73)
NRBC BLD-RTO: 0 /100 WBC
PLATELET # BLD AUTO: 300 K/UL (ref 150–450)
PMV BLD AUTO: 9 FL (ref 9.2–12.9)
POTASSIUM SERPL-SCNC: 4.8 MMOL/L (ref 3.5–5.1)
PROT SERPL-MCNC: 7.1 G/DL (ref 6–8.4)
RBC # BLD AUTO: 3.09 M/UL (ref 4.6–6.2)
SODIUM SERPL-SCNC: 139 MMOL/L (ref 136–145)
TACROLIMUS BLD-MCNC: 8.9 NG/ML (ref 5–15)
WBC # BLD AUTO: 8.94 K/UL (ref 3.9–12.7)

## 2023-10-03 PROCEDURE — 85025 COMPLETE CBC W/AUTO DIFF WBC: CPT | Performed by: SURGERY

## 2023-10-03 PROCEDURE — 3008F BODY MASS INDEX DOCD: CPT | Mod: CPTII,S$GLB,, | Performed by: TRANSPLANT SURGERY

## 2023-10-03 PROCEDURE — 3080F PR MOST RECENT DIASTOLIC BLOOD PRESSURE >= 90 MM HG: ICD-10-PCS | Mod: CPTII,S$GLB,, | Performed by: TRANSPLANT SURGERY

## 2023-10-03 PROCEDURE — 97802 MEDICAL NUTRITION INDIV IN: CPT | Mod: S$GLB,,, | Performed by: DIETITIAN, REGISTERED

## 2023-10-03 PROCEDURE — 97802 PR MED NUTR THER, 1ST, INDIV, EA 15 MIN: ICD-10-PCS | Mod: S$GLB,,, | Performed by: DIETITIAN, REGISTERED

## 2023-10-03 PROCEDURE — 3075F PR MOST RECENT SYSTOLIC BLOOD PRESS GE 130-139MM HG: ICD-10-PCS | Mod: CPTII,S$GLB,, | Performed by: TRANSPLANT SURGERY

## 2023-10-03 PROCEDURE — 1111F PR DISCHARGE MEDS RECONCILED W/ CURRENT OUTPATIENT MED LIST: ICD-10-PCS | Mod: CPTII,S$GLB,, | Performed by: TRANSPLANT SURGERY

## 2023-10-03 PROCEDURE — 3075F SYST BP GE 130 - 139MM HG: CPT | Mod: CPTII,S$GLB,, | Performed by: TRANSPLANT SURGERY

## 2023-10-03 PROCEDURE — 99999 PR PBB SHADOW E&M-EST. PATIENT-LVL IV: CPT | Mod: PBBFAC,,,

## 2023-10-03 PROCEDURE — 3044F HG A1C LEVEL LT 7.0%: CPT | Mod: CPTII,S$GLB,, | Performed by: TRANSPLANT SURGERY

## 2023-10-03 PROCEDURE — 3080F DIAST BP >= 90 MM HG: CPT | Mod: CPTII,S$GLB,, | Performed by: TRANSPLANT SURGERY

## 2023-10-03 PROCEDURE — 80197 ASSAY OF TACROLIMUS: CPT | Performed by: SURGERY

## 2023-10-03 PROCEDURE — 99215 OFFICE O/P EST HI 40 MIN: CPT | Mod: 24,S$GLB,, | Performed by: TRANSPLANT SURGERY

## 2023-10-03 PROCEDURE — 1159F MED LIST DOCD IN RCRD: CPT | Mod: CPTII,S$GLB,, | Performed by: TRANSPLANT SURGERY

## 2023-10-03 PROCEDURE — 36415 COLL VENOUS BLD VENIPUNCTURE: CPT | Performed by: SURGERY

## 2023-10-03 PROCEDURE — 99999 PR PBB SHADOW E&M-EST. PATIENT-LVL IV: ICD-10-PCS | Mod: PBBFAC,,,

## 2023-10-03 PROCEDURE — 1111F DSCHRG MED/CURRENT MED MERGE: CPT | Mod: CPTII,S$GLB,, | Performed by: TRANSPLANT SURGERY

## 2023-10-03 PROCEDURE — 82248 BILIRUBIN DIRECT: CPT | Performed by: SURGERY

## 2023-10-03 PROCEDURE — 3008F PR BODY MASS INDEX (BMI) DOCUMENTED: ICD-10-PCS | Mod: CPTII,S$GLB,, | Performed by: TRANSPLANT SURGERY

## 2023-10-03 PROCEDURE — 99215 PR OFFICE/OUTPT VISIT, EST, LEVL V, 40-54 MIN: ICD-10-PCS | Mod: 24,S$GLB,, | Performed by: TRANSPLANT SURGERY

## 2023-10-03 PROCEDURE — 1159F PR MEDICATION LIST DOCUMENTED IN MEDICAL RECORD: ICD-10-PCS | Mod: CPTII,S$GLB,, | Performed by: TRANSPLANT SURGERY

## 2023-10-03 PROCEDURE — 80053 COMPREHEN METABOLIC PANEL: CPT | Performed by: SURGERY

## 2023-10-03 PROCEDURE — 3044F PR MOST RECENT HEMOGLOBIN A1C LEVEL <7.0%: ICD-10-PCS | Mod: CPTII,S$GLB,, | Performed by: TRANSPLANT SURGERY

## 2023-10-03 RX ORDER — AMLODIPINE BESYLATE 5 MG/1
5 TABLET ORAL DAILY
Qty: 30 TABLET | Refills: 11 | Status: SHIPPED | OUTPATIENT
Start: 2023-10-03 | End: 2024-10-02

## 2023-10-03 RX ORDER — TACROLIMUS 1 MG/1
3 CAPSULE ORAL EVERY 12 HOURS
Qty: 180 CAPSULE | Refills: 11 | Status: SHIPPED | OUTPATIENT
Start: 2023-10-03 | End: 2023-10-05 | Stop reason: DRUGHIGH

## 2023-10-03 NOTE — TELEPHONE ENCOUNTER
----- Message from Tita Qiu PharmD sent at 10/3/2023  2:47 PM CDT -----  Norvasc 5 mg daily is a bit less potent and may be just enough   ----- Message -----  From: Diya Walker  Sent: 10/3/2023   2:41 PM CDT  To: Abdominal Transplant Pharmacists    Patient seen in clinic today by : issues w/ B/P and pulse (running 140's/ 90-100s this week) was on Nifedipine a couple of weeks ago but was stopped last week by  due to low readings .   would like rec. from you re: should we re-start the Nifedipine or something else??? Thanks.

## 2023-10-03 NOTE — PROGRESS NOTES
Transplant Surgery  Liver Transplant Recipient Follow-up    Original Referring Physician: Tito Canales  Current Corresponding Physician: Tito Canales    Chief Complaint: Sukhdeep is here for follow up of his liver transplant performed 9/7/2023 for the primary diagnosis (UNOS) of Alcohol-Associated Cirrhosis Without Acute Alcohol-Associated Hepatitis    ORGAN: LIVER  Whole or Partial: whole liver  Donor Type: donation after brain death  PHS Increased Risk: no  Donor CMV Status: Positive  Donor HCV Status: Negative  Donor HBcAb: Negative  Donor HBV GOGO:   Donor HCV GOGO: Negative    Biliary Anastomosis: end to end  Arterial Anatomy: standard  IVC reconstruction: end to end ivc  Portal vein status: patent    Subjective:     History of Present Illness: He has had the following complications since transplant: none.  The noted complications are well controlled.    Interval History: Currently, he is doing well.  Current complaints include none.  Sukhdeep is here for management of his immunosuppression medication.    External provider notes reviewed: Yes    Review of Systems   Constitutional:  Negative for activity change, appetite change, chills, diaphoresis, fatigue, fever and unexpected weight change.   HENT:  Negative for congestion, dental problem, ear pain, facial swelling, mouth sores, nosebleeds, sore throat, tinnitus, trouble swallowing and voice change.    Eyes:  Negative for photophobia, pain and visual disturbance.   Respiratory:  Negative for apnea, cough, choking, chest tightness and shortness of breath.    Cardiovascular:  Negative for chest pain, palpitations and leg swelling.   Gastrointestinal:  Negative for abdominal distention, abdominal pain, blood in stool, constipation, diarrhea, nausea and vomiting.   Endocrine: Negative for cold intolerance and heat intolerance.   Genitourinary:  Negative for difficulty urinating, dysuria, flank pain, hematuria and urgency.   Musculoskeletal:  Negative for  arthralgias and gait problem.   Skin:  Negative for color change, pallor and rash.   Neurological:  Negative for dizziness, tremors, seizures, syncope and light-headedness.   Hematological:  Negative for adenopathy. Does not bruise/bleed easily.   Psychiatric/Behavioral:  Negative for agitation and confusion.      Objective:     Physical Exam  Constitutional:       General: He is not in acute distress.     Appearance: He is well-developed.   HENT:      Head: Normocephalic and atraumatic.      Mouth/Throat:      Pharynx: No oropharyngeal exudate.   Eyes:      General: No scleral icterus.     Conjunctiva/sclera: Conjunctivae normal.      Pupils: Pupils are equal, round, and reactive to light.   Cardiovascular:      Rate and Rhythm: Normal rate and regular rhythm.      Heart sounds: Normal heart sounds.   Pulmonary:      Effort: Pulmonary effort is normal. No respiratory distress.      Breath sounds: Normal breath sounds.   Abdominal:      General: Bowel sounds are normal. There is no distension.      Palpations: Abdomen is soft.      Tenderness: There is no abdominal tenderness. There is no guarding or rebound.   Musculoskeletal:         General: No swelling. Normal range of motion.      Cervical back: Normal range of motion and neck supple.   Lymphadenopathy:      Cervical: No cervical adenopathy.   Skin:     General: Skin is warm and dry.      Findings: No erythema or rash.   Neurological:      Mental Status: He is alert and oriented to person, place, and time.   Psychiatric:         Mood and Affect: Mood normal.         Behavior: Behavior normal.         Thought Content: Thought content normal.       Lab Results   Component Value Date    BILITOT 2.5 (H) 10/03/2023    AST 10 10/03/2023    ALT 10 10/03/2023    ALKPHOS 82 10/03/2023    CREATININE 2.0 (H) 10/03/2023    ALBUMIN 3.8 10/03/2023     Lab Results   Component Value Date    WBC 8.94 10/03/2023    HGB 9.0 (L) 10/03/2023    HCT 27.9 (L) 10/03/2023    HCT 21 (L)  09/08/2023     10/03/2023     Lab Results   Component Value Date    TACROLIMUS 8.9 10/03/2023       Diagnostics:  The following labs have been reviewed: CBC  CMP  INR  TACROLIMUS LEVEL  The following radiology images have been independently reviewed and interpreted: Liver US    Assessment/Plan:          S/P liver transplant.  Chronic immunosuppressive medications for rejection prophylaxis at target.  Plan:  tac held yesterday for level of 19. resume tac at 3mg bid and repeat labs thurs .  Continue monitoring symptoms, labs and drug levels for drug-related toxicity and side effects.  Incision: staples out, wound well-healed, no evidence of hernia  Femoral arterial line site: no complications evident  Repeat US early next week    Additional testing to be completed according to Written Order Guidelines for Post-Liver and Combined Liver/Kidney Transplant Follow-up (LI-09)    Interpretation of tests and discussion of patient management involves all members of the multidisciplinary transplant team  Patient advised that it is recommended that all transplanted patients, and their close contacts and household members receive Covid vaccination.  Mel Murry MD       [facility] Patient Status  Functional Status: 60% - Requires occasional assistance but is able to care for needs  Physical Capacity: No Limitations

## 2023-10-04 ENCOUNTER — PATIENT MESSAGE (OUTPATIENT)
Dept: TRANSPLANT | Facility: CLINIC | Age: 38
End: 2023-10-04
Payer: COMMERCIAL

## 2023-10-04 ENCOUNTER — TELEPHONE (OUTPATIENT)
Dept: TRANSPLANT | Facility: CLINIC | Age: 38
End: 2023-10-04
Payer: COMMERCIAL

## 2023-10-04 NOTE — TELEPHONE ENCOUNTER
----- Message from Haley Mercado sent at 10/4/2023  3:49 PM CDT -----  Regarding: Patient advice  Contact: Maribel [phone]              Name of Caller: Maribel pt's mother     Contact Preference:    [phone]     Nature of Call: Patient mother requesting a call back form Diya have a few questions about what was  discussed yesterday

## 2023-10-04 NOTE — PROGRESS NOTES
"REASON FOR VISIT:  post liver tx clinic visit, weight check/nutrition follow up    PAST MEDICAL HX: L tx 9/7/2023, alcoholic cirrhosis of liver with ascites, unintentional weight loss, malnutrition    LABS: reviewed,  noted BUN and Creat elevated, new as of 2 days ago    NUTRITION HX:   Pt reports to have a good appetite, no chronic n/v/d/abd pain/c. He's drinking 1 protein shake per day, unsure of exact brand name at this time. He typically has 2 meals, 2-3 snacks most days, skips breakfast. Snacks may be Oreo cookies, chips, fruit, yogurt. He reports UBW was closer to 140-150 lb.     HT: 5'10"  WT: 118 lb  BMI: 17      INTERVENTION/EDUCATION:  Underweight Nutrition information provided & discussed.  Provided education on protein content in foods, goal intake per day, suggestions for ways to reach protein intake goal; nutrition supplements, snacks.   Encouraged physical activity daily, regular exercise as tolerated, stay mobile.      Patient voiced understanding of education & goals. Contact information was provided & will f/u as needed at clinic visits.     Consultation Time: 15 minutes  "

## 2023-10-04 NOTE — TELEPHONE ENCOUNTER
How Severe Are Your Spot(S)?: mild Returned call to patient's mother Maribel; questions answered: informed that clearence to return home for patient is yet to be determined; will be evaluated further with labs/ ultrasound review on Monday 10/9/23. She was able to voice understanding.   Have Your Spot(S) Been Treated In The Past?: has not been treated Hpi Title: Evaluation of a Skin Lesion Family Member: father Year Removed: 1900 Hpi Title: Evaluation of Skin Lesions Year Removed: 1900

## 2023-10-05 ENCOUNTER — LAB VISIT (OUTPATIENT)
Dept: LAB | Facility: HOSPITAL | Age: 38
End: 2023-10-05
Attending: SURGERY
Payer: COMMERCIAL

## 2023-10-05 DIAGNOSIS — Z94.4 LIVER REPLACED BY TRANSPLANT: Primary | ICD-10-CM

## 2023-10-05 DIAGNOSIS — Z94.4 LIVER REPLACED BY TRANSPLANT: ICD-10-CM

## 2023-10-05 LAB
ALBUMIN SERPL BCP-MCNC: 3.9 G/DL (ref 3.5–5.2)
ALP SERPL-CCNC: 78 U/L (ref 55–135)
ALT SERPL W/O P-5'-P-CCNC: 9 U/L (ref 10–44)
ANION GAP SERPL CALC-SCNC: 11 MMOL/L (ref 8–16)
AST SERPL-CCNC: 10 U/L (ref 10–40)
BASOPHILS # BLD AUTO: 0.11 K/UL (ref 0–0.2)
BASOPHILS NFR BLD: 1.4 % (ref 0–1.9)
BILIRUB DIRECT SERPL-MCNC: 1.6 MG/DL (ref 0.1–0.3)
BILIRUB SERPL-MCNC: 2.2 MG/DL (ref 0.1–1)
BUN SERPL-MCNC: 28 MG/DL (ref 6–20)
CALCIUM SERPL-MCNC: 9.7 MG/DL (ref 8.7–10.5)
CHLORIDE SERPL-SCNC: 104 MMOL/L (ref 95–110)
CO2 SERPL-SCNC: 21 MMOL/L (ref 23–29)
CREAT SERPL-MCNC: 1.7 MG/DL (ref 0.5–1.4)
DIFFERENTIAL METHOD: ABNORMAL
EOSINOPHIL # BLD AUTO: 0.2 K/UL (ref 0–0.5)
EOSINOPHIL NFR BLD: 2.5 % (ref 0–8)
ERYTHROCYTE [DISTWIDTH] IN BLOOD BY AUTOMATED COUNT: 15.2 % (ref 11.5–14.5)
EST. GFR  (NO RACE VARIABLE): 52.6 ML/MIN/1.73 M^2
GLUCOSE SERPL-MCNC: 100 MG/DL (ref 70–110)
HCT VFR BLD AUTO: 28 % (ref 40–54)
HCV RNA SERPL QL NAA+PROBE: NOT DETECTED
HCV RNA SPEC NAA+PROBE-ACNC: NOT DETECTED IU/ML
HEPATITIS B VIRUS DNA: NORMAL
HEPATITIS B VIRUS PCR, QUANT: NOT DETECTED IU/ML
HGB BLD-MCNC: 9 G/DL (ref 14–18)
HIV1 RNA # SERPL NAA+PROBE: NOT DETECTED COPIES/ML
HIV1 RNA SERPL QL NAA+PROBE: NOT DETECTED
IMM GRANULOCYTES # BLD AUTO: 0.04 K/UL (ref 0–0.04)
IMM GRANULOCYTES NFR BLD AUTO: 0.5 % (ref 0–0.5)
LYMPHOCYTES # BLD AUTO: 2 K/UL (ref 1–4.8)
LYMPHOCYTES NFR BLD: 24.6 % (ref 18–48)
MCH RBC QN AUTO: 29.5 PG (ref 27–31)
MCHC RBC AUTO-ENTMCNC: 32.1 G/DL (ref 32–36)
MCV RBC AUTO: 92 FL (ref 82–98)
MONOCYTES # BLD AUTO: 0.5 K/UL (ref 0.3–1)
MONOCYTES NFR BLD: 6.4 % (ref 4–15)
NEUTROPHILS # BLD AUTO: 5.1 K/UL (ref 1.8–7.7)
NEUTROPHILS NFR BLD: 64.6 % (ref 38–73)
NRBC BLD-RTO: 0 /100 WBC
PLATELET # BLD AUTO: 257 K/UL (ref 150–450)
PMV BLD AUTO: 9.1 FL (ref 9.2–12.9)
POTASSIUM SERPL-SCNC: 4.5 MMOL/L (ref 3.5–5.1)
PROT SERPL-MCNC: 7 G/DL (ref 6–8.4)
RBC # BLD AUTO: 3.05 M/UL (ref 4.6–6.2)
SODIUM SERPL-SCNC: 136 MMOL/L (ref 136–145)
TACROLIMUS BLD-MCNC: 9.8 NG/ML (ref 5–15)
WBC # BLD AUTO: 7.93 K/UL (ref 3.9–12.7)

## 2023-10-05 PROCEDURE — 87522 HEPATITIS C REVRS TRNSCRPJ: CPT | Performed by: SURGERY

## 2023-10-05 PROCEDURE — 87536 HIV-1 QUANT&REVRSE TRNSCRPJ: CPT | Performed by: SURGERY

## 2023-10-05 PROCEDURE — 36415 COLL VENOUS BLD VENIPUNCTURE: CPT | Performed by: SURGERY

## 2023-10-05 PROCEDURE — 80053 COMPREHEN METABOLIC PANEL: CPT | Performed by: SURGERY

## 2023-10-05 PROCEDURE — 87517 HEPATITIS B DNA QUANT: CPT | Performed by: SURGERY

## 2023-10-05 PROCEDURE — 82248 BILIRUBIN DIRECT: CPT | Performed by: SURGERY

## 2023-10-05 PROCEDURE — 85025 COMPLETE CBC W/AUTO DIFF WBC: CPT | Performed by: SURGERY

## 2023-10-05 PROCEDURE — 80197 ASSAY OF TACROLIMUS: CPT | Performed by: SURGERY

## 2023-10-05 NOTE — TELEPHONE ENCOUNTER
Patient notified and instructed to reduce Prograf dose to 2mg twice daily, repeat labs and U/S(as scheduled) on Monday 10/9/23. Patient able to repeat instructions and voiced understanding.

## 2023-10-06 ENCOUNTER — TELEPHONE (OUTPATIENT)
Dept: TRANSPLANT | Facility: CLINIC | Age: 38
End: 2023-10-06
Payer: COMMERCIAL

## 2023-10-06 RX ORDER — TACROLIMUS 1 MG/1
2 CAPSULE ORAL EVERY 12 HOURS
Qty: 120 CAPSULE | Refills: 11 | Status: SHIPPED | OUTPATIENT
Start: 2023-10-06 | End: 2023-10-12

## 2023-10-06 NOTE — TELEPHONE ENCOUNTER
Returned call to Mendez at [facility]; confirmed that Nifedipine was stopped and patient now started on Amlodipine.

## 2023-10-06 NOTE — TELEPHONE ENCOUNTER
----- Message from Celi Mckeon sent at 10/6/2023 12:49 PM CDT -----  Regarding: speak to Nurse  Contact: Mendez Dubon   Mendez [facility] health  called to double check supposed to stopping medication Nifedipin(spell) and starting amlodipine       Mendez Dubon

## 2023-10-09 ENCOUNTER — TELEPHONE (OUTPATIENT)
Dept: TRANSPLANT | Facility: CLINIC | Age: 38
End: 2023-10-09
Payer: COMMERCIAL

## 2023-10-09 ENCOUNTER — HOSPITAL ENCOUNTER (OUTPATIENT)
Dept: RADIOLOGY | Facility: HOSPITAL | Age: 38
Discharge: HOME | End: 2023-10-09
Attending: SURGERY
Payer: COMMERCIAL

## 2023-10-09 DIAGNOSIS — R93.5 ABNORMAL FINDINGS ON DIAGNOSTIC IMAGING OF ABDOMEN: ICD-10-CM

## 2023-10-09 DIAGNOSIS — Z94.4 LIVER REPLACED BY TRANSPLANT: ICD-10-CM

## 2023-10-09 DIAGNOSIS — R93.5 ABNORMAL FINDINGS ON DIAGNOSTIC IMAGING OF ABDOMEN: Primary | ICD-10-CM

## 2023-10-09 PROCEDURE — 93976 VASCULAR STUDY: CPT | Mod: TC

## 2023-10-09 PROCEDURE — 93976 VASCULAR STUDY: CPT | Mod: 26,,, | Performed by: STUDENT IN AN ORGANIZED HEALTH CARE EDUCATION/TRAINING PROGRAM

## 2023-10-09 PROCEDURE — 76705 US DOPPLER LIVER TRANSPLANT POST (XPD): ICD-10-PCS | Mod: 26,59,, | Performed by: STUDENT IN AN ORGANIZED HEALTH CARE EDUCATION/TRAINING PROGRAM

## 2023-10-09 PROCEDURE — 76705 ECHO EXAM OF ABDOMEN: CPT | Mod: 59,TC

## 2023-10-09 PROCEDURE — 76705 ECHO EXAM OF ABDOMEN: CPT | Mod: 26,59,, | Performed by: STUDENT IN AN ORGANIZED HEALTH CARE EDUCATION/TRAINING PROGRAM

## 2023-10-09 PROCEDURE — 93976 US DOPPLER LIVER TRANSPLANT POST (XPD): ICD-10-PCS | Mod: 26,,, | Performed by: STUDENT IN AN ORGANIZED HEALTH CARE EDUCATION/TRAINING PROGRAM

## 2023-10-09 NOTE — TELEPHONE ENCOUNTER
Patient notified and instructed: labs reviewed and u/s reviewed; no medicine changes made at this time; repeat labs on Thursday 10/12/23; will repeat U/S on Monday 10/16/23. Patient not cleared to return home this week.   Patient voiced understanding.  Patient also confirmed that he did complete Fluconazole yesterday.

## 2023-10-09 NOTE — TELEPHONE ENCOUNTER
----- Message from Tim Gonzalez MD sent at 10/9/2023  1:36 PM CDT -----  Results ok. No action needed

## 2023-10-09 NOTE — TELEPHONE ENCOUNTER
----- Message from Jackie Champion sent at 10/9/2023 11:44 AM CDT -----  Regarding: Consult/Advisory  Contact: Bobby, [facility]  Consult/Advisory    Name Of Caller: Bobby, [facility]      Contact Preference: [phone]    Nature of call: FYI: Patient has no symptoms but there is a medication interaction with amLODIPine (NORVASC) 5 MG tablet and tacrolimus (PROGRAF) 1 MG Cap after reading from computer.

## 2023-10-09 NOTE — TELEPHONE ENCOUNTER
Spoke to  nurse Mendez: informed him that medications were prescribed by transplant MD with recommendation from Transplant Pharmacy team.

## 2023-10-10 ENCOUNTER — TELEPHONE (OUTPATIENT)
Dept: TRANSPLANT | Facility: CLINIC | Age: 38
End: 2023-10-10
Payer: COMMERCIAL

## 2023-10-10 NOTE — TELEPHONE ENCOUNTER
----- Message from Nighat Sullivan sent at 10/10/2023 11:46 AM CDT -----  Regarding: Speak to coordinator  Contact: Maribel  Regarding:speak to coordinator          Name Of Caller: Maribel        Contact Preference: [phone] (home)        Nature of call:  pt mother is calling to speak to coordinator regarding when the pt will be released to go back home and U/S results. Please advise.  Requesting a call back.

## 2023-10-10 NOTE — TELEPHONE ENCOUNTER
Returned call to patient mother: explained that repeat labs and ultrasound on Monday will be reviewed by the doctor who will determing if any other testing is needed. Will review for a discharge to home date. She voiced understanding.

## 2023-10-11 LAB — FUNGUS SPEC CULT: NORMAL

## 2023-10-11 RX ORDER — LORAZEPAM 0.5 MG/1
1 TABLET ORAL 2 TIMES DAILY PRN
Qty: 60 TABLET | Refills: 0 | Status: CANCELLED | OUTPATIENT
Start: 2023-10-11 | End: 2023-11-10

## 2023-10-12 ENCOUNTER — LAB VISIT (OUTPATIENT)
Dept: LAB | Facility: HOSPITAL | Age: 38
End: 2023-10-12
Attending: SURGERY
Payer: COMMERCIAL

## 2023-10-12 DIAGNOSIS — Z94.4 LIVER REPLACED BY TRANSPLANT: Primary | ICD-10-CM

## 2023-10-12 DIAGNOSIS — Z94.4 LIVER REPLACED BY TRANSPLANT: ICD-10-CM

## 2023-10-12 LAB
ALBUMIN SERPL BCP-MCNC: 3.5 G/DL (ref 3.5–5.2)
ALP SERPL-CCNC: 67 U/L (ref 55–135)
ALT SERPL W/O P-5'-P-CCNC: 10 U/L (ref 10–44)
ANION GAP SERPL CALC-SCNC: 9 MMOL/L (ref 8–16)
AST SERPL-CCNC: 12 U/L (ref 10–40)
BASOPHILS # BLD AUTO: 0.06 K/UL (ref 0–0.2)
BASOPHILS NFR BLD: 1 % (ref 0–1.9)
BILIRUB DIRECT SERPL-MCNC: 1.1 MG/DL (ref 0.1–0.3)
BILIRUB SERPL-MCNC: 1.6 MG/DL (ref 0.1–1)
BUN SERPL-MCNC: 14 MG/DL (ref 6–20)
CALCIUM SERPL-MCNC: 9.6 MG/DL (ref 8.7–10.5)
CHLORIDE SERPL-SCNC: 106 MMOL/L (ref 95–110)
CO2 SERPL-SCNC: 21 MMOL/L (ref 23–29)
CREAT SERPL-MCNC: 1.1 MG/DL (ref 0.5–1.4)
DIFFERENTIAL METHOD: ABNORMAL
EOSINOPHIL # BLD AUTO: 0.2 K/UL (ref 0–0.5)
EOSINOPHIL NFR BLD: 4 % (ref 0–8)
ERYTHROCYTE [DISTWIDTH] IN BLOOD BY AUTOMATED COUNT: 14.7 % (ref 11.5–14.5)
EST. GFR  (NO RACE VARIABLE): >60 ML/MIN/1.73 M^2
GLUCOSE SERPL-MCNC: 103 MG/DL (ref 70–110)
HCT VFR BLD AUTO: 25 % (ref 40–54)
HGB BLD-MCNC: 8 G/DL (ref 14–18)
IMM GRANULOCYTES # BLD AUTO: 0.01 K/UL (ref 0–0.04)
IMM GRANULOCYTES NFR BLD AUTO: 0.2 % (ref 0–0.5)
LYMPHOCYTES # BLD AUTO: 1.2 K/UL (ref 1–4.8)
LYMPHOCYTES NFR BLD: 20.3 % (ref 18–48)
MCH RBC QN AUTO: 28.1 PG (ref 27–31)
MCHC RBC AUTO-ENTMCNC: 32 G/DL (ref 32–36)
MCV RBC AUTO: 88 FL (ref 82–98)
MONOCYTES # BLD AUTO: 0.6 K/UL (ref 0.3–1)
MONOCYTES NFR BLD: 9.6 % (ref 4–15)
NEUTROPHILS # BLD AUTO: 3.8 K/UL (ref 1.8–7.7)
NEUTROPHILS NFR BLD: 64.9 % (ref 38–73)
NRBC BLD-RTO: 0 /100 WBC
PLATELET # BLD AUTO: 130 K/UL (ref 150–450)
PMV BLD AUTO: 9.3 FL (ref 9.2–12.9)
POTASSIUM SERPL-SCNC: 4.2 MMOL/L (ref 3.5–5.1)
PROT SERPL-MCNC: 6.5 G/DL (ref 6–8.4)
RBC # BLD AUTO: 2.85 M/UL (ref 4.6–6.2)
SODIUM SERPL-SCNC: 136 MMOL/L (ref 136–145)
TACROLIMUS BLD-MCNC: 4.9 NG/ML (ref 5–15)
WBC # BLD AUTO: 5.81 K/UL (ref 3.9–12.7)

## 2023-10-12 PROCEDURE — 80197 ASSAY OF TACROLIMUS: CPT | Performed by: SURGERY

## 2023-10-12 PROCEDURE — 82248 BILIRUBIN DIRECT: CPT | Performed by: SURGERY

## 2023-10-12 PROCEDURE — 85025 COMPLETE CBC W/AUTO DIFF WBC: CPT | Performed by: SURGERY

## 2023-10-12 PROCEDURE — 36415 COLL VENOUS BLD VENIPUNCTURE: CPT | Performed by: SURGERY

## 2023-10-12 PROCEDURE — 80053 COMPREHEN METABOLIC PANEL: CPT | Performed by: SURGERY

## 2023-10-12 NOTE — TELEPHONE ENCOUNTER
Patient notified and instructed to increase Prograf dose to 3mg twice a day, repeat labs on Monday (10/16/23) morning, will review labs and U/S on Monday afternoon for a further plan.  Reviewed appointments with patient for Monday 10/16(labs, u/s and  appts.)  and Tues. 10/17(surgery clinic).  Patient repeated instructions and reported writing down Prograf dose change. Patient reported that he will call his mom with above information.

## 2023-10-13 ENCOUNTER — TELEPHONE (OUTPATIENT)
Dept: TRANSPLANT | Facility: CLINIC | Age: 38
End: 2023-10-13
Payer: COMMERCIAL

## 2023-10-13 RX ORDER — LORAZEPAM 0.5 MG/1
1 TABLET ORAL 2 TIMES DAILY PRN
Qty: 60 TABLET | Refills: 0 | Status: CANCELLED | OUTPATIENT
Start: 2023-10-11 | End: 2023-11-10

## 2023-10-13 RX ORDER — TACROLIMUS 1 MG/1
3 CAPSULE ORAL EVERY 12 HOURS
Qty: 180 CAPSULE | Refills: 11 | Status: SHIPPED | OUTPATIENT
Start: 2023-10-13 | End: 2023-10-16 | Stop reason: DRUGHIGH

## 2023-10-13 NOTE — TELEPHONE ENCOUNTER
Returned mother's call; informed her will ask the doctor when patient can go home with review of the u/s and labs on Monday. She voiced understanding.

## 2023-10-13 NOTE — TELEPHONE ENCOUNTER
----- Message from Haley Mercado sent at 10/13/2023  3:33 PM CDT -----  Regarding: Appt  Contact: Maribel [phone]          Name of Caller:   Mairbel pt's mother     Contact Preference:   [phone]     Nature of Call: Patient's mother requesting a call back to discuss pt getting discharged if US comes back fine

## 2023-10-16 ENCOUNTER — SOCIAL WORK (OUTPATIENT)
Dept: TRANSPLANT | Facility: CLINIC | Age: 38
End: 2023-10-16
Payer: COMMERCIAL

## 2023-10-16 ENCOUNTER — HOSPITAL ENCOUNTER (OUTPATIENT)
Dept: RADIOLOGY | Facility: HOSPITAL | Age: 38
Discharge: HOME | End: 2023-10-16
Attending: SURGERY
Payer: COMMERCIAL

## 2023-10-16 DIAGNOSIS — R93.5 ABNORMAL FINDINGS ON DIAGNOSTIC IMAGING OF ABDOMEN: ICD-10-CM

## 2023-10-16 DIAGNOSIS — Z94.4 LIVER REPLACED BY TRANSPLANT: Primary | ICD-10-CM

## 2023-10-16 DIAGNOSIS — R93.5 ABNORMAL FINDINGS ON DIAGNOSTIC IMAGING OF ABDOMEN: Primary | ICD-10-CM

## 2023-10-16 PROCEDURE — 76705 ECHO EXAM OF ABDOMEN: CPT | Mod: 59,TC

## 2023-10-16 PROCEDURE — 93976 US DOPPLER LIVER TRANSPLANT POST (XPD): ICD-10-PCS | Mod: 26,,, | Performed by: RADIOLOGY

## 2023-10-16 PROCEDURE — 76705 ECHO EXAM OF ABDOMEN: CPT | Mod: 26,59,, | Performed by: RADIOLOGY

## 2023-10-16 PROCEDURE — 93976 VASCULAR STUDY: CPT | Mod: 26,,, | Performed by: RADIOLOGY

## 2023-10-16 PROCEDURE — 76705 US DOPPLER LIVER TRANSPLANT POST (XPD): ICD-10-PCS | Mod: 26,59,, | Performed by: RADIOLOGY

## 2023-10-16 PROCEDURE — 93976 VASCULAR STUDY: CPT | Mod: TC

## 2023-10-16 RX ORDER — LORAZEPAM 0.5 MG/1
1 TABLET ORAL 2 TIMES DAILY PRN
Qty: 60 TABLET | Refills: 0 | Status: SHIPPED | OUTPATIENT
Start: 2023-10-16 | End: 2024-03-21

## 2023-10-16 RX ORDER — LORAZEPAM 0.5 MG/1
1 TABLET ORAL 2 TIMES DAILY PRN
Qty: 60 TABLET | Refills: 0 | Status: CANCELLED | OUTPATIENT
Start: 2023-10-11 | End: 2023-11-10

## 2023-10-16 NOTE — TELEPHONE ENCOUNTER
Patient notified and instructed to increase Prograf dose to 4mg twice a day; repeat labs due Monday 10/23/23.  Patient will be scheduled for CTA and results will be reviewed by the doctor for a plan. Patient to come to clinic tomorrow as planned and the doctor will discuss with him his ultrasound results and planned CTA . Patient was able to repeat instructions and voiced  understanding. Patient will have his mother on the phone with him at tomorrow's clinic appt, he said..

## 2023-10-16 NOTE — PROGRESS NOTES
"Transplant Social Work Update:    SW met with pt and brother in clinic for update and to discuss long-term plan for sobriety. Pt's mother, Maribel, participated in visit by phone for 10-15 minutes. Pt excited to share he is feeling well and has been taking walks outside with his brother.     Pt reports he completed [facility] and was discharged from PT [facility].     Pt's mother had questions about pt's disability application. YADIRA previously sent SSD referral to [facility] representative. SW provided pt and pt's mother with contact info of [facility] office for follow-up. SW also emailed [email or web address] for update on pt's SSD application.    SW and pt discussed pt's long-term plan for maintaining sobriety. SW printed list of IOP programs in [place] and gave to pt and emailed list to pt's brother, Nitesh. Pt reports he plans to start with an IOP as soon as he returns home. Pt and SW discussed pt prefers to stay active and would like to begin a program to keep his mind occupied and meet more people. SW and pt discussed pt previously engaging in AA for a few meetings when pt first arrived to [facility] and then no meetings since transplant. Pt reports not liking AA because it is "too negative" and usually one person dominates the meeting. SW provided pt with list of alternative support groups- SMART meetings, NA, etc. Pt and pt's brother expressed understanding of need for life-long support to maintain sobriety. Pt also expressed interest in participating in transplant patient support groups. YADIRA emailed pt's brother, Nitesh, list of support groups, too.     SW strongly encouraged pt to establish with a mental health provider. Pt reports he already has a list of mental health providers that accept his insurance and will establish services after he completes IOP.     Pt's brother that is staying with him now will have to fly home to [place] on Wednesday. Pt reports his mother and brother, Nitesh, will assist pt with " "arranging flight home to [place].     Pt expressed his gratitude to SW and staff for "saving my life". Pt agreed to update transplant SW team after establishing with IOP in [place].    "

## 2023-10-17 ENCOUNTER — TELEPHONE (OUTPATIENT)
Dept: TRANSPLANT | Facility: CLINIC | Age: 38
End: 2023-10-17
Payer: COMMERCIAL

## 2023-10-17 RX ORDER — TACROLIMUS 1 MG/1
4 CAPSULE ORAL EVERY 12 HOURS
Qty: 240 CAPSULE | Refills: 11 | Status: SHIPPED | OUTPATIENT
Start: 2023-10-17 | End: 2023-10-24 | Stop reason: DRUGHIGH

## 2023-10-17 NOTE — TELEPHONE ENCOUNTER
Patient notified and instructed : CTA scheduled on Thursday 10/19/23 at 7am.  Fast x 4 hrs prior.   Patient voiced understanding.  Per ABDIAS Ernst: clinic appt. Today cancelled , will be able to give patient more info. After CTA.

## 2023-10-19 ENCOUNTER — HOSPITAL ENCOUNTER (OUTPATIENT)
Dept: RADIOLOGY | Facility: HOSPITAL | Age: 38
Discharge: HOME | End: 2023-10-19
Attending: SURGERY
Payer: COMMERCIAL

## 2023-10-19 ENCOUNTER — TELEPHONE (OUTPATIENT)
Dept: TRANSPLANT | Facility: CLINIC | Age: 38
End: 2023-10-19
Payer: COMMERCIAL

## 2023-10-19 DIAGNOSIS — R93.5 ABNORMAL FINDINGS ON DIAGNOSTIC IMAGING OF ABDOMEN: ICD-10-CM

## 2023-10-19 DIAGNOSIS — Z94.4 LIVER REPLACED BY TRANSPLANT: Primary | ICD-10-CM

## 2023-10-19 PROCEDURE — 74175 CTA ABDOMEN W/CONTRAST: CPT | Mod: 26,,, | Performed by: RADIOLOGY

## 2023-10-19 PROCEDURE — 74175 CTA ABDOMEN: ICD-10-PCS | Mod: 26,,, | Performed by: RADIOLOGY

## 2023-10-19 PROCEDURE — 25500020 PHARM REV CODE 255: Performed by: SURGERY

## 2023-10-19 PROCEDURE — 74175 CTA ABDOMEN W/CONTRAST: CPT | Mod: TC

## 2023-10-19 RX ADMIN — IOHEXOL 75 ML: 350 INJECTION, SOLUTION INTRAVENOUS at 07:10

## 2023-10-19 NOTE — TELEPHONE ENCOUNTER
Pateint was notified of need for angiogram and that the IR department will be calling him with instructions. He was able to voice understanding.

## 2023-10-20 ENCOUNTER — TELEPHONE (OUTPATIENT)
Dept: INTERVENTIONAL RADIOLOGY/VASCULAR | Facility: CLINIC | Age: 38
End: 2023-10-20
Payer: COMMERCIAL

## 2023-10-20 NOTE — TELEPHONE ENCOUNTER
Spoke to pt on phone,  Pt is scheduled on 10/23/2023 with arrival time of 530am for IR procedure at  location.  Preop instructions given (NPO after midnight, MUST have a ride home, Nurse will call 1-2  days before to go over instructions and medications), instructed pt to stay on ASA.  pt verbally understood. Pt aware and confirmed, Thanks

## 2023-10-23 ENCOUNTER — HOSPITAL ENCOUNTER (OUTPATIENT)
Dept: INTERVENTIONAL RADIOLOGY/VASCULAR | Facility: HOSPITAL | Age: 38
Discharge: HOME | End: 2023-10-23
Attending: SURGERY | Admitting: TRANSPLANT SURGERY
Payer: COMMERCIAL

## 2023-10-23 ENCOUNTER — PATIENT MESSAGE (OUTPATIENT)
Dept: TRANSPLANT | Facility: CLINIC | Age: 38
End: 2023-10-23
Payer: COMMERCIAL

## 2023-10-23 ENCOUNTER — ANESTHESIA (OUTPATIENT)
Dept: INTERVENTIONAL RADIOLOGY/VASCULAR | Facility: HOSPITAL | Age: 38
End: 2023-10-23
Payer: COMMERCIAL

## 2023-10-23 VITALS
SYSTOLIC BLOOD PRESSURE: 131 MMHG | HEIGHT: 70 IN | BODY MASS INDEX: 17.18 KG/M2 | WEIGHT: 120 LBS | RESPIRATION RATE: 18 BRPM | TEMPERATURE: 98 F | DIASTOLIC BLOOD PRESSURE: 96 MMHG | OXYGEN SATURATION: 100 % | HEART RATE: 99 BPM

## 2023-10-23 DIAGNOSIS — Z94.4 LIVER REPLACED BY TRANSPLANT: ICD-10-CM

## 2023-10-23 LAB
INR PPP: 1 (ref 0.8–1.2)
PROTHROMBIN TIME: 11 SEC (ref 9–12.5)

## 2023-10-23 PROCEDURE — 85610 PROTHROMBIN TIME: CPT

## 2023-10-23 PROCEDURE — 36247 PR PLACE CATH SUBSUBSELECT ART,ABD/PEL: ICD-10-PCS | Mod: ,,, | Performed by: RADIOLOGY

## 2023-10-23 PROCEDURE — 63600175 PHARM REV CODE 636 W HCPCS: Performed by: RADIOLOGY

## 2023-10-23 PROCEDURE — 75726 ARTERY X-RAYS ABDOMEN: CPT | Mod: TC,59 | Performed by: RADIOLOGY

## 2023-10-23 PROCEDURE — 27000221 HC OXYGEN, UP TO 24 HOURS

## 2023-10-23 PROCEDURE — 25000003 PHARM REV CODE 250: Performed by: RADIOLOGY

## 2023-10-23 PROCEDURE — 76937 US GUIDE VASCULAR ACCESS: CPT | Mod: TC | Performed by: RADIOLOGY

## 2023-10-23 PROCEDURE — 75726 ARTERY X-RAYS ABDOMEN: CPT | Mod: 26,59,, | Performed by: RADIOLOGY

## 2023-10-23 PROCEDURE — 75774 ARTERY X-RAY EACH VESSEL: CPT | Mod: 26,59,, | Performed by: RADIOLOGY

## 2023-10-23 PROCEDURE — 63600175 PHARM REV CODE 636 W HCPCS: Performed by: NURSE ANESTHETIST, CERTIFIED REGISTERED

## 2023-10-23 PROCEDURE — D9220A PRA ANESTHESIA: ICD-10-PCS | Mod: CRNA,,, | Performed by: NURSE ANESTHETIST, CERTIFIED REGISTERED

## 2023-10-23 PROCEDURE — 75774 PR  ANGIO EA ADDNL SELECTV VESSEL: ICD-10-PCS | Mod: 26,59,, | Performed by: RADIOLOGY

## 2023-10-23 PROCEDURE — D9220A PRA ANESTHESIA: ICD-10-PCS | Mod: ANES,,, | Performed by: ANESTHESIOLOGY

## 2023-10-23 PROCEDURE — 25000003 PHARM REV CODE 250: Performed by: NURSE PRACTITIONER

## 2023-10-23 PROCEDURE — 99900035 HC TECH TIME PER 15 MIN (STAT)

## 2023-10-23 PROCEDURE — 75726 CHG ANGIO VISCERAL SELECTV/SUBSELEC: ICD-10-PCS | Mod: 26,59,, | Performed by: RADIOLOGY

## 2023-10-23 PROCEDURE — 37236 OPEN/PERQ PLACE STENT 1ST: CPT | Performed by: RADIOLOGY

## 2023-10-23 PROCEDURE — 76937 PR  US GUIDE, VASCULAR ACCESS: ICD-10-PCS | Mod: 26,,, | Performed by: RADIOLOGY

## 2023-10-23 PROCEDURE — 36247 INS CATH ABD/L-EXT ART 3RD: CPT | Mod: ,,, | Performed by: RADIOLOGY

## 2023-10-23 PROCEDURE — D9220A PRA ANESTHESIA: Mod: CRNA,,, | Performed by: NURSE ANESTHETIST, CERTIFIED REGISTERED

## 2023-10-23 PROCEDURE — 25000003 PHARM REV CODE 250: Performed by: NURSE ANESTHETIST, CERTIFIED REGISTERED

## 2023-10-23 PROCEDURE — 75774 ARTERY X-RAY EACH VESSEL: CPT | Mod: TC,59 | Performed by: RADIOLOGY

## 2023-10-23 PROCEDURE — C1894 INTRO/SHEATH, NON-LASER: HCPCS

## 2023-10-23 PROCEDURE — 25000003 PHARM REV CODE 250

## 2023-10-23 PROCEDURE — 37000008 HC ANESTHESIA 1ST 15 MINUTES

## 2023-10-23 PROCEDURE — 25500020 PHARM REV CODE 255: Performed by: ANESTHESIOLOGY

## 2023-10-23 PROCEDURE — 37000009 HC ANESTHESIA EA ADD 15 MINS

## 2023-10-23 PROCEDURE — 37236 IR ANGIOGRAM VISCERAL: ICD-10-PCS | Mod: ,,, | Performed by: RADIOLOGY

## 2023-10-23 PROCEDURE — D9220A PRA ANESTHESIA: Mod: ANES,,, | Performed by: ANESTHESIOLOGY

## 2023-10-23 PROCEDURE — 76937 US GUIDE VASCULAR ACCESS: CPT | Mod: 26,,, | Performed by: RADIOLOGY

## 2023-10-23 PROCEDURE — 36247 INS CATH ABD/L-EXT ART 3RD: CPT | Performed by: RADIOLOGY

## 2023-10-23 PROCEDURE — 94761 N-INVAS EAR/PLS OXIMETRY MLT: CPT

## 2023-10-23 RX ORDER — CLOPIDOGREL 300 MG/1
TABLET, FILM COATED ORAL
Status: COMPLETED | OUTPATIENT
Start: 2023-10-23 | End: 2023-10-23

## 2023-10-23 RX ORDER — CLOPIDOGREL BISULFATE 75 MG/1
75 TABLET ORAL DAILY
Qty: 30 TABLET | Refills: 6 | Status: SHIPPED | OUTPATIENT
Start: 2023-10-23 | End: 2024-04-23

## 2023-10-23 RX ORDER — ONDANSETRON 2 MG/ML
INJECTION INTRAMUSCULAR; INTRAVENOUS
Status: DISCONTINUED | OUTPATIENT
Start: 2023-10-23 | End: 2023-10-23

## 2023-10-23 RX ORDER — CEFAZOLIN SODIUM 1 G/3ML
INJECTION, POWDER, FOR SOLUTION INTRAMUSCULAR; INTRAVENOUS
Status: DISCONTINUED | OUTPATIENT
Start: 2023-10-23 | End: 2023-10-23

## 2023-10-23 RX ORDER — ASPIRIN 325 MG
325 TABLET, DELAYED RELEASE (ENTERIC COATED) ORAL DAILY
Qty: 30 TABLET | Refills: 11 | Status: SHIPPED | OUTPATIENT
Start: 2023-10-23 | End: 2024-10-22

## 2023-10-23 RX ORDER — HYDROMORPHONE HYDROCHLORIDE 1 MG/ML
0.2 INJECTION, SOLUTION INTRAMUSCULAR; INTRAVENOUS; SUBCUTANEOUS EVERY 5 MIN PRN
Status: DISCONTINUED | OUTPATIENT
Start: 2023-10-23 | End: 2023-10-24 | Stop reason: HOSPADM

## 2023-10-23 RX ORDER — SODIUM CHLORIDE 9 MG/ML
INJECTION, SOLUTION INTRAVENOUS CONTINUOUS
Status: DISCONTINUED | OUTPATIENT
Start: 2023-10-23 | End: 2023-10-24 | Stop reason: HOSPADM

## 2023-10-23 RX ORDER — LORAZEPAM 1 MG/1
1 TABLET ORAL ONCE
Status: COMPLETED | OUTPATIENT
Start: 2023-10-23 | End: 2023-10-23

## 2023-10-23 RX ORDER — PROCHLORPERAZINE EDISYLATE 5 MG/ML
5 INJECTION INTRAMUSCULAR; INTRAVENOUS EVERY 30 MIN PRN
Status: DISCONTINUED | OUTPATIENT
Start: 2023-10-23 | End: 2023-10-24 | Stop reason: HOSPADM

## 2023-10-23 RX ORDER — MIDAZOLAM HYDROCHLORIDE 1 MG/ML
INJECTION, SOLUTION INTRAMUSCULAR; INTRAVENOUS
Status: DISCONTINUED | OUTPATIENT
Start: 2023-10-23 | End: 2023-10-23

## 2023-10-23 RX ORDER — NITROGLYCERIN 5 MG/ML
INJECTION, SOLUTION INTRAVENOUS
Status: COMPLETED | OUTPATIENT
Start: 2023-10-23 | End: 2023-10-23

## 2023-10-23 RX ORDER — FENTANYL CITRATE 50 UG/ML
INJECTION, SOLUTION INTRAMUSCULAR; INTRAVENOUS
Status: DISCONTINUED | OUTPATIENT
Start: 2023-10-23 | End: 2023-10-23

## 2023-10-23 RX ORDER — LIDOCAINE HYDROCHLORIDE 20 MG/ML
INJECTION INTRAVENOUS
Status: DISCONTINUED | OUTPATIENT
Start: 2023-10-23 | End: 2023-10-23

## 2023-10-23 RX ORDER — HALOPERIDOL 5 MG/ML
0.5 INJECTION INTRAMUSCULAR EVERY 10 MIN PRN
Status: DISCONTINUED | OUTPATIENT
Start: 2023-10-23 | End: 2023-10-24 | Stop reason: HOSPADM

## 2023-10-23 RX ORDER — ASPIRIN 325 MG
TABLET ORAL
Status: COMPLETED | OUTPATIENT
Start: 2023-10-23 | End: 2023-10-23

## 2023-10-23 RX ORDER — SODIUM CHLORIDE 0.9 % (FLUSH) 0.9 %
3 SYRINGE (ML) INJECTION
Status: DISCONTINUED | OUTPATIENT
Start: 2023-10-23 | End: 2023-10-24 | Stop reason: HOSPADM

## 2023-10-23 RX ORDER — DEXMEDETOMIDINE HYDROCHLORIDE 100 UG/ML
INJECTION, SOLUTION INTRAVENOUS
Status: DISCONTINUED | OUTPATIENT
Start: 2023-10-23 | End: 2023-10-23

## 2023-10-23 RX ORDER — LIDOCAINE HYDROCHLORIDE 10 MG/ML
1 INJECTION, SOLUTION EPIDURAL; INFILTRATION; INTRACAUDAL; PERINEURAL ONCE
Status: DISCONTINUED | OUTPATIENT
Start: 2023-10-23 | End: 2023-10-24 | Stop reason: HOSPADM

## 2023-10-23 RX ORDER — HEPARIN SODIUM 1000 [USP'U]/ML
INJECTION, SOLUTION INTRAVENOUS; SUBCUTANEOUS
Status: DISCONTINUED | OUTPATIENT
Start: 2023-10-23 | End: 2023-10-23

## 2023-10-23 RX ORDER — ROCURONIUM BROMIDE 10 MG/ML
INJECTION, SOLUTION INTRAVENOUS
Status: DISCONTINUED | OUTPATIENT
Start: 2023-10-23 | End: 2023-10-23

## 2023-10-23 RX ORDER — PROPOFOL 10 MG/ML
VIAL (ML) INTRAVENOUS
Status: DISCONTINUED | OUTPATIENT
Start: 2023-10-23 | End: 2023-10-23

## 2023-10-23 RX ADMIN — ROCURONIUM BROMIDE 10 MG: 10 INJECTION INTRAVENOUS at 09:10

## 2023-10-23 RX ADMIN — IOHEXOL 70 ML: 300 INJECTION, SOLUTION INTRAVENOUS at 10:10

## 2023-10-23 RX ADMIN — DEXMEDETOMIDINE 8 MCG: 100 INJECTION, SOLUTION, CONCENTRATE INTRAVENOUS at 08:10

## 2023-10-23 RX ADMIN — ONDANSETRON 4 MG: 2 INJECTION INTRAMUSCULAR; INTRAVENOUS at 10:10

## 2023-10-23 RX ADMIN — HEPARIN SODIUM 1000 UNITS: 1000 INJECTION, SOLUTION INTRAVENOUS; SUBCUTANEOUS at 10:10

## 2023-10-23 RX ADMIN — CEFAZOLIN 2 G: 330 INJECTION, POWDER, FOR SOLUTION INTRAMUSCULAR; INTRAVENOUS at 09:10

## 2023-10-23 RX ADMIN — MIDAZOLAM HYDROCHLORIDE 1 MG: 1 INJECTION, SOLUTION INTRAMUSCULAR; INTRAVENOUS at 08:10

## 2023-10-23 RX ADMIN — NITROGLYCERIN 200 MCG: 5 INJECTION, SOLUTION INTRAVENOUS at 09:10

## 2023-10-23 RX ADMIN — SODIUM CHLORIDE: 0.9 INJECTION, SOLUTION INTRAVENOUS at 08:10

## 2023-10-23 RX ADMIN — ROCURONIUM BROMIDE 35 MG: 10 INJECTION INTRAVENOUS at 08:10

## 2023-10-23 RX ADMIN — CLOPIDOGREL BISULFATE 300 MG: 300 TABLET, FILM COATED ORAL at 10:10

## 2023-10-23 RX ADMIN — FENTANYL CITRATE 100 MCG: 50 INJECTION, SOLUTION INTRAMUSCULAR; INTRAVENOUS at 08:10

## 2023-10-23 RX ADMIN — NITROGLYCERIN 200 MCG: 5 INJECTION, SOLUTION INTRAVENOUS at 10:10

## 2023-10-23 RX ADMIN — ROCURONIUM BROMIDE 5 MG: 10 INJECTION INTRAVENOUS at 10:10

## 2023-10-23 RX ADMIN — HEPARIN SODIUM 4000 UNITS: 1000 INJECTION, SOLUTION INTRAVENOUS; SUBCUTANEOUS at 09:10

## 2023-10-23 RX ADMIN — DEXMEDETOMIDINE 4 MCG: 100 INJECTION, SOLUTION, CONCENTRATE INTRAVENOUS at 09:10

## 2023-10-23 RX ADMIN — LIDOCAINE HYDROCHLORIDE 50 MG: 20 INJECTION INTRAVENOUS at 08:10

## 2023-10-23 RX ADMIN — DEXMEDETOMIDINE 4 MCG: 100 INJECTION, SOLUTION, CONCENTRATE INTRAVENOUS at 10:10

## 2023-10-23 RX ADMIN — PROPOFOL 170 MG: 10 INJECTION, EMULSION INTRAVENOUS at 08:10

## 2023-10-23 RX ADMIN — ASPIRIN 325 MG ORAL TABLET 325 MG: 325 PILL ORAL at 10:10

## 2023-10-23 RX ADMIN — LORAZEPAM 1 MG: 1 TABLET ORAL at 03:10

## 2023-10-23 RX ADMIN — SUGAMMADEX 200 MG: 100 INJECTION, SOLUTION INTRAVENOUS at 10:10

## 2023-10-23 NOTE — PROGRESS NOTES
Patient ok'd to be discharged home per IR and transplant team. Patient transferred to [facility], denies pain, VSS. Mother updated on plan of care.

## 2023-10-23 NOTE — PROGRESS NOTES
"Patient's angiogram is medically urgent, as an intervention may be needed to insure function of his transplant liver.    Guy Herrera MD ([facility])  Interventional Radiology        "

## 2023-10-23 NOTE — ANESTHESIA PREPROCEDURE EVALUATION
10/23/2023  Sukhdeep Grimes is a 38 y.o., male for Angiogram. He is s/p OLTx.     Patient Active Problem List   Diagnosis    Alcohol use disorder, severe, in early remission    Moderate malnutrition    Thrombocytopenia, unspecified    Metabolic acidosis    Steroid-induced hyperglycemia    Prophylactic immunotherapy    Adrenal cortical steroids causing adverse effect in therapeutic use    Liver transplanted    Long-term use of immunosuppressant medication    At risk for opportunistic infections    Candidiasis           Pre-op Assessment    I have reviewed the Patient Summary Reports.     I have reviewed the Nursing Notes. I have reviewed the NPO Status.   I have reviewed the Medications.     Review of Systems      Physical Exam  General: Well nourished    Airway:  Mallampati: I / I  Mouth Opening: Normal  TM Distance: Normal  Tongue: Normal  Neck ROM: Normal ROM    Dental:  Intact        Anesthesia Plan  Type of Anesthesia, risks & benefits discussed:    Anesthesia Type: Gen Natural Airway  Intra-op Monitoring Plan: Standard ASA Monitors  Post Op Pain Control Plan: multimodal analgesia  Induction:  IV  Informed Consent: Informed consent signed with the Patient and all parties understand the risks and agree with anesthesia plan.  All questions answered.   ASA Score: 3  Day of Surgery Review of History & Physical: H&P Update referred to the surgeon/provider.    Ready For Surgery From Anesthesia Perspective.     .    Lab Results   Component Value Date    WBC 5.56 10/16/2023    HGB 8.1 (L) 10/16/2023    HCT 24.6 (L) 10/16/2023    MCV 87 10/16/2023     10/16/2023       BMP  Lab Results   Component Value Date     10/16/2023    K 4.0 10/16/2023     10/16/2023    CO2 20 (L) 10/16/2023    BUN 9 10/16/2023    CREATININE 0.8 10/16/2023    CALCIUM 9.6 10/16/2023    ANIONGAP 11 10/16/2023     EGFRNORACEVR >60.0 10/16/2023     Results for orders placed during the hospital encounter of 08/24/23    Echo Saline Bubble? Yes    Interpretation Summary    Left Ventricle: The left ventricle is normal in size. Normal wall thickness. Normal wall motion. There is normal systolic function. There is normal diastolic function.    Right Ventricle: Normal right ventricular cavity size. Wall thickness is normal. Right ventricle wall motion  is normal. Systolic function is normal.    Aortic Valve: The aortic valve is a trileaflet valve.    IVC/SVC: Normal venous pressure at 3 mmHg.    Study is negative for intra cardiac shunt. Bubbles on the left side on the 4th beat emanating from pulmonary veins consistent with transpulmonary shunting.

## 2023-10-23 NOTE — NURSING
[facility] +/- vessel plasty and stent placement with anesthesia, escorted to room by RN and CRNA who will assume care. Pt oriented to unit and staff. Plan of care reviewed with patient, patient verbalizes understanding. Comfort measures utilized. Pt safely transferred from stretcher to procedural table. Fall risk reviewed with patient, fall risk interventions maintained with direct observation/attendance.  positioner pillows utilized to minimize pressure points. Blankets applied. Pt prepped and draped utilizing standard sterile technique. Patient placed on continuous monitoring, as required by sedation policy. Timeouts completed utilizing standard universal time-out, per department and facility policy. RN to remain at bedside, continuous monitoring maintained. Pt resting comfortably. Denies pain/discomfort. Will continue to monitor. See flow sheets for monitoring, medication administration, and updates.

## 2023-10-23 NOTE — DISCHARGE SUMMARY
Radiology Discharge Summary      Hospital Course: No complications    Admit Date: 10/23/2023  Discharge Date: 10/23/2023     Instructions Given to Patient: Yes  Diet: Resume prior diet  Activity: activity as tolerated and no driving for today    Description of Condition on Discharge: Stable  Vital Signs (Most Recent): Temp: 98 °F (36.7 °C) (10/23/23 1545)  Pulse: 99 (10/23/23 1545)  Resp: 18 (10/23/23 1545)  BP: (!) 131/96 (10/23/23 1545)  SpO2: 100 % (10/23/23 1545)    Discharge Disposition: Home    Discharge Diagnosis: HAS s/p angioplasty and stenting    Follow up: As scheduled    Karlos Garcia M.D.  Interventional Radiology  Department of Radiology

## 2023-10-23 NOTE — PROCEDURES
Radiology Post-Procedure Note    Pre Op Diagnosis: HAS    Post Op Diagnosis: Same    Procedure: Visceral angiogram and stenting    Procedure performed by: Karlos Garcia MD    Written Informed Consent Obtained: Yes  Specimen Removed: NO  Estimated Blood Loss: Minimal    Findings:   Via rt cfa, celiac, pha and rha segmental branch selected. Angiograms obtained. Anastomotic stricture noted, resistant to angioplasty. Overlapping 4.5 mm x 2 cm uncovered, self expanding stents were placed followed by angioplasty with 5 mm balloon. Angioseal to rt cfa. No complications. Pt was loaded with aspirin and plavix and will need to continue taking these for the next 6 months. See dictation.    Patient tolerated procedure well.    Karlos Garcia M.D.  Interventional Radiology  Department of Radiology

## 2023-10-23 NOTE — ANESTHESIA PROCEDURE NOTES
Intubation    Date/Time: 10/23/2023 8:42 AM    Performed by: Sukhdeep Casey CRNA  Authorized by: Reuben Conn MD    Intubation:     Induction:  Intravenous    Intubated:  Postinduction    Mask Ventilation:  Easy mask    Attempts:  1    Attempted By:  CRNA    Method of Intubation:  Direct    Blade:  Laney 3    Laryngeal View Grade: Grade I - full view of cords      Difficult Airway Encountered?: No      Complications:  None    Airway Device:  Oral endotracheal tube    Airway Device Size:  7.5    Style/Cuff Inflation:  Cuffed (inflated to minimal occlusive pressure)    Tube secured:  21    Secured at:  The lips    Placement Verified By:  Capnometry    Complicating Factors:  None    Findings Post-Intubation:  BS equal bilateral and atraumatic/condition of teeth unchanged

## 2023-10-23 NOTE — DISCHARGE INSTRUCTIONS
Please call with any questions or concerns.    Monday through Friday 8:00 am - 5:00 pm    Interventional Radiology   [phone] clinic    After Hours    Ask the  for the Radiology Resident on call  [phone]  May remove dressing in 24 hours and shower.   Do Not sit in bath water, hot tub, or swim for 7 days   Do not drive for 3 days   Do not lift anything heavier than 10 lbs for the next 3 days

## 2023-10-23 NOTE — NURSING
Hepatic angiogram with vessel angioplasty and stent placement complete. Pt tolerated well. VSS. No signs or symptoms of distress noted per CRNA.Angioseal closure device in place. Hemostasis 1035. Pt to remain flat until 1235 Pt will be transferred to PACU bed after extubation/stabilization escorted by RN and CRNA who will give report.

## 2023-10-23 NOTE — ANESTHESIA POSTPROCEDURE EVALUATION
Anesthesia Post Evaluation    Patient: Sukhdeep Grimes    Procedure(s) Performed: * No procedures listed *    Final Anesthesia Type: general      Patient location during evaluation: PACU  Patient participation: Yes- Able to Participate  Level of consciousness: awake and alert and awake  Post-procedure vital signs: reviewed and stable  Pain management: adequate  Airway patency: patent    PONV status at discharge: No PONV  Anesthetic complications: no      Cardiovascular status: blood pressure returned to baseline  Respiratory status: unassisted and spontaneous ventilation  Hydration status: euvolemic  Follow-up not needed.          Vitals Value Taken Time   /89 10/23/23 1131   Temp 36.5 °C (97.7 °F) 10/23/23 1055   Pulse 83 10/23/23 1142   Resp 16 10/23/23 1142   SpO2 100 % 10/23/23 1142   Vitals shown include unvalidated device data.      No case tracking events are documented in the log.      Pain/Nasrin Score: Nasrin Score: 9 (10/23/2023 11:15 AM)

## 2023-10-23 NOTE — H&P
"VIR Pre-Procedure H&P      SUBJECTIVE:     Chief Complaint: hepatic artery stenosis    History of Present Illness:  Sukhdeep Grimes is a 38 y.o. 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       Past Medical History:   Diagnosis Date    Alcoholic cirrhosis of liver with ascites     Alcoholic cirrhosis of liver with ascites 8/1/2023    Last Assessment & Plan:  Formatting of this note might be different from the original. Looking better today compared to last week in regards to his energy. I also reviewed his visit with Dr. Stanford from last week. [facility] appears Dr. Stanford meant to do this. He would like to change to [facility] GI team, so will place that referral today. Send in referral to [facility]. 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  No red Rigoberto sign present.  Banding was not performed.  Moderate portal hypertensive gastropathy in the entire examined stomach.  Examined duodenum was normal." EGD done in [facility])    LIVER TRANSPLANT N/A 9/3/2023    Procedure: TRANSPLANT, LIVER;  Surgeon: Phil Ariza MD;  Location: [facility] OR [address] [place] [address];  Service: Transplant;  Laterality: N/A;    LIVER TRANSPLANT N/A 9/7/2023    Procedure: TRANSPLANT, LIVER;  Surgeon: Leonardo Manuel MD;  Location: [facility] OR [address] [place] [address];  Service: Transplant;  Laterality: N/A;    PARACENTESIS  08/24/2023       Home Meds:   Prior to Admission medications    Medication Sig Start Date End Date Taking? Authorizing Provider   albuterol (VENTOLIN HFA) 90 mcg/actuation inhaler Inhale 2 puffs into the lungs every 6 " (six) hours as needed for Wheezing. Rescue    Provider, Historical   amLODIPine (NORVASC) 5 MG tablet Take 1 tablet (5 mg total) by mouth once daily. 10/3/23 10/2/24  Mel Murry MD   aspirin (ECOTRIN) 81 MG EC tablet Take 1 tablet (81 mg total) by mouth once daily.  Patient taking differently: Take 81 mg by mouth every evening. 9/16/23 9/15/24  Leonardo Manuel MD   calcium carbonate-vitamin D3 600 mg-20 mcg (800 unit) Tab Take 1 tablet by mouth once daily. 9/16/23   Leonardo Manuel MD   famotidine (PEPCID) 20 MG tablet Take 1 tablet (20 mg total) by mouth nightly. 9/13/23 10/14/23  Leonardo Manuel MD   hydrOXYzine HCL (ATARAX) 25 MG tablet Take 1 tablet (25 mg total) by mouth 3 (three) times daily as needed for Anxiety (or Sleep). 9/13/23   Leonardo Manuel MD   LORazepam (ATIVAN) 0.5 MG tablet Take 2 tablets (1 mg total) by mouth 2 (two) times daily as needed for Anxiety. 10/16/23 11/15/23  Tim Gonzalez MD   mycophenolate (CELLCEPT) 250 mg Cap Take 4 capsules (1,000 mg total) by mouth 2 (two) times daily. 9/8/23   Lex Paulino MD   oxyCODONE (ROXICODONE) 5 MG immediate release tablet Take 1 tablet (5 mg total) by mouth every 4 (four) hours as needed for Pain. 9/13/23   [place], JOEY   polyethylene glycol (GLYCOLAX) 17 gram PwPk Take 17 g by mouth once daily. 9/14/23   Leonardo Manuel MD   predniSONE (DELTASONE) 5 MG tablet Take by mouth daily:  20mg 9/13-9/19, 15mg 9/20-9/26, 10mg 9/27-10/3, 5mg 10/4-10/6. STOP 10/7/23 9/13/23   Lex Paulino MD   sodium bicarbonate 650 MG tablet Take 2 tablets (1,300 mg total) by mouth 2 (two) times daily. 9/13/23 9/12/24  Leonardo Manuel MD   sulfamethoxazole-trimethoprim 400-80mg (BACTRIM,SEPTRA) 400-80 mg per tablet Take 1 tablet by mouth every morning. STOP 3/8/24 9/14/23   Lex Paulino MD   tacrolimus (PROGRAF) 1 MG Cap Take 4 capsules (4 mg total) by mouth every 12 (twelve) hours. 10/17/23 10/16/24  Tim Gonzalez MD    traZODone (DESYREL) 50 MG tablet Take 1 tablet (50 mg total) by mouth every evening. 9/13/23 9/12/24  Leonardo Manuel MD   valGANciclovir (VALCYTE) 450 mg Tab Take 1 tablet (450 mg total) by mouth once daily. STOP 3/8/24 9/17/23   Lex Paulino MD     Anticoagulants/Antiplatelets: aspirin    Allergies:   Review of patient's allergies indicates:   Allergen Reactions    Vancomycin analogues      Per patient, rior h/o significant kidney injury 2/2 vancomycin and some redness   Other reaction(s): Anaphylactic shock-Allergy     Sedation History:  no adverse reactions    Review of Systems:   Hematological: no known coagulopathies  Respiratory: no shortness of breath  Cardiovascular: no chest pain  Gastrointestinal: no abdominal pain  Genito-Urinary: no dysuria  Musculoskeletal: negative  Neurological: no TIA or stroke symptoms         OBJECTIVE:     Vital Signs (Most Recent)       Physical Exam:  ASA: per anesthesia  Mallampati: per anesthesia    General: no acute distress  Mental Status: alert and oriented to person, place and time  HEENT: normocephalic, atraumatic  Chest: unlabored breathing  Heart: regular heart rate  Abdomen: nondistended  Extremity: moves all extremities    Laboratory  Lab Results   Component Value Date    INR 1.0 09/10/2023       Lab Results   Component Value Date    WBC 5.56 10/16/2023    HGB 8.1 (L) 10/16/2023    HCT 24.6 (L) 10/16/2023    MCV 87 10/16/2023     10/16/2023      Lab Results   Component Value Date     10/16/2023     10/16/2023    K 4.0 10/16/2023     10/16/2023    CO2 20 (L) 10/16/2023    BUN 9 10/16/2023    CREATININE 0.8 10/16/2023    CALCIUM 9.6 10/16/2023    MG 1.7 09/14/2023    ALT 19 10/16/2023    AST 18 10/16/2023    ALBUMIN 3.5 10/16/2023    BILITOT 1.4 (H) 10/16/2023    BILIDIR 0.9 (H) 10/16/2023       ASSESSMENT/PLAN:     Sedation Plan: per anesthesia  Patient will undergo visceral angiography with angioplasty/stenting of the  transplant hepatic artery .      Kael Orr MD  VIR Fellow

## 2023-10-23 NOTE — PROCEDURES
VIR procedure note      Pre Op Diagnosis: Transplant hepatic artery stenosis  Post Op Diagnosis: Same    Procedure: Visceral angiogram and hepatic artery angioplasty/stenting    Procedure performed by:  Kael Orr MD / Karlos Garcia MD / Andres Clark MD    Written Informed Consent Obtained: Yes  Specimen Removed: NO  Estimated Blood Loss: Minimal    Findings:     Celiac and hepatic angiogram showed severe stenosis of proper hepatic artery    Successful angioplasty (3 and 4 mm balloons) & two stents placement (4.5 x 2.5 & 4.5 x 1.5 mm self expanding BMS) at the proper hepatic artery. Post stent angioplasty with 5 mm balloon.     Patient tolerated procedure well.         Kael Orr MD  VIR Fellow

## 2023-10-23 NOTE — TRANSFER OF CARE
"Anesthesia Transfer of Care Note    Patient: Sukhdeep Grimes    Procedure(s) Performed: * No procedures listed *    Patient location: PACU    Anesthesia Type: general    Transport from OR: Transported from OR on 6-10 L/min O2 by face mask with adequate spontaneous ventilation    Post pain: adequate analgesia    Post assessment: no apparent anesthetic complications and tolerated procedure well    Post vital signs: stable    Level of consciousness: awake, alert and oriented    Nausea/Vomiting: no nausea/vomiting    Complications: none    Transfer of care protocol was followed      Last vitals:   Visit Vitals  BP (!) 134/94 (BP Location: Left arm, Patient Position: Lying)   Pulse (!) 119   Temp 37 °C (98.6 °F) (Temporal)   Resp 18   Ht 5' 10" (1.778 m)   Wt 54.4 kg (120 lb)   SpO2 100%   BMI 17.22 kg/m²     "

## 2023-10-24 ENCOUNTER — LAB VISIT (OUTPATIENT)
Dept: LAB | Facility: HOSPITAL | Age: 38
End: 2023-10-24
Attending: SURGERY
Payer: COMMERCIAL

## 2023-10-24 DIAGNOSIS — Z94.4 LIVER REPLACED BY TRANSPLANT: ICD-10-CM

## 2023-10-24 DIAGNOSIS — R93.5 ABNORMAL FINDINGS ON DIAGNOSTIC IMAGING OF ABDOMEN: Primary | ICD-10-CM

## 2023-10-24 DIAGNOSIS — Z94.4 LIVER REPLACED BY TRANSPLANT: Primary | ICD-10-CM

## 2023-10-24 LAB
ALBUMIN SERPL BCP-MCNC: 3.2 G/DL (ref 3.5–5.2)
ALP SERPL-CCNC: 82 U/L (ref 55–135)
ALT SERPL W/O P-5'-P-CCNC: 22 U/L (ref 10–44)
ANION GAP SERPL CALC-SCNC: 10 MMOL/L (ref 8–16)
AST SERPL-CCNC: 16 U/L (ref 10–40)
BASOPHILS # BLD AUTO: 0.11 K/UL (ref 0–0.2)
BASOPHILS NFR BLD: 1.9 % (ref 0–1.9)
BILIRUB DIRECT SERPL-MCNC: 0.6 MG/DL (ref 0.1–0.3)
BILIRUB SERPL-MCNC: 0.9 MG/DL (ref 0.1–1)
BUN SERPL-MCNC: 9 MG/DL (ref 6–20)
CALCIUM SERPL-MCNC: 9 MG/DL (ref 8.7–10.5)
CHLORIDE SERPL-SCNC: 111 MMOL/L (ref 95–110)
CO2 SERPL-SCNC: 19 MMOL/L (ref 23–29)
CREAT SERPL-MCNC: 0.8 MG/DL (ref 0.5–1.4)
DIFFERENTIAL METHOD: ABNORMAL
EOSINOPHIL # BLD AUTO: 0.6 K/UL (ref 0–0.5)
EOSINOPHIL NFR BLD: 9.6 % (ref 0–8)
ERYTHROCYTE [DISTWIDTH] IN BLOOD BY AUTOMATED COUNT: 14.5 % (ref 11.5–14.5)
EST. GFR  (NO RACE VARIABLE): >60 ML/MIN/1.73 M^2
GLUCOSE SERPL-MCNC: 103 MG/DL (ref 70–110)
HCT VFR BLD AUTO: 23 % (ref 40–54)
HGB BLD-MCNC: 7.5 G/DL (ref 14–18)
IMM GRANULOCYTES # BLD AUTO: 0.22 K/UL (ref 0–0.04)
IMM GRANULOCYTES NFR BLD AUTO: 3.8 % (ref 0–0.5)
LYMPHOCYTES # BLD AUTO: 0.9 K/UL (ref 1–4.8)
LYMPHOCYTES NFR BLD: 14.8 % (ref 18–48)
MCH RBC QN AUTO: 28.6 PG (ref 27–31)
MCHC RBC AUTO-ENTMCNC: 32.6 G/DL (ref 32–36)
MCV RBC AUTO: 88 FL (ref 82–98)
MONOCYTES # BLD AUTO: 0.6 K/UL (ref 0.3–1)
MONOCYTES NFR BLD: 11 % (ref 4–15)
NEUTROPHILS # BLD AUTO: 3.4 K/UL (ref 1.8–7.7)
NEUTROPHILS NFR BLD: 58.9 % (ref 38–73)
NRBC BLD-RTO: 0 /100 WBC
PLATELET # BLD AUTO: 245 K/UL (ref 150–450)
PMV BLD AUTO: 9.6 FL (ref 9.2–12.9)
POTASSIUM SERPL-SCNC: 4.1 MMOL/L (ref 3.5–5.1)
PROT SERPL-MCNC: 6 G/DL (ref 6–8.4)
RBC # BLD AUTO: 2.62 M/UL (ref 4.6–6.2)
SODIUM SERPL-SCNC: 140 MMOL/L (ref 136–145)
TACROLIMUS BLD-MCNC: 3.2 NG/ML (ref 5–15)
WBC # BLD AUTO: 5.73 K/UL (ref 3.9–12.7)

## 2023-10-24 PROCEDURE — 80197 ASSAY OF TACROLIMUS: CPT | Performed by: SURGERY

## 2023-10-24 PROCEDURE — 80053 COMPREHEN METABOLIC PANEL: CPT | Performed by: SURGERY

## 2023-10-24 PROCEDURE — 85025 COMPLETE CBC W/AUTO DIFF WBC: CPT | Performed by: SURGERY

## 2023-10-24 PROCEDURE — 82248 BILIRUBIN DIRECT: CPT | Performed by: SURGERY

## 2023-10-24 PROCEDURE — 36415 COLL VENOUS BLD VENIPUNCTURE: CPT | Performed by: SURGERY

## 2023-10-24 NOTE — TELEPHONE ENCOUNTER
Patient and mother notified and instructed: increase prograf dose to 6mg twice a day, repeat labs 8am on Thursday morning before taking morning dose of Prograf.   Follow up liver ultrasound tomorrow 8:45 am .  They were able to repeat instructions and voiced understanding.

## 2023-10-25 ENCOUNTER — HOSPITAL ENCOUNTER (OUTPATIENT)
Dept: RADIOLOGY | Facility: HOSPITAL | Age: 38
Discharge: HOME | End: 2023-10-25
Attending: SURGERY
Payer: COMMERCIAL

## 2023-10-25 DIAGNOSIS — R93.5 ABNORMAL FINDINGS ON DIAGNOSTIC IMAGING OF ABDOMEN: ICD-10-CM

## 2023-10-25 PROCEDURE — 93976 US DOPPLER LIVER TRANSPLANT POST (XPD): ICD-10-PCS | Mod: 26,,, | Performed by: RADIOLOGY

## 2023-10-25 PROCEDURE — 76705 ECHO EXAM OF ABDOMEN: CPT | Mod: 26,59,, | Performed by: RADIOLOGY

## 2023-10-25 PROCEDURE — 93976 VASCULAR STUDY: CPT | Mod: TC

## 2023-10-25 PROCEDURE — 76705 US DOPPLER LIVER TRANSPLANT POST (XPD): ICD-10-PCS | Mod: 26,59,, | Performed by: RADIOLOGY

## 2023-10-25 PROCEDURE — 93976 VASCULAR STUDY: CPT | Mod: 26,,, | Performed by: RADIOLOGY

## 2023-10-25 NOTE — TELEPHONE ENCOUNTER
----- Message from Tim Gonzalez MD sent at 10/25/2023  1:13 PM CDT -----  Results ok. No action needed

## 2023-10-25 NOTE — TELEPHONE ENCOUNTER
Patient and his mother notified : u/s reviewed , results ok.  Repeat labs tomorrow as planned.  They were able to repeat instructions and voiced understanding.

## 2023-10-26 ENCOUNTER — TELEPHONE (OUTPATIENT)
Dept: TRANSPLANT | Facility: CLINIC | Age: 38
End: 2023-10-26
Payer: COMMERCIAL

## 2023-10-26 ENCOUNTER — LAB VISIT (OUTPATIENT)
Dept: LAB | Facility: HOSPITAL | Age: 38
End: 2023-10-26
Attending: SURGERY
Payer: COMMERCIAL

## 2023-10-26 DIAGNOSIS — Z94.4 LIVER REPLACED BY TRANSPLANT: ICD-10-CM

## 2023-10-26 LAB
ALBUMIN SERPL BCP-MCNC: 3.1 G/DL (ref 3.5–5.2)
ALP SERPL-CCNC: 82 U/L (ref 55–135)
ALT SERPL W/O P-5'-P-CCNC: 17 U/L (ref 10–44)
ANION GAP SERPL CALC-SCNC: 9 MMOL/L (ref 8–16)
AST SERPL-CCNC: 14 U/L (ref 10–40)
BASOPHILS # BLD AUTO: 0.12 K/UL (ref 0–0.2)
BASOPHILS NFR BLD: 2.6 % (ref 0–1.9)
BILIRUB SERPL-MCNC: 0.8 MG/DL (ref 0.1–1)
BUN SERPL-MCNC: 8 MG/DL (ref 6–20)
CALCIUM SERPL-MCNC: 9.1 MG/DL (ref 8.7–10.5)
CHLORIDE SERPL-SCNC: 109 MMOL/L (ref 95–110)
CO2 SERPL-SCNC: 21 MMOL/L (ref 23–29)
CREAT SERPL-MCNC: 0.7 MG/DL (ref 0.5–1.4)
DIFFERENTIAL METHOD: ABNORMAL
EOSINOPHIL # BLD AUTO: 0.4 K/UL (ref 0–0.5)
EOSINOPHIL NFR BLD: 8.3 % (ref 0–8)
ERYTHROCYTE [DISTWIDTH] IN BLOOD BY AUTOMATED COUNT: 14.6 % (ref 11.5–14.5)
EST. GFR  (NO RACE VARIABLE): >60 ML/MIN/1.73 M^2
GLUCOSE SERPL-MCNC: 98 MG/DL (ref 70–110)
HCT VFR BLD AUTO: 23 % (ref 40–54)
HGB BLD-MCNC: 7.3 G/DL (ref 14–18)
IMM GRANULOCYTES # BLD AUTO: 0.08 K/UL (ref 0–0.04)
IMM GRANULOCYTES NFR BLD AUTO: 1.7 % (ref 0–0.5)
LYMPHOCYTES # BLD AUTO: 1 K/UL (ref 1–4.8)
LYMPHOCYTES NFR BLD: 20.9 % (ref 18–48)
MCH RBC QN AUTO: 27.3 PG (ref 27–31)
MCHC RBC AUTO-ENTMCNC: 31.7 G/DL (ref 32–36)
MCV RBC AUTO: 86 FL (ref 82–98)
MONOCYTES # BLD AUTO: 0.5 K/UL (ref 0.3–1)
MONOCYTES NFR BLD: 10.4 % (ref 4–15)
NEUTROPHILS # BLD AUTO: 2.6 K/UL (ref 1.8–7.7)
NEUTROPHILS NFR BLD: 56.1 % (ref 38–73)
NRBC BLD-RTO: 0 /100 WBC
PLATELET # BLD AUTO: 226 K/UL (ref 150–450)
PMV BLD AUTO: 9.4 FL (ref 9.2–12.9)
POTASSIUM SERPL-SCNC: 4.1 MMOL/L (ref 3.5–5.1)
PROT SERPL-MCNC: 5.8 G/DL (ref 6–8.4)
RBC # BLD AUTO: 2.67 M/UL (ref 4.6–6.2)
SODIUM SERPL-SCNC: 139 MMOL/L (ref 136–145)
TACROLIMUS BLD-MCNC: 5.3 NG/ML (ref 5–15)
WBC # BLD AUTO: 4.69 K/UL (ref 3.9–12.7)

## 2023-10-26 PROCEDURE — 80197 ASSAY OF TACROLIMUS: CPT | Performed by: SURGERY

## 2023-10-26 PROCEDURE — 36415 COLL VENOUS BLD VENIPUNCTURE: CPT | Performed by: SURGERY

## 2023-10-26 PROCEDURE — 85025 COMPLETE CBC W/AUTO DIFF WBC: CPT | Performed by: SURGERY

## 2023-10-26 PROCEDURE — 80053 COMPREHEN METABOLIC PANEL: CPT | Performed by: SURGERY

## 2023-10-26 RX ORDER — TACROLIMUS 1 MG/1
6 CAPSULE ORAL EVERY 12 HOURS
Qty: 360 CAPSULE | Refills: 11 | Status: SHIPPED | OUTPATIENT
Start: 2023-10-26 | End: 2023-11-08 | Stop reason: DRUGHIGH

## 2023-10-26 NOTE — TELEPHONE ENCOUNTER
Patient notified and instructed : labs reviewed by ; results ok, no medicine changes made. Repeat labs due on  Monday 10/30/23. Patient cleared to return home to [place] per CLEVE Bey.  patient will have labs locally on Monday, Standing Lab Orders left at Clinic Check in Desk and patient will pick them up there before going home.    Patient advised to schedule appt. With  upon return to [place] for follow up visit post transplant : he voiced understanding.

## 2023-10-26 NOTE — TELEPHONE ENCOUNTER
----- Message from Tim Gonzalez MD sent at 10/26/2023  1:45 PM CDT -----  Results ok. No action needed

## 2023-10-26 NOTE — TELEPHONE ENCOUNTER
Transplant Social Work Note:    SW called pt to discuss pt being cleared to return home to [place]. Pt reports his mother, Maribel, is here with him and is helping him book a flight home. Pt plans to live with mother for foreseeable future and enroll in [facility] program in [place]. Pt reports he has list of IOP resources and local mental health providers that SW team previously provided. Pt states he has anxiety medications. Pt expressed his gratitude to transplant team and agreed to call SW with any needs once back home.     Debbie Lee LMSW

## 2023-11-02 ENCOUNTER — PATIENT MESSAGE (OUTPATIENT)
Dept: TRANSPLANT | Facility: CLINIC | Age: 38
End: 2023-11-02
Payer: COMMERCIAL

## 2023-11-02 LAB
EXT ALBUMIN: 3.8
EXT ALKALINE PHOSPHATASE: 68
EXT ALT: 10
EXT AST: 13
EXT BILIRUBIN DIRECT: 0.4 MG/DL
EXT BILIRUBIN TOTAL: 0.9
EXT BUN: 12
EXT CALCIUM: 9.4
EXT CHLORIDE: 109
EXT CO2: 23
EXT CREATININE: 0.81 MG/DL
EXT GFR MDRD NON AF AMER: >90
EXT GLUCOSE: 94
EXT POTASSIUM: 4
EXT PROTEIN TOTAL: 5.9
EXT SODIUM: 141 MMOL/L
EXT TACROLIMUS LVL: 9.6

## 2023-11-07 ENCOUNTER — TELEPHONE (OUTPATIENT)
Dept: TRANSPLANT | Facility: CLINIC | Age: 38
End: 2023-11-07
Payer: COMMERCIAL

## 2023-11-07 NOTE — TELEPHONE ENCOUNTER
----- Message from Kristie Bishop sent at 11/7/2023  9:40 AM CST -----  Regarding: Questions      Name Of Caller:   Sukhdeep      Contact Preference:    [phone]      Nature of call:   Pt wants to speak w/ Diya. He has questions about his blood tests and medications.

## 2023-11-07 NOTE — TELEPHONE ENCOUNTER
Returned patient call; he reported he had labs drawn today instead of yesterday- will await results.   He also reported swelling to feet from Amlodipine; he stated that his PCP told him this is a side effect and to let him know if it becomes bothersome, patient stated he will do that, confirmed with him to do that.

## 2023-11-08 ENCOUNTER — TELEPHONE (OUTPATIENT)
Dept: TRANSPLANT | Facility: CLINIC | Age: 38
End: 2023-11-08
Payer: COMMERCIAL

## 2023-11-08 LAB
EXT ALBUMIN: 4.2
EXT ALKALINE PHOSPHATASE: 61
EXT ALT: 9
EXT AST: 16
EXT BILIRUBIN DIRECT: 0.3 MG/DL
EXT BILIRUBIN TOTAL: 0.9
EXT BUN: 12
EXT CALCIUM: 9.9
EXT CHLORIDE: 107
EXT CO2: 23
EXT CREATININE: 0.87 MG/DL
EXT EOSINOPHIL%: 3.9
EXT GFR MDRD NON AF AMER: >90
EXT GLUCOSE: 93
EXT HEMATOCRIT: 29
EXT HEMOGLOBIN: 9.6
EXT LYMPH%: 35.6
EXT MONOCYTES%: 9.9
EXT PLATELETS: 193
EXT POTASSIUM: 4.1
EXT PROTEIN TOTAL: 6.4
EXT SEGS%: 49.8
EXT SODIUM: 139 MMOL/L
EXT TACROLIMUS LVL: 16.4
EXT WBC: 3.7
PHOSPHATIDYLETHANOL (PETH): NEGATIVE NG/ML

## 2023-11-08 NOTE — TELEPHONE ENCOUNTER
FK level resulted from yesterday's labs = 16.  Called and spoke to patient : he denied taking Prograf dose before his labs were drawn, and reported last dose was 8:30-9PM the night before.  Confirmed Prograf dose is 6mg twice a day. Will review results with the doctor for plan.

## 2023-11-08 NOTE — TELEPHONE ENCOUNTER
----- Message from Tim Gonzalez MD sent at 11/8/2023  1:17 PM CST -----  Hold 1 dose   Then fk 3 bid  Labs Monday

## 2023-11-08 NOTE — TELEPHONE ENCOUNTER
Patient notified and instructed to hold Prograf dose tonight, then starting tomorrow , new dose of Prograf to be 3mg twice a day. Repeat labs on Monday 11/13/23. Patient was able to repeat instructions and voiced understanding.

## 2023-11-10 ENCOUNTER — DOCUMENT SCAN (OUTPATIENT)
Dept: HOME HEALTH SERVICES | Facility: HOSPITAL | Age: 38
End: 2023-11-10
Payer: COMMERCIAL

## 2023-11-13 RX ORDER — TACROLIMUS 1 MG/1
3 CAPSULE ORAL EVERY 12 HOURS
Qty: 180 CAPSULE | Refills: 11 | Status: SHIPPED | OUTPATIENT
Start: 2023-11-13 | End: 2023-11-21 | Stop reason: DRUGHIGH

## 2023-11-14 ENCOUNTER — TELEPHONE (OUTPATIENT)
Dept: TRANSPLANT | Facility: CLINIC | Age: 38
End: 2023-11-14
Payer: COMMERCIAL

## 2023-11-14 NOTE — TELEPHONE ENCOUNTER
----- Message from Kristie Bishop sent at 9/29/2023  2:04 PM CDT -----  Regarding: Questions      Name Of Caller:    Sukhdeep      Contact Preference:    [phone]      Nature of call:   Pt requesting to speak w/ Diya. He has questions about [facility] transportation services or options for pt's that live out of the [facility].        Skin Substitute Text: The defect edges were debeveled with a #15 scalpel blade. Given the location of the defect, shape of the defect and the proximity to free margins a skin substitute graft was deemed most appropriate.  The graft material was trimmed to fit the size of the defect. The graft was then placed in the primary defect and oriented appropriately.

## 2023-11-14 NOTE — TELEPHONE ENCOUNTER
----- Message from Jackie Champion sent at 11/14/2023  4:21 PM CST -----  Regarding: Consult/Advisory  Contact: Sukhdeep Grimes  Consult/Advisory    Name Of Caller: Sukhdeep Grimes       Contact Preference: [phone] (home)      Nature of call: Patient calling to get lab results from yesterday, as they did not reach his portal.  Requesting a call back.

## 2023-11-14 NOTE — TELEPHONE ENCOUNTER
Returned patient call; informed him labs resulted and will be reviewed by the doctor tomorrow.  Labs look ok

## 2023-11-15 ENCOUNTER — TELEPHONE (OUTPATIENT)
Dept: TRANSPLANT | Facility: CLINIC | Age: 38
End: 2023-11-15
Payer: COMMERCIAL

## 2023-11-15 LAB
EXT ALBUMIN: 3.8
EXT ALKALINE PHOSPHATASE: 53
EXT ALT: 7
EXT AST: 20
EXT BILIRUBIN TOTAL: 0.6
EXT BUN: 14
EXT CALCIUM: 9.8
EXT CHLORIDE: 105
EXT CO2: 24
EXT CREATININE: 0.87 MG/DL
EXT EOSINOPHIL%: 6.6
EXT GFR MDRD NON AF AMER: >90
EXT GLUCOSE: 93
EXT HEMATOCRIT: 30.9
EXT HEMOGLOBIN: 9.8
EXT LYMPH%: 25.9
EXT MONOCYTES%: 9.2
EXT PLATELETS: 141
EXT POTASSIUM: 4
EXT PROTEIN TOTAL: 6.6
EXT SEGS%: 56
EXT SODIUM: 139 MMOL/L
EXT TACROLIMUS LVL: 6.3
EXT WBC: 3.5

## 2023-11-15 NOTE — TELEPHONE ENCOUNTER
----- Message from Tim Gonzalez MD sent at 11/15/2023  1:12 PM CST -----  Results ok. No action needed

## 2023-11-20 ENCOUNTER — EXTERNAL HOME HEALTH (OUTPATIENT)
Dept: HOME HEALTH SERVICES | Facility: HOSPITAL | Age: 38
End: 2023-11-20
Payer: COMMERCIAL

## 2023-11-21 LAB
EXT ALBUMIN: 3.9
EXT ALKALINE PHOSPHATASE: 53
EXT ALT: <6
EXT AST: 21
EXT BILIRUBIN TOTAL: 0.6
EXT BUN: 11
EXT CALCIUM: 10
EXT CHLORIDE: 105
EXT CO2: 23
EXT CREATININE: 0.74 MG/DL
EXT EOSINOPHIL%: 4.9
EXT GFR MDRD NON AF AMER: >90
EXT GLUCOSE: 87
EXT HEMATOCRIT: 31.7
EXT HEMOGLOBIN: 10.1
EXT LYMPH%: 24.3
EXT MONOCYTES%: 8.5
EXT PLATELETS: 137
EXT POTASSIUM: 4.5
EXT PROTEIN TOTAL: 6.7
EXT SEGS%: 60.4
EXT SODIUM: 140 MMOL/L
EXT TACROLIMUS LVL: 4.2
EXT WBC: 3.7

## 2023-11-22 RX ORDER — TACROLIMUS 1 MG/1
4 CAPSULE ORAL EVERY 12 HOURS
Qty: 240 CAPSULE | Refills: 11 | Status: SHIPPED | OUTPATIENT
Start: 2023-11-22 | End: 2024-01-24 | Stop reason: DRUGHIGH

## 2023-11-28 ENCOUNTER — TELEPHONE (OUTPATIENT)
Dept: TRANSPLANT | Facility: CLINIC | Age: 38
End: 2023-11-28
Payer: COMMERCIAL

## 2023-11-28 NOTE — TELEPHONE ENCOUNTER
----- Message from Allen Thurston sent at 11/28/2023  8:18 AM CST -----  Regarding: call back  Pt call to speak with Diya in regards to lab work also US pt states he question about that also he has some concerns on 2 of his RX requesting call back    Call

## 2023-11-28 NOTE — TELEPHONE ENCOUNTER
Returned patient call : he asked if he still has to take the Plavix, informed him to continue Plavix at this time.  Patient also reported he is scheduled for follow up ultrasound tomorrow and would like to do his weekly labs tomorrow if ok.  Told patient ok to have labs tomorrow morning. He voiced understanding.

## 2023-11-29 LAB
EXT ALBUMIN: 4
EXT ALKALINE PHOSPHATASE: 49
EXT ALT: 9
EXT AST: 21
EXT BILIRUBIN TOTAL: 0.4
EXT BUN: 15
EXT CALCIUM: 10
EXT CHLORIDE: 104
EXT CO2: 21
EXT CREATININE: 0.91 MG/DL
EXT EOSINOPHIL%: 4.4
EXT GFR MDRD NON AF AMER: >90
EXT GLUCOSE: 91
EXT HEMATOCRIT: 34.1
EXT HEMOGLOBIN: 10.8
EXT LYMPH%: 29.2
EXT MONOCYTES%: 7.4
EXT PLATELETS: 143
EXT POTASSIUM: 3.8
EXT PROTEIN TOTAL: 6.7
EXT SEGS%: 56.7
EXT SODIUM: 139 MMOL/L
EXT TACROLIMUS LVL: 15.5
EXT WBC: 3.9

## 2023-11-30 ENCOUNTER — TELEPHONE (OUTPATIENT)
Dept: TRANSPLANT | Facility: CLINIC | Age: 38
End: 2023-11-30
Payer: COMMERCIAL

## 2023-11-30 NOTE — TELEPHONE ENCOUNTER
"Spoke to patient: he reported that he did not take his Prograf dose prior to lab draw yesterday , but reported taking his evening dose " a little late at 10:30 PM, the night before labs".  Patient also reported that he saw his PCP  today and reviewed the U/S from yesterday, he reported  said the f/u ultasound looked good and HA stent in place.     "

## 2023-11-30 NOTE — TELEPHONE ENCOUNTER
----- Message from Haley Mercado sent at 11/30/2023 12:22 PM CST -----  Regarding: Patient advice  Contact: Pt  [phone]              Caller: Sukhdeep     Returning call to:  Diya     Caller can be reached at: [phone]    Nature of the call:  States he is returning missed call

## 2023-12-01 ENCOUNTER — TELEPHONE (OUTPATIENT)
Dept: TRANSPLANT | Facility: CLINIC | Age: 38
End: 2023-12-01
Payer: COMMERCIAL

## 2023-12-01 LAB
EXT ALBUMIN: 4.1
EXT ALKALINE PHOSPHATASE: 50
EXT ALT: 9
EXT AST: 21
EXT BILIRUBIN TOTAL: 0.5
EXT BUN: 16
EXT CALCIUM: 10
EXT CHLORIDE: 103
EXT CO2: 24
EXT CREATININE: 0.9 MG/DL
EXT EOSINOPHIL%: 3.3
EXT GFR MDRD NON AF AMER: >90
EXT GLUCOSE: 99
EXT HEMATOCRIT: 34.2
EXT HEMOGLOBIN: 10.9
EXT LYMPH%: 25.2
EXT MONOCYTES%: 8.3
EXT PLATELETS: 143
EXT POTASSIUM: 4
EXT PROTEIN TOTAL: 6.9
EXT SEGS%: 61
EXT SODIUM: 139 MMOL/L
EXT TACROLIMUS LVL: 7.7
EXT WBC: 4.2

## 2023-12-01 NOTE — TELEPHONE ENCOUNTER
Returned call to patient mom: asked if he can get labs on Monday. Informed her that  asked for labs today to f/u FK level. She voiced understanding.

## 2023-12-01 NOTE — TELEPHONE ENCOUNTER
----- Message from Mel Murry MD sent at 12/1/2023  7:20 AM CST -----  Tacro level high. If true trough, hold and repeat level tomorrow.

## 2023-12-01 NOTE — TELEPHONE ENCOUNTER
----- Message from Allen Thurston sent at 12/1/2023 10:31 AM CST -----  Regarding: call back  Pt call to speak with Diya in regards to him getting his lab work done requesting call back    Call

## 2023-12-01 NOTE — TELEPHONE ENCOUNTER
Reviewed with : patient reported he took evening dose of Prograf late the night before (around 10:30 PM instead of 8). TORB : no medicine dose changes for now,  just repeat labs today.  Spoke to patient : he reported he has not yet taken his morning Prograf dose today, he will go to the lab first for blood work, he said.

## 2023-12-01 NOTE — TELEPHONE ENCOUNTER
----- Message from Celi Mckeon sent at 12/1/2023  9:08 AM CST -----  Regarding: missed call from Nurse Keane  Contact: mom - Maribel  [phone]  The patient mom (Maribel) called returning call to Nurse Keane  Please call at your convenience    No further information provided      Patient can be contacted @# [phone] (Maribel)

## 2023-12-03 ENCOUNTER — TELEPHONE (OUTPATIENT)
Dept: TRANSPLANT | Facility: CLINIC | Age: 38
End: 2023-12-03
Payer: COMMERCIAL

## 2023-12-03 NOTE — TELEPHONE ENCOUNTER
Have been checking care everywhere for Tacrolimus level done on Dec.1 .  No results found.  Called lab at [facility].  Informed by technfernando who answered phone that level resulted yesterday and is noted in EPIC.  This coordinator was unable to see results. Technician is not allowed to give results verbally to me as she was not able to verify my position over the phone.

## 2023-12-04 ENCOUNTER — TELEPHONE (OUTPATIENT)
Dept: TRANSPLANT | Facility: CLINIC | Age: 38
End: 2023-12-04
Payer: COMMERCIAL

## 2023-12-04 NOTE — TELEPHONE ENCOUNTER
----- Message from Mel Murry MD sent at 12/4/2023 11:53 AM CST -----  Results ok. No action needed.

## 2023-12-04 NOTE — TELEPHONE ENCOUNTER
Patient notified and instructed: labs reviewed by , results ok, no medicine changes made. Repeat labs due Monday 12/11/23. He was able to repeat instructions and voiced understanding.

## 2023-12-12 ENCOUNTER — TELEPHONE (OUTPATIENT)
Dept: TRANSPLANT | Facility: CLINIC | Age: 38
End: 2023-12-12
Payer: COMMERCIAL

## 2023-12-12 LAB
EXT ALBUMIN: 4
EXT ALKALINE PHOSPHATASE: 45
EXT ALT: 8
EXT AST: 20
EXT BILIRUBIN TOTAL: 0.4
EXT BUN: 15
EXT CALCIUM: 10
EXT CHLORIDE: 104
EXT CO2: 26
EXT CREATININE: 0.8 MG/DL
EXT EOSINOPHIL%: 3.3
EXT GFR MDRD NON AF AMER: >90
EXT GLUCOSE: 94
EXT HEMATOCRIT: 32.9
EXT HEMOGLOBIN: 10.7
EXT LYMPH%: 25.1
EXT MONOCYTES%: 9
EXT PLATELETS: 128
EXT POTASSIUM: 4
EXT PROTEIN TOTAL: 6.6
EXT SEGS%: 60.1
EXT SODIUM: 140 MMOL/L
EXT TACROLIMUS LVL: 6.3
EXT WBC: 3.9

## 2023-12-12 NOTE — TELEPHONE ENCOUNTER
Labs reviewed by , results ok, no changes made. Repeat labs due 12/18/23. Patient notified and instructed; he was able to repeat instructions and voiced understanding.

## 2023-12-19 ENCOUNTER — OFFICE VISIT (OUTPATIENT)
Dept: TRANSPLANT | Facility: CLINIC | Age: 38
End: 2023-12-19
Payer: COMMERCIAL

## 2023-12-19 DIAGNOSIS — I77.1 STENOSIS OF HEPATIC ARTERY OF TRANSPLANTED LIVER: ICD-10-CM

## 2023-12-19 DIAGNOSIS — Z94.4 LIVER TRANSPLANTED: Primary | ICD-10-CM

## 2023-12-19 DIAGNOSIS — Z79.60 LONG-TERM USE OF IMMUNOSUPPRESSANT MEDICATION: ICD-10-CM

## 2023-12-19 DIAGNOSIS — T86.49 STENOSIS OF HEPATIC ARTERY OF TRANSPLANTED LIVER: ICD-10-CM

## 2023-12-19 DIAGNOSIS — Z94.4 LIVER TRANSPLANTED: ICD-10-CM

## 2023-12-19 LAB
EXT ALBUMIN: 3.9
EXT ALKALINE PHOSPHATASE: 45
EXT ALT: 9
EXT AST: 21
EXT BILIRUBIN TOTAL: 0.4
EXT BUN: 10
EXT CALCIUM: 9.8
EXT CHLORIDE: 103
EXT CO2: 26
EXT CREATININE: 0.89 MG/DL
EXT EOSINOPHIL%: 2.7
EXT GFR MDRD NON AF AMER: >90
EXT GLUCOSE: 96
EXT HEMATOCRIT: 32.7
EXT HEMOGLOBIN: 10.8
EXT LYMPH%: 30.2
EXT MONOCYTES%: 7.5
EXT PLATELETS: 138
EXT POTASSIUM: 3.9
EXT PROTEIN TOTAL: 6.4
EXT SEGS%: 57
EXT SODIUM: 138 MMOL/L
EXT TACROLIMUS LVL: 8.1
EXT WBC: 3.7

## 2023-12-19 PROCEDURE — 99499 UNLISTED E&M SERVICE: CPT | Mod: 95,,, | Performed by: STUDENT IN AN ORGANIZED HEALTH CARE EDUCATION/TRAINING PROGRAM

## 2023-12-19 PROCEDURE — 99499 NO LOS: ICD-10-PCS | Mod: 95,,, | Performed by: STUDENT IN AN ORGANIZED HEALTH CARE EDUCATION/TRAINING PROGRAM

## 2023-12-19 RX ORDER — MYCOPHENOLATE MOFETIL 250 MG/1
750 CAPSULE ORAL 2 TIMES DAILY
Qty: 240 CAPSULE | Refills: 2 | Status: SHIPPED | OUTPATIENT
Start: 2023-12-19 | End: 2023-12-19 | Stop reason: SDUPTHER

## 2023-12-19 RX ORDER — MYCOPHENOLATE MOFETIL 250 MG/1
750 CAPSULE ORAL 2 TIMES DAILY
Qty: 240 CAPSULE | Refills: 2 | Status: SHIPPED | OUTPATIENT
Start: 2023-12-19 | End: 2024-03-21 | Stop reason: DRUGHIGH

## 2023-12-19 NOTE — PROGRESS NOTES
"Transplant Hepatology  Liver Transplant Recipient Follow Up    PCP: No, Primary Doctor    Transplant History  Transplant Date: 9/7/2023  UNOS Native Liver Dx: Alcohol-Associated Cirrhosis Without Acute Alcohol-Associated Hepatitis    ORGAN: LIVER  Whole or Partial: whole liver  Donor Type: donation after brain death  [facility] High Risk: no  Donor CMV Status: Positive  Donor HCV Status: Negative  Donor HBcAb: Negative  Donor HBV GOGO:   Donor HCV GOGO: Negative  Biliary Anastomosis: end to end  Arterial Anatomy: standard  IVC reconstruction: end to end ivc  Portal vein status: patent    He has had the following complications since transplant: hepatic artery stenosis    Chief complaint: follow up, history of OLT    HPI:  Sukhdeep Grimes is a 38 y.o. 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     This is his first hepatology visit since transplant. He is without complaints today. No recent hospitalizations or ED visits. He reports compliance with Prograf and CellCept. He denies recent fever, chills, nausea, vomiting, diarrhea, headache, tremors.    Past Medical History:   Diagnosis Date    Alcoholic cirrhosis of liver with ascites     Alcoholic cirrhosis of liver with ascites 8/1/2023    Last Assessment & Plan:  Formatting of this note might be different from the original. Looking better today compared to last week in regards to his energy. I also reviewed his visit with Dr. Stanford from last week. Sent in [facility], appears Dr. Stanford meant to do this. He would like to change to [facility] GI team, so will place that referral today. Send in referral to [facility]. Continue diuresis at c    Esophageal varices without bleeding     Hepatic encephalopathy     Hypokalemia     Hyponatremia     Sinus bradycardia 9/10/2023    Thrombocytopenia     Transfusion history 08/2022       Past Surgical History:   Procedure Laterality Date    EGD - EXTERNAL RESULT  08/21/2023    "1 column of large " "varices with no bleeding and no stigmata of recent bleeding in the lower 3rd of the esophagus.  No red Rigoberto sign present.  Banding was not performed.  Moderate portal hypertensive gastropathy in the entire examined stomach.  Examined duodenum was normal." EGD done in [facility])    LIVER TRANSPLANT N/A 9/3/2023    Procedure: TRANSPLANT, LIVER;  Surgeon: Phil Ariza MD;  [facility];  Service: Transplant;  Laterality: N/A;    LIVER TRANSPLANT N/A 9/7/2023    Procedure: TRANSPLANT, LIVER;  Surgeon: Leonardo Manuel MD;  [facility];  Service: Transplant;  Laterality: N/A;    PARACENTESIS  08/24/2023       History reviewed. No pertinent family history.    Social History     Tobacco Use    Smoking status: Never    Smokeless tobacco: Never   Substance Use Topics    Alcohol use: Not Currently    Drug use: Not Currently       Current Outpatient Medications   Medication Sig Dispense Refill    albuterol (VENTOLIN HFA) 90 mcg/actuation inhaler Inhale 2 puffs into the lungs every 6 (six) hours as needed for Wheezing. Rescue      amLODIPine (NORVASC) 5 MG tablet Take 1 tablet (5 mg total) by mouth once daily. 30 tablet 11    aspirin (ECOTRIN) 325 MG EC tablet Take 1 tablet (325 mg total) by mouth once daily. 30 tablet 11    calcium carbonate-vitamin D3 600 mg-20 mcg (800 unit) Tab Take 1 tablet by mouth once daily. 30 tablet 11    clopidogreL (PLAVIX) 75 mg tablet Take 1 tablet (75 mg total) by mouth once daily. 30 tablet 6    famotidine (PEPCID) 20 MG tablet Take 1 tablet (20 mg total) by mouth nightly. 30 tablet 0    hydrOXYzine HCL (ATARAX) 25 MG tablet Take 1 tablet (25 mg total) by mouth 3 (three) times daily as needed for Anxiety (or Sleep). 50 tablet 0    LORazepam (ATIVAN) 0.5 MG tablet Take 2 tablets (1 mg total) by mouth 2 (two) times daily as needed for Anxiety. 60 tablet 0    mycophenolate (CELLCEPT) 250 mg Cap Take 3 capsules (750 mg total) by mouth 2 (two) times daily. " 240 capsule 2    oxyCODONE (ROXICODONE) 5 MG immediate release tablet Take 1 tablet (5 mg total) by mouth every 4 (four) hours as needed for Pain. 40 tablet 0    polyethylene glycol (GLYCOLAX) 17 gram PwPk Take 17 g by mouth once daily.  0    predniSONE (DELTASONE) 5 MG tablet Take by mouth daily:  20mg 9/13-9/19, 15mg 9/20-9/26, 10mg 9/27-10/3, 5mg 10/4-10/6. STOP 10/7/23 70 tablet 0    sodium bicarbonate 650 MG tablet Take 2 tablets (1,300 mg total) by mouth 2 (two) times daily. 120 tablet 11    sulfamethoxazole-trimethoprim 400-80mg (BACTRIM,SEPTRA) 400-80 mg per tablet Take 1 tablet by mouth every morning. STOP 3/8/24 30 tablet 5    tacrolimus (PROGRAF) 1 MG Cap Take 4 capsules (4 mg total) by mouth every 12 (twelve) hours. 240 capsule 11    traZODone (DESYREL) 50 MG tablet Take 1 tablet (50 mg total) by mouth every evening. 30 tablet 11    valGANciclovir (VALCYTE) 450 mg Tab Take 1 tablet (450 mg total) by mouth once daily. STOP 3/8/24 30 tablet 5     No current facility-administered medications for this visit.       Review of patient's allergies indicates:   Allergen Reactions    Vancomycin analogues      Per patient, rior h/o significant kidney injury 2/2 vancomycin and some redness   Other reaction(s): Anaphylactic shock-Allergy       Review of Systems   Constitutional:  Negative for fever and weight loss.   Gastrointestinal:  Negative for abdominal pain, blood in stool, constipation, diarrhea, heartburn, melena, nausea and vomiting.   Neurological:  Negative for tremors and headaches.       There were no vitals filed for this visit.    Physical Exam  Constitutional:       General: He is not in acute distress.     Appearance: He is not ill-appearing.   HENT:      Head: Normocephalic and atraumatic.   Eyes:      General: No scleral icterus.     Extraocular Movements: Extraocular movements intact.   Pulmonary:      Effort: No respiratory distress.   Skin:     Coloration: Skin is not jaundiced.   Neurological:       Mental Status: He is alert.         LABS:  Lab Results   Component Value Date    WBC 4.69 10/26/2023    HGB 7.3 (L) 10/26/2023    HCT 23.0 (L) 10/26/2023    MCV 86 10/26/2023     10/26/2023       Lab Results   Component Value Date     10/26/2023    K 4.1 10/26/2023     10/26/2023    CO2 21 (L) 10/26/2023    BUN 8 10/26/2023    CREATININE 0.7 10/26/2023    CALCIUM 9.1 10/26/2023    ANIONGAP 9 10/26/2023       Lab Results   Component Value Date    ALT 17 10/26/2023    AST 14 10/26/2023    ALKPHOS 82 10/26/2023    BILITOT 0.8 10/26/2023       Lab Results   Component Value Date    TACROLIMUS 5.3 10/26/2023         Assessment:  38 y.o. male with history of OLT in 09/2023 for alcohol related cirrhosis who presents for scheduled follow up.    1. Liver transplanted    2. Long-term use of immunosuppressant medication    3. 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 Continue aspirin and Plavix.    Kidney function   - Creatinine 0.89  - Avoid NSAIDs as able and maintain adequate hydration    Health Maintenance/Screening:  - Recommend age appropriate cancer screening  - Skin cancer: Recommend use of sunscreen SPF 30 or higher, hat and sunglasses while outside, dermatologist visit annually or sooner if any concerning lesions.  - Osteoporosis: Recommend bone density testing every 2-3 years if previously normal or annually if previously abnormal.    Return to clinic in 3 months.    UNOS Patient Status  Functional Status: 100% - Normal, no complaints, no evidence of disease  Physical Capacity: No Limitations    This note includes dictation done using [employer] speech recognition program. Word recognition mistakes are  occasionally missed on review.    Aron Ellington MD  Staff Physician  Hepatology and Liver Transplant  [facility]  [facility] [place]

## 2023-12-19 NOTE — LETTER
December 20, 2023        Tito Canales  [address]  [address]  [facility] [address]  Phone: [phone]  Fax: [phone]             [facility]  [address]  [facility] [address]  Phone: [phone]   Patient: Sukhdeep Grimes   MR Number: [id number]   YOB: 1985   Date of Visit: 12/19/2023       Dear Dr. Tito Canales    Thank you for referring Sukhdeep Grimes to me for evaluation. Attached you will find relevant portions of my assessment and plan of care.    If you have questions, please do not hesitate to call me. I look forward to following Sukhdeep Grimes along with you.    Sincerely,    Aron Ellington MD    Enclosure    If you would like to receive this communication electronically, please contact [email or web address] or [phone] to request [employer] access.    [employer] [facility] is a tool which provides read-only access to select patient information with whom you have a relationship. Its easy to use and provides real time access to review your patients record including encounter summaries, notes, results, and demographic information.    If you feel you have received this communication in error or would no longer like to receive these types of communications, please e-mail [email or web address]

## 2023-12-20 ENCOUNTER — TELEPHONE (OUTPATIENT)
Dept: TRANSPLANT | Facility: CLINIC | Age: 38
End: 2023-12-20
Payer: COMMERCIAL

## 2023-12-20 NOTE — TELEPHONE ENCOUNTER
Patient notified and instructed: labs stable , no medicine changes made. Repeat labs due 1/2/24(every 2 weeks now). Patient was able to repeat instructions and voiced understanding.  Patient also reported that he stopped the Bicarb as  told him he does not need it anymore.

## 2023-12-20 NOTE — TELEPHONE ENCOUNTER
----- Message from Aron Ellington MD sent at 12/20/2023  3:13 PM CST -----  Liver tests are stable. No changes in his immunosuppression. Please continue to monitor labs per transplant protocol.

## 2024-01-04 ENCOUNTER — PATIENT MESSAGE (OUTPATIENT)
Dept: TRANSPLANT | Facility: CLINIC | Age: 39
End: 2024-01-04
Payer: COMMERCIAL

## 2024-01-04 ENCOUNTER — TELEPHONE (OUTPATIENT)
Dept: TRANSPLANT | Facility: CLINIC | Age: 39
End: 2024-01-04
Payer: COMMERCIAL

## 2024-01-04 LAB
EXT ALBUMIN: 3.9
EXT ALKALINE PHOSPHATASE: 42
EXT ALT: <6
EXT AST: 25
EXT BILIRUBIN TOTAL: 0.5
EXT BUN: 15
EXT CALCIUM: 9.8
EXT CHLORIDE: 105
EXT CO2: 26
EXT CREATININE: 0.86 MG/DL
EXT EOSINOPHIL%: 3.4
EXT GFR MDRD NON AF AMER: >90
EXT GLUCOSE: 119
EXT HEMATOCRIT: 33.3
EXT HEMOGLOBIN: 11.1
EXT LYMPH%: 26.8
EXT MONOCYTES%: 6.8
EXT PLATELETS: 124
EXT POTASSIUM: 4.2
EXT PROTEIN TOTAL: 6.9
EXT SEGS%: 61.4
EXT SODIUM: 140 MMOL/L
EXT TACROLIMUS LVL: 7.7
EXT WBC: 3.5

## 2024-01-04 NOTE — TELEPHONE ENCOUNTER
Patient notified and instructed : labs received , awaiting review of results by the doctor.  Patient voiced understanding. He reported he noted his blood sugar high on these results and attributes it to recent holiday indulging of chocholate and pasta.

## 2024-01-04 NOTE — TELEPHONE ENCOUNTER
----- Message from Allen Thurston sent at 1/4/2024 10:52 AM CST -----  Regarding: lab results  Pt krishna to speak with Diya in regards to lab results requesting call back    Call

## 2024-01-11 ENCOUNTER — TELEPHONE (OUTPATIENT)
Dept: TRANSPLANT | Facility: CLINIC | Age: 39
End: 2024-01-11
Payer: COMMERCIAL

## 2024-01-11 NOTE — TELEPHONE ENCOUNTER
----- Message from Aron Ellington MD sent at 1/11/2024 11:27 AM CST -----  Liver tests are stable. No changes in his immunosuppression. Please continue to monitor labs per transplant protocol.

## 2024-01-12 ENCOUNTER — TELEPHONE (OUTPATIENT)
Dept: TRANSPLANT | Facility: CLINIC | Age: 39
End: 2024-01-12
Payer: COMMERCIAL

## 2024-01-12 NOTE — TELEPHONE ENCOUNTER
----- Message from Allen Thurston sent at 1/12/2024 11:32 AM CST -----  Regarding: call back  Pt states he's returning Diya miss call     Call

## 2024-01-12 NOTE — TELEPHONE ENCOUNTER
Returned patient call; he was instructed : labs reviewed, no changes made , results stable. Repeat labs on 2/16/24, if ok will be due to move labs out to every 3 weeks. Patient voiced understanding.

## 2024-01-17 ENCOUNTER — TELEPHONE (OUTPATIENT)
Dept: TRANSPLANT | Facility: CLINIC | Age: 39
End: 2024-01-17
Payer: COMMERCIAL

## 2024-01-17 DIAGNOSIS — F10.11 HISTORY OF ALCOHOL ABUSE: ICD-10-CM

## 2024-01-17 DIAGNOSIS — Z94.4 LIVER REPLACED BY TRANSPLANT: Primary | ICD-10-CM

## 2024-01-17 LAB
EXT ALBUMIN: 4.1
EXT ALKALINE PHOSPHATASE: 45
EXT ALT: 8
EXT AST: 21
EXT BILIRUBIN TOTAL: 0.5
EXT BUN: 21
EXT CALCIUM: 9.7
EXT CHLORIDE: 104
EXT CO2: 24
EXT CREATININE: 0.98 MG/DL
EXT EOSINOPHIL%: 2.8
EXT GFR MDRD NON AF AMER: >90
EXT GLUCOSE: 107
EXT HEMATOCRIT: 32.9
EXT HEMOGLOBIN A1C: 4.8 %
EXT HEMOGLOBIN: 10.9
EXT LYMPH%: 30.9
EXT MONOCYTES%: 8.4
EXT PLATELETS: 141
EXT POTASSIUM: 4.1
EXT PROTEIN TOTAL: 6.6
EXT SEGS%: 55.8
EXT SODIUM: 139 MMOL/L
EXT TACROLIMUS LVL: 14.2
EXT WBC: 3.9

## 2024-01-17 NOTE — TELEPHONE ENCOUNTER
"Spoke to patient: re: lab results of 1/16 received , noted Prograf level high (14). Patient denied taking his dose before labs that morning. Also reported he took evening dose night before on time 8pm (4mg twice a day dose confirmed with patient).  Patient at this time reported that he saw his lab results and noted the high prograf level "so this morning I only took 3mg.", he said.  Instructed patient not to adjust his immunosuppression doses without instructions from our office and the doctor. He was able to voice understanding and stated he will not do that again.   "

## 2024-01-19 ENCOUNTER — TELEPHONE (OUTPATIENT)
Dept: TRANSPLANT | Facility: CLINIC | Age: 39
End: 2024-01-19
Payer: COMMERCIAL

## 2024-01-19 NOTE — TELEPHONE ENCOUNTER
----- Message from Skylar Farooq MA sent at 1/19/2024 11:00 AM CST -----  Regarding: FW: Patient advice  Contact: Pt  [phone]    ----- Message -----  From: Haley Mercado  Sent: 1/18/2024   4:19 PM CST  To: [facility]-Liver Transplant Non-Clinical  Subject: Patient advice                                             Name of Caller: Sukhdeep     Contact Preference:[phone]    Nature of Call: Requesting a call back regarding questions about medication changes

## 2024-01-24 RX ORDER — TACROLIMUS 1 MG/1
CAPSULE ORAL
Qty: 210 CAPSULE | Refills: 11 | Status: SHIPPED | OUTPATIENT
Start: 2024-01-24 | End: 2024-01-30 | Stop reason: DRUGHIGH

## 2024-01-24 NOTE — TELEPHONE ENCOUNTER
----- Message from Aron Ellington MD sent at 1/24/2024 10:31 AM CST -----  Liver tests are stable. Please decrease tac to 4/3 and repeat labs in 1-2 weeks.

## 2024-01-24 NOTE — TELEPHONE ENCOUNTER
Patient notified and instructed to reduce Prograf dose to 4mg in AM and 3mg in PM; repeat labs 1/29/24. He was able to repeat instructions and voiced uderstanding.

## 2024-01-30 ENCOUNTER — TELEPHONE (OUTPATIENT)
Dept: TRANSPLANT | Facility: CLINIC | Age: 39
End: 2024-01-30
Payer: COMMERCIAL

## 2024-01-30 LAB
EXT ALBUMIN: 4.4
EXT ALKALINE PHOSPHATASE: 49
EXT ALT: 9
EXT AST: 20
EXT BILIRUBIN TOTAL: 0.5
EXT BUN: 21
EXT CALCIUM: 9.9
EXT CHLORIDE: 104
EXT CO2: 23
EXT CREATININE: 1.07 MG/DL
EXT EOSINOPHIL%: 1.6
EXT GFR MDRD NON AF AMER: >90
EXT GLUCOSE: 108
EXT HEMATOCRIT: 34.8
EXT HEMOGLOBIN: 11.6
EXT LYMPH%: 26.3
EXT MONOCYTES%: 7.3
EXT PLATELETS: 134
EXT POTASSIUM: 4.5
EXT PROTEIN TOTAL: 7
EXT SEGS%: 60.7
EXT SODIUM: 138 MMOL/L
EXT TACROLIMUS LVL: 18.8
EXT WBC: 3.2

## 2024-01-30 RX ORDER — TACROLIMUS 1 MG/1
CAPSULE ORAL
Qty: 180 CAPSULE | Refills: 11 | Status: SHIPPED | OUTPATIENT
Start: 2024-01-30 | End: 2024-02-07 | Stop reason: DRUGHIGH

## 2024-01-30 NOTE — TELEPHONE ENCOUNTER
Returned patient call; informed him lab results received and will be reviewed with .  FK level high, patient denied taking dose before blood was drawn. Confirmed current dose is 4/3.

## 2024-01-30 NOTE — TELEPHONE ENCOUNTER
----- Message from Kristie Bishop sent at 1/30/2024  2:40 PM CST -----  Regarding: Questions      Name Of Caller:      Sukhdeep      Contact Preference:     [phone]       Nature of call:   Pt would like to verify whether the office received their lab results from [facility]. It was sent over today.

## 2024-01-30 NOTE — TELEPHONE ENCOUNTER
----- Message from Kristie Bishop sent at 1/30/2024  2:40 PM CST -----  Regarding: Questions      Name Of Caller:      Sukhdeep      Contact Preference:     [phone]       Nature of call:   Pt would like to verify whether the office received their lab results from [facility]. It was sent over today.

## 2024-01-30 NOTE — TELEPHONE ENCOUNTER
Reviewed labs , (high FK level) with  : TORB : reduce Prograf dose to 3mg bid and repeat labs on Monday. Patient notified and instructions given to reduce Prograf dose to 3mg twice a day and repeat labs on Monday (2/5/24). Patient able to repeat instructions and voiced understanding.

## 2024-02-05 ENCOUNTER — TELEPHONE (OUTPATIENT)
Dept: TRANSPLANT | Facility: CLINIC | Age: 39
End: 2024-02-05
Payer: COMMERCIAL

## 2024-02-05 DIAGNOSIS — Z94.4 LIVER REPLACED BY TRANSPLANT: Primary | ICD-10-CM

## 2024-02-05 NOTE — TELEPHONE ENCOUNTER
----- Message from Kristie Bishop sent at 2/5/2024  9:49 AM CST -----  Regarding: Questions      Name Of Caller:    Maribel (Pt's Mother)      Contact Preference:    [phone]      Nature of call:   Calling to speak with Diya about her son's prograf levels.       This is a 67-[address] [place] [address] [employer] 

## 2024-02-05 NOTE — TELEPHONE ENCOUNTER
Returned call to patient/mom: informed her labs not resulted yet from today, will await results and review Prograf level with  for a plan. She voiced understanding.  She also reported that patient will plan on 2 night stay in [place] when he comes in March for his appointments .

## 2024-02-06 ENCOUNTER — TELEPHONE (OUTPATIENT)
Dept: TRANSPLANT | Facility: CLINIC | Age: 39
End: 2024-02-06
Payer: COMMERCIAL

## 2024-02-06 LAB
EXT ALBUMIN: 4.4
EXT ALKALINE PHOSPHATASE: 50
EXT ALT: 9
EXT AST: 20
EXT BILIRUBIN TOTAL: 0.5
EXT BUN: 15
EXT CALCIUM: 10
EXT CHLORIDE: 105
EXT CO2: 23
EXT CREATININE: 0.99 MG/DL
EXT EOSINOPHIL%: 2.2
EXT GFR MDRD NON AF AMER: >90
EXT GLUCOSE: 103
EXT HEMATOCRIT: 35.2
EXT HEMOGLOBIN: 11.8
EXT LYMPH%: 27.3
EXT MONOCYTES%: 6.9
EXT PLATELETS: 143
EXT POTASSIUM: 4
EXT PROTEIN TOTAL: 7
EXT SEGS%: 61.9
EXT SODIUM: 140 MMOL/L
EXT TACROLIMUS LVL: 17.6
EXT WBC: 3.6

## 2024-02-06 NOTE — TELEPHONE ENCOUNTER
Returned patient call; informed him that I reviewed with  and he will take a look at the labs and let me know what to do and I will let patient know. Patient voiced understanding.

## 2024-02-06 NOTE — TELEPHONE ENCOUNTER
----- Message from Celi Mckeon sent at 2/6/2024  4:16 PM CST -----  Regarding: discuss (Prograf) dose (PT req notice asap)  Contact: PT  [phone]  The patient called requesting to to speak to Nurse regarding his Prograf levels recieved today. States they were a bit higher and requesting call back to advise regarding dosage. Please call at your earliest opportunity  No further information provided      Patient can be contacted @# [phone]

## 2024-02-07 ENCOUNTER — TELEPHONE (OUTPATIENT)
Dept: TRANSPLANT | Facility: CLINIC | Age: 39
End: 2024-02-07
Payer: COMMERCIAL

## 2024-02-07 RX ORDER — TACROLIMUS 1 MG/1
CAPSULE ORAL
Qty: 150 CAPSULE | Refills: 11 | Status: SHIPPED | OUTPATIENT
Start: 2024-02-07 | End: 2024-03-06 | Stop reason: DRUGHIGH

## 2024-02-07 NOTE — TELEPHONE ENCOUNTER
----- Message from Aron Ellington MD sent at 2/7/2024 10:14 AM CST -----  Liver tests are stable. Tac high. Please decrease tac to 3/2 and repeat labs in 1-2 weeks.

## 2024-02-07 NOTE — TELEPHONE ENCOUNTER
Patient notified and instructed to reduce Prograf dose to 3mg in AM and 2mg in PM; repeat labs 1-2 weeks. Patient able to repeat instructions and stated he will go to lab on 2/15/24.  Patient at this time reported that he just remembered that lately he has been busy with his dogs and has been taking his Prograf doses around 11am and 11pm.  He will go back to earlier times of 9am and 9pm . Instructed patient that we need labs drawn 12 hrs. After his last dose of medication, so go to lab around 9am  once he changes times.  He voiced understanding.

## 2024-02-15 LAB
EXT ALBUMIN: 4.7
EXT ALKALINE PHOSPHATASE: 51
EXT ALT: 8
EXT AST: 21
EXT BILIRUBIN TOTAL: 0.6
EXT BUN: 19
EXT CALCIUM: 10
EXT CHLORIDE: 104
EXT CO2: 24
EXT CREATININE: 1 MG/DL
EXT EOSINOPHIL%: 2.5
EXT GFR MDRD NON AF AMER: >90
EXT GLUCOSE: 102
EXT HEMATOCRIT: 34.6
EXT HEMOGLOBIN: 11.8
EXT LYMPH%: 31.3
EXT MONOCYTES%: 8
EXT PLATELETS: 149
EXT POTASSIUM: 3.9
EXT PROTEIN TOTAL: 7.1
EXT SEGS%: 56
EXT SODIUM: 140 MMOL/L
EXT TACROLIMUS LVL: 8
EXT WBC: 2.8

## 2024-02-16 ENCOUNTER — TELEPHONE (OUTPATIENT)
Dept: TRANSPLANT | Facility: CLINIC | Age: 39
End: 2024-02-16
Payer: COMMERCIAL

## 2024-02-16 NOTE — TELEPHONE ENCOUNTER
----- Message from Aron Ellington MD sent at 2/15/2024  5:19 PM CST -----  Liver tests are stable. No changes in his immunosuppression. Please continue to monitor labs per transplant protocol.

## 2024-02-16 NOTE — TELEPHONE ENCOUNTER
Patient notified and instructed: labs reviewed, results stable. No medicine changes made. Repeat labs due on Monday 2/26/24. Patient able to repeat instructions and voiced understanding.

## 2024-02-27 LAB
EXT ALBUMIN: 4.3
EXT ALKALINE PHOSPHATASE: 52
EXT ALT: 10
EXT AST: 19
EXT BASOPHIL%: 1.2
EXT BILIRUBIN TOTAL: 0.5
EXT BUN: 19
EXT CALCIUM: 9.8
EXT CHLORIDE: 104
EXT CO2: 23
EXT CREATININE: 0.91 MG/DL
EXT EOSINOPHIL%: 2.2
EXT GFR MDRD NON AF AMER: >90
EXT GLUCOSE: 106
EXT HEMATOCRIT: 35.1
EXT HEMOGLOBIN: 11.8
EXT LYMPH%: 27.3
EXT MONOCYTES%: 9
EXT PLATELETS: 152
EXT POTASSIUM: 3.8
EXT PROTEIN TOTAL: 6.8
EXT SEGS%: 59.4
EXT SODIUM: 138 MMOL/L
EXT TACROLIMUS LVL: 7.9
EXT WBC: 3.2

## 2024-02-29 ENCOUNTER — TELEPHONE (OUTPATIENT)
Dept: TRANSPLANT | Facility: CLINIC | Age: 39
End: 2024-02-29
Payer: COMMERCIAL

## 2024-02-29 NOTE — TELEPHONE ENCOUNTER
Returned call and discussed labs.  Awaiting lab review.  After review we can spread out lab frequency per guideline.

## 2024-03-06 ENCOUNTER — TELEPHONE (OUTPATIENT)
Dept: TRANSPLANT | Facility: CLINIC | Age: 39
End: 2024-03-06
Payer: COMMERCIAL

## 2024-03-06 RX ORDER — TACROLIMUS 1 MG/1
CAPSULE ORAL
Qty: 120 CAPSULE | Refills: 11 | Status: SHIPPED | OUTPATIENT
Start: 2024-03-06 | End: 2024-04-08 | Stop reason: DRUGHIGH

## 2024-03-06 NOTE — TELEPHONE ENCOUNTER
Patient notified and instructed to reduce Prograf dose to 2mg twice a day and repeat labs on 3/21/24. Patient able to repeat instructions and voiced understanding.

## 2024-03-06 NOTE — TELEPHONE ENCOUNTER
----- Message from Aron Ellington MD sent at 3/6/2024 11:12 AM CST -----  Liver tests are stable. Please decrease tac to 2/2 and repeat labs in 2 weeks.

## 2024-03-15 ENCOUNTER — TELEPHONE (OUTPATIENT)
Dept: TRANSPLANT | Facility: CLINIC | Age: 39
End: 2024-03-15
Payer: COMMERCIAL

## 2024-03-15 LAB
EXT ALBUMIN: 4.3
EXT ALKALINE PHOSPHATASE: 53
EXT ALT: 7
EXT AST: 19
EXT BILIRUBIN TOTAL: 0.4
EXT BUN: 13
EXT CALCIUM: 9.4
EXT CHLORIDE: 103
EXT CO2: 26
EXT CREATININE: 0.86 MG/DL
EXT EOSINOPHIL%: 2.7
EXT GFR MDRD NON AF AMER: >90
EXT GLUCOSE: 101
EXT HEMATOCRIT: 36
EXT HEMOGLOBIN: 12.1
EXT LYMPH%: 22.9
EXT MONOCYTES%: 12.3
EXT PLATELETS: 138
EXT POTASSIUM: 3.6
EXT PROTEIN TOTAL: 6.6
EXT SEGS%: 60.7
EXT SODIUM: 136 MMOL/L
EXT TACROLIMUS LVL: 3.8
EXT WBC: 2.9

## 2024-03-15 NOTE — TELEPHONE ENCOUNTER
Patient informed that I am awaiting 's review of labs , message was sent to him. Patient voiced understanding.

## 2024-03-15 NOTE — TELEPHONE ENCOUNTER
----- Message from Haley Rogelio sent at 3/15/2024  4:41 PM CDT -----  Regarding: Results Patient advice  Contact: Pt  [phone]          Name of Caller:  Sukhdeep Landeros Preference: [phone]    Nature of Call:  Requesting a call back regarding medication changes want to know if he go back to regular dosage or continue on the changed regiment would like to know prior to weekend

## 2024-03-15 NOTE — TELEPHONE ENCOUNTER
"----- Message from Vik Silva sent at 3/15/2024 10:16 AM CDT -----  Consult/Advisory:      Name Of Caller: Self      Contact Preference?: [phone]       What is the nature of the call?: Calling to speak w/ Diya about his prograf levels being low - were formerly at 7.9 and is now at 3.8      Additional Notes:  "Thank you for all that you do for our patients"         "

## 2024-03-19 ENCOUNTER — TELEPHONE (OUTPATIENT)
Dept: TRANSPLANT | Facility: CLINIC | Age: 39
End: 2024-03-19
Payer: COMMERCIAL

## 2024-03-19 NOTE — TELEPHONE ENCOUNTER
----- Message from Leila Bernard sent at 3/19/2024  4:11 PM CDT -----  Regarding: Medical Questions  Contact: [phone]  CONSULT/ADVISORY    Name of Caller:  JUDAH RDZ [[id number]]    Contact Preference:   [phone]    Nature of Call:  Requesting a call back from Diya to discuss his medications to see if there's any change.

## 2024-03-20 ENCOUNTER — TELEPHONE (OUTPATIENT)
Dept: TRANSPLANT | Facility: CLINIC | Age: 39
End: 2024-03-20
Payer: COMMERCIAL

## 2024-03-20 NOTE — TELEPHONE ENCOUNTER
----- Message from Aron Ellington MD sent at 3/20/2024 12:30 PM CDT -----  Liver tests are stable. Please decrease MMF to 500mg BID and repeat labs in 1-2 weeks.

## 2024-03-20 NOTE — TELEPHONE ENCOUNTER
"Attempted to call patient; no answer- recording stating "the wireless customer you are trying to reach is not available, please try your call again later."  "

## 2024-03-20 NOTE — TELEPHONE ENCOUNTER
"Attempted again to call patient with same message on recording: "wireless caller you are trying to reach is not available".  Will continue to try to reach patient.  "

## 2024-03-21 ENCOUNTER — OFFICE VISIT (OUTPATIENT)
Dept: TRANSPLANT | Facility: CLINIC | Age: 39
End: 2024-03-21
Payer: COMMERCIAL

## 2024-03-21 ENCOUNTER — HOSPITAL ENCOUNTER (OUTPATIENT)
Dept: RADIOLOGY | Facility: HOSPITAL | Age: 39
Discharge: HOME | End: 2024-03-21
Attending: STUDENT IN AN ORGANIZED HEALTH CARE EDUCATION/TRAINING PROGRAM
Payer: COMMERCIAL

## 2024-03-21 VITALS
OXYGEN SATURATION: 100 % | BODY MASS INDEX: 19.86 KG/M2 | DIASTOLIC BLOOD PRESSURE: 92 MMHG | HEIGHT: 69 IN | HEART RATE: 109 BPM | RESPIRATION RATE: 18 BRPM | WEIGHT: 134.06 LBS | SYSTOLIC BLOOD PRESSURE: 173 MMHG

## 2024-03-21 DIAGNOSIS — Z79.60 LONG-TERM USE OF IMMUNOSUPPRESSANT MEDICATION: ICD-10-CM

## 2024-03-21 DIAGNOSIS — I77.1 STENOSIS OF HEPATIC ARTERY OF TRANSPLANTED LIVER: ICD-10-CM

## 2024-03-21 DIAGNOSIS — T86.49 STENOSIS OF HEPATIC ARTERY OF TRANSPLANTED LIVER: ICD-10-CM

## 2024-03-21 DIAGNOSIS — Z94.4 LIVER TRANSPLANTED: ICD-10-CM

## 2024-03-21 DIAGNOSIS — Z94.4 LIVER TRANSPLANTED: Primary | ICD-10-CM

## 2024-03-21 DIAGNOSIS — Z94.4 LIVER REPLACED BY TRANSPLANT: ICD-10-CM

## 2024-03-21 PROCEDURE — 3044F HG A1C LEVEL LT 7.0%: CPT | Mod: CPTII,S$GLB,, | Performed by: STUDENT IN AN ORGANIZED HEALTH CARE EDUCATION/TRAINING PROGRAM

## 2024-03-21 PROCEDURE — 99999 PR PBB SHADOW E&M-EST. PATIENT-LVL V: CPT | Mod: PBBFAC,,, | Performed by: STUDENT IN AN ORGANIZED HEALTH CARE EDUCATION/TRAINING PROGRAM

## 2024-03-21 PROCEDURE — 76705 ECHO EXAM OF ABDOMEN: CPT | Mod: TC

## 2024-03-21 PROCEDURE — 1160F RVW MEDS BY RX/DR IN RCRD: CPT | Mod: CPTII,S$GLB,, | Performed by: STUDENT IN AN ORGANIZED HEALTH CARE EDUCATION/TRAINING PROGRAM

## 2024-03-21 PROCEDURE — 3077F SYST BP >= 140 MM HG: CPT | Mod: CPTII,S$GLB,, | Performed by: STUDENT IN AN ORGANIZED HEALTH CARE EDUCATION/TRAINING PROGRAM

## 2024-03-21 PROCEDURE — 1159F MED LIST DOCD IN RCRD: CPT | Mod: CPTII,S$GLB,, | Performed by: STUDENT IN AN ORGANIZED HEALTH CARE EDUCATION/TRAINING PROGRAM

## 2024-03-21 PROCEDURE — 76705 ECHO EXAM OF ABDOMEN: CPT | Mod: 26,59,, | Performed by: STUDENT IN AN ORGANIZED HEALTH CARE EDUCATION/TRAINING PROGRAM

## 2024-03-21 PROCEDURE — 3008F BODY MASS INDEX DOCD: CPT | Mod: CPTII,S$GLB,, | Performed by: STUDENT IN AN ORGANIZED HEALTH CARE EDUCATION/TRAINING PROGRAM

## 2024-03-21 PROCEDURE — 99214 OFFICE O/P EST MOD 30 MIN: CPT | Mod: S$GLB,,, | Performed by: STUDENT IN AN ORGANIZED HEALTH CARE EDUCATION/TRAINING PROGRAM

## 2024-03-21 PROCEDURE — 93976 VASCULAR STUDY: CPT | Mod: 26,,, | Performed by: STUDENT IN AN ORGANIZED HEALTH CARE EDUCATION/TRAINING PROGRAM

## 2024-03-21 PROCEDURE — 3080F DIAST BP >= 90 MM HG: CPT | Mod: CPTII,S$GLB,, | Performed by: STUDENT IN AN ORGANIZED HEALTH CARE EDUCATION/TRAINING PROGRAM

## 2024-03-21 RX ORDER — METOPROLOL SUCCINATE 25 MG/1
50 TABLET, EXTENDED RELEASE ORAL
COMMUNITY
Start: 2024-02-26

## 2024-03-21 RX ORDER — HYDROCHLOROTHIAZIDE 12.5 MG/1
12.5 CAPSULE ORAL
COMMUNITY
Start: 2023-11-07

## 2024-03-21 RX ORDER — MYCOPHENOLATE MOFETIL 250 MG/1
500 CAPSULE ORAL 2 TIMES DAILY
Qty: 120 CAPSULE | Refills: 3 | Status: SHIPPED | OUTPATIENT
Start: 2024-03-21 | End: 2024-04-08 | Stop reason: SINTOL

## 2024-03-21 RX ORDER — NAPROXEN SODIUM 220 MG/1
81 TABLET, FILM COATED ORAL DAILY
COMMUNITY

## 2024-03-21 NOTE — LETTER
March 21, 2024        Tito Canales  [address]  [address]  [facility] [address]  Phone: [phone]  Fax: [phone]             [facility]  [address]  [facility] [address]  Phone: [phone]   Patient: Sukhdeep Grimes   MR Number: [id number]   YOB: 1985   Date of Visit: 3/21/2024       Dear Dr. Tito Canales    Thank you for referring Sukhdeep Grimes to me for evaluation. Attached you will find relevant portions of my assessment and plan of care.    If you have questions, please do not hesitate to call me. I look forward to following Sukhdeep Grimes along with you.    Sincerely,    Arno Ellington MD    Enclosure    If you would like to receive this communication electronically, please contact [email or web address] or [phone] to request [employer] access.    [employer] is a tool which provides read-only access to select patient information with whom you have a relationship. Its easy to use and provides real time access to review your patients record including encounter summaries, notes, results, and demographic information.    If you feel you have received this communication in error or would no longer like to receive these types of communications, please e-mail [email or web address]

## 2024-03-21 NOTE — PROGRESS NOTES
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     He is without complaints today. No recent hospitalizations or ED visits. He reports compliance with Prograf and CellCept. He denies recent fever, chills, nausea, vomiting, diarrhea, headache, tremors.    Past Medical History:   Diagnosis Date    Alcoholic cirrhosis of liver with ascites     Alcoholic cirrhosis of liver with ascites 8/1/2023    Last Assessment & Plan:  Formatting of this note might be different from the original. Looking better today compared to last week in regards to his energy. I also reviewed his visit with Dr. Stanford from last week. Sent in [facility], appears Dr. Stanford meant to do this. He would like to change to [facility] GI team, so will place that referral today. Send in referral to [facility]. 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  No red Rigoberto sign present.  Banding was not performed.  Moderate portal hypertensive gastropathy in the entire examined stomach.  Examined duodenum was normal." EGD done in [facility]    LIVER TRANSPLANT N/A 9/3/2023    Procedure: TRANSPLANT, LIVER;  Surgeon: Phil Ariza MD;  Location: [facility] OR [address] [place] [address];  Service: Transplant;  Laterality: N/A;    LIVER TRANSPLANT N/A 9/7/2023    Procedure: TRANSPLANT, LIVER;  Surgeon: Leonardo Manuel MD;  [facility];  Service: Transplant;  Laterality: N/A;    PARACENTESIS  08/24/2023       No family history on file.    Social History     Tobacco Use    Smoking status: Never    Smokeless tobacco: Never   Substance Use Topics    Alcohol use: Not Currently    Drug use: Not Currently       Current Outpatient Medications   Medication Sig Dispense Refill    albuterol (VENTOLIN HFA) 90 mcg/actuation inhaler Inhale 2 puffs into the lungs every 6 (six) hours as needed for Wheezing. Rescue      amLODIPine (NORVASC) 5 MG tablet Take 1 tablet (5 mg total) by mouth once daily. 30 tablet 11    aspirin (ECOTRIN) 325 MG EC tablet Take 1 tablet (325 mg total) by mouth once daily. 30 tablet 11    calcium carbonate-vitamin D3 600 mg-20 mcg (800 unit) Tab Take 1 tablet by mouth once daily. 30 tablet 11    clopidogreL (PLAVIX) 75 mg tablet Take 1 tablet (75 mg total) by mouth once daily. 30 tablet 6    famotidine (PEPCID) 20 MG tablet Take 1 tablet (20 mg total) by mouth nightly. 30 tablet 0    hydrOXYzine HCL (ATARAX) 25 MG tablet Take 1 tablet (25 mg total) by mouth 3 (three) times daily as needed for Anxiety (or Sleep). 50 tablet 0    LORazepam (ATIVAN) 0.5 MG tablet Take 2 tablets (1 mg total) by mouth 2 (two) times daily as needed for Anxiety. 60 tablet 0    mycophenolate (CELLCEPT) 250 mg Cap Take 2 capsules (500 mg total) by mouth 2 (two) times daily. 120 capsule 3    oxyCODONE (ROXICODONE) 5 MG immediate release tablet " "Take 1 tablet (5 mg total) by mouth every 4 (four) hours as needed for Pain. 40 tablet 0    polyethylene glycol (GLYCOLAX) 17 gram PwPk Take 17 g by mouth once daily.  0    sulfamethoxazole-trimethoprim 400-80mg (BACTRIM,SEPTRA) 400-80 mg per tablet Take 1 tablet by mouth every morning. STOP 3/8/24 30 tablet 5    tacrolimus (PROGRAF) 1 MG Cap Take 2 capsules (2 mg total) by mouth every morning AND 2 capsules (2 mg total) every evening. 120 capsule 11    traZODone (DESYREL) 50 MG tablet Take 1 tablet (50 mg total) by mouth every evening. 30 tablet 11    valGANciclovir (VALCYTE) 450 mg Tab Take 1 tablet (450 mg total) by mouth once daily. STOP 3/8/24 30 tablet 5     No current facility-administered medications for this visit.       Review of patient's allergies indicates:   Allergen Reactions    Vancomycin analogues      Per patient, rior h/o significant kidney injury 2/2 vancomycin and some redness   Other reaction(s): Anaphylactic shock-Allergy       Review of Systems   Constitutional:  Negative for fever and weight loss.   Cardiovascular:  Negative for leg swelling.   Gastrointestinal:  Negative for abdominal pain, blood in stool, constipation, diarrhea, heartburn, melena, nausea and vomiting.   Neurological:  Negative for tremors and headaches.       Vitals:    03/21/24 1550   BP: (!) 173/92   Pulse: 109   Resp: 18   SpO2: 100%   Weight: 60.8 kg (134 lb 0.6 oz)   Height: 5' 9" (1.753 m)       Physical Exam  Constitutional:       General: He is not in acute distress.     Appearance: He is not ill-appearing.   HENT:      Head: Normocephalic and atraumatic.   Eyes:      General: No scleral icterus.     Extraocular Movements: Extraocular movements intact.   Cardiovascular:      Rate and Rhythm: Regular rhythm. Tachycardia present.   Pulmonary:      Effort: Pulmonary effort is normal. No respiratory distress.   Abdominal:      General: Bowel sounds are normal. There is no distension.      Palpations: Abdomen is soft. "      Tenderness: There is no abdominal tenderness. There is no guarding or rebound.   Musculoskeletal:      Right lower leg: No edema.      Left lower leg: No edema.   Skin:     Coloration: Skin is not jaundiced.   Neurological:      Mental Status: He is alert.         LABS:  Lab Results   Component Value Date    WBC 3.12 (L) 03/21/2024    HGB 12.2 (L) 03/21/2024    HCT 35.8 (L) 03/21/2024    MCV 83 03/21/2024     (L) 03/21/2024       Lab Results   Component Value Date     03/21/2024    K 3.7 03/21/2024     03/21/2024    CO2 25 03/21/2024    BUN 13 03/21/2024    CREATININE 0.9 03/21/2024    CALCIUM 10.1 03/21/2024    ANIONGAP 11 03/21/2024       Lab Results   Component Value Date    ALT 18 03/21/2024    AST 24 03/21/2024    ALKPHOS 57 03/21/2024    BILITOT 1.0 03/21/2024       Lab Results   Component Value Date    TACROLIMUS 5.0 03/21/2024         Assessment:  38 y.o. 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    1. Liver transplanted    2. Long-term use of immunosuppressant medication    3. 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 Continue aspirin and Plavix.    Kidney function   - Creatinine 0.9  - Avoid NSAIDs as able and maintain adequate hydration    Health Maintenance/Screening:  - Recommend age appropriate cancer screening  - Skin cancer: Recommend use of sunscreen SPF 30 or higher, hat and sunglasses while outside, dermatologist visit annually or sooner if any concerning lesions.  - Osteoporosis: Recommend bone density testing every 2-3 years if previously normal or annually if previously abnormal.    Return to clinic in 3 months.    UNOS Patient  Status  Functional Status: 100% - Normal, no complaints, no evidence of disease  Physical Capacity: No Limitations    I spent a total of 30 minutes on the day of the visit. This includes face to face time and non-face to face time preparing to see the patient (eg, review of tests), obtaining and/or reviewing separately obtained history, documenting clinical information in the electronic or other health record, independently interpreting results, and communicating results to the patient/family/caregiver, or care coordination.    This note includes dictation done using [employer] speech recognition program. Word recognition mistakes are occasionally missed on review.    Aron Ellington MD  Staff Physician  Hepatology and Liver Transplant  [facility]  [facility] [place]

## 2024-03-21 NOTE — TELEPHONE ENCOUNTER
Spoke to patient : instructed him to reduce Cellcept dose to 500mg twice a day, repeat labs on 4/1/24. He was able to repeat instructions and voiced understanding.

## 2024-03-26 ENCOUNTER — TELEPHONE (OUTPATIENT)
Dept: TRANSPLANT | Facility: CLINIC | Age: 39
End: 2024-03-26
Payer: COMMERCIAL

## 2024-03-26 NOTE — TELEPHONE ENCOUNTER
----- Message from Aron Ellington MD sent at 3/26/2024 12:46 PM CDT -----  Liver tests are stable. No changes in his immunosuppression. Please continue to monitor labs per transplant protocol.

## 2024-04-01 ENCOUNTER — TELEPHONE (OUTPATIENT)
Dept: TRANSPLANT | Facility: CLINIC | Age: 39
End: 2024-04-01
Payer: COMMERCIAL

## 2024-04-01 NOTE — TELEPHONE ENCOUNTER
"----- Message from Vik Silva sent at 4/1/2024 10:47 AM CDT -----  Consult/Advisory:      Name Of Caller: Self    Contact Preference?: [phone]     What is the nature of the call?: Calling to inform Diya that he already took his medication today and he'll be going to his lab appt tomorrow instead       Additional Notes:  "Thank you for all that you do for our patients"     "

## 2024-04-01 NOTE — TELEPHONE ENCOUNTER
Message rec'd. From patient : took AM meds this morning before lab; will go to lab tomorrow morning.

## 2024-04-03 ENCOUNTER — TELEPHONE (OUTPATIENT)
Dept: TRANSPLANT | Facility: CLINIC | Age: 39
End: 2024-04-03
Payer: COMMERCIAL

## 2024-04-03 LAB
EXT ALBUMIN: 4.4
EXT ALKALINE PHOSPHATASE: 66
EXT ALT: 24
EXT AST: 26
EXT BILIRUBIN TOTAL: 1.1
EXT BUN: 12
EXT CALCIUM: 9.6
EXT CHLORIDE: 102
EXT CO2: 24
EXT CREATININE: 1 MG/DL
EXT EOSINOPHIL%: 2.8
EXT GFR MDRD NON AF AMER: >90
EXT GLUCOSE: 111
EXT HEMATOCRIT: 35.8
EXT HEMOGLOBIN: 12.2
EXT LYMPH%: 45.2
EXT MONOCYTES%: 21.7
EXT PLATELETS: 114
EXT POTASSIUM: 4
EXT PROTEIN TOTAL: 6.8
EXT SEGS%: 28.9
EXT SODIUM: 136 MMOL/L
EXT TACROLIMUS LVL: 3.1
EXT WBC: 2.2

## 2024-04-03 NOTE — TELEPHONE ENCOUNTER
----- Message from Kristie Bishop sent at 4/3/2024  3:23 PM CDT -----  Regarding: Medication Question      Name Of Caller:    Sukhdeep      Contact Preference:    [phone]      Nature of call:   Pt would like to verify whether the office received his prograf results. He needs to know if there's any changes with his medications. Pt also has questions about his Ultrasound results. Please call back before the end of the day.

## 2024-04-03 NOTE — TELEPHONE ENCOUNTER
Returned patient call; informed him that labs were received today and awaiting review by the doctor. Also informed him per 's review note that Ultrasound ok, will repeat in one month.  Patient voiced understanding.

## 2024-04-05 ENCOUNTER — TELEPHONE (OUTPATIENT)
Dept: TRANSPLANT | Facility: CLINIC | Age: 39
End: 2024-04-05
Payer: COMMERCIAL

## 2024-04-05 NOTE — TELEPHONE ENCOUNTER
----- Message from Kristie Bishop sent at 4/5/2024  2:48 PM CDT -----  Regarding: Lab Results      Name Of Caller:   Sukhdeep      Contact Preference:   [phone]      Nature of call:    Pt would like to know if Dr. Ellington reviewed their recent lab results. He states his prograf levels and white blood cell count are low. Pt has some concerns.

## 2024-04-05 NOTE — TELEPHONE ENCOUNTER
Returned patient call , informed him awaiting lab review from , will let patient know if any changes are made once review notes received. Patient voiced understanding.

## 2024-04-08 DIAGNOSIS — Z94.4 LIVER TRANSPLANTED: Primary | ICD-10-CM

## 2024-04-08 NOTE — TELEPHONE ENCOUNTER
Reviewed labs with Dr. Ellington. Per MD, pt to increase tac to 3/2 and hold MMF. Labs with CMV needed 4/15/24.    Spoke with pt. Discussed lab results. He verbalized understanding to increase tac to 3/2 and hold MMF. He agreed to repeat labs with CMV Monday 4/15/24. All questions answered.  ----- Message from Leila Ellis sent at 4/8/2024  2:49 PM CDT -----  Regarding: Consult/Advisory  Contact: ANICETO RDZ     Consult/Advisory     Name Of Caller:ANICETO RDZ        Contact Preference:[phone] (home)       Nature of call: Patient is calling about last blood work done and concern about pro jackson level at 3.1 little worried. Requesting a call back

## 2024-04-09 RX ORDER — TACROLIMUS 1 MG/1
CAPSULE ORAL
Qty: 150 CAPSULE | Refills: 11 | Status: SHIPPED | OUTPATIENT
Start: 2024-04-09 | End: 2024-04-16 | Stop reason: DRUGHIGH

## 2024-04-10 ENCOUNTER — TELEPHONE (OUTPATIENT)
Dept: TRANSPLANT | Facility: CLINIC | Age: 39
End: 2024-04-10
Payer: COMMERCIAL

## 2024-04-10 NOTE — TELEPHONE ENCOUNTER
----- Message from Aron Ellington MD sent at 4/10/2024  9:19 AM CDT -----  Liver tests are stable. Tac low. Tac increased to 3/2. MMF held due to leukopenia. Please repeat labs next week.

## 2024-04-10 NOTE — TELEPHONE ENCOUNTER
Pt aware, no action required.  ----- Message from Aron Ellington MD sent at 4/10/2024  9:19 AM CDT -----  Liver tests are stable. Tac low. Tac increased to 3/2. MMF held due to leukopenia. Please repeat labs next week.

## 2024-04-12 ENCOUNTER — TELEPHONE (OUTPATIENT)
Dept: TRANSPLANT | Facility: CLINIC | Age: 39
End: 2024-04-12
Payer: COMMERCIAL

## 2024-04-12 NOTE — TELEPHONE ENCOUNTER
Spoke to patient : he wanted to make sure he is to continue to hold Cellcept . Informed him to continue to hold Cellcept as instructed; he voiced understanding.

## 2024-04-12 NOTE — TELEPHONE ENCOUNTER
----- Message from Celi Linda sent at 4/12/2024  2:31 PM CDT -----  Regarding: Speak to Nurse Keane regarding taking cellcept  Contact: PT  [phone]  The patient called requesting to speak to Nurse Keane. Please contact to advise regarding the patient taking his Cellcept  he isn't sure if needs to be taking it before labs on Monday. Please call to advise patient    No further information provided    Patient can be contacted @# [phone]

## 2024-04-15 ENCOUNTER — TELEPHONE (OUTPATIENT)
Dept: TRANSPLANT | Facility: CLINIC | Age: 39
End: 2024-04-15
Payer: COMMERCIAL

## 2024-04-15 ENCOUNTER — PATIENT MESSAGE (OUTPATIENT)
Dept: TRANSPLANT | Facility: CLINIC | Age: 39
End: 2024-04-15
Payer: COMMERCIAL

## 2024-04-15 LAB
EXT ALBUMIN: 4
EXT ALKALINE PHOSPHATASE: 80
EXT ALT: 52
EXT AST: 41
EXT BILIRUBIN TOTAL: 0.7
EXT BUN: 12
EXT CALCIUM: 9.7
EXT CHLORIDE: 104
EXT CO2: 25
EXT CREATININE: 0.88 MG/DL
EXT EOSINOPHIL%: 1.3
EXT GFR MDRD NON AF AMER: >90
EXT GLUCOSE: 101
EXT HEMATOCRIT: 36.4
EXT HEMOGLOBIN: 11.9
EXT LYMPH%: 63.8
EXT MONOCYTES%: 8.1
EXT PLATELETS: 137
EXT POTASSIUM: 4.2
EXT PROTEIN TOTAL: 6.5
EXT SEGS%: 25.8
EXT SODIUM: 138 MMOL/L
EXT TACROLIMUS LVL: 4.9
EXT WBC: 6.2

## 2024-04-15 NOTE — TELEPHONE ENCOUNTER
----- Message from Celi Mckeon sent at 4/15/2024  1:07 PM CDT -----  Regarding: questions/discuss labs  Contact: PT  [phone]  The patient called requesting to make sure labs were sent to you they were drawn this morning and PT  requesting return call regarding results.PT states he noted on these labs after stopping the Cellcept that his WBC were looking good but noted ALT and AST both went up, PT states significantly. Please call to advise regarding results at your earliest convenience     No further information provided    Patient can be contacted @# [phone]

## 2024-04-16 DIAGNOSIS — Z94.4 LIVER TRANSPLANTED: ICD-10-CM

## 2024-04-16 RX ORDER — TACROLIMUS 1 MG/1
CAPSULE ORAL
Qty: 180 CAPSULE | Refills: 11 | Status: SHIPPED | OUTPATIENT
Start: 2024-04-16 | End: 2025-04-16

## 2024-04-16 NOTE — TELEPHONE ENCOUNTER
Labs reviewed by : ANTONETTEB : increase Prograf dose to 3mg bid, repeat labs on Monday 4/22/24 due to increase of LFTs.    Patient notified and instructed to increase Prograf dose to 3mg twice a day, repeat labs on Monday 4/22/24.  Patient was able to repeat instructions and voiced understanding. Patient reported that he is scheduled for his follow up ultrasound through 's office on Thursday 4/18/24.

## 2024-04-19 ENCOUNTER — TELEPHONE (OUTPATIENT)
Dept: TRANSPLANT | Facility: CLINIC | Age: 39
End: 2024-04-19
Payer: COMMERCIAL

## 2024-04-19 NOTE — TELEPHONE ENCOUNTER
Returned patient mother's call; instructed her he is off the Cellcept for now, we increased the Prograf dose and patient to have repeat labs on Monday, she voiced understanding.

## 2024-04-19 NOTE — TELEPHONE ENCOUNTER
Returned patient mother's call; got cut off of call , attempted to call back: left VM message that call dropped and Maikel returning call.

## 2024-04-19 NOTE — TELEPHONE ENCOUNTER
"----- Message from Allen Thurston sent at 4/19/2024 11:32 AM CDT -----  Regarding: call back  Consult/Advisory:        Name Of Caller: pt's mom Kassandra  Contact Preference?:[phone]     What is the nature of the call?: Calling to speak w/ Diya in regards to some question she has about his liver meds requesting call back about        Additional Notes:  "Thank you for all that you do for our patients"  "

## 2024-04-19 NOTE — TELEPHONE ENCOUNTER
"----- Message from Allen Thurston sent at 4/19/2024 12:09 PM CDT -----  Regarding: call back  Consult/Advisory:        Name Of Caller: pt's mom Kassandra        Contact Preference?:[phone]        What is the nature of the call?: Returning call to Diya        Additional Notes:  "Thank you for all that you do for our patients"  "

## 2024-04-22 ENCOUNTER — TELEPHONE (OUTPATIENT)
Dept: TRANSPLANT | Facility: CLINIC | Age: 39
End: 2024-04-22
Payer: COMMERCIAL

## 2024-04-22 NOTE — TELEPHONE ENCOUNTER
Returned patient call, informed him labs noted in [facility]where , look about the same as previoius labs, Prograf level still pending, will review with  for plan for Prograf dose once results come in.  Patient reported that  called him and will add some tests to his upcoming next labs, he said.

## 2024-04-22 NOTE — TELEPHONE ENCOUNTER
----- Message from Haley Mercado sent at 4/22/2024  3:41 PM CDT -----  Regarding: Patient advice  Contact: Pt  [phone]            Name of Caller:  Sukhdeep Landeros Preference:   [phone]    Nature of Call:  Requesting a call back have a few questions about results and if there are any medication changes

## 2024-04-23 LAB
EXT ALBUMIN: 4.1
EXT ALKALINE PHOSPHATASE: 83
EXT ALT: 58
EXT AST: 41
EXT BILIRUBIN TOTAL: 0.4
EXT BUN: 13
EXT CALCIUM: 9.6
EXT CHLORIDE: 105
EXT CO2: 23
EXT CREATININE: 0.87 MG/DL
EXT EOSINOPHIL%: 0.9
EXT GFR MDRD NON AF AMER: >90
EXT GLUCOSE: 108
EXT HEMATOCRIT: 36
EXT HEMOGLOBIN: 11.8
EXT LYMPH%: 73.2
EXT MONOCYTES%: 6.9
EXT PLATELETS: 126
EXT POTASSIUM: 4.2
EXT PROTEIN TOTAL: 6.4
EXT SEGS%: 18
EXT SODIUM: 139 MMOL/L
EXT TACROLIMUS LVL: 5.4
EXT WBC: 6.8

## 2024-04-24 RX ORDER — MYCOPHENOLATE MOFETIL 250 MG/1
500 CAPSULE ORAL 2 TIMES DAILY
Qty: 120 CAPSULE | Refills: 11 | Status: SHIPPED | OUTPATIENT
Start: 2024-04-24 | End: 2025-04-24

## 2024-04-24 NOTE — TELEPHONE ENCOUNTER
----- Message from Aron Ellington MD sent at 4/24/2024 12:19 PM CDT -----  Liver tests remain slightly elevated. Please restart MMF at 500mg BID and repeat labs next week.

## 2024-04-24 NOTE — TELEPHONE ENCOUNTER
Patient notified and instructed to re-start Cellcept 500mg twice a day, repeat labs on Monday 4/29/24. Patient notified and instructed , he was able to repeat instructions and voiced understanding.

## 2024-04-30 ENCOUNTER — TELEPHONE (OUTPATIENT)
Dept: TRANSPLANT | Facility: CLINIC | Age: 39
End: 2024-04-30
Payer: COMMERCIAL

## 2024-04-30 LAB
EXT ALBUMIN: 4.3
EXT ALKALINE PHOSPHATASE: 88
EXT ALT: 55
EXT AST: 39
EXT BILIRUBIN TOTAL: 0.5
EXT BUN: 13
EXT CALCIUM: 9.8
EXT CHLORIDE: 105
EXT CO2: 22
EXT CREATININE: 0.91 MG/DL
EXT EOSINOPHIL%: 1.1
EXT GFR MDRD NON AF AMER: >90
EXT GLUCOSE: 99
EXT HEMATOCRIT: 38.9
EXT HEMOGLOBIN: 12.4
EXT LYMPH%: 68.3
EXT MONOCYTES%: 7.2
EXT PLATELETS: 140
EXT POTASSIUM: 4.4
EXT PROTEIN TOTAL: 7
EXT SEGS%: 22.8
EXT SODIUM: 138 MMOL/L
EXT TACROLIMUS LVL: 6.5
EXT WBC: 6.3

## 2024-04-30 NOTE — TELEPHONE ENCOUNTER
----- Message from Jackie Champion sent at 4/30/2024  3:46 PM CDT -----  Regarding: Consult/Advisory  Contact: Sukhdeep Grimes  Consult/Advisory    Name Of Caller: Sukhdeep Grimes       Contact Preference: [phone] (home)      Nature of call: Patient calling to speak to coordinator to verify test results were in, also patient notifying that he will have an US on tomorrow morning. Requesting a call back.

## 2024-04-30 NOTE — TELEPHONE ENCOUNTER
Returned patient call; informed him that labs received and sent to  for review.  Patient reported he is scheduled for follow up ultrasound tomorrow.

## 2024-05-01 ENCOUNTER — TELEPHONE (OUTPATIENT)
Dept: TRANSPLANT | Facility: CLINIC | Age: 39
End: 2024-05-01
Payer: COMMERCIAL

## 2024-05-01 NOTE — TELEPHONE ENCOUNTER
Patient had follow up ultrasound today (ordered by ) : [facility]    Simon Valles MD - 05/01/2024  Formatting of this note might be different from the original.    EXAM: US ABDOMEN  RIGHT UPPER QUADRANT, 5/1/2024 11:34 AM    INDICATION: R74.01 Elevation of levels of liver transaminase levels I10;    Comparison: 4/18/2024    FINDINGS:     · PANCREAS: NO DISCRETE LESION. POSTERIOR AND DISTAL PANCREAS NOT WELL SEEN.   · Liver: Patient with history of prior liver transplant. Parenchymal echogenicity of the transplant liver is upper normal. No discrete hepatic lesion identified. Transplant liver measures about 15 cm in vertical span.  · Antegrade flow portal vein with flow velocity of 48.2 cm/s.  · Common bile duct: Measures 4 mm at the alice, within normal limits.  · Right kidney: Measures 10.1 cm in length without hydronephrosis or shadowing calculi.  · Gallbladder: Surgically absent.     Conclusion:    1. Transplant liver is normal in size measuring 14-15 cm in length. No focal hepatic lesion identified.    2. Antegrade flow portal vein.    3. Gallbladder surgically absent.    4. No pathologic biliary dilation.    Signed by: 5/1/2024 12:24 PM: THEO Valles MD  Exam End: 05/01/24 11:07    Specimen Collected: 05/01/24 11:19 Last Resulted: 05/01/24 11:24   Received From: [employer]  Result Received: 05/01/24 12:07

## 2024-05-01 NOTE — TELEPHONE ENCOUNTER
Returned patient call, he reported that  would be contacting  to report tests : CMV /EBV both positive.  Results in [facility]where.  Message sent to .

## 2024-05-01 NOTE — TELEPHONE ENCOUNTER
----- Message from Nighat Sullivan sent at 5/1/2024  3:00 PM CDT -----  Regarding: Test/US results  Contact: Sukhdeep  Regarding:Lab Results          Name Of Caller: Sukhdeep Grimes          Contact Preference: [phone] (home)            Nature of call: Pt  is calling to  get lab/test results for US/labs/Test that were taken on :  05 /01/2024. Requesting a call back.         Note; pt stats some other lab results came in late last night that are very important.

## 2024-05-02 ENCOUNTER — TELEPHONE (OUTPATIENT)
Dept: TRANSPLANT | Facility: CLINIC | Age: 39
End: 2024-05-02
Payer: COMMERCIAL

## 2024-05-02 ENCOUNTER — PATIENT MESSAGE (OUTPATIENT)
Dept: TRANSPLANT | Facility: CLINIC | Age: 39
End: 2024-05-02
Payer: COMMERCIAL

## 2024-05-02 NOTE — TELEPHONE ENCOUNTER
Returned patient call, informed him to repeat labs on Monday 5/6/24 , will add CMV PCR and EBV PCR to labs - orders faxed.

## 2024-05-02 NOTE — TELEPHONE ENCOUNTER
----- Message from Leonila Gilliam MA sent at 5/2/2024  4:23 PM CDT -----  Regarding: FW: Consult/Advisory  Contact: :Sukhdeep Grimes    ----- Message -----  From: Leila Corral  Sent: 5/2/2024   4:20 PM CDT  To: [facility] Staff  Subject: Consult/Advisory                                    Consult/Advisory     Name Of Caller:Sukhdeep Grimes         Contact Preference:[phone] (home)       Nature of call: Patient is calling to get lab results and he is feeling really concern . Requesting a call back

## 2024-05-03 ENCOUNTER — TELEPHONE (OUTPATIENT)
Dept: TRANSPLANT | Facility: CLINIC | Age: 39
End: 2024-05-03
Payer: COMMERCIAL

## 2024-05-03 NOTE — TELEPHONE ENCOUNTER
Returned call to patient, I sent secure chatted Dr Ellington.  He has ordered PCR to see treatment options.    ----- Message from Celi Mckeon sent at 5/3/2024  3:06 PM CDT -----  Regarding: results/antbx - symptoms headache (please advise before weekend)  Contact:  [phone]  The patient called requesting to speak to Nurse regarding some of the labs he has recently had showed still possible infection going on. PT is asking is any antbx are being prescribed. Valganciclovir 450 mg is the medication, also states he is having some symptoms of a possible infection going on. PT states may be needing to see a dentist soon due to some problems he has possibly being scheduled for next week or so. PT also states he has been having some headaches/migraine pain. Scratchy throat possible low grade fever   PT is requesting return call at earliest opportunity before the weekend    No further information provided      Patient can be contacted @# [phone]

## 2024-05-06 ENCOUNTER — TELEPHONE (OUTPATIENT)
Dept: TRANSPLANT | Facility: CLINIC | Age: 39
End: 2024-05-06
Payer: COMMERCIAL

## 2024-05-06 NOTE — TELEPHONE ENCOUNTER
"Spoke to patient; advised patient that he needs to see his dentist to evaluate his tooth ache, he said he is at the dentist now, and the dentists wants him to take "Amoxicillin", and will be scheduled for a root canal . Informed the patient that it is ok to take Amoxicillin from transplant standpoint. And to have dental office call our office if clearence for root canal is needed. He was able to voice understanding.  "

## 2024-05-06 NOTE — TELEPHONE ENCOUNTER
----- Message from Nighat Sullivan sent at 5/6/2024  9:15 AM CDT -----  Regarding: Test results/ medication  Contact: Lili  Regarding:Lab Results          Name Of Caller: Marta        Contact Preference: [phone] (home) ,[phone] (Mobile)           Nature of call:  pt mother is calling to get test results and US, and medication pt need to take for an infection, pt also have a really bad tooth ache that he wiil be going to dentist now. Requesting a call back.

## 2024-05-07 LAB
EXT ALBUMIN: 4.4
EXT ALKALINE PHOSPHATASE: 83
EXT ALT: 32
EXT AST: 29
EXT BILIRUBIN TOTAL: 0.6
EXT BUN: 11
EXT CALCIUM: 9.9
EXT CHLORIDE: 105
EXT CMV DNA QUANT. BY PCR: NEGATIVE
EXT CO2: 23
EXT CREATININE: 0.8 MG/DL
EXT EBV DNA BY PCR: NEGATIVE
EXT GFR MDRD NON AF AMER: >90
EXT GLUCOSE: 113
EXT HEMATOCRIT: 36.5
EXT HEMOGLOBIN: 11.9
EXT PLATELETS: 136
EXT POTASSIUM: 4.3
EXT PROTEIN TOTAL: 7.2
EXT SODIUM: 139 MMOL/L
EXT TACROLIMUS LVL: 6.5
EXT WBC: 7

## 2024-05-09 ENCOUNTER — TELEPHONE (OUTPATIENT)
Dept: TRANSPLANT | Facility: CLINIC | Age: 39
End: 2024-05-09
Payer: COMMERCIAL

## 2024-05-09 NOTE — TELEPHONE ENCOUNTER
Returned call, advised patient his labs and imaging has not been reviewed at this time at this time.  LFT are improving.  Will update him after Dr. Ellington reviews----- Message from Kristie Bishop sent at 5/9/2024  4:05 PM CDT -----  Regarding: Results      Name Of Caller:   Sukhdeep      Contact Preference:   [phone]       Nature of call:   Pt would like to go over their recent labs, ultrasound, and test results.

## 2024-05-10 ENCOUNTER — TELEPHONE (OUTPATIENT)
Dept: TRANSPLANT | Facility: CLINIC | Age: 39
End: 2024-05-10
Payer: COMMERCIAL

## 2024-05-10 NOTE — TELEPHONE ENCOUNTER
"Returned patient call; informed him per TORB : labs reviewed , LFTs improved , EBV and CMV PCR results were negative , no changes to immunosuppression, repeat labs 2 weeks(due 5/20/24). Patient was able to repeat instructions and voiced understanding.    Patient reported that he "is almost finished taking Amoxicillin - prescribed by his dentist - will be scheduled for a root canal in the near future(not scheduled yet) - patient asking if he can hold the Plavix for that. Informed him that I will ask .   "

## 2024-05-10 NOTE — TELEPHONE ENCOUNTER
----- Message from Funmilayo Reis MA sent at 5/10/2024  4:18 PM CDT -----  Regarding: FW: Patient advice  Contact: Pt  [phone]    ----- Message -----  From: Haley Mercado  Sent: 5/10/2024   4:00 PM CDT  To: [facility]-Liver Transplant Non-Clinical  Subject: Patient advice                                         Name of Caller:  Sukhdeep     Contact Preference:   [phone]    Nature of Call:  Requesting a call back to discuss if labs are needed Monday morning  or not

## 2024-05-16 ENCOUNTER — TELEPHONE (OUTPATIENT)
Dept: TRANSPLANT | Facility: CLINIC | Age: 39
End: 2024-05-16
Payer: COMMERCIAL

## 2024-05-16 NOTE — TELEPHONE ENCOUNTER
----- Message from Aron Ellington MD sent at 5/16/2024 11:19 AM CDT -----  Regarding: RE:  Ok great, thanks!  ----- Message -----  From: Diya Walker  Sent: 5/16/2024  10:44 AM CDT  To: Aron Ellington MD  Subject: RE:                                              This is letter I will send to the dental office if ok by you:  ay 16, 2024    Sukhdeep Grimes  [address] [place] [id number]          To Whom It May Concern,  RE:  Sukhdeep Grimes,  MRN: [id number]    Mr. Sukhdeep Grimes received a liver transplant on 9/7/2023.  He had complication of Hepatic Artery Stenosis that was stented  7 months ago - he has been required to take Plavix and Aspirin since that time.  He is cleared to have the recommended dental procedure from the liver transplant standpoint. We are ok with holding the Plavix but would really prefer for him to stay on Aspirin if at all possible.    If you have any questions or concerns, please don't hesitate to call.    Sincerely,     Dr. Aron Ellington MD - NPI #[id number]       [facility]  [address] [place] [address]  [phone]  ----- Message -----  From: Aron Ellington MD  Sent: 5/16/2024   8:33 AM CDT  To: Karlos Garcia MD; Diya Walker  Subject: RE:                                              Thanks Karlos.    Diya thanks for calling. We'd be ok holding Plavix but would really prefer for him to stay on aspirin if at all possible.  ----- Message -----  From: Karlos Garcia MD  Sent: 5/15/2024   4:02 PM CDT  To: Diya Walker; Aron Ellington MD  Subject: RE:                                              Not much we can do. Ideally he could stay on one of them. Especially given his us.     Karlos  ----- Message -----  From: Diya Walker  Sent: 5/15/2024   3:27 PM CDT  To: Karlos Garcia MD; Aron Ellington MD    I spoke to the dentist's office and yes he would like patient to hold both the asa and plavix, is that ok? Thanks.  ----- Message  -----  From: Aron Ellington MD  Sent: 5/13/2024   1:48 PM CDT  To: Karlos Garcia MD; Diya Walker    They only asked about Plavix. Diya can you please confirm that they're ok continuing aspirin?  ----- Message -----  From: Karlos Garcia MD  Sent: 5/13/2024   1:40 PM CDT  To: Diya Walker; Aron Ellington MD    Can he at least stay on Aspirin? If not, we will have to stop it and it's been 6 months since we put them in.    Karlos  ----- Message -----  From: Aron Ellington MD  Sent: 5/13/2024   1:27 PM CDT  To: Karlos Garcia MD; Diya Everett,    He had HAS post transplant, and you placed 2 stents in 10/2023. He needs a root canal and asking if he can hold Plavix. I think it's ok since stents were placed almost 7 months ago but wanted to see if you agreed first.    Thanks,  Aron  ----- Message -----  From: Diya Walker  Sent: 5/10/2024   4:46 PM CDT  To: Aron Ellington MD    Patient needs a root canal , (not scheduled yet) but is asking if he will be able to hold the Plavix prior?  Thanks.  Trying to get the disc of his last u/s - had sent you the report though- and it is in CareEverywhere, thanks.

## 2024-05-20 ENCOUNTER — PATIENT MESSAGE (OUTPATIENT)
Dept: TRANSPLANT | Facility: CLINIC | Age: 39
End: 2024-05-20
Payer: COMMERCIAL

## 2024-05-20 ENCOUNTER — TELEPHONE (OUTPATIENT)
Dept: TRANSPLANT | Facility: CLINIC | Age: 39
End: 2024-05-20
Payer: COMMERCIAL

## 2024-05-20 NOTE — TELEPHONE ENCOUNTER
Patient notified and instructed via [facility]:    Your labs have been resulted but still waiting on Prograf level, labs look good!!!  Also :  said the u/s was good.   You need to ask your dentist how long he wants you to hold your Plavix before your root canal.   and the Radiologist want you to stay on the aspirin without stopping it. I have sent information/instructions to your dentist, thanks.

## 2024-05-21 ENCOUNTER — PATIENT MESSAGE (OUTPATIENT)
Dept: TRANSPLANT | Facility: CLINIC | Age: 39
End: 2024-05-21
Payer: COMMERCIAL

## 2024-05-21 ENCOUNTER — TELEPHONE (OUTPATIENT)
Dept: TRANSPLANT | Facility: CLINIC | Age: 39
End: 2024-05-21
Payer: COMMERCIAL

## 2024-05-21 LAB
EXT ALBUMIN: 4.3
EXT ALKALINE PHOSPHATASE: 69
EXT ALT: 19
EXT AST: 25
EXT BILIRUBIN TOTAL: 0.5
EXT BUN: 15
EXT CALCIUM: 9.5
EXT CHLORIDE: 105
EXT CO2: 24
EXT CREATININE: 0.94 MG/DL
EXT GFR MDRD NON AF AMER: >90
EXT GLUCOSE: 99
EXT HEMATOCRIT: 37.2
EXT HEMOGLOBIN: 12.4
EXT PLATELETS: 150
EXT POTASSIUM: 4.6
EXT PROTEIN TOTAL: 7.1
EXT SODIUM: 138 MMOL/L
EXT TACROLIMUS LVL: 9.5
EXT WBC: 6.4

## 2024-05-21 NOTE — TELEPHONE ENCOUNTER
Returned patient call; informed him awaiting lab review by ; patient reported that his dentist has referred him to see an oral surgeon as instead of root canal , may to extraction of 1-2 teeth, he said.  Patient reported that once seen the oral surgeon may send papers for clearence to our office. - will watch for that.  Patient reported that last ultrasound done at [facility] location and the one prior done at the [place] location: will request CDs from both.

## 2024-05-21 NOTE — TELEPHONE ENCOUNTER
"----- Message from Vik Silva sent at 5/21/2024  2:51 PM CDT -----  Consult/Advisory      Name Of Caller: Self    Contact Preference?: [phone]     What is the nature of the call?: Calling to speak w/ Diya about recent portal message, US, blood test, and dental inquiries.    Additional Notes:  "Thank you for all that you do for our patients"  "

## 2024-05-23 ENCOUNTER — TELEPHONE (OUTPATIENT)
Dept: TRANSPLANT | Facility: CLINIC | Age: 39
End: 2024-05-23
Payer: COMMERCIAL

## 2024-05-23 NOTE — TELEPHONE ENCOUNTER
----- Message from Leila Corral sent at 5/23/2024  4:09 PM CDT -----  Regarding: Consult/Advisory  Contact: Sukhdeep Grimes     Consult/Advisory     Name Of Caller:Sukhdeep Grimes         Contact Preference:[phone] (home)       Nature of call:Patient is calling to speak to Diya about blood work and his dental appt. Requesting a  call back

## 2024-05-23 NOTE — TELEPHONE ENCOUNTER
"Returned patient call; he gave update: he just saw new [facility] Surgeon re: possible tooth extraction(1-2 teeth);  he said doctor ("Hai Yi") "told me I can likely stay on aspriin and hold only the plavix for about 4 days before", he said.  Patient reported that oral surgeon office will be sending their clearence form to our office. .   "

## 2024-05-24 ENCOUNTER — PATIENT MESSAGE (OUTPATIENT)
Dept: TRANSPLANT | Facility: CLINIC | Age: 39
End: 2024-05-24
Payer: COMMERCIAL

## 2024-05-24 ENCOUNTER — TELEPHONE (OUTPATIENT)
Dept: TRANSPLANT | Facility: CLINIC | Age: 39
End: 2024-05-24
Payer: COMMERCIAL

## 2024-05-24 NOTE — TELEPHONE ENCOUNTER
Patient notified and instructed via [facility]:      Per : Liver tests are stable. No changes in his immunosuppression. Tacrolimus level  slightly elevated but hesitant to decrease with recently elevated liver function tests. Please repeat labs in 2 weeks(due 6/3/24). Thanks.

## 2024-05-24 NOTE — TELEPHONE ENCOUNTER
----- Message from Aron Ellington MD sent at 5/23/2024  5:46 PM CDT -----  Liver tests are stable. No changes in his immunosuppression. Tac slightly elevated but hesitant to decrease with recently elevated LFTs. Please repeat labs in 2 weeks.

## 2024-06-03 ENCOUNTER — PATIENT MESSAGE (OUTPATIENT)
Dept: TRANSPLANT | Facility: CLINIC | Age: 39
End: 2024-06-03
Payer: COMMERCIAL

## 2024-06-03 ENCOUNTER — TELEPHONE (OUTPATIENT)
Dept: TRANSPLANT | Facility: CLINIC | Age: 39
End: 2024-06-03
Payer: COMMERCIAL

## 2024-06-03 LAB
EXT ALBUMIN: 4.5
EXT ALKALINE PHOSPHATASE: 106
EXT ALT: 166
EXT AST: 53
EXT BILIRUBIN TOTAL: 0.6
EXT BUN: 10
EXT CALCIUM: 9.9
EXT CHLORIDE: 103
EXT CO2: 23
EXT CREATININE: 0.86 MG/DL
EXT GFR MDRD NON AF AMER: >90
EXT GLUCOSE: 105
EXT HEMATOCRIT: 40.4
EXT HEMOGLOBIN: 13.2
EXT PLATELETS: 147
EXT POTASSIUM: 4.4
EXT PROTEIN TOTAL: 7.5
EXT SODIUM: 137 MMOL/L
EXT TACROLIMUS LVL: 14.4
EXT WBC: 6

## 2024-06-03 NOTE — TELEPHONE ENCOUNTER
Message received from patient via portal; will review with :      Ryder Keane it's Sukhdeep. I'm very concerned why my ALT is so high!! Can you please call when you can. I've been taking hydrocodone/acetaminophen 5-325 tb. ibuprofen 600mg and amoxicillin 500mg. Thank you.

## 2024-06-03 NOTE — TELEPHONE ENCOUNTER
Reviewed today's labs with  (Prograf level pending) LFTs elevated.  patient notified and instructed as per TORDESTIN Pierre(Secure Chat):     said it could be from the Amoxicillin, how long do you have left to take?  Also your Prograf level is still pending so he will not change your dose at this time.  He would like you to have repeat labs on Thursday or Friday of this week , thanks.

## 2024-06-04 ENCOUNTER — PATIENT MESSAGE (OUTPATIENT)
Dept: TRANSPLANT | Facility: CLINIC | Age: 39
End: 2024-06-04
Payer: COMMERCIAL

## 2024-06-06 ENCOUNTER — PATIENT MESSAGE (OUTPATIENT)
Dept: TRANSPLANT | Facility: CLINIC | Age: 39
End: 2024-06-06
Payer: COMMERCIAL

## 2024-06-07 LAB
EXT ALBUMIN: 4.4
EXT ALKALINE PHOSPHATASE: 88
EXT ALT: 75
EXT AST: 27
EXT BILIRUBIN TOTAL: 0.5
EXT BUN: 16
EXT CALCIUM: 9.5
EXT CHLORIDE: 104
EXT CO2: 23
EXT CREATININE: 0.96 MG/DL
EXT GFR MDRD NON AF AMER: >90
EXT GLUCOSE: 104
EXT HEMATOCRIT: 37.4
EXT HEMOGLOBIN: 12.7
EXT PLATELETS: 138
EXT POTASSIUM: 4.5
EXT PROTEIN TOTAL: 7.2
EXT SODIUM: 137 MMOL/L
EXT TACROLIMUS LVL: 10.3
EXT WBC: 6

## 2024-06-10 ENCOUNTER — PATIENT MESSAGE (OUTPATIENT)
Dept: TRANSPLANT | Facility: CLINIC | Age: 39
End: 2024-06-10
Payer: COMMERCIAL

## 2024-06-11 ENCOUNTER — PATIENT MESSAGE (OUTPATIENT)
Dept: TRANSPLANT | Facility: CLINIC | Age: 39
End: 2024-06-11
Payer: COMMERCIAL

## 2024-06-11 ENCOUNTER — TELEPHONE (OUTPATIENT)
Dept: TRANSPLANT | Facility: CLINIC | Age: 39
End: 2024-06-11
Payer: COMMERCIAL

## 2024-06-11 NOTE — TELEPHONE ENCOUNTER
Patient notifiied and instructed via [facility]:     Per : liver tests elevated but improved, no medicine changes made. Will repeat labs next week. Thanks.

## 2024-06-11 NOTE — TELEPHONE ENCOUNTER
----- Message from Aron Ellington MD sent at 6/11/2024  2:26 PM CDT -----  Liver tests elevated but improved. Possible DILI from recent antibiotic use. No changes in IS. Please repeat labs next week.

## 2024-06-12 ENCOUNTER — TELEPHONE (OUTPATIENT)
Dept: TRANSPLANT | Facility: CLINIC | Age: 39
End: 2024-06-12
Payer: COMMERCIAL

## 2024-06-12 NOTE — TELEPHONE ENCOUNTER
Patient reported he had f/u ultrasound on 6/6/24 , ordered by .  Results in CareEverywhere:      EXAM: HEPATIC VASCULAR ULTRASOUND             GRAYSCALE             DOPPLER    HISTORY: Hepatic transplant.    COMPARISONS:        1. Comparison    RESULTS:            Liver:  14 cm.length.                         Grossly normal-appearing hepatic parenchyma.              Gallbladder: Surgically absent              CBD: 5 mm.              Pancreas: Normal              Spleen: 14 cm. length.                         Normal parenchyma.     Right Kidney: 10.5 cm length.                            Normal renal parenchyma.    Left Kidney:  10.5 cm length.                                  Normal renal parenchyma              Main Portal Vein: Flow Direction:  Hepatopetal.                                             Velocity 46 cm/s. (NR: 16 - 40 cm/s).              Hepatic Artery: Wave Form:  Low resistance waveform.                                                                     Velocity: 390 cm/s. (vicinity of the hepatic artery stent(.                                         Resistive Index: 0.65 (NR: 0.55 - 0.7).              Hepatic Vein Patency: Normal              Ascites: None.  Procedure Note    Jd Jama III, MD - 06/06/2024  Formatting of this note might be different from the original.    EXAM: HEPATIC VASCULAR ULTRASOUND             GRAYSCALE             DOPPLER    HISTORY: Hepatic transplant.    COMPARISONS:        1. Comparison    RESULTS:            Liver:  14 cm.length.                         Grossly normal-appearing hepatic parenchyma.              Gallbladder: Surgically absent              CBD: 5 mm.              Pancreas: Normal              Spleen: 14 cm. length.                         Normal parenchyma.     Right Kidney: 10.5 cm length.                            Normal renal parenchyma.    Left Kidney:  10.5 cm length.                                  Normal renal parenchyma               Main Portal Vein: Flow Direction:  Hepatopetal.                                             Velocity 46 cm/s. (NR: 16 - 40 cm/s).              Hepatic Artery: Wave Form:  Low resistance waveform.                                                     Velocity: 390 cm/s. (vicinity of the hepatic artery stent(.                                         Resistive Index: 0.65 (NR: 0.55 - 0.7).              Hepatic Vein Patency: Normal              Ascites: None.    IMPRESSION:  1. Normal-appearing hepatic parenchymal transplant.    2. Presumed hepatic artery stenosis in the vicinity of the hepatic artery stent.    3. Normal portal venous waveforms.    4. No biliary ductal dilatation.    5. No ascites.    Signed by: 6/6/2024 4:57 PM: Wiley MOTT, Jd  Images on Order [id number]

## 2024-06-17 LAB
EXT ALBUMIN: 4.4
EXT ALKALINE PHOSPHATASE: 73
EXT ALT: 23
EXT AST: 23
EXT BILIRUBIN TOTAL: 0.7
EXT BUN: 15
EXT CALCIUM: 10
EXT CHLORIDE: 105
EXT CO2: 23
EXT CREATININE: 0.95 MG/DL
EXT EOSINOPHIL%: 1.5
EXT GFR MDRD NON AF AMER: >90
EXT GLUCOSE: 107
EXT HEMATOCRIT: 37.4
EXT HEMOGLOBIN: 12.6
EXT LYMPH%: 58
EXT MONOCYTES%: 6.9
EXT PLATELETS: 146
EXT POTASSIUM: 4.7
EXT PROTEIN TOTAL: 7.2
EXT SEGS%: 33
EXT SODIUM: 139 MMOL/L
EXT TACROLIMUS LVL: 8.4
EXT WBC: 6.5

## 2024-06-21 ENCOUNTER — PATIENT MESSAGE (OUTPATIENT)
Dept: TRANSPLANT | Facility: CLINIC | Age: 39
End: 2024-06-21
Payer: COMMERCIAL

## 2024-06-21 ENCOUNTER — TELEPHONE (OUTPATIENT)
Dept: TRANSPLANT | Facility: CLINIC | Age: 39
End: 2024-06-21
Payer: COMMERCIAL

## 2024-06-21 NOTE — TELEPHONE ENCOUNTER
----- Message from Aron Ellington MD sent at 6/20/2024 10:23 PM CDT -----  Liver tests normal. No changes in his immunosuppression. Please continue to monitor labs per transplant protocol.

## 2024-06-21 NOTE — TELEPHONE ENCOUNTER
Patient notified and instructed via [facility]:    Per : Liver tests normal. No changes in his immunosuppression. Please continue to monitor labs per transplant protocol. Next labs due 7/1/24. Thanks.  Also: still working on the virtual clinic visit with : awaiting a schedule adjustment so we can book it on his schedule. Thanks.

## 2024-06-27 ENCOUNTER — TELEPHONE (OUTPATIENT)
Dept: TRANSPLANT | Facility: CLINIC | Age: 39
End: 2024-06-27
Payer: COMMERCIAL

## 2024-07-02 LAB
EXT ALBUMIN: 4
EXT ALKALINE PHOSPHATASE: 62
EXT ALT: 11
EXT AST: 21
EXT BILIRUBIN TOTAL: 0.5
EXT BUN: 10
EXT CALCIUM: 9.5
EXT CHLORIDE: 105
EXT CO2: 24
EXT CREATININE: 0.92 MG/DL
EXT EOSINOPHIL%: 1.7
EXT GFR MDRD NON AF AMER: >90
EXT GLUCOSE: 105
EXT HEMATOCRIT: 39
EXT HEMOGLOBIN: 12.9
EXT LYMPH%: 56.8
EXT MONOCYTES%: 6.4
EXT PLATELETS: 125
EXT POTASSIUM: 4.4
EXT PROTEIN TOTAL: 6.9
EXT SEGS%: 34.3
EXT SODIUM: 138 MMOL/L
EXT TACROLIMUS LVL: 10.9
EXT WBC: 5.4

## 2024-07-09 DIAGNOSIS — Z94.4 LIVER TRANSPLANTED: ICD-10-CM

## 2024-07-09 RX ORDER — TACROLIMUS 1 MG/1
CAPSULE ORAL
Qty: 150 CAPSULE | Refills: 11 | Status: SHIPPED | OUTPATIENT
Start: 2024-07-09 | End: 2025-07-09

## 2024-07-09 NOTE — TELEPHONE ENCOUNTER
Portal message sent, repeat labs 7/22/24----- Message from Aron Ellington MD sent at 7/8/2024 10:06 PM CDT -----  Liver tests are stable. Please decrease tac to 3/2 and repeat labs in 2 weeks.

## 2024-07-10 ENCOUNTER — TELEPHONE (OUTPATIENT)
Dept: TRANSPLANT | Facility: CLINIC | Age: 39
End: 2024-07-10
Payer: COMMERCIAL

## 2024-07-23 LAB
EXT ALBUMIN: 4.4
EXT ALKALINE PHOSPHATASE: 59
EXT ALT: 16
EXT AST: 20
EXT BILIRUBIN TOTAL: 0.8
EXT BUN: 12
EXT CALCIUM: 9.8
EXT CHLORIDE: 105
EXT CO2: 23
EXT CREATININE: 0.95 MG/DL
EXT EOSINOPHIL%: 1.8
EXT GFR MDRD NON AF AMER: >90
EXT GLUCOSE: 95
EXT HEMATOCRIT: 39.4
EXT HEMOGLOBIN: 13.6
EXT LYMPH%: 49.1
EXT MONOCYTES%: 7.4
EXT PLATELETS: 150
EXT POTASSIUM: 4.4
EXT PROTEIN TOTAL: 6.9
EXT SEGS%: 40.9
EXT SODIUM: 139 MMOL/L
EXT TACROLIMUS LVL: 7.7
EXT WBC: 5.4

## 2024-07-29 ENCOUNTER — TELEPHONE (OUTPATIENT)
Dept: TRANSPLANT | Facility: CLINIC | Age: 39
End: 2024-07-29
Payer: COMMERCIAL

## 2024-07-29 ENCOUNTER — PATIENT MESSAGE (OUTPATIENT)
Dept: TRANSPLANT | Facility: CLINIC | Age: 39
End: 2024-07-29
Payer: COMMERCIAL

## 2024-07-29 NOTE — TELEPHONE ENCOUNTER
Patient notified and instructed via [facility]:    Your labs have been reviewed by ; results were stable. No medicine changes made. Repeat labs due 8/12/24. Thanks.  Hope you are doing well!!!

## 2024-07-29 NOTE — TELEPHONE ENCOUNTER
----- Message from Aron Ellington MD sent at 7/28/2024 11:37 AM CDT -----  Liver tests are stable. No changes in his immunosuppression. Please continue to monitor labs per transplant protocol.

## 2024-08-08 ENCOUNTER — APPOINTMENT (RX ONLY)
Dept: URBAN - METROPOLITAN AREA CLINIC 24 | Facility: CLINIC | Age: 39
Setting detail: DERMATOLOGY
End: 2024-08-08

## 2024-08-08 DIAGNOSIS — L21.8 OTHER SEBORRHEIC DERMATITIS: ICD-10-CM | Status: STABLE

## 2024-08-08 DIAGNOSIS — D18.0 HEMANGIOMA: ICD-10-CM | Status: STABLE

## 2024-08-08 DIAGNOSIS — Z71.89 OTHER SPECIFIED COUNSELING: ICD-10-CM

## 2024-08-08 DIAGNOSIS — L82.1 OTHER SEBORRHEIC KERATOSIS: ICD-10-CM | Status: STABLE

## 2024-08-08 DIAGNOSIS — L57.8 OTHER SKIN CHANGES DUE TO CHRONIC EXPOSURE TO NONIONIZING RADIATION: ICD-10-CM | Status: STABLE

## 2024-08-08 DIAGNOSIS — L81.4 OTHER MELANIN HYPERPIGMENTATION: ICD-10-CM | Status: STABLE

## 2024-08-08 DIAGNOSIS — D22 MELANOCYTIC NEVI: ICD-10-CM | Status: STABLE

## 2024-08-08 PROBLEM — D22.5 MELANOCYTIC NEVI OF TRUNK: Status: ACTIVE | Noted: 2024-08-08

## 2024-08-08 PROBLEM — D18.01 HEMANGIOMA OF SKIN AND SUBCUTANEOUS TISSUE: Status: ACTIVE | Noted: 2024-08-08

## 2024-08-08 PROCEDURE — ? OTC TREATMENT REGIMEN

## 2024-08-08 PROCEDURE — 99203 OFFICE O/P NEW LOW 30 MIN: CPT

## 2024-08-08 PROCEDURE — ? SUNSCREEN RECOMMENDATIONS

## 2024-08-08 PROCEDURE — ? COUNSELING

## 2024-08-08 ASSESSMENT — LOCATION ZONE DERM
LOCATION ZONE: FACE
LOCATION ZONE: TRUNK

## 2024-08-08 ASSESSMENT — LOCATION DETAILED DESCRIPTION DERM
LOCATION DETAILED: LEFT CENTRAL MALAR CHEEK
LOCATION DETAILED: RIGHT MEDIAL MALAR CHEEK
LOCATION DETAILED: STERNUM
LOCATION DETAILED: RIGHT SUPERIOR MEDIAL UPPER BACK
LOCATION DETAILED: SUPERIOR THORACIC SPINE
LOCATION DETAILED: RIGHT CENTRAL MALAR CHEEK
LOCATION DETAILED: STERNAL NOTCH
LOCATION DETAILED: LEFT MEDIAL MALAR CHEEK
LOCATION DETAILED: INFERIOR THORACIC SPINE

## 2024-08-08 ASSESSMENT — LOCATION SIMPLE DESCRIPTION DERM
LOCATION SIMPLE: RIGHT CHEEK
LOCATION SIMPLE: CHEST
LOCATION SIMPLE: LEFT CHEEK
LOCATION SIMPLE: RIGHT UPPER BACK
LOCATION SIMPLE: UPPER BACK

## 2024-08-08 NOTE — PROCEDURE: OTC TREATMENT REGIMEN
Detail Level: Zone
Patient Specific Otc Recommendations (Will Not Stick From Patient To Patient): Recommend OTC CERAVE moisturizer

## 2024-08-13 LAB
EXT ALBUMIN: 4.2
EXT ALKALINE PHOSPHATASE: 58
EXT ALT: 15
EXT AST: 21
EXT BILIRUBIN TOTAL: 0.6
EXT BUN: 13
EXT CALCIUM: 9.5
EXT CHLORIDE: 104
EXT CO2: 24
EXT CREATININE: 0.96 MG/DL
EXT EOSINOPHIL%: 1.6
EXT GFR MDRD NON AF AMER: >90
EXT GLUCOSE: 106
EXT HEMATOCRIT: 40.2
EXT HEMOGLOBIN: 13.6
EXT LYMPH%: 47.8
EXT MONOCYTES%: 6.5
EXT PLATELETS: 141
EXT POTASSIUM: 4.3
EXT PROTEIN TOTAL: 6.8
EXT SEGS%: 43.3
EXT SODIUM: 139 MMOL/L
EXT TACROLIMUS LVL: 9.9
EXT WBC: 4.9

## 2024-08-16 ENCOUNTER — OFFICE VISIT (OUTPATIENT)
Dept: TRANSPLANT | Facility: CLINIC | Age: 39
End: 2024-08-16
Payer: COMMERCIAL

## 2024-08-16 ENCOUNTER — PATIENT MESSAGE (OUTPATIENT)
Dept: TRANSPLANT | Facility: CLINIC | Age: 39
End: 2024-08-16
Payer: COMMERCIAL

## 2024-08-16 DIAGNOSIS — T86.49 STENOSIS OF HEPATIC ARTERY OF TRANSPLANTED LIVER: ICD-10-CM

## 2024-08-16 DIAGNOSIS — Z79.60 LONG-TERM USE OF IMMUNOSUPPRESSANT MEDICATION: ICD-10-CM

## 2024-08-16 DIAGNOSIS — I77.1 STENOSIS OF HEPATIC ARTERY OF TRANSPLANTED LIVER: ICD-10-CM

## 2024-08-16 DIAGNOSIS — Z94.4 LIVER TRANSPLANTED: Primary | ICD-10-CM

## 2024-08-16 PROCEDURE — 1160F RVW MEDS BY RX/DR IN RCRD: CPT | Mod: CPTII,95,, | Performed by: STUDENT IN AN ORGANIZED HEALTH CARE EDUCATION/TRAINING PROGRAM

## 2024-08-16 PROCEDURE — 1159F MED LIST DOCD IN RCRD: CPT | Mod: CPTII,95,, | Performed by: STUDENT IN AN ORGANIZED HEALTH CARE EDUCATION/TRAINING PROGRAM

## 2024-08-16 PROCEDURE — 99213 OFFICE O/P EST LOW 20 MIN: CPT | Mod: 95,,, | Performed by: STUDENT IN AN ORGANIZED HEALTH CARE EDUCATION/TRAINING PROGRAM

## 2024-08-16 PROCEDURE — 3044F HG A1C LEVEL LT 7.0%: CPT | Mod: CPTII,95,, | Performed by: STUDENT IN AN ORGANIZED HEALTH CARE EDUCATION/TRAINING PROGRAM

## 2024-08-16 NOTE — LETTER
August 18, 2024        Tito Canales  [address]  [address]  [facility] [address]  Phone: [phone]  Fax: [phone]             [facility]  [address]  [facility] [address]  Phone: [phone]   Patient: Sukhdeep Grimes   MR Number: [id number]   YOB: 1985   Date of Visit: 8/16/2024       Dear Dr. Tito Canales    Thank you for referring Sukhdeep Grimes to me for evaluation. Attached you will find relevant portions of my assessment and plan of care.    If you have questions, please do not hesitate to call me. I look forward to following Sukhdeep Grimes along with you.    Sincerely,    Aron Ellington MD    Enclosure    If you would like to receive this communication electronically, please contact [email or web address] or [phone] to request iFulfillment Link access.    iFulfillment Link is a tool which provides read-only access to select patient information with whom you have a relationship. Its easy to use and provides real time access to review your patients record including encounter summaries, notes, results, and demographic information.    If you feel you have received this communication in error or would no longer like to receive these types of communications, please e-mail [email or web address]

## 2024-08-16 NOTE — PROGRESS NOTES
"Transplant Hepatology  Liver Transplant Recipient Follow Up    PCP: No, Primary Doctor    Transplant History  Transplant Date: 9/7/2023  UNOS Native Liver Dx: Alcohol-Associated Cirrhosis Without Acute Alcohol-Associated Hepatitis    ORGAN: LIVER  Whole or Partial: whole liver  Donor Type: donation after brain death  [facility] High Risk: no  Donor CMV Status: Positive  Donor HCV Status: Negative  Donor HBcAb: Negative  Donor HBV GOGO:   Donor HCV GOGO: Negative  Biliary Anastomosis: end to end  Arterial Anatomy: standard  IVC reconstruction: end to end ivc  Portal vein status: patent    He has had the following complications since transplant: hepatic artery stenosis    Chief complaint: follow up, history of OLT    HPI:  Sukhdeep Grimes is a 39 y.o. 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     He is without complaints today. No recent hospitalizations or ED visits. He reports compliance with Prograf and CellCept. He denies recent fever, chills, nausea, vomiting, diarrhea, headache, tremors.    Past Medical History:   Diagnosis Date    Alcoholic cirrhosis of liver with ascites     Alcoholic cirrhosis of liver with ascites 8/1/2023    Last Assessment & Plan:  Formatting of this note might be different from the original. Looking better today compared to last week in regards to his energy. I also reviewed his visit with Dr. Stanford from last week. Sent in [facility], appears Dr. Stanford meant to do this. He would like to change to [facility] GI team, so will place that referral today. Send in referral to [facility]. 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  No red Rigoberto sign present.  Banding was not performed.  Moderate portal hypertensive gastropathy in the entire examined stomach.  Examined duodenum was normal." EGD done in [facility])    LIVER TRANSPLANT N/A 9/3/2023    Procedure: TRANSPLANT, LIVER;  Surgeon: Phil Ariza MD;  Location: [facility] OR [address] [place] [address];  Service: Transplant;  Laterality: N/A;    LIVER TRANSPLANT N/A 9/7/2023    Procedure: TRANSPLANT, LIVER;  Surgeon: Leonardo Manuel MD;  Location: [facility] OR [address] [place] [address];  Service: Transplant;  Laterality: N/A;    PARACENTESIS  08/24/2023       No family history on file.    Social History     Tobacco Use    Smoking status: Never    Smokeless tobacco: Never   Substance Use Topics    Alcohol use: Not Currently    Drug use: Not Currently       Current Outpatient Medications   Medication Sig Dispense Refill    albuterol (VENTOLIN HFA) 90 mcg/actuation inhaler Inhale 2 puffs into the lungs every 6 (six) hours as needed for Wheezing. Rescue      aspirin 81 MG Chew Take 81 mg by mouth once daily.      clopidogreL (PLAVIX) 75 mg tablet Take 1 tablet (75 mg total) by mouth once daily. 30 tablet 6    famotidine (PEPCID) 20 MG tablet Take 1 tablet (20 mg total) by mouth nightly. 30 tablet 0    hydroCHLOROthiazide (MICROZIDE) 12.5 mg capsule Take 12.5 mg by mouth.      LORazepam (ATIVAN) 0.5 MG tablet Take 2 tablets (1 mg total) by mouth 2 (two) times daily as needed for Anxiety. (Patient taking differently: Take 1 mg by mouth 2 (two) times daily as needed for Anxiety. Needs refill) 60 tablet 0    metoprolol succinate (TOPROL-XL) 25 MG 24 hr tablet Take 50 mg by mouth.      mycophenolate (CELLCEPT) 250 mg Cap Take 2 capsules (500 mg total) by mouth 2 (two) times daily. 120 capsule 11    oxyCODONE (ROXICODONE) 5 MG immediate release tablet Take 1 tablet (5 mg total) by mouth every 4 (four) hours as needed for Pain. 40 tablet 0    polyethylene glycol (GLYCOLAX) 17 gram PwPk " Take 17 g by mouth once daily.  0    tacrolimus (PROGRAF) 1 MG Cap Take 3 capsules (3 mg total) by mouth every morning AND 2 capsules (2 mg total) every evening. 150 capsule 11    traZODone (DESYREL) 50 MG tablet Take 1 tablet (50 mg total) by mouth every evening. 30 tablet 11     No current facility-administered medications for this visit.       Review of patient's allergies indicates:   Allergen Reactions    Vancomycin analogues      Per patient, rior h/o significant kidney injury 2/2 vancomycin and some redness   Other reaction(s): Anaphylactic shock-Allergy       Review of Systems   Constitutional:  Negative for fever and weight loss.   Cardiovascular:  Negative for leg swelling.   Gastrointestinal:  Negative for abdominal pain, blood in stool, constipation, diarrhea, heartburn, melena, nausea and vomiting.   Neurological:  Negative for tremors and headaches.       There were no vitals filed for this visit.      Physical Exam  Constitutional:       General: He is not in acute distress.     Appearance: He is not ill-appearing.   HENT:      Head: Normocephalic and atraumatic.   Eyes:      General: No scleral icterus.     Extraocular Movements: Extraocular movements intact.   Pulmonary:      Effort: No respiratory distress.   Skin:     Coloration: Skin is not jaundiced.   Neurological:      Mental Status: He is alert.         LABS:  Lab Results   Component Value Date    WBC 3.12 (L) 03/21/2024    HGB 12.2 (L) 03/21/2024    HCT 35.8 (L) 03/21/2024    MCV 83 03/21/2024     (L) 03/21/2024       Lab Results   Component Value Date     03/21/2024    K 3.7 03/21/2024     03/21/2024    CO2 25 03/21/2024    BUN 13 03/21/2024    CREATININE 0.9 03/21/2024    CALCIUM 10.1 03/21/2024    ANIONGAP 11 03/21/2024       Lab Results   Component Value Date    ALT 18 03/21/2024    AST 24 03/21/2024    ALKPHOS 57 03/21/2024    BILITOT 1.0 03/21/2024       Lab Results   Component Value Date    TACROLIMUS 5.0 03/21/2024          Assessment:  39 y.o. 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    1. Liver transplanted    2. Long-term use of immunosuppressant medication    3. 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 Continue aspirin and Plavix.    Kidney function   - Creatinine 0.96  - Avoid NSAIDs as able and maintain adequate hydration    Health Maintenance/Screening:  - Recommend age appropriate cancer screening  - Skin cancer: Recommend use of sunscreen SPF 30 or higher, hat and sunglasses while outside, dermatologist visit annually or sooner if any concerning lesions.  - Osteoporosis: Recommend bone density testing every 2-3 years if previously normal or annually if previously abnormal.    Return to clinic in 6 months.    UNOS Patient Status  Functional Status: 100% - Normal, no complaints, no evidence of disease  Physical Capacity: No Limitations    I spent a total of 20 minutes on the day of the visit. This includes face to face time and non-face to face time preparing to see the patient (eg, review of tests), obtaining and/or reviewing separately obtained history, documenting clinical information in the electronic or other health record, independently interpreting results, and communicating results to the patient/family/caregiver, or care coordination.    This note includes dictation done using [employer] speech recognition program. Word recognition mistakes are occasionally missed on review.    Aron Ellington MD  Staff Physician  Hepatology and Liver Transplant  [facility]  [facility]

## 2024-08-20 ENCOUNTER — PATIENT MESSAGE (OUTPATIENT)
Dept: TRANSPLANT | Facility: CLINIC | Age: 39
End: 2024-08-20
Payer: COMMERCIAL

## 2024-08-20 DIAGNOSIS — Z94.4 LIVER TRANSPLANTED: ICD-10-CM

## 2024-08-20 RX ORDER — TACROLIMUS 1 MG/1
CAPSULE ORAL
Qty: 120 CAPSULE | Refills: 11 | Status: SHIPPED | OUTPATIENT
Start: 2024-08-20 | End: 2025-08-20

## 2024-08-20 NOTE — TELEPHONE ENCOUNTER
----- Message from Aron Ellington MD sent at 8/20/2024  9:07 AM CDT -----  Liver tests are stable. Please decrease tac to 2/2 and repeat labs in 2 weeks.

## 2024-08-20 NOTE — TELEPHONE ENCOUNTER
Patient notified and instructed via [facility]:    Per : Liver tests are stable. Please decrease tacrolimus dose to 2mg twice a day, and repeat labs in 2 weeks(due 9/3/24), thanks..

## 2024-09-04 ENCOUNTER — PATIENT MESSAGE (OUTPATIENT)
Dept: TRANSPLANT | Facility: CLINIC | Age: 39
End: 2024-09-04
Payer: COMMERCIAL

## 2024-09-04 ENCOUNTER — TELEPHONE (OUTPATIENT)
Dept: TRANSPLANT | Facility: CLINIC | Age: 39
End: 2024-09-04
Payer: COMMERCIAL

## 2024-09-04 LAB
EXT ALBUMIN: 4.3
EXT ALKALINE PHOSPHATASE: 54
EXT ALT: 12
EXT AST: 21
EXT BILIRUBIN TOTAL: 0.9
EXT BUN: 14
EXT CALCIUM: 9.7
EXT CHLORIDE: 103
EXT CO2: 22
EXT CREATININE: 0.9 MG/DL
EXT EOSINOPHIL%: 2.2
EXT GFR MDRD NON AF AMER: >90
EXT GLUCOSE: 100
EXT HEMATOCRIT: 39.7
EXT HEMOGLOBIN: 13.5
EXT LYMPH%: 42.6
EXT MONOCYTES%: 8.3
EXT PLATELETS: 150
EXT POTASSIUM: 4.4
EXT PROTEIN TOTAL: 6.9
EXT SEGS%: 46.3
EXT SODIUM: 137 MMOL/L
EXT TACROLIMUS LVL: 6.8
EXT VIT D 25 HYDROXY: 72
EXT WBC: 4.6

## 2024-09-04 NOTE — TELEPHONE ENCOUNTER
----- Message from Aron Ellington MD sent at 9/4/2024  1:04 PM CDT -----  Liver tests are stable. No changes in his immunosuppression. Please continue to monitor labs per transplant protocol.

## 2024-09-04 NOTE — TELEPHONE ENCOUNTER
Patient notified and instructed via [facility]:    Your labs have been reviewed by , results were stable. No changes made. Repeat labs due 10/7/24( monthly now). Thanks.

## 2024-09-17 ENCOUNTER — TELEPHONE (OUTPATIENT)
Dept: TRANSPLANT | Facility: CLINIC | Age: 39
End: 2024-09-17
Payer: COMMERCIAL

## 2024-09-17 NOTE — TELEPHONE ENCOUNTER
Returned call to patient's mother; also spoke to patient.    Patient mother reported she has COVID infection.  She thinks Sukhdeep has it too.  Patient c/o runny nose and headache but reported overall he feels well.  Advised patient to go to Urgent Care or PCP to be tested. Instructed patient and his mother that if he tests positive for COVID, he will not be able to be prescribed Paxlovid , as it interferes with the Prograf level.   And if he does have COVID , will need to hold Cellcept x 2 weeks(as per recs. From transplant Pharm Ds). Will need to isolate and when absolutely necessary to go out ie for labs or MD appts.  Will need to wear a mask.  If he does not have COVID , will need to keep away from his mother who has covid.  May be prescribed Molnupirivir if deemed necessary by his local doctors. Patient and mother were able to repeat instructions and mother reported writing it down.

## 2024-09-17 NOTE — TELEPHONE ENCOUNTER
----- Message from Leila Bernard sent at 9/17/2024  9:07 AM CDT -----  Regarding: Medical Question  Contact: Maribel ( Mother)  CONSULT/ADVISORY    Name of Caller:  Maribel ( Mother)    Contact Preference:  [phone]    Nature of Call:  Pts mother is requesting a call back from Diya CHOI  States she has Covid and believes pt has it.  She's about to take him to Urgent Care to be tested.  Requesting info to see what type of antibiotics pt can take if he's tested positive?  States pt is allergic Vancomycin.

## 2024-09-23 ENCOUNTER — PATIENT MESSAGE (OUTPATIENT)
Dept: TRANSPLANT | Facility: CLINIC | Age: 39
End: 2024-09-23
Payer: COMMERCIAL

## 2024-09-23 ENCOUNTER — TELEPHONE (OUTPATIENT)
Dept: TRANSPLANT | Facility: CLINIC | Age: 39
End: 2024-09-23
Payer: COMMERCIAL

## 2024-09-23 NOTE — TELEPHONE ENCOUNTER
"----- Message from Funmilayo Reis MA sent at 9/23/2024  9:14 AM CDT -----    ----- Message -----  From: Vik Silva  Sent: 9/23/2024   9:11 AM CDT  To: [facility] Post-Liver Transplant Non-Clinical    Consult/Advisory:    Name Of Caller: Self    Contact Preference?: [phone]    Provider Name: Denise    What is the nature of the call?: Requesting clarification if he needed any labs done today instead of 10/7    Additional Notes:  "Thank you for all that you do for our patients"  "

## 2024-09-25 LAB
EXT ALBUMIN: 4.4
EXT ALKALINE PHOSPHATASE: 57
EXT ALT: 25
EXT AST: 21
EXT BILIRUBIN TOTAL: 0.7
EXT BUN: 14
EXT CALCIUM: 9.5
EXT CHLORIDE: 105
EXT CO2: 22
EXT CREATININE: 0.9 MG/DL
EXT GFR MDRD NON AF AMER: >90
EXT GLUCOSE: 100
EXT HBV DNA QUANT PCR: NOT DETECTED
EXT HEMATOCRIT: 40.6
EXT HEMOGLOBIN: 13.7
EXT PLATELETS: 128
EXT POTASSIUM: 4.5
EXT PROTEIN TOTAL: 7
EXT SODIUM: 138 MMOL/L
EXT TACROLIMUS LVL: 7.7
EXT WBC: 4.5

## 2024-10-03 ENCOUNTER — TELEPHONE (OUTPATIENT)
Dept: TRANSPLANT | Facility: CLINIC | Age: 39
End: 2024-10-03
Payer: COMMERCIAL

## 2024-10-03 ENCOUNTER — PATIENT MESSAGE (OUTPATIENT)
Dept: TRANSPLANT | Facility: CLINIC | Age: 39
End: 2024-10-03
Payer: COMMERCIAL

## 2024-10-03 NOTE — TELEPHONE ENCOUNTER
----- Message from Aron Ellington MD sent at 10/2/2024  1:00 PM CDT -----  Liver tests are stable. No changes in his immunosuppression. Please continue to monitor labs per transplant protocol.

## 2024-10-03 NOTE — TELEPHONE ENCOUNTER
Patient notified and instructed via [facility]:    Your labs have been reviewed by ; results stable. No medicine changes made. Repeat labs on Monday as planned. Thanks.

## 2024-10-08 ENCOUNTER — TELEPHONE (OUTPATIENT)
Dept: TRANSPLANT | Facility: CLINIC | Age: 39
End: 2024-10-08
Payer: COMMERCIAL

## 2024-10-08 ENCOUNTER — PATIENT MESSAGE (OUTPATIENT)
Dept: TRANSPLANT | Facility: CLINIC | Age: 39
End: 2024-10-08
Payer: COMMERCIAL

## 2024-10-08 LAB
EXT ALBUMIN: 4.4
EXT ALKALINE PHOSPHATASE: 52
EXT ALT: 12
EXT AST: 21
EXT BILIRUBIN TOTAL: 0.7
EXT BUN: 14
EXT CALCIUM: 9.4
EXT CHLORIDE: 103
EXT CO2: 23
EXT CREATININE: 0.9 MG/DL
EXT EOSINOPHIL%: 2.4
EXT GFR MDRD NON AF AMER: >90
EXT GLUCOSE: 101
EXT HEMATOCRIT: 39.8
EXT HEMOGLOBIN: 13.5
EXT LYMPH%: 40.5
EXT MONOCYTES%: 7.1
EXT PLATELETS: 151
EXT POTASSIUM: 4.5
EXT PROTEIN TOTAL: 6.9
EXT SEGS%: 49.2
EXT SODIUM: 136 MMOL/L
EXT TACROLIMUS LVL: 6.5
EXT WBC: 5.1

## 2024-10-08 NOTE — TELEPHONE ENCOUNTER
Patient notified and instructed via [facility]:    Your labs have been reviewed by ; results were stable. No medicine changes were made. He said your labs can now be every 6 weeks- so next labs due on 11/18/24. Thanks. :)

## 2024-10-08 NOTE — TELEPHONE ENCOUNTER
----- Message from Aron Ellington MD sent at 10/8/2024  1:34 PM CDT -----  6 weeks should be fine  ----- Message -----  From: Diya Walker  Sent: 10/8/2024   1:29 PM CDT  To: Aron Ellington MD    He is due by our protocol schedule to have labs every 6 weeks,  is that ok? Or do you want sooner: he is starting back on the Cellcept today after holding 2 weeks for COVID.  ----- Message -----  From: Aron Ellington MD  Sent: 10/8/2024  12:47 PM CDT  To: [facility] Post-Liver Transplant Clinical    Liver tests are stable. No changes in his immunosuppression. Please continue to monitor labs per transplant protocol.

## 2024-10-08 NOTE — TELEPHONE ENCOUNTER
----- Message from Vitaly Trejo sent at 10/8/2024  8:59 AM CDT -----  Regarding: FW: Patient advice  Contact: Maribel  [phone]    ----- Message -----  From: Haley Mercado  Sent: 10/8/2024   8:53 AM CDT  To: [facility] Post-Liver Transplant Non-Clinical  Subject: Patient advice                                               Name of Caller: Maribel      Contact Preference:[phone]    Nature of Call: Requesting a call back have a few questions in regard to the pt possibly moving out of states and getting recommendation of provider in the new area

## 2024-10-08 NOTE — TELEPHONE ENCOUNTER
Returned call to patient's mother , also spoke to patient.  Patient and his mother are planning to move to [facility] , wanted our office to know.  Will let our office know once he moves to go over plans for a local lab and to find local GI/Hepatology doctor locally to assist with care.

## 2024-10-17 ENCOUNTER — APPOINTMENT (RX ONLY)
Dept: URBAN - METROPOLITAN AREA CLINIC 24 | Facility: CLINIC | Age: 39
Setting detail: DERMATOLOGY
End: 2024-10-17

## 2024-10-17 DIAGNOSIS — L20.89 OTHER ATOPIC DERMATITIS: ICD-10-CM | Status: INADEQUATELY CONTROLLED

## 2024-10-17 PROCEDURE — 99214 OFFICE O/P EST MOD 30 MIN: CPT

## 2024-10-17 PROCEDURE — ? COUNSELING

## 2024-10-17 PROCEDURE — ? MEDICATION COUNSELING

## 2024-10-17 PROCEDURE — ? PRESCRIPTION MEDICATION MANAGEMENT

## 2024-10-17 PROCEDURE — ? PRESCRIPTION

## 2024-10-17 RX ORDER — CLOBETASOL PROPIONATE 0.5 MG/G
OINTMENT TOPICAL
Qty: 60 | Refills: 2 | Status: ERX | COMMUNITY
Start: 2024-10-17

## 2024-10-17 RX ADMIN — CLOBETASOL PROPIONATE: 0.5 OINTMENT TOPICAL at 00:00

## 2024-10-17 ASSESSMENT — LOCATION SIMPLE DESCRIPTION DERM: LOCATION SIMPLE: LEFT HAND

## 2024-10-17 ASSESSMENT — LOCATION DETAILED DESCRIPTION DERM: LOCATION DETAILED: 2ND WEB SPACE LEFT HAND

## 2024-10-17 ASSESSMENT — LOCATION ZONE DERM: LOCATION ZONE: HAND

## 2024-10-17 NOTE — HPI: SKIN LESION
What Type Of Note Output Would You Prefer (Optional)?: Standard Output
How Severe Is Your Skin Lesion?: mild
Is This A New Presentation, Or A Follow-Up?: Skin Lesion
Additional History: Patient states he used peroxide and neosporin on the area.

## 2024-10-17 NOTE — PROCEDURE: MEDICATION COUNSELING
Tranexamic Acid Counseling:  Patient advised of the small risk of bleeding problems with tranexamic acid. They were also instructed to call if they developed any nausea, vomiting or diarrhea. All of the patient's questions and concerns were addressed.
Cellcept Pregnancy And Lactation Text: This medication is Pregnancy Category D and isn't considered safe during pregnancy. It is unknown if this medication is excreted in breast milk.
Imiquimod Counseling:  I discussed with the patient the risks of imiquimod including but not limited to erythema, scaling, itching, weeping, crusting, and pain.  Patient understands that the inflammatory response to imiquimod is variable from person to person and was educated regarded proper titration schedule.  If flu-like symptoms develop, patient knows to discontinue the medication and contact us.
Siliq Counseling:  I discussed with the patient the risks of Siliq including but not limited to new or worsening depression, suicidal thoughts and behavior, immunosuppression, malignancy, posterior leukoencephalopathy syndrome, and serious infections.  The patient understands that monitoring is required including a PPD at baseline and must alert us or the primary physician if symptoms of infection or other concerning signs are noted. There is also a special program designed to monitor depression which is required with Siliq.
Gabapentin Counseling: I discussed with the patient the risks of gabapentin including but not limited to dizziness, somnolence, fatigue and ataxia.
Niacinamide Pregnancy And Lactation Text: These medications are considered safe during pregnancy.
Sarecycline Counseling: Patient advised regarding possible photosensitivity and discoloration of the teeth, skin, lips, tongue and gums.  Patient instructed to avoid sunlight, if possible.  When exposed to sunlight, patients should wear protective clothing, sunglasses, and sunscreen.  The patient was instructed to call the office immediately if the following severe adverse effects occur:  hearing changes, easy bruising/bleeding, severe headache, or vision changes.  The patient verbalized understanding of the proper use and possible adverse effects of sarecycline.  All of the patient's questions and concerns were addressed.
5-Fu Pregnancy And Lactation Text: This medication is Pregnancy Category X and contraindicated in pregnancy and in women who may become pregnant. It is unknown if this medication is excreted in breast milk.
Libtayo Counseling- I discussed with the patient the risks of Libtayo including but not limited to nausea, vomiting, diarrhea, and bone or muscle pain.  The patient verbalized understanding of the proper use and possible adverse effects of Libtayo.  All of the patient's questions and concerns were addressed.
Winlevi Pregnancy And Lactation Text: This medication is considered safe during pregnancy and breastfeeding.
Azelaic Acid Pregnancy And Lactation Text: This medication is considered safe during pregnancy and breast feeding.
Rituxan Counseling:  I discussed with the patient the risks of Rituxan infusions. Side effects can include infusion reactions, severe drug rashes including mucocutaneous reactions, reactivation of latent hepatitis and other infections and rarely progressive multifocal leukoencephalopathy.  All of the patient's questions and concerns were addressed.
Itraconazole Pregnancy And Lactation Text: This medication is Pregnancy Category C and it isn't know if it is safe during pregnancy. It is also excreted in breast milk.
Erythromycin Pregnancy And Lactation Text: This medication is Pregnancy Category B and is considered safe during pregnancy. It is also excreted in breast milk.
Topical Ketoconazole Counseling: Patient counseled that this medication may cause skin irritation or allergic reactions.  In the event of skin irritation, the patient was advised to reduce the amount of the drug applied or use it less frequently.   The patient verbalized understanding of the proper use and possible adverse effects of ketoconazole.  All of the patient's questions and concerns were addressed.
Solaraze Pregnancy And Lactation Text: This medication is Pregnancy Category B and is considered safe. There is some data to suggest avoiding during the third trimester. It is unknown if this medication is excreted in breast milk.
Oxybutynin Counseling:  I discussed with the patient the risks of oxybutynin including but not limited to skin rash, drowsiness, dry mouth, difficulty urinating, and blurred vision.
Sotyktu Pregnancy And Lactation Text: There is insufficient data to evaluate whether or not Sotyktu is safe to use during pregnancy.   It is not known if Sotyktu passes into breast milk and whether or not it is safe to use when breastfeeding.  
Finasteride Pregnancy And Lactation Text: This medication is absolutely contraindicated during pregnancy. It is unknown if it is excreted in breast milk.
Picato Counseling:  I discussed with the patient the risks of Picato including but not limited to erythema, scaling, itching, weeping, crusting, and pain.
Enbrel Pregnancy And Lactation Text: This medication is Pregnancy Category B and is considered safe during pregnancy. It is unknown if this medication is excreted in breast milk.
Cibinqo Counseling: I discussed with the patient the risks of Cibinqo therapy including but not limited to common cold, nausea, headache, cold sores, increased blood CPK levels, dizziness, UTIs, fatigue, acne, and vomitting. Live vaccines should be avoided.  This medication has been linked to serious infections; higher rate of mortality; malignancy and lymphoproliferative disorders; major adverse cardiovascular events; thrombosis; thrombocytopenia and lymphopenia; lipid elevations; and retinal detachment.
Imiquimod Pregnancy And Lactation Text: This medication is Pregnancy Category C. It is unknown if this medication is excreted in breast milk.
Taltz Counseling: I discussed with the patient the risks of ixekizumab including but not limited to immunosuppression, serious infections, worsening of inflammatory bowel disease and drug reactions.  The patient understands that monitoring is required including a PPD at baseline and must alert us or the primary physician if symptoms of infection or other concerning signs are noted.
Tranexamic Acid Pregnancy And Lactation Text: It is unknown if this medication is safe during pregnancy or breast feeding.
Hydroxyzine Counseling: Patient advised that the medication is sedating and not to drive a car after taking this medication.  Patient informed of potential adverse effects including but not limited to dry mouth, urinary retention, and blurry vision.  The patient verbalized understanding of the proper use and possible adverse effects of hydroxyzine.  All of the patient's questions and concerns were addressed.
Bimzelx Counseling:  I discussed with the patient the risks of Bimzelx including but not limited to depression, immunosuppression, allergic reactions and infections.  The patient understands that monitoring is required including a PPD at baseline and must alert us or the primary physician if symptoms of infection or other concerning signs are noted.
Arava Counseling:  Patient counseled regarding adverse effects of Arava including but not limited to nausea, vomiting, abnormalities in liver function tests. Patients may develop mouth sores, rash, diarrhea, and abnormalities in blood counts. The patient understands that monitoring is required including LFTs and blood counts.  There is a rare possibility of scarring of the liver and lung problems that can occur when taking methotrexate. Persistent nausea, loss of appetite, pale stools, dark urine, cough, and shortness of breath should be reported immediately. Patient advised to discontinue Arava treatment and consult with a physician prior to attempting conception. The patient will have to undergo a treatment to eliminate Arava from the body prior to conception.
Drysol Counseling:  I discussed with the patient the risks of drysol/aluminum chloride including but not limited to skin rash, itching, irritation, burning.
Siliq Pregnancy And Lactation Text: The risk during pregnancy and breastfeeding is uncertain with this medication.
Gabapentin Pregnancy And Lactation Text: This medication is Pregnancy Category C and isn't considered safe during pregnancy. It is excreted in breast milk.
Sarecycline Pregnancy And Lactation Text: This medication is Pregnancy Category D and not consider safe during pregnancy. It is also excreted in breast milk.
Nsaids Counseling: NSAID Counseling: I discussed with the patient that NSAIDs should be taken with food. Prolonged use of NSAIDs can result in the development of stomach ulcers.  Patient advised to stop taking NSAIDs if abdominal pain occurs.  The patient verbalized understanding of the proper use and possible adverse effects of NSAIDs.  All of the patient's questions and concerns were addressed.
Detail Level: Simple
VTAMA Counseling: I discussed with the patient that VTAMA is not for use in the eyes, mouth or mouth. They should call the office if they develop any signs of allergic reactions to VTAMA. The patient verbalized understanding of the proper use and possible adverse effects of VTAMA.  All of the patient's questions and concerns were addressed.
Libtayo Pregnancy And Lactation Text: This medication is contraindicated in pregnancy and when breast feeding.
Winlevi Counseling:  I discussed with the patient the risks of topical clascoterone including but not limited to erythema, scaling, itching, and stinging. Patient voiced their understanding.
Metronidazole Counseling:  I discussed with the patient the risks of metronidazole including but not limited to seizures, nausea/vomiting, a metallic taste in the mouth, nausea/vomiting and severe allergy.
Benzoyl Peroxide Counseling: Patient counseled that medicine may cause skin irritation and bleach clothing.  In the event of skin irritation, the patient was advised to reduce the amount of the drug applied or use it less frequently.   The patient verbalized understanding of the proper use and possible adverse effects of benzoyl peroxide.  All of the patient's questions and concerns were addressed.
Ketoconazole Counseling:   Patient counseled regarding improving absorption with orange juice.  Adverse effects include but are not limited to breast enlargement, headache, diarrhea, nausea, upset stomach, liver function test abnormalities, taste disturbance, and stomach pain.  There is a rare possibility of liver failure that can occur when taking ketoconazole. The patient understands that monitoring of LFTs may be required, especially at baseline. The patient verbalized understanding of the proper use and possible adverse effects of ketoconazole.  All of the patient's questions and concerns were addressed.
Rituxan Pregnancy And Lactation Text: This medication is Pregnancy Category C and it isn't know if it is safe during pregnancy. It is unknown if this medication is excreted in breast milk but similar antibodies are known to be excreted.
Cyclophosphamide Counseling:  I discussed with the patient the risks of cyclophosphamide including but not limited to hair loss, hormonal abnormalities, decreased fertility, abdominal pain, diarrhea, nausea and vomiting, bone marrow suppression and infection. The patient understands that monitoring is required while taking this medication.
Humira Counseling:  I discussed with the patient the risks of adalimumab including but not limited to myelosuppression, immunosuppression, autoimmune hepatitis, demyelinating diseases, lymphoma, and serious infections.  The patient understands that monitoring is required including a PPD at baseline and must alert us or the primary physician if symptoms of infection or other concerning signs are noted.
Xeljanz Counseling: I discussed with the patient the risks of Xeljanz therapy including increased risk of infection, liver issues, headache, diarrhea, or cold symptoms. Live vaccines should be avoided. They were instructed to call if they have any problems.
Topical Ketoconazole Pregnancy And Lactation Text: This medication is Pregnancy Category B and is considered safe during pregnancy. It is unknown if it is excreted in breast milk.
Bimzelx Pregnancy And Lactation Text: This medication crosses the placenta and the safety is uncertain during pregnancy. It is unknown if this medication is present in breast milk.
Birth Control Pills Counseling: Birth Control Pill Counseling: I discussed with the patient the potential side effects of OCPs including but not limited to increased risk of stroke, heart attack, thrombophlebitis, deep venous thrombosis, hepatic adenomas, breast changes, GI upset, headaches, and depression.  The patient verbalized understanding of the proper use and possible adverse effects of OCPs. All of the patient's questions and concerns were addressed.
Cibinqo Pregnancy And Lactation Text: It is unknown if this medication will adversely affect pregnancy or breast feeding.  You should not take this medication if you are currently pregnant or planning a pregnancy or while breastfeeding.
Soolantra Counseling: I discussed with the patients the risks of topial Soolantra. This is a medicine which decreases the number of mites and inflammation in the skin. You experience burning, stinging, eye irritation or allergic reactions.  Please call our office if you develop any problems from using this medication.
Valtrex Counseling: I discussed with the patient the risks of valacyclovir including but not limited to kidney damage, nausea, vomiting and severe allergy.  The patient understands that if the infection seems to be worsening or is not improving, they are to call.
Klisyri Counseling:  I discussed with the patient the risks of Klisyri including but not limited to erythema, scaling, itching, weeping, crusting, and pain.
Glycopyrrolate Counseling:  I discussed with the patient the risks of glycopyrrolate including but not limited to skin rash, drowsiness, dry mouth, difficulty urinating, and blurred vision.
Hydroxyzine Pregnancy And Lactation Text: This medication is not safe during pregnancy and should not be taken. It is also excreted in breast milk and breast feeding isn't recommended.
Nsaids Pregnancy And Lactation Text: These medications are considered safe up to 30 weeks gestation. It is excreted in breast milk.
Vtama Pregnancy And Lactation Text: It is unknown if this medication can cause problems during pregnancy and breastfeeding.
Benzoyl Peroxide Pregnancy And Lactation Text: This medication is Pregnancy Category C. It is unknown if benzoyl peroxide is excreted in breast milk.
Arava Pregnancy And Lactation Text: This medication is Pregnancy Category X and is absolutely contraindicated during pregnancy. It is unknown if it is excreted in breast milk.
Tetracycline Counseling: Patient counseled regarding possible photosensitivity and increased risk for sunburn.  Patient instructed to avoid sunlight, if possible.  When exposed to sunlight, patients should wear protective clothing, sunglasses, and sunscreen.  The patient was instructed to call the office immediately if the following severe adverse effects occur:  hearing changes, easy bruising/bleeding, severe headache, or vision changes.  The patient verbalized understanding of the proper use and possible adverse effects of tetracycline.  All of the patient's questions and concerns were addressed. Patient understands to avoid pregnancy while on therapy due to potential birth defects.
Simponi Counseling:  I discussed with the patient the risks of golimumab including but not limited to myelosuppression, immunosuppression, autoimmune hepatitis, demyelinating diseases, lymphoma, and serious infections.  The patient understands that monitoring is required including a PPD at baseline and must alert us or the primary physician if symptoms of infection or other concerning signs are noted.
Odomzo Counseling- I discussed with the patient the risks of Odomzo including but not limited to nausea, vomiting, diarrhea, constipation, weight loss, changes in the sense of taste, decreased appetite, muscle spasms, and hair loss.  The patient verbalized understanding of the proper use and possible adverse effects of Odomzo.  All of the patient's questions and concerns were addressed.
Metronidazole Pregnancy And Lactation Text: This medication is Pregnancy Category B and considered safe during pregnancy.  It is also excreted in breast milk.
Topical Metronidazole Counseling: Metronidazole is a topical antibiotic medication. You may experience burning, stinging, redness, or allergic reactions.  Please call our office if you develop any problems from using this medication.
Include Pregnancy/Lactation Warning?: No
Isotretinoin Pregnancy And Lactation Text: This medication is Pregnancy Category X and is considered extremely dangerous during pregnancy. It is unknown if it is excreted in breast milk.
Cyclophosphamide Pregnancy And Lactation Text: This medication is Pregnancy Category D and it isn't considered safe during pregnancy. This medication is excreted in breast milk.
Ketoconazole Pregnancy And Lactation Text: This medication is Pregnancy Category C and it isn't know if it is safe during pregnancy. It is also excreted in breast milk and breast feeding isn't recommended.
Birth Control Pills Pregnancy And Lactation Text: This medication should be avoided if pregnant and for the first 30 days post-partum.
Cimzia Counseling:  I discussed with the patient the risks of Cimzia including but not limited to immunosuppression, allergic reactions and infections.  The patient understands that monitoring is required including a PPD at baseline and must alert us or the primary physician if symptoms of infection or other concerning signs are noted.
Cephalexin Counseling: I counseled the patient regarding use of cephalexin as an antibiotic for prophylactic and/or therapeutic purposes. Cephalexin (commonly prescribed under brand name Keflex) is a cephalosporin antibiotic which is active against numerous classes of bacteria, including most skin bacteria. Side effects may include nausea, diarrhea, gastrointestinal upset, rash, hives, yeast infections, and in rare cases, hepatitis, kidney disease, seizures, fever, confusion, neurologic symptoms, and others. Patients with severe allergies to penicillin medications are cautioned that there is about a 10% incidence of cross-reactivity with cephalosporins. When possible, patients with penicillin allergies should use alternatives to cephalosporins for antibiotic therapy.
Xeldrewz Pregnancy And Lactation Text: This medication is Pregnancy Category D and is not considered safe during pregnancy.  The risk during breast feeding is also uncertain.
Propranolol Counseling:  I discussed with the patient the risks of propranolol including but not limited to low heart rate, low blood pressure, low blood sugar, restlessness and increased cold sensitivity. They should call the office if they experience any of these side effects.
Soolantra Pregnancy And Lactation Text: This medication is Pregnancy Category C. This medication is considered safe during breast feeding.
Litfulo Counseling: I discussed with the patient the risks of Litfulo therapy including but not limited to upper respiratory tract infections, shingles, cold sores, and nausea. Live vaccines should be avoided.  This medication has been linked to serious infections; higher rate of mortality; malignancy and lymphoproliferative disorders; major adverse cardiovascular events; thrombosis; gastrointestinal perforations; neutropenia; lymphopenia; anemia; liver enzyme elevations; and lipid elevations.
Protopic Counseling: Patient may experience a mild burning sensation during topical application. Protopic is not approved in children less than 2 years of age. There have been case reports of hematologic and skin malignancies in patients using topical calcineurin inhibitors although causality is questionable.
Klisyri Pregnancy And Lactation Text: It is unknown if this medication can harm a developing fetus or if it is excreted in breast milk.
Glycopyrrolate Pregnancy And Lactation Text: This medication is Pregnancy Category B and is considered safe during pregnancy. It is unknown if it is excreted breast milk.
Tremfya Counseling: I discussed with the patient the risks of guselkumab including but not limited to immunosuppression, serious infections, and drug reactions.  The patient understands that monitoring is required including a PPD at baseline and must alert us or the primary physician if symptoms of infection or other concerning signs are noted.
Valtrex Pregnancy And Lactation Text: this medication is Pregnancy Category B and is considered safe during pregnancy. This medication is not directly found in breast milk but it's metabolite acyclovir is present.
Zoryve Counseling:  I discussed with the patient that Zoryve is not for use in the eyes, mouth or vagina. The most commonly reported side effects include diarrhea, headache, insomnia, application site pain, upper respiratory tract infections, and urinary tract infections.  All of the patient's questions and concerns were addressed.
Elidel Counseling: Patient may experience a mild burning sensation during topical application. Elidel is not approved in children less than 2 years of age. There have been case reports of hematologic and skin malignancies in patients using topical calcineurin inhibitors although causality is questionable.
Clofazimine Counseling:  I discussed with the patient the risks of clofazimine including but not limited to skin and eye pigmentation, liver damage, nausea/vomiting, gastrointestinal bleeding and allergy.
Olanzapine Counseling- I discussed with the patient the common side effects of olanzapine including but are not limited to: lack of energy, dry mouth, increased appetite, sleepiness, tremor, constipation, dizziness, changes in behavior, or restlessness.  Explained that teenagers are more likely to experience headaches, abdominal pain, pain in the arms or legs, tiredness, and sleepiness.  Serious side effects include but are not limited: increased risk of death in elderly patients who are confused, have memory loss, or dementia-related psychosis; hyperglycemia; increased cholesterol and triglycerides; and weight gain.
High Dose Vitamin A Counseling: Side effects reviewed, pt to contact office should one occur.
Minocycline Counseling: Patient advised regarding possible photosensitivity and discoloration of the teeth, skin, lips, tongue and gums.  Patient instructed to avoid sunlight, if possible.  When exposed to sunlight, patients should wear protective clothing, sunglasses, and sunscreen.  The patient was instructed to call the office immediately if the following severe adverse effects occur:  hearing changes, easy bruising/bleeding, severe headache, or vision changes.  The patient verbalized understanding of the proper use and possible adverse effects of minocycline.  All of the patient's questions and concerns were addressed.
Hyrimoz Counseling:  I discussed with the patient the risks of adalimumab including but not limited to myelosuppression, immunosuppression, autoimmune hepatitis, demyelinating diseases, lymphoma, and serious infections.  The patient understands that monitoring is required including a PPD at baseline and must alert us or the primary physician if symptoms of infection or other concerning signs are noted.
Cyclosporine Counseling:  I discussed with the patient the risks of cyclosporine including but not limited to hypertension, gingival hyperplasia,myelosuppression, immunosuppression, liver damage, kidney damage, neurotoxicity, lymphoma, and serious infections. The patient understands that monitoring is required including baseline blood pressure, CBC, CMP, lipid panel and uric acid, and then 1-2 times monthly CMP and blood pressure.
Carac Counseling:  I discussed with the patient the risks of Carac including but not limited to erythema, scaling, itching, weeping, crusting, and pain.
Albendazole Counseling:  I discussed with the patient the risks of albendazole including but not limited to cytopenia, kidney damage, nausea/vomiting and severe allergy.  The patient understands that this medication is being used in an off-label manner.
Spironolactone Counseling: Patient advised regarding risks of diarrhea, abdominal pain, hyperkalemia, birth defects (for female patients), liver toxicity and renal toxicity. The patient may need blood work to monitor liver and kidney function and potassium levels while on therapy. The patient verbalized understanding of the proper use and possible adverse effects of spironolactone.  All of the patient's questions and concerns were addressed.
Topical Metronidazole Pregnancy And Lactation Text: This medication is Pregnancy Category B and considered safe during pregnancy.  It is also considered safe to use while breastfeeding.
Topical Retinoid counseling:  Patient advised to apply a pea-sized amount only at bedtime and wait 30 minutes after washing their face before applying.  If too drying, patient may add a non-comedogenic moisturizer. The patient verbalized understanding of the proper use and possible adverse effects of retinoids.  All of the patient's questions and concerns were addressed.
Cephalexin Pregnancy And Lactation Text: This medication is Pregnancy Category B and considered safe during pregnancy.  It is also excreted in breast milk but can be used safely for shorter doses.
Terbinafine Counseling: Patient counseling regarding adverse effects of terbinafine including but not limited to headache, diarrhea, rash, upset stomach, liver function test abnormalities, itching, taste/smell disturbance, nausea, abdominal pain, and flatulence.  There is a rare possibility of liver failure that can occur when taking terbinafine.  The patient understands that a baseline LFT and kidney function test may be required. The patient verbalized understanding of the proper use and possible adverse effects of terbinafine.  All of the patient's questions and concerns were addressed.
Protopic Pregnancy And Lactation Text: This medication is Pregnancy Category C. It is unknown if this medication is excreted in breast milk when applied topically.
Cimzia Pregnancy And Lactation Text: This medication crosses the placenta but can be considered safe in certain situations. Cimzia may be excreted in breast milk.
Litfulo Pregnancy And Lactation Text: Based on animal studies, Lifulo may cause embryo-fetal harm when administered to pregnant women.  The medication should not be used in pregnancy.  Breastfeeding is not recommended during treatment.
Propranolol Pregnancy And Lactation Text: This medication is Pregnancy Category C and it isn't known if it is safe during pregnancy. It is excreted in breast milk.
Minoxidil Counseling: Minoxidil is a topical medication which can increase blood flow where it is applied. It is uncertain how this medication increases hair growth. Side effects are uncommon and include stinging and allergic reactions.
Hydroxychloroquine Counseling:  I discussed with the patient that a baseline ophthalmologic exam is needed at the start of therapy and every year thereafter while on therapy. A CBC may also be warranted for monitoring.  The side effects of this medication were discussed with the patient, including but not limited to agranulocytosis, aplastic anemia, seizures, rashes, retinopathy, and liver toxicity. Patient instructed to call the office should any adverse effect occur.  The patient verbalized understanding of the proper use and possible adverse effects of Plaquenil.  All the patient's questions and concerns were addressed.
Skyrizi Counseling: I discussed with the patient the risks of risankizumab-rzaa including but not limited to immunosuppression, and serious infections.  The patient understands that monitoring is required including a PPD at baseline and must alert us or the primary physician if symptoms of infection or other concerning signs are noted.
Acitretin Counseling:  I discussed with the patient the risks of acitretin including but not limited to hair loss, dry lips/skin/eyes, liver damage, hyperlipidemia, depression/suicidal ideation, photosensitivity.  Serious rare side effects can include but are not limited to pancreatitis, pseudotumor cerebri, bony changes, clot formation/stroke/heart attack.  Patient understands that alcohol is contraindicated since it can result in liver toxicity and significantly prolong the elimination of the drug by many years.
Olanzapine Pregnancy And Lactation Text: This medication is pregnancy category C.   There are no adequate and well controlled trials with olanzapine in pregnant females.  Olanzapine should be used during pregnancy only if the potential benefit justifies the potential risk to the fetus.   In a study in lactating healthy women, olanzapine was excreted in breast milk.  It is recommended that women taking olanzapine should not breast feed.
Albendazole Pregnancy And Lactation Text: This medication is Pregnancy Category C and it isn't known if it is safe during pregnancy. It is also excreted in breast milk.
High Dose Vitamin A Pregnancy And Lactation Text: High dose vitamin A therapy is contraindicated during pregnancy and breast feeding.
Clindamycin Counseling: I counseled the patient regarding use of clindamycin as an antibiotic for prophylactic and/or therapeutic purposes. Clindamycin is active against numerous classes of bacteria, including skin bacteria. Side effects may include nausea, diarrhea, gastrointestinal upset, rash, hives, yeast infections, and in rare cases, colitis.
Cyclosporine Pregnancy And Lactation Text: This medication is Pregnancy Category C and it isn't know if it is safe during pregnancy. This medication is excreted in breast milk.
Topical Steroids Counseling: I discussed with the patient that prolonged use of topical steroids can result in the increased appearance of superficial blood vessels (telangiectasias), lightening (hypopigmentation) and thinning of the skin (atrophy).  Patient understands to avoid using high potency steroids in skin folds, the groin or the face.  The patient verbalized understanding of the proper use and possible adverse effects of topical steroids.  All of the patient's questions and concerns were addressed.
Terbinafine Pregnancy And Lactation Text: This medication is Pregnancy Category B and is considered safe during pregnancy. It is also excreted in breast milk and breast feeding isn't recommended.
Qbrexza Counseling:  I discussed with the patient the risks of Qbrexza including but not limited to headache, mydriasis, blurred vision, dry eyes, nasal dryness, dry mouth, dry throat, dry skin, urinary hesitation, and constipation.  Local skin reactions including erythema, burning, stinging, and itching can also occur.
Dutasteride Male Counseling: Dutasteride Counseling:  I discussed with the patient the risks of use of dutasteride including but not limited to decreased libido, decreased ejaculate volume, and gynecomastia. Women who can become pregnant should not handle medication.  All of the patient's questions and concerns were addressed.
Fluconazole Counseling:  Patient counseled regarding adverse effects of fluconazole including but not limited to headache, diarrhea, nausea, upset stomach, liver function test abnormalities, taste disturbance, and stomach pain.  There is a rare possibility of liver failure that can occur when taking fluconazole.  The patient understands that monitoring of LFTs and kidney function test may be required, especially at baseline. The patient verbalized understanding of the proper use and possible adverse effects of fluconazole.  All of the patient's questions and concerns were addressed.
Spironolactone Pregnancy And Lactation Text: This medication can cause feminization of the male fetus and should be avoided during pregnancy. The active metabolite is also found in breast milk.
Olumiant Counseling: I discussed with the patient the risks of Olumiant therapy including but not limited to upper respiratory tract infections, shingles, cold sores, and nausea. Live vaccines should be avoided.  This medication has been linked to serious infections; higher rate of mortality; malignancy and lymphoproliferative disorders; major adverse cardiovascular events; thrombosis; gastrointestinal perforations; neutropenia; lymphopenia; anemia; liver enzyme elevations; and lipid elevations.
Minoxidil Pregnancy And Lactation Text: This medication has not been assigned a Pregnancy Risk Category but animal studies failed to show danger with the topical medication. It is unknown if the medication is excreted in breast milk.
Cosentyx Counseling:  I discussed with the patient the risks of Cosentyx including but not limited to worsening of Crohn's disease, immunosuppression, allergic reactions and infections.  The patient understands that monitoring is required including a PPD at baseline and must alert us or the primary physician if symptoms of infection or other concerning signs are noted.
Xolair Counseling:  Patient informed of potential adverse effects including but not limited to fever, muscle aches, rash and allergic reactions.  The patient verbalized understanding of the proper use and possible adverse effects of Xolair.  All of the patient's questions and concerns were addressed.
SSKI Counseling:  I discussed with the patient the risks of SSKI including but not limited to thyroid abnormalities, metallic taste, GI upset, fever, headache, acne, arthralgias, paraesthesias, lymphadenopathy, easy bleeding, arrhythmias, and allergic reaction.
Colchicine Counseling:  Patient counseled regarding adverse effects including but not limited to stomach upset (nausea, vomiting, stomach pain, or diarrhea).  Patient instructed to limit alcohol consumption while taking this medication.  Colchicine may reduce blood counts especially with prolonged use.  The patient understands that monitoring of kidney function and blood counts may be required, especially at baseline. The patient verbalized understanding of the proper use and possible adverse effects of colchicine.  All of the patient's questions and concerns were addressed.
Hydroxychloroquine Pregnancy And Lactation Text: This medication has been shown to cause fetal harm but it isn't assigned a Pregnancy Risk Category. There are small amounts excreted in breast milk.
Acitretin Pregnancy And Lactation Text: This medication is Pregnancy Category X and should not be given to women who are pregnant or may become pregnant in the future. This medication is excreted in breast milk.
Eucrisa Counseling: Patient may experience a mild burning sensation during topical application. Eucrisa is not approved in children less than 3 months of age.
Zyclara Counseling:  I discussed with the patient the risks of imiquimod including but not limited to erythema, scaling, itching, weeping, crusting, and pain.  Patient understands that the inflammatory response to imiquimod is variable from person to person and was educated regarded proper titration schedule.  If flu-like symptoms develop, patient knows to discontinue the medication and contact us.
Quinolones Counseling:  I discussed with the patient the risks of fluoroquinolones including but not limited to GI upset, allergic reaction, drug rash, diarrhea, dizziness, photosensitivity, yeast infections, liver function test abnormalities, tendonitis/tendon rupture.
Azithromycin Counseling:  I discussed with the patient the risks of azithromycin including but not limited to GI upset, allergic reaction, drug rash, diarrhea, and yeast infections.
Ivermectin Counseling:  Patient instructed to take medication on an empty stomach with a full glass of water.  Patient informed of potential adverse effects including but not limited to nausea, diarrhea, dizziness, itching, and swelling of the extremities or lymph nodes.  The patient verbalized understanding of the proper use and possible adverse effects of ivermectin.  All of the patient's questions and concerns were addressed.
Calcipotriene Counseling:  I discussed with the patient the risks of calcipotriene including but not limited to erythema, scaling, itching, and irritation.
Oral Minoxidil Counseling- I discussed with the patient the risks of oral minoxidil including but not limited to shortness of breath, swelling of the feet or ankles, dizziness, lightheadedness, unwanted hair growth and allergic reaction.  The patient verbalized understanding of the proper use and possible adverse effects of oral minoxidil.  All of the patient's questions and concerns were addressed.
Methotrexate Counseling:  Patient counseled regarding adverse effects of methotrexate including but not limited to nausea, vomiting, abnormalities in liver function tests. Patients may develop mouth sores, rash, diarrhea, and abnormalities in blood counts. The patient understands that monitoring is required including LFT's and blood counts.  There is a rare possibility of scarring of the liver and lung problems that can occur when taking methotrexate. Persistent nausea, loss of appetite, pale stools, dark urine, cough, and shortness of breath should be reported immediately. Patient advised to discontinue methotrexate treatment at least three months before attempting to become pregnant.  I discussed the need for folate supplements while taking methotrexate.  These supplements can decrease side effects during methotrexate treatment. The patient verbalized understanding of the proper use and possible adverse effects of methotrexate.  All of the patient's questions and concerns were addressed.
Ilumya Counseling: I discussed with the patient the risks of tildrakizumab including but not limited to immunosuppression, malignancy, posterior leukoencephalopathy syndrome, and serious infections.  The patient understands that monitoring is required including a PPD at baseline and must alert us or the primary physician if symptoms of infection or other concerning signs are noted.
Clindamycin Pregnancy And Lactation Text: This medication can be used in pregnancy if certain situations. Clindamycin is also present in breast milk.
Topical Steroids Applications Pregnancy And Lactation Text: Most topical steroids are considered safe to use during pregnancy and lactation.  Any topical steroid applied to the breast or nipple should be washed off before breastfeeding.
Tazorac Counseling:  Patient advised that medication is irritating and drying.  Patient may need to apply sparingly and wash off after an hour before eventually leaving it on overnight.  The patient verbalized understanding of the proper use and possible adverse effects of tazorac.  All of the patient's questions and concerns were addressed.
Xolair Pregnancy And Lactation Text: This medication is Pregnancy Category B and is considered safe during pregnancy. This medication is excreted in breast milk.
Qbrexza Pregnancy And Lactation Text: There is no available data on Qbrexza use in pregnant women.  There is no available data on Qbrexza use in lactation.
Sski Pregnancy And Lactation Text: This medication is Pregnancy Category D and isn't considered safe during pregnancy. It is excreted in breast milk.
Dutasteride Female Counseling: Dutasteride Counseling:  I discussed with the patient the risks of use of dutasteride including but not limited to decreased libido and sexual dysfunction. Explained the teratogenic nature of the medication and stressed the importance of not getting pregnant during treatment. All of the patient's questions and concerns were addressed.
Olumiant Pregnancy And Lactation Text: Based on animal studies, Olumiant may cause embryo-fetal harm when administered to pregnant women.  The medication should not be used in pregnancy.  Breastfeeding is not recommended during treatment.
Mirvaso Counseling: Mirvaso is a topical medication which can decrease superficial blood flow where applied. Side effects are uncommon and include stinging, redness and allergic reactions.
Spevigo Counseling: I discussed with the patient the risks of Spevigo including but not limited to fatigue, nasuea, vomiting, headache, pruritus, urinary tract infection, an infusion related reactions.  The patient understands that monitoring is required including screening for tuberculosis at baseline and yearly screening thereafter while continuing Spevigo therapy. They should contact us if symptoms of infection or other concerning signs are noted.
Cimetidine Counseling:  I discussed with the patient the risks of Cimetidine including but not limited to gynecomastia, headache, diarrhea, nausea, drowsiness, arrhythmias, pancreatitis, skin rashes, psychosis, bone marrow suppression and kidney toxicity.
Calcipotriene Pregnancy And Lactation Text: The use of this medication during pregnancy or lactation is not recommended as there is insufficient data.
Azathioprine Counseling:  I discussed with the patient the risks of azathioprine including but not limited to myelosuppression, immunosuppression, hepatotoxicity, lymphoma, and infections.  The patient understands that monitoring is required including baseline LFTs, Creatinine, possible TPMP genotyping and weekly CBCs for the first month and then every 2 weeks thereafter.  The patient verbalized understanding of the proper use and possible adverse effects of azathioprine.  All of the patient's questions and concerns were addressed.
Oral Minoxidil Pregnancy And Lactation Text: This medication should only be used when clearly needed if you are pregnant, attempting to become pregnant or breast feeding.
Low Dose Naltrexone Counseling- I discussed with the patient the potential risks and side effects of low dose naltrexone including but not limited to: more vivid dreams, headaches, nausea, vomiting, abdominal pain, fatigue, dizziness, and anxiety.
Bexarotene Counseling:  I discussed with the patient the risks of bexarotene including but not limited to hair loss, dry lips/skin/eyes, liver abnormalities, hyperlipidemia, pancreatitis, depression/suicidal ideation, photosensitivity, drug rash/allergic reactions, hypothyroidism, anemia, leukopenia, infection, cataracts, and teratogenicity.  Patient understands that they will need regular blood tests to check lipid profile, liver function tests, white blood cell count, thyroid function tests and pregnancy test if applicable.
Topical Sulfur Applications Counseling: Topical Sulfur Counseling: Patient counseled that this medication may cause skin irritation or allergic reactions.  In the event of skin irritation, the patient was advised to reduce the amount of the drug applied or use it less frequently.   The patient verbalized understanding of the proper use and possible adverse effects of topical sulfur application.  All of the patient's questions and concerns were addressed.
Azithromycin Pregnancy And Lactation Text: This medication is considered safe during pregnancy and is also secreted in breast milk.
Aklief counseling:  Patient advised to apply a pea-sized amount only at bedtime and wait 30 minutes after washing their face before applying.  If too drying, patient may add a non-comedogenic moisturizer.  The most commonly reported side effects including irritation, redness, scaling, dryness, stinging, burning, itching, and increased risk of sunburn.  The patient verbalized understanding of the proper use and possible adverse effects of retinoids.  All of the patient's questions and concerns were addressed.
Doxycycline Counseling:  Patient counseled regarding possible photosensitivity and increased risk for sunburn.  Patient instructed to avoid sunlight, if possible.  When exposed to sunlight, patients should wear protective clothing, sunglasses, and sunscreen.  The patient was instructed to call the office immediately if the following severe adverse effects occur:  hearing changes, easy bruising/bleeding, severe headache, or vision changes.  The patient verbalized understanding of the proper use and possible adverse effects of doxycycline.  All of the patient's questions and concerns were addressed.
Methotrexate Pregnancy And Lactation Text: This medication is Pregnancy Category X and is known to cause fetal harm. This medication is excreted in breast milk.
Griseofulvin Counseling:  I discussed with the patient the risks of griseofulvin including but not limited to photosensitivity, cytopenia, liver damage, nausea/vomiting and severe allergy.  The patient understands that this medication is best absorbed when taken with a fatty meal (e.g., ice cream or french fries).
Dupixent Counseling: I discussed with the patient the risks of dupilumab including but not limited to eye inflammation and irritation, cold sores, injection site reactions, allergic reactions and increased risk of parasitic infection. The patient understands that monitoring is required and they must alert us or the primary physician if symptoms of infection or other concerning signs are noted.
Rinvoq Counseling: I discussed with the patient the risks of Rinvoq therapy including but not limited to upper respiratory tract infections, shingles, cold sores, bronchitis, nausea, cough, fever, acne, and headache. Live vaccines should be avoided.  This medication has been linked to serious infections; higher rate of mortality; malignancy and lymphoproliferative disorders; major adverse cardiovascular events; thrombosis; thrombocytopenia, anemia, and neutropenia; lipid elevations; liver enzyme elevations; and gastrointestinal perforations.
Tazorac Pregnancy And Lactation Text: This medication is not safe during pregnancy. It is unknown if this medication is excreted in breast milk.
Dutasteride Pregnancy And Lactation Text: This medication is absolutely contraindicated in women, especially during pregnancy and breast feeding. Feminization of male fetuses is possible if taking while pregnant.
Rhofade Counseling: Rhofade is a topical medication which can decrease superficial blood flow where applied. Side effects are uncommon and include stinging, redness and allergic reactions.
Mirvaso Pregnancy And Lactation Text: This medication has not been assigned a Pregnancy Risk Category. It is unknown if the medication is excreted in breast milk.
Thalidomide Counseling: I discussed with the patient the risks of thalidomide including but not limited to birth defects, anxiety, weakness, chest pain, dizziness, cough and severe allergy.
Dapsone Counseling: I discussed with the patient the risks of dapsone including but not limited to hemolytic anemia, agranulocytosis, rashes, methemoglobinemia, kidney failure, peripheral neuropathy, headaches, GI upset, and liver toxicity.  Patients who start dapsone require monitoring including baseline LFTs and weekly CBCs for the first month, then every month thereafter.  The patient verbalized understanding of the proper use and possible adverse effects of dapsone.  All of the patient's questions and concerns were addressed.
Hydroquinone Counseling:  Patient advised that medication may result in skin irritation, lightening (hypopigmentation), dryness, and burning.  In the event of skin irritation, the patient was advised to reduce the amount of the drug applied or use it less frequently.  Rarely, spots that are treated with hydroquinone can become darker (pseudoochronosis).  Should this occur, patient instructed to stop medication and call the office. The patient verbalized understanding of the proper use and possible adverse effects of hydroquinone.  All of the patient's questions and concerns were addressed.
Spevigo Pregnancy And Lactation Text: The risk during pregnancy and breastfeeding is uncertain with this medication. This medication does cross the placenta. It is unknown if this medication is found in breast milk.
Low Dose Naltrexone Pregnancy And Lactation Text: Naltrexone is pregnancy category C.  There have been no adequate and well-controlled studies in pregnant women.  It should be used in pregnancy only if the potential benefit justifies the potential risk to the fetus.   Limited data indicates that naltrexone is minimally excreted into breastmilk.
Bexarotene Pregnancy And Lactation Text: This medication is Pregnancy Category X and should not be given to women who are pregnant or may become pregnant. This medication should not be used if you are breast feeding.
Bactrim Counseling:  I discussed with the patient the risks of sulfa antibiotics including but not limited to GI upset, allergic reaction, drug rash, diarrhea, dizziness, photosensitivity, and yeast infections.  Rarely, more serious reactions can occur including but not limited to aplastic anemia, agranulocytosis, methemoglobinemia, blood dyscrasias, liver or kidney failure, lung infiltrates or desquamative/blistering drug rashes.
Rifampin Counseling: I discussed with the patient the risks of rifampin including but not limited to liver damage, kidney damage, red-orange body fluids, nausea/vomiting and severe allergy.
Prednisone Counseling:  I discussed with the patient the risks of prolonged use of prednisone including but not limited to weight gain, insomnia, osteoporosis, mood changes, diabetes, susceptibility to infection, glaucoma and high blood pressure.  In cases where prednisone use is prolonged, patients should be monitored with blood pressure checks, serum glucose levels and an eye exam.  Additionally, the patient may need to be placed on GI prophylaxis, PCP prophylaxis, and calcium and vitamin D supplementation and/or a bisphosphonate.  The patient verbalized understanding of the proper use and the possible adverse effects of prednisone.  All of the patient's questions and concerns were addressed.
Cantharidin Counseling:  I discussed with the patient the risks of Cantharidin including but not limited to pain, redness, burning, itching, and blistering.
Erivedge Counseling- I discussed with the patient the risks of Erivedge including but not limited to nausea, vomiting, diarrhea, constipation, weight loss, changes in the sense of taste, decreased appetite, muscle spasms, and hair loss.  The patient verbalized understanding of the proper use and possible adverse effects of Erivedge.  All of the patient's questions and concerns were addressed.
Doxycycline Pregnancy And Lactation Text: This medication is Pregnancy Category D and not consider safe during pregnancy. It is also excreted in breast milk but is considered safe for shorter treatment courses.
Topical Clindamycin Counseling: Patient counseled that this medication may cause skin irritation or allergic reactions.  In the event of skin irritation, the patient was advised to reduce the amount of the drug applied or use it less frequently.   The patient verbalized understanding of the proper use and possible adverse effects of clindamycin.  All of the patient's questions and concerns were addressed.
Opioid Counseling: I discussed with the patient the potential side effects of opioids including but not limited to addiction, altered mental status, and depression. I stressed avoiding alcohol, benzodiazepines, muscle relaxants and sleep aids unless specifically okayed by a physician. The patient verbalized understanding of the proper use and possible adverse effects of opioids. All of the patient's questions and concerns were addressed. They were instructed to flush the remaining pills down the toilet if they did not need them for pain.
Infliximab Counseling:  I discussed with the patient the risks of infliximab including but not limited to myelosuppression, immunosuppression, autoimmune hepatitis, demyelinating diseases, lymphoma, and serious infections.  The patient understands that monitoring is required including a PPD at baseline and must alert us or the primary physician if symptoms of infection or other concerning signs are noted.
Aklief Pregnancy And Lactation Text: It is unknown if this medication is safe to use during pregnancy.  It is unknown if this medication is excreted in breast milk.  Breastfeeding women should use the topical cream on the smallest area of the skin for the shortest time needed while breastfeeding.  Do not apply to nipple and areola.
Otezla Counseling: The side effects of Otezla were discussed with the patient, including but not limited to worsening or new depression, weight loss, diarrhea, nausea, upper respiratory tract infection, and headache. Patient instructed to call the office should any adverse effect occur.  The patient verbalized understanding of the proper use and possible adverse effects of Otezla.  All the patient's questions and concerns were addressed.
Griseofulvin Pregnancy And Lactation Text: This medication is Pregnancy Category X and is known to cause serious birth defects. It is unknown if this medication is excreted in breast milk but breast feeding should be avoided.
Dupixent Pregnancy And Lactation Text: This medication likely crosses the placenta but the risk for the fetus is uncertain. This medication is excreted in breast milk.
Finasteride Male Counseling: Finasteride Counseling:  I discussed with the patient the risks of use of finasteride including but not limited to decreased libido, decreased ejaculate volume, gynecomastia, and depression. Women should not handle medication.  All of the patient's questions and concerns were addressed.
Topical Sulfur Applications Pregnancy And Lactation Text: This medication is considered safe during pregnancy and breast feeding secondary to limited systemic absorption.
Adbry Counseling: I discussed with the patient the risks of tralokinumab including but not limited to eye infection and irritation, cold sores, injection site reactions, worsening of asthma, allergic reactions and increased risk of parasitic infection.  Live vaccines should be avoided while taking tralokinumab. The patient understands that monitoring is required and they must alert us or the primary physician if symptoms of infection or other concerning signs are noted.
Rinvoq Pregnancy And Lactation Text: Based on animal studies, Rinvoq may cause embryo-fetal harm when administered to pregnant women.  The medication should not be used in pregnancy.  Breastfeeding is not recommended during treatment and for 6 days after the last dose.
Cantharidin Pregnancy And Lactation Text: This medication has not been proven safe during pregnancy. It is unknown if this medication is excreted in breast milk.
Doxepin Counseling:  Patient advised that the medication is sedating and not to drive a car after taking this medication. Patient informed of potential adverse effects including but not limited to dry mouth, urinary retention, and blurry vision.  The patient verbalized understanding of the proper use and possible adverse effects of doxepin.  All of the patient's questions and concerns were addressed.
Opzelura Counseling:  I discussed with the patient the risks of Opzelura including but not limited to nasopharngitis, bronchitis, ear infection, eosinophila, hives, diarrhea, folliculitis, tonsillitis, and rhinorrhea.  Taken orally, this medication has been linked to serious infections; higher rate of mortality; malignancy and lymphoproliferative disorders; major adverse cardiovascular events; thrombosis; thrombocytopenia, anemia, and neutropenia; and lipid elevations.
Dapsone Pregnancy And Lactation Text: This medication is Pregnancy Category C and is not considered safe during pregnancy or breast feeding.
Niacinamide Counseling: I recommended taking niacin or niacinamide, also know as vitamin B3, twice daily. Recent evidence suggests that taking vitamin B3 (500 mg twice daily) can reduce the risk of actinic keratoses and non-melanoma skin cancers. Side effects of vitamin B3 include flushing and headache.
Stelara Counseling:  I discussed with the patient the risks of ustekinumab including but not limited to immunosuppression, malignancy, posterior leukoencephalopathy syndrome, and serious infections.  The patient understands that monitoring is required including a PPD at baseline and must alert us or the primary physician if symptoms of infection or other concerning signs are noted.
Isotretinoin Counseling: Patient should get monthly blood tests, not donate blood, not drive at night if vision affected, not share medication, and not undergo elective surgery for 6 months after tx completed. Side effects reviewed, pt to contact office should one occur.
Bactrim Pregnancy And Lactation Text: This medication is Pregnancy Category D and is known to cause fetal risk.  It is also excreted in breast milk.
Cellcept Counseling:  I discussed with the patient the risks of mycophenolate mofetil including but not limited to infection/immunosuppression, GI upset, hypokalemia, hypercholesterolemia, bone marrow suppression, lymphoproliferative disorders, malignancy, GI ulceration/bleed/perforation, colitis, interstitial lung disease, kidney failure, progressive multifocal leukoencephalopathy, and birth defects.  The patient understands that monitoring is required including a baseline creatinine and regular CBC testing. In addition, patient must alert us immediately if symptoms of infection or other concerning signs are noted.
5-Fu Counseling: 5-Fluorouracil Counseling:  I discussed with the patient the risks of 5-fluorouracil including but not limited to erythema, scaling, itching, weeping, crusting, and pain.
Azelaic Acid Counseling: Patient counseled that medicine may cause skin irritation and to avoid applying near the eyes.  In the event of skin irritation, the patient was advised to reduce the amount of the drug applied or use it less frequently.   The patient verbalized understanding of the proper use and possible adverse effects of azelaic acid.  All of the patient's questions and concerns were addressed.
Rifampin Pregnancy And Lactation Text: This medication is Pregnancy Category C and it isn't know if it is safe during pregnancy. It is also excreted in breast milk and should not be used if you are breast feeding.
Erythromycin Counseling:  I discussed with the patient the risks of erythromycin including but not limited to GI upset, allergic reaction, drug rash, diarrhea, increase in liver enzymes, and yeast infections.
Wartpeel Counseling:  I discussed with the patient the risks of Wartpeel including but not limited to erythema, scaling, itching, weeping, crusting, and pain.
Finasteride Female Counseling: Finasteride Counseling:  I discussed with the patient the risks of use of finasteride including but not limited to decreased libido and sexual dysfunction. Explained the teratogenic nature of the medication and stressed the importance of not getting pregnant during treatment. All of the patient's questions and concerns were addressed.
Solaraze Counseling:  I discussed with the patient the risks of Solaraze including but not limited to erythema, scaling, itching, weeping, crusting, and pain.
Opioid Pregnancy And Lactation Text: These medications can lead to premature delivery and should be avoided during pregnancy. These medications are also present in breast milk in small amounts.
Itraconazole Counseling:  I discussed with the patient the risks of itraconazole including but not limited to liver damage, nausea/vomiting, neuropathy, and severe allergy.  The patient understands that this medication is best absorbed when taken with acidic beverages such as non-diet cola or ginger ale.  The patient understands that monitoring is required including baseline LFTs and repeat LFTs at intervals.  The patient understands that they are to contact us or the primary physician if concerning signs are noted.
Sotyktu Counseling:  I discussed the most common side effects of Sotyktu including: common cold, sore throat, sinus infections, cold sores, canker sores, folliculitis, and acne.  I also discussed more serious side effects of Sotyktu including but not limited to: serious allergic reactions; increased risk for infections such as TB; cancers such as lymphomas; rhabdomyolysis and elevated CPK; and elevated triglycerides and liver enzymes. 
Otezla Pregnancy And Lactation Text: This medication is Pregnancy Category C and it isn't known if it is safe during pregnancy. It is unknown if it is excreted in breast milk.
Opzelura Pregnancy And Lactation Text: There is insufficient data to evaluate drug-associated risk for major birth defects, miscarriage, or other adverse maternal or fetal outcomes.  There is a pregnancy registry that monitors pregnancy outcomes in pregnant persons exposed to the medication during pregnancy.  It is unknown if this medication is excreted in breast milk.  Do not breastfeed during treatment and for about 4 weeks after the last dose.
Adbry Pregnancy And Lactation Text: It is unknown if this medication will adversely affect pregnancy or breast feeding.
Enbrel Counseling:  I discussed with the patient the risks of etanercept including but not limited to myelosuppression, immunosuppression, autoimmune hepatitis, demyelinating diseases, lymphoma, and infections.  The patient understands that monitoring is required including a PPD at baseline and must alert us or the primary physician if symptoms of infection or other concerning signs are noted.
Doxepin Pregnancy And Lactation Text: This medication is Pregnancy Category C and it isn't known if it is safe during pregnancy. It is also excreted in breast milk and breast feeding isn't recommended.

## 2024-10-17 NOTE — PROCEDURE: PRESCRIPTION MEDICATION MANAGEMENT
Render In Strict Bullet Format?: No
Detail Level: Zone
Discontinue Regimen: Neosporin
Initiate Treatment: clobetasol 0.05 % topical ointment \\nApply to AA on hands bid x 2 weeks for flares. Stop for 2 weeks and repeat prn for flares. Do not use longer than 2 weeks at a time. Avoid face and skin folds.\\n\\nVani Cream\\nMoisturize hands 1-2 times daily

## 2024-11-19 LAB
EXT ALBUMIN: 4.5
EXT ALKALINE PHOSPHATASE: 47
EXT ALT: 8
EXT AST: 20
EXT BILIRUBIN TOTAL: 0.6
EXT BUN: 14
EXT CALCIUM: 9.3
EXT CHLORIDE: 103
EXT CO2: 22
EXT CREATININE: 0.9 MG/DL
EXT EOSINOPHIL%: 2.2
EXT GFR MDRD NON AF AMER: >90
EXT GLUCOSE: 111
EXT HEMATOCRIT: 39.3
EXT HEMOGLOBIN: 13.2
EXT LYMPH%: 36.5
EXT MONOCYTES%: 5.9
EXT PLATELETS: 145
EXT POTASSIUM: 4.2
EXT PROTEIN TOTAL: 7
EXT SEGS%: 54.8
EXT SODIUM: 136 MMOL/L
EXT TACROLIMUS LVL: 10
EXT WBC: 5.4

## 2024-11-26 ENCOUNTER — PATIENT MESSAGE (OUTPATIENT)
Dept: TRANSPLANT | Facility: CLINIC | Age: 39
End: 2024-11-26
Payer: COMMERCIAL

## 2024-11-26 ENCOUNTER — TELEPHONE (OUTPATIENT)
Dept: TRANSPLANT | Facility: CLINIC | Age: 39
End: 2024-11-26
Payer: COMMERCIAL

## 2024-11-26 DIAGNOSIS — Z94.4 LIVER TRANSPLANTED: ICD-10-CM

## 2024-11-26 RX ORDER — TACROLIMUS 0.5 MG/1
0.5 CAPSULE ORAL EVERY 12 HOURS
Qty: 60 CAPSULE | Refills: 11 | Status: SHIPPED | OUTPATIENT
Start: 2024-11-26 | End: 2025-11-26

## 2024-11-26 RX ORDER — TACROLIMUS 1 MG/1
CAPSULE ORAL
Qty: 60 CAPSULE | Refills: 11 | Status: SHIPPED | OUTPATIENT
Start: 2024-11-26 | End: 2025-11-26

## 2024-11-26 NOTE — TELEPHONE ENCOUNTER
----- Message from Aron Ellington MD sent at 11/26/2024 12:14 PM CST -----  Liver tests are stable. Tac high. Did he take prior to labs? If not, please decrease tac to 1.5mg BID and repeat labs in 2 weeks and then monthly x2 to monitor for rejection.

## 2024-11-26 NOTE — TELEPHONE ENCOUNTER
Patient notified and instructed via [facility]:    Per :  Liver tests are stable. Tacrolimus level was high. Did he take prior to labs? If not, please decrease tacrolimus dose to 1.5mg twice a day, and repeat labs in 2 weeks(due 12/9/24) and then monthly x2 to monitor for rejection.  Thanks.    The Prograf 0.5mg is a different capsule,  will be sent to your local pharmacy , let me know. Thanks.

## 2024-11-26 NOTE — TELEPHONE ENCOUNTER
Patient reported he did not take his Prograf dose prior to labs.  Will lower Prograf dose as per orders to 1.5mg bid.  Patient instructed.

## 2024-11-27 ENCOUNTER — TELEPHONE (OUTPATIENT)
Dept: TRANSPLANT | Facility: CLINIC | Age: 39
End: 2024-11-27
Payer: COMMERCIAL

## 2024-11-27 NOTE — TELEPHONE ENCOUNTER
Returned patient call, he said pharmacy will have 0.5mg Prograf capsules later today or tomorrow, he said,  will start new dose once received.

## 2024-11-27 NOTE — TELEPHONE ENCOUNTER
----- Message from Celi sent at 11/27/2024  8:36 AM CST -----  Regarding: fe more question/Nurse Keane  Contact: Patient can be contacted @# [phone]  PT called asking for a return call to speak Nurse Keane. PT has some extra questions    Patient can be contacted @# [phone]

## 2024-11-27 NOTE — TELEPHONE ENCOUNTER
----- Message from Celi sent at 11/27/2024  8:36 AM CST -----  Regarding: fe more question/Nurse Keane  Contact: Patient can be contacted @# [phone]  PT called asking for a return call to speak Nurse Keane. PT has some extra questions    Patient can be contacted @# [phone]

## 2024-12-11 LAB
EXT ALBUMIN: 4.8
EXT ALKALINE PHOSPHATASE: 47
EXT ALT: 10
EXT AST: 18
EXT BILIRUBIN TOTAL: 0.9
EXT BUN: 17
EXT CALCIUM: 9.4
EXT CHLORIDE: 103
EXT CO2: 22
EXT CREATININE: 0.96 MG/DL
EXT EOSINOPHIL%: 1.4
EXT GFR MDRD NON AF AMER: >90
EXT GLUCOSE: 95
EXT HEMATOCRIT: 39.8
EXT HEMOGLOBIN: 13.6
EXT LYMPH%: 38.5
EXT MONOCYTES%: 6.6
EXT PLATELETS: 166
EXT POTASSIUM: 4.2
EXT PROTEIN TOTAL: 7.4
EXT SEGS%: 52.9
EXT SODIUM: 137 MMOL/L
EXT TACROLIMUS LVL: 4.9
EXT WBC: 5.2

## 2024-12-17 ENCOUNTER — TELEPHONE (OUTPATIENT)
Dept: TRANSPLANT | Facility: CLINIC | Age: 39
End: 2024-12-17
Payer: COMMERCIAL

## 2024-12-17 ENCOUNTER — PATIENT MESSAGE (OUTPATIENT)
Dept: TRANSPLANT | Facility: CLINIC | Age: 39
End: 2024-12-17
Payer: COMMERCIAL

## 2024-12-17 NOTE — TELEPHONE ENCOUNTER
Patient notified and instructed via [facility];    Your labs have been reviewed by ; results were stable. No medicine changes were made. Repeat labs every 6 weeks now - next due on 1/20/2025 -  Happy New Year, and Tessa Vasquez!!!

## 2024-12-17 NOTE — TELEPHONE ENCOUNTER
----- Message from Aron Ellington MD sent at 12/17/2024 12:32 PM CST -----  Liver tests are stable. No changes in his immunosuppression. Please continue to monitor labs per transplant protocol.

## 2025-01-21 ENCOUNTER — TELEPHONE (OUTPATIENT)
Dept: TRANSPLANT | Facility: CLINIC | Age: 40
End: 2025-01-21
Payer: COMMERCIAL

## 2025-01-21 NOTE — TELEPHONE ENCOUNTER
"Returned call, spoke to PATRICK Hartman.  She reports that Isabelle (PA) has left the office for the day.  She reports she will send a message to notify Isabelle of callback.  Provided numbers ([phone] or [phone]) to contact transplant department and suggested that Isabelle requests to speak to MD on call if ultrasound results need urgent review.  Minnie verbalized understanding.   ----- Message from Allen sent at 1/21/2025  3:18 PM CST -----  Regarding: call back  Consult/Advisory:        Name Of Caller:Isabelle Bobby BIRCH with GI     Contact Preference?:[phone]     What is the nature of the call?: Calling to speak hilda Keane in regards to his US requesting call back         Additional Notes:  "Thank you for all that you do for our patients"  "

## 2025-01-24 LAB
EXT ALBUMIN: 4.2
EXT ALKALINE PHOSPHATASE: 48
EXT ALT: 20
EXT AST: 24
EXT BILIRUBIN TOTAL: 0.6
EXT BUN: 15
EXT CALCIUM: 9.5
EXT CHLORIDE: 104
EXT CO2: 23
EXT CREATININE: 0.9 MG/DL
EXT EOSINOPHIL%: 1.8
EXT GFR MDRD NON AF AMER: >90
EXT GLUCOSE: 105
EXT HEMATOCRIT: 40.4
EXT HEMOGLOBIN: 13.6
EXT LYMPH%: 33.1
EXT MONOCYTES%: 5.8
EXT PLATELETS: 141
EXT POTASSIUM: 4.3
EXT PROTEIN TOTAL: 7.1
EXT SEGS%: 58.7
EXT SODIUM: 137 MMOL/L
EXT TACROLIMUS LVL: 5.9
EXT WBC: 6.2

## 2025-01-30 ENCOUNTER — TELEPHONE (OUTPATIENT)
Dept: TRANSPLANT | Facility: CLINIC | Age: 40
End: 2025-01-30
Payer: COMMERCIAL

## 2025-01-30 ENCOUNTER — PATIENT MESSAGE (OUTPATIENT)
Dept: TRANSPLANT | Facility: CLINIC | Age: 40
End: 2025-01-30
Payer: COMMERCIAL

## 2025-01-30 NOTE — TELEPHONE ENCOUNTER
Patient notified and instructed via [facility]:    Per : labs reviewed , results stable . No medicine changes made. Repeat labs due on 3/5/25. Thanks.

## 2025-01-30 NOTE — TELEPHONE ENCOUNTER
Returned call to QUETA Rosales; she reported that  is asking what frequency patient needs f/u ultrasounds ?  She stated he had recent one this week and finding in IVC possible concern, will review with  for plan.

## 2025-01-30 NOTE — TELEPHONE ENCOUNTER
----- Message from Martin sent at 1/30/2025 11:32 AM CST -----  Regarding: Consult/Advisory  Contact: [phone]  Consult/Advisory     Name Of Caller: QUETA Rosales        Contact Preference: [phone]     Nature of call:  Calling to speak with Diya Walker about the pt.

## 2025-01-30 NOTE — TELEPHONE ENCOUNTER
----- Message from Aron Ellington MD sent at 1/29/2025 12:55 PM CST -----  Liver tests are stable. No changes in his immunosuppression. Please continue to monitor labs per transplant protocol.   Palliative Medicine Consult [place]: [phone] Patient Name: Bee Echols YOB: 1944 Date of Initial Consult: March 19, 2019 Reason for Consult: Pain Management Requesting Provider: Dr. Mars Tipton Primary Care Physician: Shahida Garcia MD 
 
 SUMMARY:  
Bee Echols is a 69 y.o. with a past history listed below, who was admitted on 3/16/2019 from home due to worsening pain s/s after having a bone marrow biopsy a few days prior. Pt has an extensive pain history which includes a intrathecal pump. PAST MEDICAL HISTORY:  Include; 1.  Multiple myeloma. 2.  Osteoporosis. 3.  Anemia. 4.  Hyperlipidemia. 5.  Urinary incontinence. 6.  Pancreatitis. 7.  Sleep apnea. 8.  Chronic pain. 9.  Rheumatoid arthritis. 10.  DVT in her right leg. 11.  Fibromyalgia. 12.  GERD. 13.  Anxiety. 15.  She also has a history of Salmonella infection. 15.  Aneurysm. 
  
Current medical issues leading to Palliative Medicine involvement include: support with pain management. Current Regimen: tylenol 650mg q 4 prn,elavil 50mg hs, valium 10mg bid, neurontin 300mg 4 times/day, dilaudid 8mg po every 4 hour prn, dilaudid 1mg every 3 hours prn, morphine/bupivacaine pump, savella bid, fentanyl 50mcq patch 
3/20/19: fentanyl patch discontinued, dilaudid 10mg tab added for severe pain q 4 prn Interval History: 
3/19/19: JOSE L4-5 PALLIATIVE DIAGNOSES:  
1. Lower back pain 2. Left leg pain 3. Left foot pain 4. Physical debility PLAN:  
1. Met with pt and spouse at the bedside 2. Pt has not verbalized an improvement in her pain but witnessed improved mobility today with physical therapy and the pt did not have any prn doses of dilaudid for over 12 hours after [facility] 3. Pt had one dose of 8mg oral dilaudid today which her chronic dose usually taken q 4 hours at home. I asked her to take the 10mg that I ordered yesterday to see if that helps 4. Pt is followed by Dr. Valeri Green [phone] for pain pump adjustments and fills. Dr. Ame Sommers is the surgeon who placed the pump. Called Dr. Felecia Graham office to discuss the pt. I left my information for a call back 5. Recommendation continue current regimen below 1.  oral dilaudid 10mg q 4 prn for severe pain. Continue 8mg for chronic pain 2. Continue other meds 3. Hopefully injection will provide enough relief so that additional meds will not be needed 4. Would recommend pt following up with Dr. Kerry Moran for adjustment in her pump if pain persists 6. Will follow up tomorrow 7. Initial consult note routed to primary continuity provider and/or primary health care team members 8. Communicated plan of care with: Palliative [facility] Team 
 
 GOALS OF CARE / TREATMENT PREFERENCES:  
 
GOALS OF CARE: 
Patient/Health Care Proxy Stated Goals: Cure TREATMENT PREFERENCES:  
Code Status: Full Code Advance Care Planning: 
[x] The [facility] Interdisciplinary Team has updated the ACP Navigator with Que and Patient Capacity Primary Decision MakerMarahraysa Langston Spouse - [phone] Advance Care Planning 3/19/2019 Patient's Healthcare Decision Maker is: Named in scanned ACP document Primary Decision Maker Name -  
Primary Decision Maker Phone Number -  
Primary Decision Maker Relationship to Patient -  
Confirm Advance Directive Yes, on file Does the patient have other document types - Medical Interventions: Full interventions Other Instructions: Other: As far as possible, the palliative care team has discussed with patient / health care proxy about goals of care / treatment preferences for patient. HISTORY:  
 
History obtained from: pt, chart, team 
 
CHIEF COMPLAINT: pain HPI/SUBJECTIVE: The patient is:  
[x] Verbal and participatory [] Non-participatory due to: Pt unable to provide a detailed pain history. Seen during physical therapy and a little more mobile today. Pain control remains suboptimal though. Pt not taking prn meds as ordered Clinical Pain Assessment (nonverbal scale for severity on nonverbal patients):  
Clinical Pain Assessment Severity: 5 Location: lower back, both feet, left leg Character: throbbing feet Duration: days Effect: unable to walk securely Factors: happened after [facility]MB Frequency: all the time Duration: for how long has pt been experiencing pain (e.g., 2 days, 1 month, years) Frequency: how often pain is an issue (e.g., several times per day, once every few days, constant) FUNCTIONAL ASSESSMENT:  
 
Palliative Performance Scale (PPS): PPS: 60 PSYCHOSOCIAL/SPIRITUAL SCREENING:  
 
Palliative IDT has assessed this patient for cultural preferences / practices and a referral made as appropriate to needs (Cultural Services, Patient Advocacy, Ethics, etc.) Any spiritual / Rastafarian concerns: 
[] Yes /  [x] No 
 
Caregiver Burnout: 
[] Yes /  [x] No /  [] No Caregiver Present Anticipatory grief assessment:  
[x] Normal  / [] Maladaptive ESAS Anxiety: Anxiety: 3 ESAS Depression: Depression: 0 REVIEW OF SYSTEMS:  
 
Positive and pertinent negative findings in ROS are noted above in HPI. The following systems were [x] reviewed / [] unable to be reviewed as noted in HPI Other findings are noted below. Systems: constitutional, ears/nose/mouth/throat, respiratory, gastrointestinal, genitourinary, musculoskeletal, integumentary, neurologic, psychiatric, endocrine. Positive findings noted below. Modified ESAS Completed by: provider Fatigue: 0 Drowsiness: 0 Depression: 0 Pain: 5 Anxiety: 3 Nausea: 0 Anorexia: 0 Dyspnea: 0 PHYSICAL EXAM:  
 
From RN flowsheet: 
Wt Readings from Last 3 Encounters:  
03/16/19 197 lb (89.4 kg) 03/08/19 197 lb (89.4 kg) 02/07/19 187 lb (84.8 kg) Blood pressure 128/66, pulse 100, temperature 97.4 °F (36.3 °C), resp. rate 14, height 5' 3\" (1.6 m), weight 197 lb (89.4 kg), SpO2 92 %, not currently breastfeeding. Pain Scale 1: Numeric (0 - 10) Pain Intensity 1: 7 Pain Onset 1: acute Pain Location 1: Back Pain Orientation 1: Left Pain Description 1: Rollo Laura Pain Intervention(s) 1: Medication (see MAR) Last bowel movement, if known:  
 
Constitutional: alert, verbal, working with PT Eyes: pupils equal, anicteric ENMT: no nasal discharge, moist mucous membranes Cardiovascular: regular rhythm, distal pulses intact Respiratory: breathing not labored, symmetric Gastrointestinal: gross, soft non-tender, +bowel sounds Musculoskeletal: no deformity, no tenderness to palpation Skin: warm, dry Neurologic: following commands, moving all extremities Psychiatric: full affect, no hallucinations Other: 
 
 
 HISTORY:  
 
Principal Problem: 
  Severe pain (3/16/2019) Active Problems: 
  Pain (3/16/2019) Past Medical History:  
Diagnosis Date  Anemia  Aneurysm ([address] 3/2005 HX LEFT OPTIC NERVE  Anxiety  Chronic pain   
 spinal stenosis per pt  Fibromyalgia  GERD (gastroesophageal reflux disease)  High cholesterol  History of acute pancreatitis  History of blood transfusion  History of Salmonella infection 2009  Multiple myeloma ([address] 2007  Osteoporosis  Other complication due to venous access device ([address] 1/29/2013  Recurrent umbilical hernia 1/04/7020  Recurrent ventral incisional hernia 3/28/2016  Rheumatoid arthritis(714.0)  Sleep apnea INTOLERATANT OF CPAP  Thromboembolus ([address] Right leg DVT  Urinary incontinence Past Surgical History:  
Procedure Laterality Date  HX CHOLECYSTECTOMY  2004  HX CRANIOTOMY    
 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    
 COLONOSCOPIES, EGD  HX GI    
 ESOPHAGUS STREACHED  
  HX HEENT  10/2011  
 b/l cataract surgery in 2011  HX HERNIA REPAIR  [id number] X6  
 HX HERNIA REPAIR  10-13-14  
 repair of recurrent ventral incisional hernia  with primary suture [id number]. Rogers Roy  HX KYPHOPLASTY  2017 L4  
 HX ORTHOPAEDIC Right  BONE SPURS SHOULDER  
 HX OTHER SURGICAL   LLQ PAIN PUMP FOR MORPHINE INFUSION  
 HX OTHER SURGICAL  14  
 repair of recurrent umbilical hernia with ventralex pillow top mesh and extensive lysis of [id number]. Rudi Snuffer  HX OTHER SURGICAL  16  
 repair of recurrent ventral incisional hernia with underlay mesh-[facility]-Dr. Rogers Roy  HX VASCULAR ACCESS Right 2013 POWER PORT   
 HX VASCULAR ACCESS   PAIN PUMP  IR INJ FORAMIN EPID LUMB ANES/STER SNGL  3/19/2019  
 NEUROLOGICAL PROCEDURE UNLISTED   KYPHOPLASTY X2  
 NEUROLOGICAL PROCEDURE UNLISTED  10/26/15 Kyphoplasty t-5  PAIN PUMP DEVICE    
 implanted in LLQ, MORPHINE Family History Problem Relation Age of Onset 25 [facility] Arthritis-osteo Mother  Anesth Problems Neg Hx History reviewed, no pertinent family history. Social History Tobacco Use  Smoking status: Former Smoker Years: 10.00 Last attempt to quit: 10/7/2005 Years since quittin.4  Smokeless tobacco: Never Used Substance Use Topics  Alcohol use: No  
 
Allergies Allergen Reactions  Aggrenox [Aspirin-Dipyridamole] Rash Allergic to the dipyridamole and not aspirin.  Broccoli Other (comments) C/O GAS  Bumex [Bumetanide] Rash  Doxil [Doxorubicin, Peg-Liposomal] Rash Burning of skin  Flagyl [Metronidazole] Rash  Keflex [Cephalexin] Rash  Lasix [Furosemide] Rash  Macrobid [Nitrofurantoin Monohyd/M-Cryst] Shortness of Breath  Methotrexate Hives and Rash  Pcn [Penicillins] Rash  Sulfa (Sulfonamide Antibiotics) Hives  Tetanus And Diphtheria Toxoids Swelling  Thalidomide Rash Current Facility-Administered Medications Medication Dose Route Frequency  HYDROmorphone (DILAUDID) tablet 10 mg  10 mg Oral Q4H PRN  
 HYDROmorphone (DILAUDID) tablet 8 mg  8 mg Oral Q4H PRN  
 LLQ Morphine/Bupivacaine Pain Pump (Patient Supplied)  1 Bag Other CONTINUOUS  
 sodium chloride (NS) flush 5-40 mL  5-40 mL IntraVENous Q8H  
 sodium chloride (NS) flush 5-40 mL  5-40 mL IntraVENous PRN  
 acetaminophen (TYLENOL) tablet 650 mg  650 mg Oral Q4H PRN  
 HYDROmorphone (PF) (DILAUDID) injection 1 mg  1 mg IntraVENous Q3H PRN  
 aluminum hydrox-magnesium carb (GAVISCON) oral suspension 15 mL  15 mL Oral Q6H PRN  
 bisacodyl (DULCOLAX) suppository 10 mg  10 mg Rectal DAILY PRN  
 calcium carbonate (TUMS) chewable tablet 200 mg [elemental]  200 mg Oral BID PRN  
 calcium-vitamin D (OS-GLORIA) 500 mg-200 unit tablet  1 Tab Oral DAILY  cetirizine (ZYRTEC) tablet 10 mg  10 mg Oral DAILY PRN  
 cholecalciferol (VITAMIN D3) tablet 400 Units  400 Units Oral DAILY  diazePAM (VALIUM) tablet 10 mg  10 mg Oral BID  docusate sodium (COLACE) capsule 100 mg  100 mg Oral DAILY  docusate sodium (COLACE) capsule 300 mg  300 mg Oral QHS  fluticasone propionate (FLONASE) 50 mcg/actuation nasal spray 2 Spray  2 Spray Both Nostrils DAILY  gabapentin (NEURONTIN) capsule 300 mg  300 mg Oral QID  magnesium hydroxide (MILK OF MAGNESIA) 400 mg/5 mL oral suspension 30 mL  30 mL Oral DAILY PRN  
 milnacipran (SAVELLA) tablet 50 mg (Patient Supplied)  50 mg Oral BID  polyethylene glycol (MIRALAX) packet 17 g  17 g Oral DAILY PRN  potassium chloride SR (KLOR-CON 10) tablet 20 mEq  20 mEq Oral DAILY  predniSONE (DELTASONE) tablet 10 mg  10 mg Oral DAILY  prochlorperazine (COMPAZINE) tablet 5 mg  5 mg Oral Q6H PRN  
 famotidine (PEPCID) tablet 40 mg  40 mg Oral ACD  senna (SENOKOT) tablet 34.4 mg  4 Tab Oral BID  simvastatin (ZOCOR) tablet 20 mg  20 mg Oral QHS  spironolactone (ALDACTONE) tablet 50 mg  50 mg Oral BID PRN  
 levoFLOXacin (LEVAQUIN) tablet 500 mg  500 mg Oral Q24H  pantoprazole (PROTONIX) tablet 40 mg  40 mg Oral ACB  gentamicin (GARAMYCIN) 0.1 % cream   Topical DAILY  amitriptyline (ELAVIL) tablet 50 mg  50 mg Oral QHS  
 
 
 
 LAB AND IMAGING FINDINGS:  
 
Lab Results Component Value Date/Time WBC 7.9 03/18/2019 04:00 AM  
 HGB 8.9 (L) 03/18/2019 04:00 AM  
 PLATELET 478 34/15/7095 04:00 AM  
 
Lab Results Component Value Date/Time Sodium 138 03/19/2019 05:00 AM  
 Potassium 3.9 03/19/2019 05:00 AM  
 Chloride 104 03/19/2019 05:00 AM  
 CO2 29 03/19/2019 05:00 AM  
 BUN 8 03/19/2019 05:00 AM  
 Creatinine 0.75 03/19/2019 05:00 AM  
 Calcium 8.8 03/19/2019 05:00 AM  
 Magnesium 2.0 06/06/2016 06:33 AM  
 Phosphorus 3.4 09/20/2012 06:05 AM  
  
Lab Results Component Value Date/Time AST (SGOT) 31 03/16/2019 09:58 AM  
 Alk. phosphatase 87 03/16/2019 09:58 AM  
 Protein, total 7.3 03/16/2019 09:58 AM  
 Albumin 3.2 (L) 03/16/2019 09:58 AM  
 Globulin 4.1 (H) 03/16/2019 09:58 AM  
 
Lab Results Component Value Date/Time INR 1.1 03/19/2019 05:00 AM  
 Prothrombin time 10.9 03/19/2019 05:00 AM  
 aPTT 78.5 (H) 01/29/2019 06:05 AM  
  
Lab Results Component Value Date/Time Iron 24 (L) 08/25/2011 01:00 PM  
 TIBC 353 08/25/2011 01:00 PM  
 Iron % saturation 7 (L) 08/25/2011 01:00 PM  
 Ferritin 151 09/16/2012 02:24 PM  
  
No results found for: PH, PCO2, PO2 No components found for: [place] Lab Results Component Value Date/Time CK 62 03/01/2016 12:48 PM  
 CK - MB 0.8 03/01/2016 12:48 PM  
  
 
 
   
 
Total time: 35 minutes Counseling / coordination time, spent as noted above: 30 minutes 
> 50% counseling / coordination?: y 
 
Prolonged service was provided for  []30 min   []75 min in face to face time in the presence of the patient, spent as noted above. Time Start:  
Time End: Note: this can only be billed with 75767 (initial) or 44450 (follow up). If multiple start / stop times, list each separately.

## 2025-01-31 ENCOUNTER — TELEPHONE (OUTPATIENT)
Dept: TRANSPLANT | Facility: CLINIC | Age: 40
End: 2025-01-31
Payer: COMMERCIAL

## 2025-01-31 NOTE — PROGRESS NOTES
Accessioned [facility] for U/S report done on 1/28/25:    Impression      1. Filling defect within the posterior portion of the IVC which could be related to a mural thrombus or plaque.  2. Borderline enlargement of the extra hepatic common bile duct. The patient is status post cholecystectomy.  3. No hepatic abnormality is identified with patency along the hepatic artery stent.  4. Other findings as detailed above.    Signed by: 1/29/2025 9:38 AM: Forest Esquivel MD      ULTRASOUND ABDOMEN WITH LIVER WITH DOPPLER    INDICATION: T86.49 Other complications of liver transplant (HCC) I10;I77.1 Stricture of artery (HCC) I10;Z94.4 Liver transplant status (HCC) I10;    COMPARISON: Prior ultrasound from 6/6/2024    TECHNIQUE: Multiplanar sonographic images of the abdomen were performed. In addition color duplex with spectral waveforms was performed on the hepatic vasculature.    IMAGE QUALITY: Satisfactory, of diagnostic value.    FINDINGS GRAYSCALE:    PANCREAS: Not well visualized.  LIVER:  The liver is within normal limits for size measuring 12.5 cm in size. No discrete hepatic mass is seen..  GALLBLADDER: Nonvisualized. The gallbladder has reportedly been removed.  COMMON BILE DUCT: 7 mm. This is borderline enlarged.  RIGHT KIDNEY: 9.5cm in length. No evidence of hydronephrosis.  LEFT KIDNEY: 10.5cm in length.  No evidence of hydronephrosis.  SPLEEN: The spleen is within normal limits for size. The spleen measures 12.1 cm in length. No splenic mass is identified.  OTHER: No ascites or free fluid in the right upper quadrant.      FINDINGS LIVER DOPPLER:    Hepatic veins: Patent with appropriate triphasic waveform.  Inferior vena cava: There is color Doppler flow within the IVC. There is a filling defect along the posterior wall of the IVC. This could represent a mural thrombus or be related to plaque.    Main portal vein: 28.6cm/s, hepatopetal flow direction.  Normal flow velocity values typically range  20-40 cm/s and values below this range may be indicative of portal hypertension.    Common hepatic artery: There is a stent along the main hepatic artery. Patent, resistive index measures  0.7 . Normal IR measurement range: 0.55-0.8 . The prior measurement was 0.65.    Splenic vein: patent, hepatopetal directional flow.  Procedure Note    Forest Esquivel MD - 01/29/2025  Formatting of this note might be different from the original.    ULTRASOUND ABDOMEN WITH LIVER WITH DOPPLER    INDICATION: T86.49 Other complications of liver transplant (HCC) I10;I77.1 Stricture of artery (HCC) I10;Z94.4 Liver transplant status (HCC) I10;    COMPARISON: Prior ultrasound from 6/6/2024    TECHNIQUE: Multiplanar sonographic images of the abdomen were performed. In addition color duplex with spectral waveforms was performed on the hepatic vasculature.    IMAGE QUALITY: Satisfactory, of diagnostic value.    FINDINGS GRAYSCALE:    PANCREAS: Not well visualized.  LIVER:  The liver is within normal limits for size measuring 12.5 cm in size. No discrete hepatic mass is seen..  GALLBLADDER: Nonvisualized. The gallbladder has reportedly been removed.  COMMON BILE DUCT: 7 mm. This is borderline enlarged.  RIGHT KIDNEY: 9.5cm in length. No evidence of hydronephrosis.  LEFT KIDNEY: 10.5cm in length.  No evidence of hydronephrosis.  SPLEEN: The spleen is within normal limits for size. The spleen measures 12.1 cm in length. No splenic mass is identified.  OTHER: No ascites or free fluid in the right upper quadrant.      FINDINGS LIVER DOPPLER:    Hepatic veins: Patent with appropriate triphasic waveform.  Inferior vena cava: There is color Doppler flow within the IVC. There is a filling defect along the posterior wall of the IVC. This could represent a mural thrombus or be related to plaque.    Main portal vein: 28.6cm/s, hepatopetal flow direction.  Normal flow velocity values typically range 20-40 cm/s and values below this range may be  indicative of portal hypertension.    Common hepatic artery: There is a stent along the main hepatic artery. Patent, resistive index measures  0.7 . Normal IR measurement range: 0.55-0.8 . The prior measurement was 0.65.    Splenic vein: patent, hepatopetal directional flow.      IMPRESSION:  1. Filling defect within the posterior portion of the IVC which could be related to a mural thrombus or plaque.  2. Borderline enlargement of the extra hepatic common bile duct. The patient is status post cholecystectomy.  3. No hepatic abnormality is identified with patency along the hepatic artery stent.  4. Other findings as detailed above.    Signed by: 1/29/2025 9:38 AM: Forest Esquivel MD

## 2025-02-03 NOTE — TELEPHONE ENCOUNTER
----- Message from Nighat Sullivan sent at 1/17/2024  9:55 AM CST -----  Regarding: Lab results  Contact: Sukhdeep  Regarding:Lab Results          Name Of Caller: Sukhdeep        Contact Preference: [phone] (home)            Nature of call: Pt is calling to get lab results for labs that were taken on :  01 / 16/ 2024 Please advise. Requesting a call back.                  No

## 2025-02-06 ENCOUNTER — PATIENT MESSAGE (OUTPATIENT)
Dept: TRANSPLANT | Facility: CLINIC | Age: 40
End: 2025-02-06
Payer: COMMERCIAL

## 2025-02-06 ENCOUNTER — TELEPHONE (OUTPATIENT)
Dept: TRANSPLANT | Facility: CLINIC | Age: 40
End: 2025-02-06
Payer: COMMERCIAL

## 2025-02-06 NOTE — TELEPHONE ENCOUNTER
Message sent to  via email with request from  to get triple phase CT to evaluate IVC thrombus noted on recent ultrasond that was done in [place].

## 2025-02-06 NOTE — TELEPHONE ENCOUNTER
----- Message from Aron Ellington MD sent at 2/6/2025 12:03 PM CST -----  Please arrange triple phase CT for further evaluation of IVC thrombus  ----- Message -----  From: Diya Walker  Sent: 2/6/2025  11:44 AM CST  To: Aron Ellington MD    Ultrasound report from 's office , patient asking if it is ok?  Thanks.:    S Liver Transplant w Abdomen Complete    Anatomical Region Laterality Modality  Abdomen - Ultrasound    Impression      1. Filling defect within the posterior portion of the IVC which could be related to a mural thrombus or plaque.  2. Borderline enlargement of the extra hepatic common bile duct. The patient is status post cholecystectomy.  3. No hepatic abnormality is identified with patency along the hepatic artery stent.  4. Other findings as detailed above.    Signed by: 1/29/2025 9:38 AM: Forest Esquivel MD  Narrative      ULTRASOUND ABDOMEN WITH LIVER WITH DOPPLER    INDICATION: T86.49 Other complications of liver transplant (HCC) I10;I77.1 Stricture of artery (HCC) I10;Z94.4 Liver transplant status (HCC) I10;    COMPARISON: Prior ultrasound from 6/6/2024    TECHNIQUE: Multiplanar sonographic images of the abdomen were performed. In addition color duplex with spectral waveforms was performed on the hepatic vasculature.    IMAGE QUALITY: Satisfactory, of diagnostic value.    FINDINGS GRAYSCALE:    PANCREAS: Not well visualized.  LIVER:  The liver is within normal limits for size measuring 12.5 cm in size. No discrete hepatic mass is seen..  GALLBLADDER: Nonvisualized. The gallbladder has reportedly been removed.  COMMON BILE DUCT: 7 mm. This is borderline enlarged.  RIGHT KIDNEY: 9.5cm in length. No evidence of hydronephrosis.  LEFT KIDNEY: 10.5cm in length.  No evidence of hydronephrosis.  SPLEEN: The spleen is within normal limits for size. The spleen measures 12.1 cm in length. No splenic mass is identified.  OTHER: No ascites or free fluid in the right upper quadrant.      FINDINGS LIVER  DOPPLER:    Hepatic veins: Patent with appropriate triphasic waveform.  Inferior vena cava: There is color Doppler flow within the IVC. There is a filling defect along the posterior wall of the IVC. This could represent a mural thrombus or be related to plaque.    Main portal vein: 28.6cm/s, hepatopetal flow direction.  Normal flow velocity values typically range 20-40 cm/s and values below this range may be indicative of portal hypertension.    Common hepatic artery: There is a stent along the main hepatic artery. Patent, resistive index measures  0.7 . Normal IR measurement range: 0.55-0.8 . The prior measurement was 0.65.    Splenic vein: patent, hepatopetal directional flow.  Procedure Note    Forest Esquivel MD - 01/29/2025  Formatting of this note might be different from the original.    ULTRASOUND ABDOMEN WITH LIVER WITH DOPPLER    INDICATION: T86.49 Other complications of liver transplant (HCC) I10;I77.1 Stricture of artery (HCC) I10;Z94.4 Liver transplant status (HCC) I10;    COMPARISON: Prior ultrasound from 6/6/2024    TECHNIQUE: Multiplanar sonographic images of the abdomen were performed. In addition color duplex with spectral waveforms was performed on the hepatic vasculature.    IMAGE QUALITY: Satisfactory, of diagnostic value.    FINDINGS GRAYSCALE:    PANCREAS: Not well visualized.  LIVER:  The liver is within normal limits for size measuring 12.5 cm in size. No discrete hepatic mass is seen..  GALLBLADDER: Nonvisualized. The gallbladder has reportedly been removed.  COMMON BILE DUCT: 7 mm. This is borderline enlarged.  RIGHT KIDNEY: 9.5cm in length. No evidence of hydronephrosis.  LEFT KIDNEY: 10.5cm in length.  No evidence of hydronephrosis.  SPLEEN: The spleen is within normal limits for size. The spleen measures 12.1 cm in length. No splenic mass is identified.  OTHER: No ascites or free fluid in the right upper quadrant.      FINDINGS LIVER DOPPLER:    Hepatic veins: Patent with  appropriate triphasic waveform.  Inferior vena cava: There is color Doppler flow within the IVC. There is a filling defect along the posterior wall of the IVC. This could represent a mural thrombus or be related to plaque.    Main portal vein: 28.6cm/s, hepatopetal flow direction.  Normal flow velocity values typically range 20-40 cm/s and values below this range may be indicative of portal hypertension.    Common hepatic artery: There is a stent along the main hepatic artery. Patent, resistive index measures  0.7 . Normal IR measurement range: 0.55-0.8 . The prior measurement was 0.65.    Splenic vein: patent, hepatopetal directional flow.      IMPRESSION:  1. Filling defect within the posterior portion of the IVC which could be related to a mural thrombus or plaque.  2. Borderline enlargement of the extra hepatic common bile duct. The patient is status post cholecystectomy.  3. No hepatic abnormality is identified with patency along the hepatic artery stent.  4. Other findings as detailed above.    Signed by: 1/29/2025 9:38 AM: Forest Esquivel MD  Exam End: 01/28/25 09:48   Specimen Collected: 01/29/25 08:31 Last Resulted: 01/29/25 08:38  Received From: [facility]

## 2025-02-10 ENCOUNTER — TELEPHONE (OUTPATIENT)
Dept: TRANSPLANT | Facility: CLINIC | Age: 40
End: 2025-02-10
Payer: COMMERCIAL

## 2025-02-10 NOTE — TELEPHONE ENCOUNTER
Email message received from  re: order request for triple phase CT to evaluate IVC stenosis:    Noted.  Will schedule.  Thanks, Diya!

## 2025-02-26 ENCOUNTER — PATIENT MESSAGE (OUTPATIENT)
Dept: TRANSPLANT | Facility: CLINIC | Age: 40
End: 2025-02-26
Payer: COMMERCIAL

## 2025-02-26 ENCOUNTER — RESULTS FOLLOW-UP (OUTPATIENT)
Dept: HEPATOLOGY | Facility: CLINIC | Age: 40
End: 2025-02-26
Payer: COMMERCIAL

## 2025-02-26 LAB
EXT ALBUMIN: 4.4
EXT ALKALINE PHOSPHATASE: 48
EXT ALT: 16
EXT AST: 21
EXT BILIRUBIN TOTAL: 1
EXT BUN: 17
EXT CALCIUM: 9.3
EXT CHLORIDE: 100
EXT CO2: 22
EXT CREATININE: 0.88 MG/DL
EXT EOSINOPHIL%: 2
EXT GFR MDRD NON AF AMER: >90
EXT GLUCOSE: 94
EXT HEMATOCRIT: 41.4
EXT HEMOGLOBIN: 14.3
EXT LYMPH%: 37
EXT MONOCYTES%: 8
EXT PLATELETS: 171
EXT POTASSIUM: 4.2
EXT PROTEIN TOTAL: 7.1
EXT SEGS%: 52.3
EXT SODIUM: 136 MMOL/L
EXT TACROLIMUS LVL: 3.4
EXT WBC: 5.6

## 2025-02-26 NOTE — TELEPHONE ENCOUNTER
Patient notified and instructed via [facility]:    Per : Liver tests are stable. No changes in his immunosuppression. Please continue to monitor labs per transplant protocol - due 4/7/25.     Also: he did review your scans that were sent here,  he said there was no evidence of a thrombus(clot) in your Vena Cava (IVC). Thanks.

## 2025-02-26 NOTE — TELEPHONE ENCOUNTER
----- Message from Aron Ellington MD sent at 2/26/2025  1:13 PM CST -----  Liver tests are stable. No changes in his immunosuppression. Please continue to monitor labs per transplant protocol.  ----- Message -----  From: Diya Walker  Sent: 2/26/2025  12:43 PM CST  To: Aron Ellington MD

## 2025-04-06 RX ORDER — MYCOPHENOLATE MOFETIL 250 MG/1
CAPSULE ORAL
Qty: 120 CAPSULE | Refills: 11 | Status: SHIPPED | OUTPATIENT
Start: 2025-04-06

## 2025-04-07 ENCOUNTER — PATIENT MESSAGE (OUTPATIENT)
Dept: TRANSPLANT | Facility: CLINIC | Age: 40
End: 2025-04-07
Payer: COMMERCIAL

## 2025-04-08 ENCOUNTER — PATIENT MESSAGE (OUTPATIENT)
Dept: TRANSPLANT | Facility: CLINIC | Age: 40
End: 2025-04-08
Payer: COMMERCIAL

## 2025-04-09 ENCOUNTER — PATIENT MESSAGE (OUTPATIENT)
Dept: TRANSPLANT | Facility: CLINIC | Age: 40
End: 2025-04-09
Payer: COMMERCIAL

## 2025-04-10 ENCOUNTER — PATIENT MESSAGE (OUTPATIENT)
Dept: TRANSPLANT | Facility: CLINIC | Age: 40
End: 2025-04-10
Payer: COMMERCIAL

## 2025-04-11 ENCOUNTER — RESULTS FOLLOW-UP (OUTPATIENT)
Dept: HEPATOLOGY | Facility: CLINIC | Age: 40
End: 2025-04-11
Payer: COMMERCIAL

## 2025-04-11 ENCOUNTER — PATIENT MESSAGE (OUTPATIENT)
Dept: TRANSPLANT | Facility: CLINIC | Age: 40
End: 2025-04-11
Payer: COMMERCIAL

## 2025-04-11 ENCOUNTER — TELEPHONE (OUTPATIENT)
Dept: TRANSPLANT | Facility: CLINIC | Age: 40
End: 2025-04-11
Payer: COMMERCIAL

## 2025-04-11 DIAGNOSIS — Z94.4 LIVER TRANSPLANTED: ICD-10-CM

## 2025-04-11 LAB
EXT ALBUMIN: 5.2
EXT ALKALINE PHOSPHATASE: 42
EXT ALT: 18
EXT AST: 20
EXT BILIRUBIN TOTAL: 1.2
EXT BUN: 18
EXT CALCIUM: 10.1
EXT CHLORIDE: 104
EXT CO2: 27
EXT CREATININE: 1 MG/DL
EXT EOSINOPHIL%: 1.3
EXT GFR MDRD NON AF AMER: 97
EXT GLUCOSE: 87
EXT HEMATOCRIT: 42.3
EXT HEMOGLOBIN: 13.9
EXT LYMPH%: 32.7
EXT MONOCYTES%: 7.8
EXT PLATELETS: 177
EXT POTASSIUM: 4.6
EXT PROTEIN TOTAL: 7.4
EXT SEGS%: 57.9
EXT SODIUM: 137 MMOL/L
EXT TACROLIMUS LVL: 2.2
EXT WBC: 6

## 2025-04-11 RX ORDER — TACROLIMUS 1 MG/1
CAPSULE ORAL
Qty: 90 CAPSULE | Refills: 11 | Status: SHIPPED | OUTPATIENT
Start: 2025-04-11 | End: 2026-04-11

## 2025-04-11 NOTE — TELEPHONE ENCOUNTER
----- Message from Aron Ellington MD sent at 4/11/2025 12:40 PM CDT -----  Liver tests are stable. Tac low. Please increase tac to 2/1 and repeat labs in 2 weeks.  ----- Message -----  From: Diya Walker  Sent: 4/11/2025   9:15 AM CDT  To: Aron Ellington MD

## 2025-04-11 NOTE — TELEPHONE ENCOUNTER
Update received from patient via portal:    Get Keane, morning thanks for sending rest of labs. I saw my prograf level was only 2.2, is that ok? Seems to low And can you please check with Dr. Ellington to see if I need a medicine change to up my dose? Also until the 8th when i got my 0.5 tacro i was taking 2 one mg prograf in the morning and 1 at night. I take my medicine in 1 1/ 2 hours. Thanks Sukhdeep

## 2025-04-11 NOTE — TELEPHONE ENCOUNTER
Patient notified and instructed via [facility]:    Per : Liver tests are stable. Tacrolimus level is low. Please increase tacrolimus dose to 2mg in the mornings and 1mg in the evenings,  and repeat labs in 2 weeks( due 4/21/25)  I will email you the lab order, thanks..

## 2025-04-12 NOTE — TELEPHONE ENCOUNTER
Patient notified and instructed via [facility]: to increase Prograf dose to 2mg twice a day, repeat labs as planned(4/21/25).

## 2025-04-13 RX ORDER — TACROLIMUS 1 MG/1
CAPSULE ORAL
Qty: 120 CAPSULE | Refills: 11 | Status: SHIPPED | OUTPATIENT
Start: 2025-04-13 | End: 2026-04-13

## 2025-04-24 LAB
EXT ALBUMIN: 4.5
EXT ALKALINE PHOSPHATASE: 35
EXT ALT: 18
EXT AST: 20
EXT BILIRUBIN TOTAL: 0.8
EXT BUN: 13
EXT CALCIUM: 9.3
EXT CHLORIDE: 107
EXT CO2: 28
EXT CREATININE: 0.95 MG/DL
EXT EOSINOPHIL%: 2.1
EXT GFR MDRD NON AF AMER: 104
EXT GLUCOSE: 98
EXT HEMATOCRIT: 39.2
EXT HEMOGLOBIN: 13
EXT LYMPH%: 35.4
EXT MONOCYTES%: 8.5
EXT PLATELETS: 166
EXT POTASSIUM: 4.3
EXT PROTEIN TOTAL: 6.4
EXT SEGS%: 53.6
EXT SODIUM: 139 MMOL/L
EXT TACROLIMUS LVL: 4.3
EXT WBC: 5.3

## 2025-04-28 ENCOUNTER — PATIENT MESSAGE (OUTPATIENT)
Dept: TRANSPLANT | Facility: CLINIC | Age: 40
End: 2025-04-28
Payer: COMMERCIAL

## 2025-04-28 ENCOUNTER — RESULTS FOLLOW-UP (OUTPATIENT)
Dept: HEPATOLOGY | Facility: CLINIC | Age: 40
End: 2025-04-28
Payer: COMMERCIAL

## 2025-04-28 NOTE — TELEPHONE ENCOUNTER
Patient notified and instructed via [facility]:  Your labs have been reviewed by ; results stable. No medicine changes were made. Repeat labs due 6/2/25. Thanks.

## 2025-04-28 NOTE — TELEPHONE ENCOUNTER
----- Message from Aron Ellington MD sent at 4/28/2025 12:28 PM CDT -----  Liver tests are stable. No changes in his immunosuppression. Please continue to monitor labs per transplant protocol.  ----- Message -----  From: Diya Walker  Sent: 4/24/2025   9:17 AM CDT  To: Aron Ellington MD

## 2025-05-08 ENCOUNTER — PATIENT MESSAGE (OUTPATIENT)
Dept: TRANSPLANT | Facility: CLINIC | Age: 40
End: 2025-05-08
Payer: COMMERCIAL

## 2025-06-04 ENCOUNTER — PATIENT MESSAGE (OUTPATIENT)
Dept: TRANSPLANT | Facility: CLINIC | Age: 40
End: 2025-06-04
Payer: COMMERCIAL

## 2025-06-04 LAB
EXT ALBUMIN: 4.6
EXT ALKALINE PHOSPHATASE: 34
EXT ALT: 16
EXT AST: 18
EXT BILIRUBIN TOTAL: 0.8
EXT BUN: 14
EXT CALCIUM: 9.2
EXT CHLORIDE: 105
EXT CO2: 30
EXT CREATININE: 0.9 MG/DL
EXT EOSINOPHIL%: 1.4
EXT GFR MDRD NON AF AMER: 109
EXT GLUCOSE: 103
EXT HEMATOCRIT: 39.5
EXT HEMOGLOBIN: 13.3
EXT LYMPH%: 35.7
EXT MONOCYTES%: 7.8
EXT PLATELETS: 165
EXT POTASSIUM: 4.5
EXT PROTEIN TOTAL: 6.6
EXT SEGS%: 54
EXT SODIUM: 137 MMOL/L
EXT TACROLIMUS LVL: 3.6
EXT WBC: 4.4
PETH BLD-MCNC: NEGATIVE NG/ML

## 2025-06-09 ENCOUNTER — PATIENT MESSAGE (OUTPATIENT)
Dept: TRANSPLANT | Facility: CLINIC | Age: 40
End: 2025-06-09
Payer: COMMERCIAL

## 2025-06-09 ENCOUNTER — RESULTS FOLLOW-UP (OUTPATIENT)
Dept: HEPATOLOGY | Facility: CLINIC | Age: 40
End: 2025-06-09
Payer: COMMERCIAL

## 2025-06-09 NOTE — TELEPHONE ENCOUNTER
----- Message from Aron Ellington MD sent at 6/9/2025  8:29 AM CDT -----  Liver tests are stable. No changes in his immunosuppression. Please continue to monitor labs per transplant protocol.  ----- Message -----  From: Diya Walker  Sent: 6/4/2025   4:19 PM CDT  To: Aron Ellington MD

## 2025-06-09 NOTE — TELEPHONE ENCOUNTER
Patient notified and instructed via [facility]:    Per :   Liver tests are stable. No changes in his immunosuppression. Please continue to monitor labs per transplant protocol.   Next labs due 7/14/25; thanks.

## 2025-07-17 LAB
EXT ALBUMIN: 4.7
EXT ALKALINE PHOSPHATASE: 34
EXT ALT: 13
EXT AST: 16
EXT BILIRUBIN TOTAL: 0.8
EXT BUN: 14
EXT CALCIUM: 9.3
EXT CHLORIDE: 105
EXT CO2: 24
EXT CREATININE: 0.99 MG/DL
EXT EOSINOPHIL%: 1.6
EXT GFR MDRD NON AF AMER: 99
EXT GLUCOSE: 92
EXT HEMATOCRIT: 39.4
EXT HEMOGLOBIN: 13.1
EXT LYMPH%: 21.8
EXT MONOCYTES%: 5.7
EXT PLATELETS: 161
EXT POTASSIUM: 4.4
EXT PROTEIN TOTAL: 6.4
EXT SEGS%: 70.1
EXT SODIUM: 137 MMOL/L
EXT TACROLIMUS LVL: 5
EXT WBC: 5

## 2025-07-23 ENCOUNTER — TELEPHONE (OUTPATIENT)
Dept: TRANSPLANT | Facility: CLINIC | Age: 40
End: 2025-07-23
Payer: COMMERCIAL

## 2025-07-23 ENCOUNTER — PATIENT MESSAGE (OUTPATIENT)
Dept: TRANSPLANT | Facility: CLINIC | Age: 40
End: 2025-07-23
Payer: COMMERCIAL

## 2025-07-23 NOTE — TELEPHONE ENCOUNTER
Copied from CRM #[id number]. Topic: General Inquiry - Test Results  >> Jul 23, 2025  2:38 PM Leila wrote:     Consult/Advisory     Name Of Caller:Sukhdeep Grimes         Contact Preference:[phone] (home)      Nature of call:Patient is calling to speak to Diya and get lab results. Requesting a message in portal.

## 2025-07-30 ENCOUNTER — RESULTS FOLLOW-UP (OUTPATIENT)
Dept: HEPATOLOGY | Facility: HOSPITAL | Age: 40
End: 2025-07-30
Payer: COMMERCIAL

## 2025-07-30 ENCOUNTER — PATIENT MESSAGE (OUTPATIENT)
Dept: TRANSPLANT | Facility: CLINIC | Age: 40
End: 2025-07-30
Payer: COMMERCIAL

## 2025-07-30 NOTE — TELEPHONE ENCOUNTER
----- Message from Aron Ellington MD sent at 7/30/2025 12:16 PM CDT -----  Liver tests are stable. No changes in his immunosuppression. Please continue to monitor labs per transplant protocol.  ----- Message -----  From: Diya Walker  Sent: 7/17/2025   9:46 AM CDT  To: Aron Ellington MD

## 2025-07-30 NOTE — TELEPHONE ENCOUNTER
Patient notified and instructed via [facility]:    Your labs have been reviewed by ; results were stable. No medicine changes were made. Repeat labs due every 8 weeks now - next due on 9/15/25, thanks.

## 2025-08-29 ENCOUNTER — TELEPHONE (OUTPATIENT)
Dept: TRANSPLANT | Facility: CLINIC | Age: 40
End: 2025-08-29
Payer: COMMERCIAL

## (undated) DEVICE — HANDSET ARGON PLUS

## (undated) DEVICE — CLIP SPRING 6MM

## (undated) DEVICE — COVER LIGHT HANDLE 80/CA

## (undated) DEVICE — SEE MEDLINE ITEM 156901

## (undated) DEVICE — ELECTRODE EMG 24 LEADWIRE

## (undated) DEVICE — SUT 4-0 12-18IN SILK BLACK

## (undated) DEVICE — ELECTRODE PAD DEFIB STERILE

## (undated) DEVICE — SUT SILK 3-0 STRANDS 30IN

## (undated) DEVICE — TUBING NEPTUNE 2 SMOKE 10IN

## (undated) DEVICE — SUT 7/0 24IN PROLENE BL MO

## (undated) DEVICE — SET IV ADMIN 15DROP 3 CARESITE

## (undated) DEVICE — EVACUATOR WOUND BULB 100CC

## (undated) DEVICE — GOWN SURGICAL X-LARGE

## (undated) DEVICE — SET EXTENSION STERILE 30IN

## (undated) DEVICE — SUT 3-0 12-18IN SILK

## (undated) DEVICE — WIPE ESENTA BARR STNG FREE 3ML

## (undated) DEVICE — SUT 2-0 12-18IN SILK

## (undated) DEVICE — SEALER LIGASURE MARYLAND 23CM

## (undated) DEVICE — SUT PROLENE 5-0 36IN C-1

## (undated) DEVICE — CATH URETHRAL RED RUBBER 18FR

## (undated) DEVICE — SUT 4-0 12-30IN SILK

## (undated) DEVICE — SUT PROLENE 3-0 SH DA 36 BL

## (undated) DEVICE — KIT SAHARA DRAPE DRAW/LIFT

## (undated) DEVICE — DRAIN CHANNEL ROUND 19FR

## (undated) DEVICE — DRESSING ADH ISLAND 3.6 X 14

## (undated) DEVICE — KIT SURGIFLO HEMOSTATIC MATRIX

## (undated) DEVICE — STAPLER SKIN PROXIMATE WIDE

## (undated) DEVICE — PAD K-THERMIA 24IN X 60IN

## (undated) DEVICE — SYR ONLY LUER LOCK 20CC

## (undated) DEVICE — TOWEL OR XRAY WHITE 17X26IN

## (undated) DEVICE — SUT SILK 2-0 STRANDS 30IN

## (undated) DEVICE — DECANTER FLUID TRNSF WHITE 9IN

## (undated) DEVICE — DRAPE CORETEMP FLD WRM 56X62IN

## (undated) DEVICE — SUT PROLENE 6-0 BV-1 30IN

## (undated) DEVICE — TRAY CATH FOL SIL URIMTR 16FR

## (undated) DEVICE — CONNECTOR TUBING STR 5 IN 1

## (undated) DEVICE — SUT 1 36IN PDS II VIO MONO

## (undated) DEVICE — NDL MONOPTY BIOPSY 14GX10CM

## (undated) DEVICE — SUT SILK 3-0 SH 18IN BLACK

## (undated) DEVICE — HEMOSTAT SURGICEL NU-KNIT 6X9

## (undated) DEVICE — SUT ETHILON 3-0 PS2 18 BLK

## (undated) DEVICE — FIBRILLAR ABS HEMOSTAT 4X4

## (undated) DEVICE — SUT PDS BV 6-0

## (undated) DEVICE — SUT SILK 0 STRANDS 30IN BLK

## (undated) DEVICE — SOL NS 1000CC

## (undated) DEVICE — BOOT AIR FLUID HEEL ADLT STD

## (undated) DEVICE — SUT 2/0 30IN SILK BLK BRAI

## (undated) DEVICE — SUT PROLENE 4-0 SH BLU 36IN

## (undated) DEVICE — DRAPE BAG ISOLATION 20 X 20

## (undated) DEVICE — TIP YANKAUERS BULB NO VENT

## (undated) DEVICE — BLADE 4 INCH EDGE UN-INS

## (undated) DEVICE — DRESSING AQUACEL SACRAL 9 X 9

## (undated) DEVICE — ELECTRODE REM PLYHSV RETURN 9